# Patient Record
Sex: FEMALE | Race: WHITE | NOT HISPANIC OR LATINO | Employment: OTHER | URBAN - METROPOLITAN AREA
[De-identification: names, ages, dates, MRNs, and addresses within clinical notes are randomized per-mention and may not be internally consistent; named-entity substitution may affect disease eponyms.]

---

## 2017-09-26 ENCOUNTER — AMBULATORY - HEALTHEAST (OUTPATIENT)
Dept: ADMINISTRATIVE | Facility: CLINIC | Age: 62
End: 2017-09-26

## 2017-09-26 RX ORDER — AMITRIPTYLINE HYDROCHLORIDE 150 MG/1
150 TABLET ORAL AT BEDTIME
Status: SHIPPED | COMMUNITY
Start: 2017-09-26

## 2017-09-26 RX ORDER — FERROUS SULFATE 325(65) MG
1 TABLET ORAL
Status: SHIPPED | COMMUNITY
Start: 2017-09-26

## 2017-09-26 RX ORDER — METOPROLOL SUCCINATE 25 MG/1
25 TABLET, EXTENDED RELEASE ORAL DAILY
Status: SHIPPED | COMMUNITY
Start: 2020-04-02

## 2017-09-26 RX ORDER — ROSUVASTATIN CALCIUM 20 MG/1
20 TABLET, COATED ORAL AT BEDTIME
Status: SHIPPED | COMMUNITY
Start: 2017-09-26

## 2017-09-26 RX ORDER — LISINOPRIL 10 MG/1
20 TABLET ORAL DAILY
Status: SHIPPED | COMMUNITY
Start: 2017-09-26

## 2017-09-26 RX ORDER — NITROGLYCERIN 0.4 MG/1
0.4 TABLET SUBLINGUAL EVERY 5 MIN PRN
Status: SHIPPED | COMMUNITY
Start: 2017-09-26

## 2017-09-28 ENCOUNTER — OFFICE VISIT - HEALTHEAST (OUTPATIENT)
Dept: GERIATRICS | Facility: CLINIC | Age: 62
End: 2017-09-28

## 2017-09-28 DIAGNOSIS — Z79.4 TYPE 2 DIABETES MELLITUS WITH HYPERGLYCEMIA, WITH LONG-TERM CURRENT USE OF INSULIN (H): ICD-10-CM

## 2017-09-28 DIAGNOSIS — J44.9 COPD (CHRONIC OBSTRUCTIVE PULMONARY DISEASE) (H): ICD-10-CM

## 2017-09-28 DIAGNOSIS — N10 ACUTE PYELONEPHRITIS: ICD-10-CM

## 2017-09-28 DIAGNOSIS — E87.1 HYPONATREMIA: ICD-10-CM

## 2017-09-28 DIAGNOSIS — E87.6 HYPOKALEMIA: ICD-10-CM

## 2017-09-28 DIAGNOSIS — E11.65 TYPE 2 DIABETES MELLITUS WITH HYPERGLYCEMIA, WITH LONG-TERM CURRENT USE OF INSULIN (H): ICD-10-CM

## 2017-09-28 DIAGNOSIS — R10.9 ACUTE ABDOMINAL PAIN IN RIGHT FLANK: ICD-10-CM

## 2017-09-28 DIAGNOSIS — I25.10 CORONARY ARTERY DISEASE: ICD-10-CM

## 2017-09-29 ENCOUNTER — OFFICE VISIT - HEALTHEAST (OUTPATIENT)
Dept: GERIATRICS | Facility: CLINIC | Age: 62
End: 2017-09-29

## 2017-09-29 DIAGNOSIS — N17.9 AKI (ACUTE KIDNEY INJURY) (H): ICD-10-CM

## 2017-09-29 DIAGNOSIS — J43.9 PULMONARY EMPHYSEMA (H): ICD-10-CM

## 2017-09-29 DIAGNOSIS — Z79.4 TYPE 2 DIABETES MELLITUS WITH HYPERGLYCEMIA, WITH LONG-TERM CURRENT USE OF INSULIN (H): ICD-10-CM

## 2017-09-29 DIAGNOSIS — K59.01 SLOW TRANSIT CONSTIPATION: ICD-10-CM

## 2017-09-29 DIAGNOSIS — E34.9 HYPERTENSION SECONDARY TO ENDOCRINE DISORDER WITH GOAL BLOOD PRESSURE LESS THAN 130/80: ICD-10-CM

## 2017-09-29 DIAGNOSIS — N10 ACUTE PYELONEPHRITIS: ICD-10-CM

## 2017-09-29 DIAGNOSIS — E11.65 TYPE 2 DIABETES MELLITUS WITH HYPERGLYCEMIA, WITH LONG-TERM CURRENT USE OF INSULIN (H): ICD-10-CM

## 2017-09-29 DIAGNOSIS — I15.2 HYPERTENSION SECONDARY TO ENDOCRINE DISORDER WITH GOAL BLOOD PRESSURE LESS THAN 130/80: ICD-10-CM

## 2017-10-02 ENCOUNTER — RECORDS - HEALTHEAST (OUTPATIENT)
Dept: LAB | Facility: CLINIC | Age: 62
End: 2017-10-02

## 2017-10-03 ENCOUNTER — OFFICE VISIT - HEALTHEAST (OUTPATIENT)
Dept: GERIATRICS | Facility: CLINIC | Age: 62
End: 2017-10-03

## 2017-10-03 DIAGNOSIS — E11.65 TYPE 2 DIABETES MELLITUS WITH HYPERGLYCEMIA, WITH LONG-TERM CURRENT USE OF INSULIN (H): ICD-10-CM

## 2017-10-03 DIAGNOSIS — Z79.4 TYPE 2 DIABETES MELLITUS WITH HYPERGLYCEMIA, WITH LONG-TERM CURRENT USE OF INSULIN (H): ICD-10-CM

## 2017-10-03 DIAGNOSIS — N12 PYELONEPHRITIS: ICD-10-CM

## 2017-10-03 DIAGNOSIS — I15.2 HYPERTENSION DUE TO ENDOCRINE DISORDER: ICD-10-CM

## 2017-10-03 DIAGNOSIS — D64.9 ANEMIA: ICD-10-CM

## 2017-10-03 LAB — HBA1C MFR BLD: 16.4 % (ref 4.2–6.1)

## 2017-10-06 ENCOUNTER — OFFICE VISIT - HEALTHEAST (OUTPATIENT)
Dept: GERIATRICS | Facility: CLINIC | Age: 62
End: 2017-10-06

## 2017-10-06 DIAGNOSIS — N12 PYELONEPHRITIS: ICD-10-CM

## 2017-10-06 DIAGNOSIS — Z79.4 TYPE 2 DIABETES MELLITUS WITH HYPERGLYCEMIA, WITH LONG-TERM CURRENT USE OF INSULIN (H): ICD-10-CM

## 2017-10-06 DIAGNOSIS — E03.9 HYPOTHYROIDISM: ICD-10-CM

## 2017-10-06 DIAGNOSIS — E11.65 TYPE 2 DIABETES MELLITUS WITH HYPERGLYCEMIA, WITH LONG-TERM CURRENT USE OF INSULIN (H): ICD-10-CM

## 2017-10-06 DIAGNOSIS — N10 ACUTE PYELONEPHRITIS: ICD-10-CM

## 2017-10-09 ENCOUNTER — AMBULATORY - HEALTHEAST (OUTPATIENT)
Dept: GERIATRICS | Facility: CLINIC | Age: 62
End: 2017-10-09

## 2018-04-23 ENCOUNTER — RECORDS - HEALTHEAST (OUTPATIENT)
Dept: LAB | Facility: CLINIC | Age: 63
End: 2018-04-23

## 2018-04-23 LAB
ALBUMIN SERPL-MCNC: 3 G/DL (ref 3.5–5)
ALP SERPL-CCNC: 262 U/L (ref 45–120)
ALT SERPL W P-5'-P-CCNC: 22 U/L (ref 0–45)
ANION GAP SERPL CALCULATED.3IONS-SCNC: 9 MMOL/L (ref 5–18)
AST SERPL W P-5'-P-CCNC: 19 U/L (ref 0–40)
BILIRUB SERPL-MCNC: 0.4 MG/DL (ref 0–1)
BUN SERPL-MCNC: 7 MG/DL (ref 8–22)
CALCIUM SERPL-MCNC: 9 MG/DL (ref 8.5–10.5)
CHLORIDE BLD-SCNC: 104 MMOL/L (ref 98–107)
CHOLEST SERPL-MCNC: 276 MG/DL
CO2 SERPL-SCNC: 26 MMOL/L (ref 22–31)
CREAT SERPL-MCNC: 0.76 MG/DL (ref 0.6–1.1)
FASTING STATUS PATIENT QL REPORTED: ABNORMAL
GFR SERPL CREATININE-BSD FRML MDRD: >60 ML/MIN/1.73M2
GLUCOSE BLD-MCNC: 86 MG/DL (ref 70–125)
HDLC SERPL-MCNC: 51 MG/DL
LDLC SERPL CALC-MCNC: 176 MG/DL
POTASSIUM BLD-SCNC: 4.8 MMOL/L (ref 3.5–5)
PROT SERPL-MCNC: 6.1 G/DL (ref 6–8)
SODIUM SERPL-SCNC: 139 MMOL/L (ref 136–145)
TRIGL SERPL-MCNC: 246 MG/DL

## 2018-05-31 ENCOUNTER — RECORDS - HEALTHEAST (OUTPATIENT)
Dept: LAB | Facility: CLINIC | Age: 63
End: 2018-05-31

## 2018-05-31 LAB
ALBUMIN SERPL-MCNC: 3.1 G/DL (ref 3.5–5)
ALP SERPL-CCNC: 302 U/L (ref 45–120)
ALT SERPL W P-5'-P-CCNC: 50 U/L (ref 0–45)
ANION GAP SERPL CALCULATED.3IONS-SCNC: 12 MMOL/L (ref 5–18)
AST SERPL W P-5'-P-CCNC: 35 U/L (ref 0–40)
BILIRUB SERPL-MCNC: 0.2 MG/DL (ref 0–1)
BUN SERPL-MCNC: 16 MG/DL (ref 8–22)
CALCIUM SERPL-MCNC: 8.7 MG/DL (ref 8.5–10.5)
CHLORIDE BLD-SCNC: 104 MMOL/L (ref 98–107)
CHOLEST SERPL-MCNC: 210 MG/DL
CO2 SERPL-SCNC: 20 MMOL/L (ref 22–31)
CREAT SERPL-MCNC: 0.95 MG/DL (ref 0.6–1.1)
CREAT UR-MCNC: 13.7 MG/DL
FASTING STATUS PATIENT QL REPORTED: NO
GFR SERPL CREATININE-BSD FRML MDRD: 59 ML/MIN/1.73M2
GLUCOSE BLD-MCNC: 329 MG/DL (ref 70–125)
HDLC SERPL-MCNC: 69 MG/DL
LDLC SERPL CALC-MCNC: 84 MG/DL
MICROALBUMIN UR-MCNC: 5.89 MG/DL (ref 0–1.99)
MICROALBUMIN/CREAT UR: 429.9 MG/G
POTASSIUM BLD-SCNC: 4.5 MMOL/L (ref 3.5–5)
PROT SERPL-MCNC: 6.3 G/DL (ref 6–8)
SODIUM SERPL-SCNC: 136 MMOL/L (ref 136–145)
TRIGL SERPL-MCNC: 285 MG/DL
TSH SERPL DL<=0.005 MIU/L-ACNC: 2.95 UIU/ML (ref 0.3–5)

## 2018-06-02 LAB — BACTERIA SPEC CULT: ABNORMAL

## 2018-10-03 ENCOUNTER — RECORDS - HEALTHEAST (OUTPATIENT)
Dept: LAB | Facility: CLINIC | Age: 63
End: 2018-10-03

## 2018-10-03 LAB
ANION GAP SERPL CALCULATED.3IONS-SCNC: 11 MMOL/L (ref 5–18)
BUN SERPL-MCNC: 19 MG/DL (ref 8–22)
CALCIUM SERPL-MCNC: 9.3 MG/DL (ref 8.5–10.5)
CHLORIDE BLD-SCNC: 103 MMOL/L (ref 98–107)
CHOLEST SERPL-MCNC: 208 MG/DL
CO2 SERPL-SCNC: 28 MMOL/L (ref 22–31)
CREAT SERPL-MCNC: 0.81 MG/DL (ref 0.6–1.1)
FASTING STATUS PATIENT QL REPORTED: NO
GFR SERPL CREATININE-BSD FRML MDRD: >60 ML/MIN/1.73M2
GLUCOSE BLD-MCNC: 68 MG/DL (ref 70–125)
HDLC SERPL-MCNC: 58 MG/DL
LDLC SERPL CALC-MCNC: 119 MG/DL
POTASSIUM BLD-SCNC: 4.6 MMOL/L (ref 3.5–5)
SODIUM SERPL-SCNC: 142 MMOL/L (ref 136–145)
TRIGL SERPL-MCNC: 156 MG/DL

## 2019-09-04 ENCOUNTER — OFFICE VISIT - HEALTHEAST (OUTPATIENT)
Dept: GERIATRICS | Facility: CLINIC | Age: 64
End: 2019-09-04

## 2019-09-04 DIAGNOSIS — J43.9 PULMONARY EMPHYSEMA, UNSPECIFIED EMPHYSEMA TYPE (H): ICD-10-CM

## 2019-09-04 DIAGNOSIS — H91.91 HEARING LOSS OF RIGHT EAR, UNSPECIFIED HEARING LOSS TYPE: ICD-10-CM

## 2019-09-04 DIAGNOSIS — I15.2 HYPERTENSION DUE TO ENDOCRINE DISORDER: ICD-10-CM

## 2019-09-04 DIAGNOSIS — R13.10 DYSPHAGIA, UNSPECIFIED TYPE: ICD-10-CM

## 2019-09-04 DIAGNOSIS — E11.42 DIABETIC PERIPHERAL NEUROPATHY (H): ICD-10-CM

## 2019-09-04 DIAGNOSIS — H93.11 TINNITUS OF RIGHT EAR: ICD-10-CM

## 2019-09-04 DIAGNOSIS — R42 DIZZINESS: ICD-10-CM

## 2019-09-04 DIAGNOSIS — R20.0 RIGHT FACIAL NUMBNESS: ICD-10-CM

## 2019-09-04 DIAGNOSIS — R47.89 WORD FINDING DIFFICULTY: ICD-10-CM

## 2019-09-04 DIAGNOSIS — R26.9 GAIT DISTURBANCE: ICD-10-CM

## 2019-09-04 ASSESSMENT — MIFFLIN-ST. JEOR: SCORE: 1251.21

## 2019-09-06 ENCOUNTER — OFFICE VISIT - HEALTHEAST (OUTPATIENT)
Dept: GERIATRICS | Facility: CLINIC | Age: 64
End: 2019-09-06

## 2019-09-06 DIAGNOSIS — R42 DIZZINESS: ICD-10-CM

## 2019-09-06 DIAGNOSIS — G47.00 INSOMNIA, UNSPECIFIED TYPE: ICD-10-CM

## 2019-09-06 DIAGNOSIS — Z79.4 TYPE 2 DIABETES MELLITUS WITH HYPERGLYCEMIA, WITH LONG-TERM CURRENT USE OF INSULIN (H): ICD-10-CM

## 2019-09-06 DIAGNOSIS — E86.0 ACUTE DEHYDRATION: ICD-10-CM

## 2019-09-06 DIAGNOSIS — E11.319 DIABETIC RETINOPATHY OF BOTH EYES ASSOCIATED WITH TYPE 2 DIABETES MELLITUS, MACULAR EDEMA PRESENCE UNSPECIFIED, UNSPECIFIED RETINOPATHY SEVERITY (H): ICD-10-CM

## 2019-09-06 DIAGNOSIS — E11.65 TYPE 2 DIABETES MELLITUS WITH HYPERGLYCEMIA, WITH LONG-TERM CURRENT USE OF INSULIN (H): ICD-10-CM

## 2019-09-06 DIAGNOSIS — I10 ESSENTIAL HYPERTENSION: ICD-10-CM

## 2019-09-06 DIAGNOSIS — E11.42 DIABETIC PERIPHERAL NEUROPATHY (H): ICD-10-CM

## 2019-09-06 DIAGNOSIS — F32.A DEPRESSION, UNSPECIFIED DEPRESSION TYPE: ICD-10-CM

## 2019-09-06 DIAGNOSIS — N30.00 ACUTE CYSTITIS WITHOUT HEMATURIA: ICD-10-CM

## 2019-09-06 DIAGNOSIS — I25.10 CORONARY ARTERY DISEASE INVOLVING NATIVE CORONARY ARTERY OF NATIVE HEART WITHOUT ANGINA PECTORIS: ICD-10-CM

## 2019-09-09 ENCOUNTER — OFFICE VISIT - HEALTHEAST (OUTPATIENT)
Dept: GERIATRICS | Facility: CLINIC | Age: 64
End: 2019-09-09

## 2019-09-09 DIAGNOSIS — H93.11 TINNITUS OF RIGHT EAR: ICD-10-CM

## 2019-09-09 DIAGNOSIS — E11.42 DIABETIC PERIPHERAL NEUROPATHY (H): ICD-10-CM

## 2019-09-09 DIAGNOSIS — I15.2 HYPERTENSION DUE TO ENDOCRINE DISORDER: ICD-10-CM

## 2019-09-09 DIAGNOSIS — R20.0 RIGHT FACIAL NUMBNESS: ICD-10-CM

## 2019-09-09 DIAGNOSIS — R26.9 GAIT DISTURBANCE: ICD-10-CM

## 2019-09-09 DIAGNOSIS — Z79.4 TYPE 2 DIABETES MELLITUS WITH HYPERGLYCEMIA, WITH LONG-TERM CURRENT USE OF INSULIN (H): ICD-10-CM

## 2019-09-09 DIAGNOSIS — E11.65 TYPE 2 DIABETES MELLITUS WITH HYPERGLYCEMIA, WITH LONG-TERM CURRENT USE OF INSULIN (H): ICD-10-CM

## 2019-09-09 DIAGNOSIS — R42 DIZZINESS: ICD-10-CM

## 2019-09-09 DIAGNOSIS — H91.91 HEARING LOSS OF RIGHT EAR, UNSPECIFIED HEARING LOSS TYPE: ICD-10-CM

## 2019-09-09 DIAGNOSIS — J43.9 PULMONARY EMPHYSEMA, UNSPECIFIED EMPHYSEMA TYPE (H): ICD-10-CM

## 2019-09-09 RX ORDER — ALBUTEROL SULFATE 90 UG/1
2 AEROSOL, METERED RESPIRATORY (INHALATION) 4 TIMES DAILY PRN
Status: SHIPPED | COMMUNITY
Start: 2019-09-09

## 2019-09-09 ASSESSMENT — MIFFLIN-ST. JEOR: SCORE: 1243.05

## 2019-09-11 ENCOUNTER — OFFICE VISIT - HEALTHEAST (OUTPATIENT)
Dept: GERIATRICS | Facility: CLINIC | Age: 64
End: 2019-09-11

## 2019-09-11 DIAGNOSIS — E11.65 TYPE 2 DIABETES MELLITUS WITH HYPERGLYCEMIA, WITH LONG-TERM CURRENT USE OF INSULIN (H): ICD-10-CM

## 2019-09-11 DIAGNOSIS — E11.42 DIABETIC PERIPHERAL NEUROPATHY (H): ICD-10-CM

## 2019-09-11 DIAGNOSIS — H93.11 TINNITUS OF RIGHT EAR: ICD-10-CM

## 2019-09-11 DIAGNOSIS — Z79.4 TYPE 2 DIABETES MELLITUS WITH HYPERGLYCEMIA, WITH LONG-TERM CURRENT USE OF INSULIN (H): ICD-10-CM

## 2019-09-11 DIAGNOSIS — R26.9 GAIT DISTURBANCE: ICD-10-CM

## 2019-09-11 DIAGNOSIS — J43.9 PULMONARY EMPHYSEMA, UNSPECIFIED EMPHYSEMA TYPE (H): ICD-10-CM

## 2019-09-11 DIAGNOSIS — R42 DIZZINESS: ICD-10-CM

## 2019-09-11 DIAGNOSIS — R20.0 RIGHT FACIAL NUMBNESS: ICD-10-CM

## 2019-09-11 DIAGNOSIS — I15.2 HYPERTENSION DUE TO ENDOCRINE DISORDER: ICD-10-CM

## 2019-09-11 DIAGNOSIS — H91.91 HEARING LOSS OF RIGHT EAR, UNSPECIFIED HEARING LOSS TYPE: ICD-10-CM

## 2019-09-11 ASSESSMENT — MIFFLIN-ST. JEOR: SCORE: 1243.05

## 2019-09-13 ENCOUNTER — OFFICE VISIT - HEALTHEAST (OUTPATIENT)
Dept: GERIATRICS | Facility: CLINIC | Age: 64
End: 2019-09-13

## 2019-09-13 DIAGNOSIS — E11.42 DIABETIC PERIPHERAL NEUROPATHY (H): ICD-10-CM

## 2019-09-13 DIAGNOSIS — F32.A DEPRESSION, UNSPECIFIED DEPRESSION TYPE: ICD-10-CM

## 2019-09-13 DIAGNOSIS — E11.311 DIABETIC RETINOPATHY OF BOTH EYES WITH MACULAR EDEMA ASSOCIATED WITH TYPE 2 DIABETES MELLITUS, UNSPECIFIED RETINOPATHY SEVERITY (H): ICD-10-CM

## 2019-09-13 DIAGNOSIS — E11.65 TYPE 2 DIABETES MELLITUS WITH HYPERGLYCEMIA, WITH LONG-TERM CURRENT USE OF INSULIN (H): ICD-10-CM

## 2019-09-13 DIAGNOSIS — R42 DIZZINESS: ICD-10-CM

## 2019-09-13 DIAGNOSIS — G47.00 INSOMNIA, UNSPECIFIED TYPE: ICD-10-CM

## 2019-09-13 DIAGNOSIS — Z79.4 TYPE 2 DIABETES MELLITUS WITH HYPERGLYCEMIA, WITH LONG-TERM CURRENT USE OF INSULIN (H): ICD-10-CM

## 2019-09-13 DIAGNOSIS — I10 HYPERTENSION, UNSPECIFIED TYPE: ICD-10-CM

## 2019-09-16 ENCOUNTER — OFFICE VISIT - HEALTHEAST (OUTPATIENT)
Dept: GERIATRICS | Facility: CLINIC | Age: 64
End: 2019-09-16

## 2019-09-16 DIAGNOSIS — E11.65 TYPE 2 DIABETES MELLITUS WITH HYPERGLYCEMIA, WITH LONG-TERM CURRENT USE OF INSULIN (H): ICD-10-CM

## 2019-09-16 DIAGNOSIS — H91.91 HEARING LOSS OF RIGHT EAR, UNSPECIFIED HEARING LOSS TYPE: ICD-10-CM

## 2019-09-16 DIAGNOSIS — R42 DIZZINESS: ICD-10-CM

## 2019-09-16 DIAGNOSIS — R26.9 GAIT DISTURBANCE: ICD-10-CM

## 2019-09-16 DIAGNOSIS — J43.9 PULMONARY EMPHYSEMA, UNSPECIFIED EMPHYSEMA TYPE (H): ICD-10-CM

## 2019-09-16 DIAGNOSIS — F32.A DEPRESSION, UNSPECIFIED DEPRESSION TYPE: ICD-10-CM

## 2019-09-16 DIAGNOSIS — H93.11 TINNITUS OF RIGHT EAR: ICD-10-CM

## 2019-09-16 DIAGNOSIS — E11.42 DIABETIC PERIPHERAL NEUROPATHY (H): ICD-10-CM

## 2019-09-16 DIAGNOSIS — I15.2 HYPERTENSION DUE TO ENDOCRINE DISORDER: ICD-10-CM

## 2019-09-16 DIAGNOSIS — Z79.4 TYPE 2 DIABETES MELLITUS WITH HYPERGLYCEMIA, WITH LONG-TERM CURRENT USE OF INSULIN (H): ICD-10-CM

## 2019-09-16 ASSESSMENT — MIFFLIN-ST. JEOR: SCORE: 1276.62

## 2019-09-18 ENCOUNTER — OFFICE VISIT - HEALTHEAST (OUTPATIENT)
Dept: GERIATRICS | Facility: CLINIC | Age: 64
End: 2019-09-18

## 2019-09-18 DIAGNOSIS — H91.91 HEARING LOSS OF RIGHT EAR, UNSPECIFIED HEARING LOSS TYPE: ICD-10-CM

## 2019-09-18 DIAGNOSIS — F32.A DEPRESSION, UNSPECIFIED DEPRESSION TYPE: ICD-10-CM

## 2019-09-18 DIAGNOSIS — I25.10 CORONARY ARTERY DISEASE INVOLVING NATIVE CORONARY ARTERY OF NATIVE HEART WITHOUT ANGINA PECTORIS: ICD-10-CM

## 2019-09-18 DIAGNOSIS — H93.11 TINNITUS OF RIGHT EAR: ICD-10-CM

## 2019-09-18 DIAGNOSIS — R42 DIZZINESS: ICD-10-CM

## 2019-09-18 DIAGNOSIS — J43.9 PULMONARY EMPHYSEMA, UNSPECIFIED EMPHYSEMA TYPE (H): ICD-10-CM

## 2019-09-18 DIAGNOSIS — R26.9 GAIT DISTURBANCE: ICD-10-CM

## 2019-09-18 DIAGNOSIS — E11.65 TYPE 2 DIABETES MELLITUS WITH HYPERGLYCEMIA, WITH LONG-TERM CURRENT USE OF INSULIN (H): ICD-10-CM

## 2019-09-18 DIAGNOSIS — I15.2 HYPERTENSION DUE TO ENDOCRINE DISORDER: ICD-10-CM

## 2019-09-18 DIAGNOSIS — Z79.4 TYPE 2 DIABETES MELLITUS WITH HYPERGLYCEMIA, WITH LONG-TERM CURRENT USE OF INSULIN (H): ICD-10-CM

## 2019-09-18 DIAGNOSIS — E11.42 DIABETIC PERIPHERAL NEUROPATHY (H): ICD-10-CM

## 2019-09-18 ASSESSMENT — MIFFLIN-ST. JEOR: SCORE: 1276.62

## 2019-09-23 ENCOUNTER — AMBULATORY - HEALTHEAST (OUTPATIENT)
Dept: GERIATRICS | Facility: CLINIC | Age: 64
End: 2019-09-23

## 2020-03-14 ENCOUNTER — RECORDS - HEALTHEAST (OUTPATIENT)
Dept: ADMINISTRATIVE | Facility: OTHER | Age: 65
End: 2020-03-14

## 2020-03-21 ENCOUNTER — RECORDS - HEALTHEAST (OUTPATIENT)
Dept: ADMINISTRATIVE | Facility: OTHER | Age: 65
End: 2020-03-21

## 2020-03-23 ENCOUNTER — OFFICE VISIT - HEALTHEAST (OUTPATIENT)
Dept: GERIATRICS | Facility: CLINIC | Age: 65
End: 2020-03-23

## 2020-03-23 DIAGNOSIS — H90.5 SENSORINEURAL HEARING LOSS (SNHL) OF RIGHT EAR, UNSPECIFIED HEARING STATUS ON CONTRALATERAL SIDE: ICD-10-CM

## 2020-03-23 DIAGNOSIS — E11.65 TYPE 2 DIABETES MELLITUS WITH HYPERGLYCEMIA, WITH LONG-TERM CURRENT USE OF INSULIN (H): ICD-10-CM

## 2020-03-23 DIAGNOSIS — S42.351D CLOSED DISPLACED COMMINUTED FRACTURE OF SHAFT OF RIGHT HUMERUS WITH ROUTINE HEALING: ICD-10-CM

## 2020-03-23 DIAGNOSIS — E03.9 ACQUIRED HYPOTHYROIDISM: ICD-10-CM

## 2020-03-23 DIAGNOSIS — E11.42 DIABETIC PERIPHERAL NEUROPATHY (H): ICD-10-CM

## 2020-03-23 DIAGNOSIS — E78.5 HYPERLIPIDEMIA, UNSPECIFIED HYPERLIPIDEMIA TYPE: ICD-10-CM

## 2020-03-23 DIAGNOSIS — Z79.4 TYPE 2 DIABETES MELLITUS WITH HYPERGLYCEMIA, WITH LONG-TERM CURRENT USE OF INSULIN (H): ICD-10-CM

## 2020-03-23 DIAGNOSIS — R47.89 WORD FINDING DIFFICULTY: ICD-10-CM

## 2020-03-23 DIAGNOSIS — F33.9 EPISODE OF RECURRENT MAJOR DEPRESSIVE DISORDER, UNSPECIFIED DEPRESSION EPISODE SEVERITY (H): ICD-10-CM

## 2020-03-23 RX ORDER — POLYETHYLENE GLYCOL 3350 17 G/17G
17 POWDER, FOR SOLUTION ORAL DAILY PRN
Status: SHIPPED | COMMUNITY
Start: 2020-03-23

## 2020-03-23 RX ORDER — BUSPIRONE HYDROCHLORIDE 10 MG/1
20 TABLET ORAL AT BEDTIME
Status: SHIPPED | COMMUNITY
Start: 2020-03-23

## 2020-03-23 RX ORDER — BUDESONIDE AND FORMOTEROL FUMARATE DIHYDRATE 160; 4.5 UG/1; UG/1
2 AEROSOL RESPIRATORY (INHALATION) 2 TIMES DAILY
Status: SHIPPED | COMMUNITY
Start: 2020-03-23

## 2020-03-23 RX ORDER — ACETAMINOPHEN 500 MG
1000 TABLET ORAL 3 TIMES DAILY
Status: SHIPPED | COMMUNITY
Start: 2020-03-23

## 2020-03-23 RX ORDER — PHENOL 1.4 %
10 AEROSOL, SPRAY (ML) MUCOUS MEMBRANE AT BEDTIME
Status: SHIPPED | COMMUNITY
Start: 2020-03-23

## 2020-03-23 ASSESSMENT — MIFFLIN-ST. JEOR: SCORE: 1379.13

## 2020-03-24 ENCOUNTER — AMBULATORY - HEALTHEAST (OUTPATIENT)
Dept: ADMINISTRATIVE | Facility: CLINIC | Age: 65
End: 2020-03-24

## 2020-03-24 RX ORDER — METFORMIN HYDROCHLORIDE 750 MG/1
750 TABLET, EXTENDED RELEASE ORAL 2 TIMES DAILY WITH MEALS
Status: SHIPPED | COMMUNITY
Start: 2020-03-24

## 2020-03-24 RX ORDER — LEVOTHYROXINE SODIUM 50 UG/1
50 TABLET ORAL DAILY
Status: SHIPPED | COMMUNITY
Start: 2020-03-24

## 2020-03-25 ENCOUNTER — OFFICE VISIT - HEALTHEAST (OUTPATIENT)
Dept: GERIATRICS | Facility: CLINIC | Age: 65
End: 2020-03-25

## 2020-03-25 DIAGNOSIS — E03.9 ACQUIRED HYPOTHYROIDISM: ICD-10-CM

## 2020-03-25 DIAGNOSIS — R47.89 WORD FINDING DIFFICULTY: ICD-10-CM

## 2020-03-25 DIAGNOSIS — S42.351D CLOSED DISPLACED COMMINUTED FRACTURE OF SHAFT OF RIGHT HUMERUS WITH ROUTINE HEALING: ICD-10-CM

## 2020-03-25 DIAGNOSIS — Z79.4 TYPE 2 DIABETES MELLITUS WITH HYPERGLYCEMIA, WITH LONG-TERM CURRENT USE OF INSULIN (H): ICD-10-CM

## 2020-03-25 DIAGNOSIS — E11.65 TYPE 2 DIABETES MELLITUS WITH HYPERGLYCEMIA, WITH LONG-TERM CURRENT USE OF INSULIN (H): ICD-10-CM

## 2020-03-25 DIAGNOSIS — I15.2 HYPERTENSION DUE TO ENDOCRINE DISORDER: ICD-10-CM

## 2020-03-25 DIAGNOSIS — E78.5 HYPERLIPIDEMIA, UNSPECIFIED HYPERLIPIDEMIA TYPE: ICD-10-CM

## 2020-03-25 DIAGNOSIS — E11.42 DIABETIC PERIPHERAL NEUROPATHY (H): ICD-10-CM

## 2020-03-25 DIAGNOSIS — F33.9 EPISODE OF RECURRENT MAJOR DEPRESSIVE DISORDER, UNSPECIFIED DEPRESSION EPISODE SEVERITY (H): ICD-10-CM

## 2020-03-25 DIAGNOSIS — H90.5 SENSORINEURAL HEARING LOSS (SNHL) OF RIGHT EAR, UNSPECIFIED HEARING STATUS ON CONTRALATERAL SIDE: ICD-10-CM

## 2020-03-25 ASSESSMENT — MIFFLIN-ST. JEOR: SCORE: 1379.13

## 2020-03-30 ENCOUNTER — OFFICE VISIT - HEALTHEAST (OUTPATIENT)
Dept: GERIATRICS | Facility: CLINIC | Age: 65
End: 2020-03-30

## 2020-03-30 DIAGNOSIS — S42.351D CLOSED DISPLACED COMMINUTED FRACTURE OF SHAFT OF RIGHT HUMERUS WITH ROUTINE HEALING: ICD-10-CM

## 2020-03-30 DIAGNOSIS — F33.9 EPISODE OF RECURRENT MAJOR DEPRESSIVE DISORDER, UNSPECIFIED DEPRESSION EPISODE SEVERITY (H): ICD-10-CM

## 2020-03-30 DIAGNOSIS — E78.5 HYPERLIPIDEMIA, UNSPECIFIED HYPERLIPIDEMIA TYPE: ICD-10-CM

## 2020-03-30 DIAGNOSIS — H90.5 SENSORINEURAL HEARING LOSS (SNHL) OF RIGHT EAR, UNSPECIFIED HEARING STATUS ON CONTRALATERAL SIDE: ICD-10-CM

## 2020-03-30 DIAGNOSIS — E11.65 TYPE 2 DIABETES MELLITUS WITH HYPERGLYCEMIA, WITH LONG-TERM CURRENT USE OF INSULIN (H): ICD-10-CM

## 2020-03-30 DIAGNOSIS — R47.89 WORD FINDING DIFFICULTY: ICD-10-CM

## 2020-03-30 DIAGNOSIS — I15.2 HYPERTENSION DUE TO ENDOCRINE DISORDER: ICD-10-CM

## 2020-03-30 DIAGNOSIS — Z79.4 TYPE 2 DIABETES MELLITUS WITH HYPERGLYCEMIA, WITH LONG-TERM CURRENT USE OF INSULIN (H): ICD-10-CM

## 2020-03-30 DIAGNOSIS — E03.9 ACQUIRED HYPOTHYROIDISM: ICD-10-CM

## 2020-03-30 DIAGNOSIS — E11.42 DIABETIC PERIPHERAL NEUROPATHY (H): ICD-10-CM

## 2020-03-30 ASSESSMENT — MIFFLIN-ST. JEOR: SCORE: 1379.13

## 2020-04-02 ENCOUNTER — OFFICE VISIT - HEALTHEAST (OUTPATIENT)
Dept: GERIATRICS | Facility: CLINIC | Age: 65
End: 2020-04-02

## 2020-04-02 DIAGNOSIS — E11.42 DIABETIC PERIPHERAL NEUROPATHY (H): ICD-10-CM

## 2020-04-02 DIAGNOSIS — E11.65 TYPE 2 DIABETES MELLITUS WITH HYPERGLYCEMIA, WITH LONG-TERM CURRENT USE OF INSULIN (H): ICD-10-CM

## 2020-04-02 DIAGNOSIS — E03.9 ACQUIRED HYPOTHYROIDISM: ICD-10-CM

## 2020-04-02 DIAGNOSIS — F33.9 EPISODE OF RECURRENT MAJOR DEPRESSIVE DISORDER, UNSPECIFIED DEPRESSION EPISODE SEVERITY (H): ICD-10-CM

## 2020-04-02 DIAGNOSIS — Z79.4 TYPE 2 DIABETES MELLITUS WITH HYPERGLYCEMIA, WITH LONG-TERM CURRENT USE OF INSULIN (H): ICD-10-CM

## 2020-04-02 DIAGNOSIS — R47.89 WORD FINDING DIFFICULTY: ICD-10-CM

## 2020-04-02 DIAGNOSIS — I15.2 HYPERTENSION DUE TO ENDOCRINE DISORDER: ICD-10-CM

## 2020-04-02 DIAGNOSIS — H90.5 SENSORINEURAL HEARING LOSS (SNHL) OF RIGHT EAR, UNSPECIFIED HEARING STATUS ON CONTRALATERAL SIDE: ICD-10-CM

## 2020-04-02 DIAGNOSIS — E78.5 HYPERLIPIDEMIA, UNSPECIFIED HYPERLIPIDEMIA TYPE: ICD-10-CM

## 2020-04-02 DIAGNOSIS — S42.351D CLOSED DISPLACED COMMINUTED FRACTURE OF SHAFT OF RIGHT HUMERUS WITH ROUTINE HEALING: ICD-10-CM

## 2020-04-05 ASSESSMENT — MIFFLIN-ST. JEOR: SCORE: 1351.91

## 2020-04-06 ENCOUNTER — OFFICE VISIT - HEALTHEAST (OUTPATIENT)
Dept: GERIATRICS | Facility: CLINIC | Age: 65
End: 2020-04-06

## 2020-04-06 DIAGNOSIS — R47.89 WORD FINDING DIFFICULTY: ICD-10-CM

## 2020-04-06 DIAGNOSIS — I15.2 HYPERTENSION DUE TO ENDOCRINE DISORDER: ICD-10-CM

## 2020-04-06 DIAGNOSIS — H90.5 SENSORINEURAL HEARING LOSS (SNHL) OF RIGHT EAR, UNSPECIFIED HEARING STATUS ON CONTRALATERAL SIDE: ICD-10-CM

## 2020-04-06 DIAGNOSIS — Z79.4 TYPE 2 DIABETES MELLITUS WITH HYPERGLYCEMIA, WITH LONG-TERM CURRENT USE OF INSULIN (H): ICD-10-CM

## 2020-04-06 DIAGNOSIS — S42.351D CLOSED DISPLACED COMMINUTED FRACTURE OF SHAFT OF RIGHT HUMERUS WITH ROUTINE HEALING: ICD-10-CM

## 2020-04-06 DIAGNOSIS — E78.5 HYPERLIPIDEMIA, UNSPECIFIED HYPERLIPIDEMIA TYPE: ICD-10-CM

## 2020-04-06 DIAGNOSIS — E11.65 TYPE 2 DIABETES MELLITUS WITH HYPERGLYCEMIA, WITH LONG-TERM CURRENT USE OF INSULIN (H): ICD-10-CM

## 2020-04-06 DIAGNOSIS — E11.42 DIABETIC PERIPHERAL NEUROPATHY (H): ICD-10-CM

## 2020-04-06 DIAGNOSIS — E03.9 ACQUIRED HYPOTHYROIDISM: ICD-10-CM

## 2020-04-06 DIAGNOSIS — F33.9 EPISODE OF RECURRENT MAJOR DEPRESSIVE DISORDER, UNSPECIFIED DEPRESSION EPISODE SEVERITY (H): ICD-10-CM

## 2020-04-07 ASSESSMENT — MIFFLIN-ST. JEOR: SCORE: 1351.91

## 2020-04-08 ENCOUNTER — OFFICE VISIT - HEALTHEAST (OUTPATIENT)
Dept: GERIATRICS | Facility: CLINIC | Age: 65
End: 2020-04-08

## 2020-04-08 DIAGNOSIS — E03.9 ACQUIRED HYPOTHYROIDISM: ICD-10-CM

## 2020-04-08 DIAGNOSIS — E11.42 DIABETIC PERIPHERAL NEUROPATHY (H): ICD-10-CM

## 2020-04-08 DIAGNOSIS — F33.9 EPISODE OF RECURRENT MAJOR DEPRESSIVE DISORDER, UNSPECIFIED DEPRESSION EPISODE SEVERITY (H): ICD-10-CM

## 2020-04-08 DIAGNOSIS — S42.351D CLOSED DISPLACED COMMINUTED FRACTURE OF SHAFT OF RIGHT HUMERUS WITH ROUTINE HEALING: ICD-10-CM

## 2020-04-08 DIAGNOSIS — R47.89 WORD FINDING DIFFICULTY: ICD-10-CM

## 2020-04-08 DIAGNOSIS — I15.2 HYPERTENSION DUE TO ENDOCRINE DISORDER: ICD-10-CM

## 2020-04-08 DIAGNOSIS — Z79.4 TYPE 2 DIABETES MELLITUS WITH HYPERGLYCEMIA, WITH LONG-TERM CURRENT USE OF INSULIN (H): ICD-10-CM

## 2020-04-08 DIAGNOSIS — E11.65 TYPE 2 DIABETES MELLITUS WITH HYPERGLYCEMIA, WITH LONG-TERM CURRENT USE OF INSULIN (H): ICD-10-CM

## 2020-04-08 DIAGNOSIS — H90.5 SENSORINEURAL HEARING LOSS (SNHL) OF RIGHT EAR, UNSPECIFIED HEARING STATUS ON CONTRALATERAL SIDE: ICD-10-CM

## 2020-04-08 DIAGNOSIS — E78.5 HYPERLIPIDEMIA, UNSPECIFIED HYPERLIPIDEMIA TYPE: ICD-10-CM

## 2020-04-08 ASSESSMENT — MIFFLIN-ST. JEOR: SCORE: 1351.91

## 2020-04-13 ENCOUNTER — AMBULATORY - HEALTHEAST (OUTPATIENT)
Dept: GERIATRICS | Facility: CLINIC | Age: 65
End: 2020-04-13

## 2020-06-08 ENCOUNTER — RECORDS - HEALTHEAST (OUTPATIENT)
Dept: ADMINISTRATIVE | Facility: OTHER | Age: 65
End: 2020-06-08

## 2020-06-09 ENCOUNTER — RECORDS - HEALTHEAST (OUTPATIENT)
Dept: ADMINISTRATIVE | Facility: OTHER | Age: 65
End: 2020-06-09

## 2020-07-14 ENCOUNTER — TELEPHONE (OUTPATIENT)
Dept: BEHAVIORAL HEALTH | Facility: CLINIC | Age: 65
End: 2020-07-14

## 2020-07-14 NOTE — TELEPHONE ENCOUNTER
7/14/20 Received call from client, stated she had a Rule [address] Assessment done at [facility] and she is seeking IP CD TX. She stated a having medicare coverage. Client was informed that the CD program is not a provider of Medicare coverage. EBER

## 2020-07-20 ENCOUNTER — RECORDS - HEALTHEAST (OUTPATIENT)
Dept: ADMINISTRATIVE | Facility: OTHER | Age: 65
End: 2020-07-20

## 2020-07-24 ENCOUNTER — RECORDS - HEALTHEAST (OUTPATIENT)
Dept: ADMINISTRATIVE | Facility: OTHER | Age: 65
End: 2020-07-24

## 2020-07-27 ENCOUNTER — OFFICE VISIT - HEALTHEAST (OUTPATIENT)
Dept: GERIATRICS | Facility: CLINIC | Age: 65
End: 2020-07-27

## 2020-07-27 DIAGNOSIS — J43.9 PULMONARY EMPHYSEMA, UNSPECIFIED EMPHYSEMA TYPE (H): ICD-10-CM

## 2020-07-27 DIAGNOSIS — D63.8 ANEMIA OF CHRONIC DISEASE: ICD-10-CM

## 2020-07-27 DIAGNOSIS — Z79.4 TYPE 2 DIABETES MELLITUS WITH BOTH EYES AFFECTED BY RETINOPATHY AND MACULAR EDEMA, WITH LONG-TERM CURRENT USE OF INSULIN, UNSPECIFIED RETINOPATHY SEVERITY (H): ICD-10-CM

## 2020-07-27 DIAGNOSIS — E11.42 DIABETIC PERIPHERAL NEUROPATHY (H): ICD-10-CM

## 2020-07-27 DIAGNOSIS — I25.10 CORONARY ARTERY DISEASE INVOLVING NATIVE CORONARY ARTERY OF NATIVE HEART WITHOUT ANGINA PECTORIS: ICD-10-CM

## 2020-07-27 DIAGNOSIS — I26.99 OTHER ACUTE PULMONARY EMBOLISM WITHOUT ACUTE COR PULMONALE (H): ICD-10-CM

## 2020-07-27 DIAGNOSIS — E55.9 VITAMIN D DEFICIENCY: ICD-10-CM

## 2020-07-27 DIAGNOSIS — S02.40DD CLOSED FRACTURE OF LEFT SIDE OF MAXILLA WITH ROUTINE HEALING, SUBSEQUENT ENCOUNTER: ICD-10-CM

## 2020-07-27 DIAGNOSIS — E11.21 DIABETIC NEPHROPATHY ASSOCIATED WITH TYPE 2 DIABETES MELLITUS (H): ICD-10-CM

## 2020-07-27 DIAGNOSIS — H90.5 SENSORINEURAL HEARING LOSS (SNHL) OF RIGHT EAR, UNSPECIFIED HEARING STATUS ON CONTRALATERAL SIDE: ICD-10-CM

## 2020-07-27 DIAGNOSIS — E11.311 TYPE 2 DIABETES MELLITUS WITH BOTH EYES AFFECTED BY RETINOPATHY AND MACULAR EDEMA, WITH LONG-TERM CURRENT USE OF INSULIN, UNSPECIFIED RETINOPATHY SEVERITY (H): ICD-10-CM

## 2020-07-27 DIAGNOSIS — F33.9 EPISODE OF RECURRENT MAJOR DEPRESSIVE DISORDER, UNSPECIFIED DEPRESSION EPISODE SEVERITY (H): ICD-10-CM

## 2020-07-27 DIAGNOSIS — I15.2 HYPERTENSION DUE TO ENDOCRINE DISORDER: ICD-10-CM

## 2020-07-28 ENCOUNTER — AMBULATORY - HEALTHEAST (OUTPATIENT)
Dept: ADMINISTRATIVE | Facility: CLINIC | Age: 65
End: 2020-07-28

## 2020-07-28 RX ORDER — GLIMEPIRIDE 1 MG/1
1 TABLET ORAL
Status: SHIPPED | COMMUNITY
Start: 2020-07-28

## 2020-07-28 ASSESSMENT — MIFFLIN-ST. JEOR: SCORE: 1294.76

## 2020-07-29 ENCOUNTER — OFFICE VISIT - HEALTHEAST (OUTPATIENT)
Dept: GERIATRICS | Facility: CLINIC | Age: 65
End: 2020-07-29

## 2020-07-29 DIAGNOSIS — E11.42 DIABETIC PERIPHERAL NEUROPATHY (H): ICD-10-CM

## 2020-07-29 DIAGNOSIS — I15.2 HYPERTENSION DUE TO ENDOCRINE DISORDER: ICD-10-CM

## 2020-07-29 DIAGNOSIS — M25.511 CHRONIC RIGHT SHOULDER PAIN: ICD-10-CM

## 2020-07-29 DIAGNOSIS — Z79.4 TYPE 2 DIABETES MELLITUS WITH BOTH EYES AFFECTED BY RETINOPATHY AND MACULAR EDEMA, WITH LONG-TERM CURRENT USE OF INSULIN, UNSPECIFIED RETINOPATHY SEVERITY (H): ICD-10-CM

## 2020-07-29 DIAGNOSIS — G50.0 TRIGEMINAL NEURALGIA: ICD-10-CM

## 2020-07-29 DIAGNOSIS — R47.89 WORD FINDING DIFFICULTY: ICD-10-CM

## 2020-07-29 DIAGNOSIS — H90.5 SENSORINEURAL HEARING LOSS (SNHL) OF RIGHT EAR, UNSPECIFIED HEARING STATUS ON CONTRALATERAL SIDE: ICD-10-CM

## 2020-07-29 DIAGNOSIS — F33.9 EPISODE OF RECURRENT MAJOR DEPRESSIVE DISORDER, UNSPECIFIED DEPRESSION EPISODE SEVERITY (H): ICD-10-CM

## 2020-07-29 DIAGNOSIS — S02.40DD CLOSED FRACTURE OF LEFT SIDE OF MAXILLA WITH ROUTINE HEALING, SUBSEQUENT ENCOUNTER: ICD-10-CM

## 2020-07-29 DIAGNOSIS — D63.8 ANEMIA OF CHRONIC DISEASE: ICD-10-CM

## 2020-07-29 DIAGNOSIS — E11.21 DIABETIC NEPHROPATHY ASSOCIATED WITH TYPE 2 DIABETES MELLITUS (H): ICD-10-CM

## 2020-07-29 DIAGNOSIS — J43.9 PULMONARY EMPHYSEMA, UNSPECIFIED EMPHYSEMA TYPE (H): ICD-10-CM

## 2020-07-29 DIAGNOSIS — I26.99 OTHER ACUTE PULMONARY EMBOLISM WITHOUT ACUTE COR PULMONALE (H): ICD-10-CM

## 2020-07-29 DIAGNOSIS — I25.10 CORONARY ARTERY DISEASE INVOLVING NATIVE CORONARY ARTERY OF NATIVE HEART WITHOUT ANGINA PECTORIS: ICD-10-CM

## 2020-07-29 DIAGNOSIS — E11.311 TYPE 2 DIABETES MELLITUS WITH BOTH EYES AFFECTED BY RETINOPATHY AND MACULAR EDEMA, WITH LONG-TERM CURRENT USE OF INSULIN, UNSPECIFIED RETINOPATHY SEVERITY (H): ICD-10-CM

## 2020-07-29 DIAGNOSIS — G89.29 CHRONIC RIGHT SHOULDER PAIN: ICD-10-CM

## 2020-07-29 ASSESSMENT — MIFFLIN-ST. JEOR: SCORE: 1297.48

## 2020-08-03 ENCOUNTER — OFFICE VISIT - HEALTHEAST (OUTPATIENT)
Dept: GERIATRICS | Facility: CLINIC | Age: 65
End: 2020-08-03

## 2020-08-03 DIAGNOSIS — S02.40DD CLOSED FRACTURE OF LEFT SIDE OF MAXILLA WITH ROUTINE HEALING, SUBSEQUENT ENCOUNTER: ICD-10-CM

## 2020-08-03 DIAGNOSIS — I25.10 CORONARY ARTERY DISEASE INVOLVING NATIVE CORONARY ARTERY OF NATIVE HEART WITHOUT ANGINA PECTORIS: ICD-10-CM

## 2020-08-03 DIAGNOSIS — I26.99 OTHER ACUTE PULMONARY EMBOLISM WITHOUT ACUTE COR PULMONALE (H): ICD-10-CM

## 2020-08-03 DIAGNOSIS — E11.42 DIABETIC PERIPHERAL NEUROPATHY (H): ICD-10-CM

## 2020-08-03 DIAGNOSIS — Z79.4 TYPE 2 DIABETES MELLITUS WITH BOTH EYES AFFECTED BY RETINOPATHY AND MACULAR EDEMA, WITH LONG-TERM CURRENT USE OF INSULIN, UNSPECIFIED RETINOPATHY SEVERITY (H): ICD-10-CM

## 2020-08-03 DIAGNOSIS — E11.311 TYPE 2 DIABETES MELLITUS WITH BOTH EYES AFFECTED BY RETINOPATHY AND MACULAR EDEMA, WITH LONG-TERM CURRENT USE OF INSULIN, UNSPECIFIED RETINOPATHY SEVERITY (H): ICD-10-CM

## 2020-08-03 DIAGNOSIS — E11.21 DIABETIC NEPHROPATHY ASSOCIATED WITH TYPE 2 DIABETES MELLITUS (H): ICD-10-CM

## 2020-08-03 DIAGNOSIS — F33.9 EPISODE OF RECURRENT MAJOR DEPRESSIVE DISORDER, UNSPECIFIED DEPRESSION EPISODE SEVERITY (H): ICD-10-CM

## 2020-08-03 DIAGNOSIS — R47.89 WORD FINDING DIFFICULTY: ICD-10-CM

## 2020-08-03 DIAGNOSIS — G89.29 CHRONIC RIGHT SHOULDER PAIN: ICD-10-CM

## 2020-08-03 DIAGNOSIS — G50.0 TRIGEMINAL NEURALGIA: ICD-10-CM

## 2020-08-03 DIAGNOSIS — M25.511 CHRONIC RIGHT SHOULDER PAIN: ICD-10-CM

## 2020-08-03 DIAGNOSIS — J43.9 PULMONARY EMPHYSEMA, UNSPECIFIED EMPHYSEMA TYPE (H): ICD-10-CM

## 2020-08-03 DIAGNOSIS — H90.5 SENSORINEURAL HEARING LOSS (SNHL) OF RIGHT EAR, UNSPECIFIED HEARING STATUS ON CONTRALATERAL SIDE: ICD-10-CM

## 2020-08-03 DIAGNOSIS — D63.8 ANEMIA OF CHRONIC DISEASE: ICD-10-CM

## 2020-08-03 DIAGNOSIS — I15.2 HYPERTENSION DUE TO ENDOCRINE DISORDER: ICD-10-CM

## 2020-08-03 ASSESSMENT — MIFFLIN-ST. JEOR: SCORE: 1297.48

## 2020-08-05 ENCOUNTER — OFFICE VISIT - HEALTHEAST (OUTPATIENT)
Dept: GERIATRICS | Facility: CLINIC | Age: 65
End: 2020-08-05

## 2020-08-05 DIAGNOSIS — G89.29 CHRONIC RIGHT SHOULDER PAIN: ICD-10-CM

## 2020-08-05 DIAGNOSIS — I15.2 HYPERTENSION DUE TO ENDOCRINE DISORDER: ICD-10-CM

## 2020-08-05 DIAGNOSIS — E11.42 DIABETIC PERIPHERAL NEUROPATHY (H): ICD-10-CM

## 2020-08-05 DIAGNOSIS — D63.8 ANEMIA OF CHRONIC DISEASE: ICD-10-CM

## 2020-08-05 DIAGNOSIS — G50.0 TRIGEMINAL NEURALGIA: ICD-10-CM

## 2020-08-05 DIAGNOSIS — M25.511 CHRONIC RIGHT SHOULDER PAIN: ICD-10-CM

## 2020-08-05 DIAGNOSIS — H90.5 SENSORINEURAL HEARING LOSS (SNHL) OF RIGHT EAR, UNSPECIFIED HEARING STATUS ON CONTRALATERAL SIDE: ICD-10-CM

## 2020-08-05 DIAGNOSIS — E11.21 DIABETIC NEPHROPATHY ASSOCIATED WITH TYPE 2 DIABETES MELLITUS (H): ICD-10-CM

## 2020-08-05 DIAGNOSIS — E11.311 TYPE 2 DIABETES MELLITUS WITH BOTH EYES AFFECTED BY RETINOPATHY AND MACULAR EDEMA, WITH LONG-TERM CURRENT USE OF INSULIN, UNSPECIFIED RETINOPATHY SEVERITY (H): ICD-10-CM

## 2020-08-05 DIAGNOSIS — F33.9 EPISODE OF RECURRENT MAJOR DEPRESSIVE DISORDER, UNSPECIFIED DEPRESSION EPISODE SEVERITY (H): ICD-10-CM

## 2020-08-05 DIAGNOSIS — S02.40DD CLOSED FRACTURE OF LEFT SIDE OF MAXILLA WITH ROUTINE HEALING, SUBSEQUENT ENCOUNTER: ICD-10-CM

## 2020-08-05 DIAGNOSIS — J43.9 PULMONARY EMPHYSEMA, UNSPECIFIED EMPHYSEMA TYPE (H): ICD-10-CM

## 2020-08-05 DIAGNOSIS — I25.10 CORONARY ARTERY DISEASE INVOLVING NATIVE CORONARY ARTERY OF NATIVE HEART WITHOUT ANGINA PECTORIS: ICD-10-CM

## 2020-08-05 DIAGNOSIS — Z79.4 TYPE 2 DIABETES MELLITUS WITH BOTH EYES AFFECTED BY RETINOPATHY AND MACULAR EDEMA, WITH LONG-TERM CURRENT USE OF INSULIN, UNSPECIFIED RETINOPATHY SEVERITY (H): ICD-10-CM

## 2020-08-05 DIAGNOSIS — I26.99 OTHER ACUTE PULMONARY EMBOLISM WITHOUT ACUTE COR PULMONALE (H): ICD-10-CM

## 2020-08-05 ASSESSMENT — MIFFLIN-ST. JEOR: SCORE: 1297.48

## 2020-08-06 ENCOUNTER — COMMUNICATION - HEALTHEAST (OUTPATIENT)
Dept: GERIATRICS | Facility: CLINIC | Age: 65
End: 2020-08-06

## 2020-08-06 DIAGNOSIS — G89.29 CHRONIC RIGHT SHOULDER PAIN: ICD-10-CM

## 2020-08-06 DIAGNOSIS — M25.511 CHRONIC RIGHT SHOULDER PAIN: ICD-10-CM

## 2020-08-10 ENCOUNTER — OFFICE VISIT - HEALTHEAST (OUTPATIENT)
Dept: GERIATRICS | Facility: CLINIC | Age: 65
End: 2020-08-10

## 2020-08-10 DIAGNOSIS — G89.29 CHRONIC RIGHT SHOULDER PAIN: ICD-10-CM

## 2020-08-10 DIAGNOSIS — M25.511 CHRONIC RIGHT SHOULDER PAIN: ICD-10-CM

## 2020-08-10 DIAGNOSIS — F33.9 EPISODE OF RECURRENT MAJOR DEPRESSIVE DISORDER, UNSPECIFIED DEPRESSION EPISODE SEVERITY (H): ICD-10-CM

## 2020-08-10 DIAGNOSIS — J43.9 PULMONARY EMPHYSEMA, UNSPECIFIED EMPHYSEMA TYPE (H): ICD-10-CM

## 2020-08-10 DIAGNOSIS — D63.8 ANEMIA OF CHRONIC DISEASE: ICD-10-CM

## 2020-08-10 DIAGNOSIS — I15.2 HYPERTENSION DUE TO ENDOCRINE DISORDER: ICD-10-CM

## 2020-08-10 DIAGNOSIS — I26.99 OTHER ACUTE PULMONARY EMBOLISM WITHOUT ACUTE COR PULMONALE (H): ICD-10-CM

## 2020-08-10 DIAGNOSIS — I25.10 CORONARY ARTERY DISEASE INVOLVING NATIVE CORONARY ARTERY OF NATIVE HEART WITHOUT ANGINA PECTORIS: ICD-10-CM

## 2020-08-10 DIAGNOSIS — Z79.4 TYPE 2 DIABETES MELLITUS WITH BOTH EYES AFFECTED BY RETINOPATHY AND MACULAR EDEMA, WITH LONG-TERM CURRENT USE OF INSULIN, UNSPECIFIED RETINOPATHY SEVERITY (H): ICD-10-CM

## 2020-08-10 DIAGNOSIS — S02.40DD CLOSED FRACTURE OF LEFT SIDE OF MAXILLA WITH ROUTINE HEALING, SUBSEQUENT ENCOUNTER: ICD-10-CM

## 2020-08-10 DIAGNOSIS — G50.0 TRIGEMINAL NEURALGIA: ICD-10-CM

## 2020-08-10 DIAGNOSIS — E11.311 TYPE 2 DIABETES MELLITUS WITH BOTH EYES AFFECTED BY RETINOPATHY AND MACULAR EDEMA, WITH LONG-TERM CURRENT USE OF INSULIN, UNSPECIFIED RETINOPATHY SEVERITY (H): ICD-10-CM

## 2020-08-10 DIAGNOSIS — H90.5 SENSORINEURAL HEARING LOSS (SNHL) OF RIGHT EAR, UNSPECIFIED HEARING STATUS ON CONTRALATERAL SIDE: ICD-10-CM

## 2020-08-10 DIAGNOSIS — E11.21 DIABETIC NEPHROPATHY ASSOCIATED WITH TYPE 2 DIABETES MELLITUS (H): ICD-10-CM

## 2020-08-10 DIAGNOSIS — E11.42 DIABETIC PERIPHERAL NEUROPATHY (H): ICD-10-CM

## 2020-08-10 ASSESSMENT — MIFFLIN-ST. JEOR: SCORE: 1300.2

## 2020-08-12 ENCOUNTER — AMBULATORY - HEALTHEAST (OUTPATIENT)
Dept: GERIATRICS | Facility: CLINIC | Age: 65
End: 2020-08-12

## 2020-08-21 ENCOUNTER — COMMUNICATION - HEALTHEAST (OUTPATIENT)
Dept: NEUROSURGERY | Facility: CLINIC | Age: 65
End: 2020-08-21

## 2020-08-21 DIAGNOSIS — S12.200A C3 CERVICAL FRACTURE (H): ICD-10-CM

## 2020-09-08 ENCOUNTER — HOSPITAL ENCOUNTER (OUTPATIENT)
Dept: RADIOLOGY | Facility: HOSPITAL | Age: 65
Discharge: HOME | End: 2020-09-08
Attending: NEUROLOGICAL SURGERY

## 2020-09-08 DIAGNOSIS — S12.200A C3 CERVICAL FRACTURE (H): ICD-10-CM

## 2020-09-16 ENCOUNTER — OFFICE VISIT - HEALTHEAST (OUTPATIENT)
Dept: NEUROSURGERY | Facility: CLINIC | Age: 65
End: 2020-09-16

## 2020-09-16 DIAGNOSIS — S12.200D CLOSED DISPLACED FRACTURE OF THIRD CERVICAL VERTEBRA WITH ROUTINE HEALING, UNSPECIFIED FRACTURE MORPHOLOGY, SUBSEQUENT ENCOUNTER: ICD-10-CM

## 2020-09-16 RX ORDER — ASPIRIN 81 MG/1
81 TABLET, CHEWABLE ORAL DAILY
Status: SHIPPED | COMMUNITY
Start: 2020-07-24

## 2020-09-16 ASSESSMENT — MIFFLIN-ST. JEOR: SCORE: 1171.38

## 2020-10-12 ENCOUNTER — HOSPITAL ENCOUNTER (OUTPATIENT)
Dept: RADIOLOGY | Facility: HOSPITAL | Age: 65
Discharge: HOME | End: 2020-10-12

## 2020-10-12 DIAGNOSIS — S12.200D CLOSED DISPLACED FRACTURE OF THIRD CERVICAL VERTEBRA WITH ROUTINE HEALING, UNSPECIFIED FRACTURE MORPHOLOGY, SUBSEQUENT ENCOUNTER: ICD-10-CM

## 2020-10-15 ENCOUNTER — OFFICE VISIT - HEALTHEAST (OUTPATIENT)
Dept: NEUROSURGERY | Facility: CLINIC | Age: 65
End: 2020-10-15

## 2020-10-15 DIAGNOSIS — S12.200D CLOSED DISPLACED FRACTURE OF THIRD CERVICAL VERTEBRA WITH ROUTINE HEALING, UNSPECIFIED FRACTURE MORPHOLOGY, SUBSEQUENT ENCOUNTER: ICD-10-CM

## 2020-10-15 ASSESSMENT — MIFFLIN-ST. JEOR: SCORE: 1171.38

## 2020-10-27 ENCOUNTER — OFFICE VISIT - HEALTHEAST (OUTPATIENT)
Dept: GERIATRICS | Facility: CLINIC | Age: 65
End: 2020-10-27

## 2020-10-27 DIAGNOSIS — I25.10 CORONARY ARTERY DISEASE INVOLVING NATIVE CORONARY ARTERY OF NATIVE HEART WITHOUT ANGINA PECTORIS: ICD-10-CM

## 2020-10-27 DIAGNOSIS — E11.42 DIABETIC PERIPHERAL NEUROPATHY (H): ICD-10-CM

## 2020-10-27 DIAGNOSIS — R29.6 FREQUENT FALLS: ICD-10-CM

## 2020-10-27 DIAGNOSIS — R26.9 GAIT DISTURBANCE: ICD-10-CM

## 2020-10-27 DIAGNOSIS — J43.9 PULMONARY EMPHYSEMA, UNSPECIFIED EMPHYSEMA TYPE (H): ICD-10-CM

## 2020-10-27 DIAGNOSIS — F10.21 ALCOHOL USE DISORDER, SEVERE, IN EARLY REMISSION (H): ICD-10-CM

## 2020-10-27 DIAGNOSIS — E11.21 DIABETIC NEPHROPATHY ASSOCIATED WITH TYPE 2 DIABETES MELLITUS (H): ICD-10-CM

## 2020-10-27 DIAGNOSIS — N18.31 STAGE 3A CHRONIC KIDNEY DISEASE (H): ICD-10-CM

## 2020-10-27 DIAGNOSIS — F33.9 EPISODE OF RECURRENT MAJOR DEPRESSIVE DISORDER, UNSPECIFIED DEPRESSION EPISODE SEVERITY (H): ICD-10-CM

## 2020-10-27 DIAGNOSIS — H90.5 SENSORINEURAL HEARING LOSS (SNHL) OF RIGHT EAR, UNSPECIFIED HEARING STATUS ON CONTRALATERAL SIDE: ICD-10-CM

## 2020-10-27 DIAGNOSIS — H54.8 LEGAL BLINDNESS: ICD-10-CM

## 2020-10-27 DIAGNOSIS — I15.2 HYPERTENSION DUE TO ENDOCRINE DISORDER: ICD-10-CM

## 2020-10-27 DIAGNOSIS — S12.290S: ICD-10-CM

## 2020-10-27 DIAGNOSIS — M25.511 CHRONIC RIGHT SHOULDER PAIN: ICD-10-CM

## 2020-10-27 DIAGNOSIS — D63.8 ANEMIA OF CHRONIC DISEASE: ICD-10-CM

## 2020-10-27 DIAGNOSIS — S02.31XS: ICD-10-CM

## 2020-10-27 DIAGNOSIS — G89.29 CHRONIC RIGHT SHOULDER PAIN: ICD-10-CM

## 2020-10-27 DIAGNOSIS — G50.0 TRIGEMINAL NEURALGIA: ICD-10-CM

## 2020-10-27 ASSESSMENT — MIFFLIN-ST. JEOR: SCORE: 1240.33

## 2020-10-30 ENCOUNTER — OFFICE VISIT - HEALTHEAST (OUTPATIENT)
Dept: GERIATRICS | Facility: CLINIC | Age: 65
End: 2020-10-30

## 2020-10-30 DIAGNOSIS — H54.8 LEGAL BLINDNESS: ICD-10-CM

## 2020-10-30 DIAGNOSIS — S12.290S: ICD-10-CM

## 2020-10-30 DIAGNOSIS — I15.2 HYPERTENSION DUE TO ENDOCRINE DISORDER: ICD-10-CM

## 2020-10-30 DIAGNOSIS — N18.31 STAGE 3A CHRONIC KIDNEY DISEASE (H): ICD-10-CM

## 2020-10-30 DIAGNOSIS — G89.29 CHRONIC RIGHT SHOULDER PAIN: ICD-10-CM

## 2020-10-30 DIAGNOSIS — F33.9 EPISODE OF RECURRENT MAJOR DEPRESSIVE DISORDER, UNSPECIFIED DEPRESSION EPISODE SEVERITY (H): ICD-10-CM

## 2020-10-30 DIAGNOSIS — J43.9 PULMONARY EMPHYSEMA, UNSPECIFIED EMPHYSEMA TYPE (H): ICD-10-CM

## 2020-10-30 DIAGNOSIS — G50.0 TRIGEMINAL NEURALGIA: ICD-10-CM

## 2020-10-30 DIAGNOSIS — S02.31XS: ICD-10-CM

## 2020-10-30 DIAGNOSIS — E11.42 DIABETIC PERIPHERAL NEUROPATHY (H): ICD-10-CM

## 2020-10-30 DIAGNOSIS — R29.6 FREQUENT FALLS: ICD-10-CM

## 2020-10-30 DIAGNOSIS — I25.10 CORONARY ARTERY DISEASE INVOLVING NATIVE CORONARY ARTERY OF NATIVE HEART WITHOUT ANGINA PECTORIS: ICD-10-CM

## 2020-10-30 DIAGNOSIS — L21.9 SEBORRHEIC DERMATITIS: ICD-10-CM

## 2020-10-30 DIAGNOSIS — H90.5 SENSORINEURAL HEARING LOSS (SNHL) OF RIGHT EAR, UNSPECIFIED HEARING STATUS ON CONTRALATERAL SIDE: ICD-10-CM

## 2020-10-30 DIAGNOSIS — D63.8 ANEMIA OF CHRONIC DISEASE: ICD-10-CM

## 2020-10-30 DIAGNOSIS — F10.21 ALCOHOL USE DISORDER, SEVERE, IN EARLY REMISSION (H): ICD-10-CM

## 2020-10-30 DIAGNOSIS — R26.9 GAIT DISTURBANCE: ICD-10-CM

## 2020-10-30 DIAGNOSIS — M25.511 CHRONIC RIGHT SHOULDER PAIN: ICD-10-CM

## 2020-10-30 DIAGNOSIS — E11.21 DIABETIC NEPHROPATHY ASSOCIATED WITH TYPE 2 DIABETES MELLITUS (H): ICD-10-CM

## 2020-10-30 ASSESSMENT — MIFFLIN-ST. JEOR: SCORE: 1240.33

## 2020-11-06 ENCOUNTER — OFFICE VISIT - HEALTHEAST (OUTPATIENT)
Dept: GERIATRICS | Facility: CLINIC | Age: 65
End: 2020-11-06

## 2020-11-06 DIAGNOSIS — F10.21 ALCOHOL USE DISORDER, SEVERE, IN EARLY REMISSION (H): ICD-10-CM

## 2020-11-06 DIAGNOSIS — J43.9 PULMONARY EMPHYSEMA, UNSPECIFIED EMPHYSEMA TYPE (H): ICD-10-CM

## 2020-11-06 DIAGNOSIS — H90.5 SENSORINEURAL HEARING LOSS (SNHL) OF RIGHT EAR, UNSPECIFIED HEARING STATUS ON CONTRALATERAL SIDE: ICD-10-CM

## 2020-11-06 DIAGNOSIS — S02.31XS: ICD-10-CM

## 2020-11-06 DIAGNOSIS — Z79.4 TYPE 2 DIABETES MELLITUS WITH HYPERGLYCEMIA, WITH LONG-TERM CURRENT USE OF INSULIN (H): ICD-10-CM

## 2020-11-06 DIAGNOSIS — I15.2 HYPERTENSION DUE TO ENDOCRINE DISORDER: ICD-10-CM

## 2020-11-06 DIAGNOSIS — R26.9 GAIT DISTURBANCE: ICD-10-CM

## 2020-11-06 DIAGNOSIS — M25.511 CHRONIC RIGHT SHOULDER PAIN: ICD-10-CM

## 2020-11-06 DIAGNOSIS — I25.10 CORONARY ARTERY DISEASE INVOLVING NATIVE CORONARY ARTERY OF NATIVE HEART WITHOUT ANGINA PECTORIS: ICD-10-CM

## 2020-11-06 DIAGNOSIS — H54.8 LEGAL BLINDNESS: ICD-10-CM

## 2020-11-06 DIAGNOSIS — E11.42 DIABETIC PERIPHERAL NEUROPATHY (H): ICD-10-CM

## 2020-11-06 DIAGNOSIS — S12.290S: ICD-10-CM

## 2020-11-06 DIAGNOSIS — N18.31 STAGE 3A CHRONIC KIDNEY DISEASE (H): ICD-10-CM

## 2020-11-06 DIAGNOSIS — R29.6 FREQUENT FALLS: ICD-10-CM

## 2020-11-06 DIAGNOSIS — E11.65 TYPE 2 DIABETES MELLITUS WITH HYPERGLYCEMIA, WITH LONG-TERM CURRENT USE OF INSULIN (H): ICD-10-CM

## 2020-11-06 DIAGNOSIS — L21.9 SEBORRHEIC DERMATITIS: ICD-10-CM

## 2020-11-06 DIAGNOSIS — D63.8 ANEMIA OF CHRONIC DISEASE: ICD-10-CM

## 2020-11-06 DIAGNOSIS — E11.21 DIABETIC NEPHROPATHY ASSOCIATED WITH TYPE 2 DIABETES MELLITUS (H): ICD-10-CM

## 2020-11-06 DIAGNOSIS — F33.9 EPISODE OF RECURRENT MAJOR DEPRESSIVE DISORDER, UNSPECIFIED DEPRESSION EPISODE SEVERITY (H): ICD-10-CM

## 2020-11-06 DIAGNOSIS — G50.0 TRIGEMINAL NEURALGIA: ICD-10-CM

## 2020-11-06 DIAGNOSIS — G89.29 CHRONIC RIGHT SHOULDER PAIN: ICD-10-CM

## 2020-11-06 ASSESSMENT — MIFFLIN-ST. JEOR: SCORE: 1240.33

## 2020-11-09 ENCOUNTER — AMBULATORY - HEALTHEAST (OUTPATIENT)
Dept: GERIATRICS | Facility: CLINIC | Age: 65
End: 2020-11-09

## 2021-03-17 ENCOUNTER — RECORDS - HEALTHEAST (OUTPATIENT)
Dept: ADMINISTRATIVE | Facility: OTHER | Age: 66
End: 2021-03-17

## 2021-03-19 ENCOUNTER — RECORDS - HEALTHEAST (OUTPATIENT)
Dept: ADMINISTRATIVE | Facility: OTHER | Age: 66
End: 2021-03-19

## 2021-03-22 ENCOUNTER — OFFICE VISIT - HEALTHEAST (OUTPATIENT)
Dept: GERIATRICS | Facility: CLINIC | Age: 66
End: 2021-03-22

## 2021-03-22 DIAGNOSIS — N18.31 STAGE 3A CHRONIC KIDNEY DISEASE (H): ICD-10-CM

## 2021-03-22 DIAGNOSIS — Z79.4 TYPE 2 DIABETES MELLITUS WITH HYPERGLYCEMIA, WITH LONG-TERM CURRENT USE OF INSULIN (H): ICD-10-CM

## 2021-03-22 DIAGNOSIS — I25.10 CORONARY ARTERY DISEASE INVOLVING NATIVE CORONARY ARTERY OF NATIVE HEART WITHOUT ANGINA PECTORIS: ICD-10-CM

## 2021-03-22 DIAGNOSIS — E11.42 DIABETIC PERIPHERAL NEUROPATHY (H): ICD-10-CM

## 2021-03-22 DIAGNOSIS — E66.01 MORBID OBESITY (H): ICD-10-CM

## 2021-03-22 DIAGNOSIS — Z86.718 HISTORY OF DVT (DEEP VEIN THROMBOSIS): ICD-10-CM

## 2021-03-22 DIAGNOSIS — E11.65 TYPE 2 DIABETES MELLITUS WITH HYPERGLYCEMIA, WITH LONG-TERM CURRENT USE OF INSULIN (H): ICD-10-CM

## 2021-03-22 DIAGNOSIS — K59.03 DRUG-INDUCED CONSTIPATION: ICD-10-CM

## 2021-03-22 DIAGNOSIS — E21.1 HYPERPARATHYROIDISM DUE TO VITAMIN D DEFICIENCY (H): ICD-10-CM

## 2021-03-22 DIAGNOSIS — M35.00 SJOGREN'S SYNDROME, WITH UNSPECIFIED ORGAN INVOLVEMENT (H): ICD-10-CM

## 2021-03-22 DIAGNOSIS — Z96.611 STATUS POST REVERSE TOTAL REPLACEMENT OF RIGHT SHOULDER: ICD-10-CM

## 2021-03-22 DIAGNOSIS — I15.2 HYPERTENSION DUE TO ENDOCRINE DISORDER: ICD-10-CM

## 2021-03-22 DIAGNOSIS — D64.9 ANEMIA, UNSPECIFIED TYPE: ICD-10-CM

## 2021-03-22 DIAGNOSIS — F33.9 EPISODE OF RECURRENT MAJOR DEPRESSIVE DISORDER, UNSPECIFIED DEPRESSION EPISODE SEVERITY (H): ICD-10-CM

## 2021-03-22 DIAGNOSIS — J43.9 PULMONARY EMPHYSEMA, UNSPECIFIED EMPHYSEMA TYPE (H): ICD-10-CM

## 2021-03-22 DIAGNOSIS — F10.21 ALCOHOL USE DISORDER, SEVERE, IN EARLY REMISSION (H): ICD-10-CM

## 2021-03-22 RX ORDER — NICOTINE POLACRILEX 2 MG
1000 GUM BUCCAL DAILY
Status: SHIPPED | COMMUNITY
Start: 2021-03-22

## 2021-03-22 RX ORDER — AMOXICILLIN 250 MG
1 CAPSULE ORAL 2 TIMES DAILY
Status: SHIPPED | COMMUNITY
Start: 2021-03-22

## 2021-03-22 RX ORDER — MAGNESIUM OXIDE 400 MG/1
400 TABLET ORAL DAILY
Status: SHIPPED | COMMUNITY
Start: 2021-03-22

## 2021-03-22 RX ORDER — INSULIN GLARGINE 100 [IU]/ML
5 INJECTION, SOLUTION SUBCUTANEOUS 2 TIMES DAILY
Status: SHIPPED | COMMUNITY
Start: 2021-03-12

## 2021-03-22 RX ORDER — FOLIC ACID/VIT B COMPLEX AND C 0.8 MG
1 TABLET ORAL DAILY
Status: SHIPPED | COMMUNITY
Start: 2021-03-22

## 2021-03-22 RX ORDER — OXYCODONE HYDROCHLORIDE 5 MG/1
5-10 TABLET ORAL EVERY 4 HOURS PRN
Status: SHIPPED | COMMUNITY
Start: 2021-03-22

## 2021-03-22 RX ORDER — HYDROXYZINE PAMOATE 25 MG/1
25 CAPSULE ORAL EVERY 6 HOURS PRN
Status: SHIPPED | COMMUNITY
Start: 2021-03-19

## 2021-03-22 RX ORDER — MIRTAZAPINE 45 MG/1
45 TABLET, FILM COATED ORAL AT BEDTIME
Status: SHIPPED | COMMUNITY
Start: 2021-03-12

## 2021-03-22 ASSESSMENT — MIFFLIN-ST. JEOR: SCORE: 1407.25

## 2021-03-23 ENCOUNTER — RECORDS - HEALTHEAST (OUTPATIENT)
Dept: LAB | Facility: CLINIC | Age: 66
End: 2021-03-23

## 2021-03-25 ENCOUNTER — RECORDS - HEALTHEAST (OUTPATIENT)
Dept: LAB | Facility: CLINIC | Age: 66
End: 2021-03-25

## 2021-03-25 LAB
ANION GAP SERPL CALCULATED.3IONS-SCNC: 7 MMOL/L (ref 5–18)
BUN SERPL-MCNC: 19 MG/DL (ref 8–22)
CALCIUM SERPL-MCNC: 8.2 MG/DL (ref 8.5–10.5)
CHLORIDE BLD-SCNC: 101 MMOL/L (ref 98–107)
CO2 SERPL-SCNC: 21 MMOL/L (ref 22–31)
CREAT SERPL-MCNC: 0.86 MG/DL (ref 0.6–1.1)
ERYTHROCYTE [DISTWIDTH] IN BLOOD BY AUTOMATED COUNT: 13.1 % (ref 11–14.5)
GFR SERPL CREATININE-BSD FRML MDRD: >60 ML/MIN/1.73M2
GLUCOSE BLD-MCNC: 67 MG/DL (ref 70–125)
HCT VFR BLD AUTO: 21.3 % (ref 35–47)
HGB BLD-MCNC: 6.7 G/DL (ref 12–16)
HGB BLD-MCNC: 7.4 G/DL (ref 12–16)
MCH RBC QN AUTO: 28 PG (ref 27–34)
MCHC RBC AUTO-ENTMCNC: 31.5 G/DL (ref 32–36)
MCV RBC AUTO: 89 FL (ref 80–100)
PLATELET # BLD AUTO: 384 THOU/UL (ref 140–440)
PMV BLD AUTO: 9.4 FL (ref 8.5–12.5)
POTASSIUM BLD-SCNC: 5.2 MMOL/L (ref 3.5–5)
RBC # BLD AUTO: 2.39 MILL/UL (ref 3.8–5.4)
SODIUM SERPL-SCNC: 129 MMOL/L (ref 136–145)
WBC: 7.1 THOU/UL (ref 4–11)

## 2021-03-30 ENCOUNTER — RECORDS - HEALTHEAST (OUTPATIENT)
Dept: LAB | Facility: CLINIC | Age: 66
End: 2021-03-30

## 2021-03-31 LAB
ANION GAP SERPL CALCULATED.3IONS-SCNC: 9 MMOL/L (ref 5–18)
BUN SERPL-MCNC: 21 MG/DL (ref 8–22)
CALCIUM SERPL-MCNC: 8.3 MG/DL (ref 8.5–10.5)
CHLORIDE BLD-SCNC: 103 MMOL/L (ref 98–107)
CO2 SERPL-SCNC: 19 MMOL/L (ref 22–31)
CREAT SERPL-MCNC: 1.07 MG/DL (ref 0.6–1.1)
GFR SERPL CREATININE-BSD FRML MDRD: 51 ML/MIN/1.73M2
GLUCOSE BLD-MCNC: 155 MG/DL (ref 70–125)
HGB BLD-MCNC: 7.4 G/DL (ref 12–16)
POTASSIUM BLD-SCNC: 5.3 MMOL/L (ref 3.5–5)
SODIUM SERPL-SCNC: 131 MMOL/L (ref 136–145)

## 2021-05-26 VITALS
HEART RATE: 80 BPM | OXYGEN SATURATION: 96 % | SYSTOLIC BLOOD PRESSURE: 121 MMHG | DIASTOLIC BLOOD PRESSURE: 71 MMHG | TEMPERATURE: 97.2 F | RESPIRATION RATE: 16 BRPM

## 2021-05-26 VITALS
OXYGEN SATURATION: 98 % | SYSTOLIC BLOOD PRESSURE: 125 MMHG | TEMPERATURE: 97.5 F | HEART RATE: 78 BPM | RESPIRATION RATE: 16 BRPM | DIASTOLIC BLOOD PRESSURE: 70 MMHG

## 2021-05-31 VITALS — WEIGHT: 160.4 LBS

## 2021-06-01 NOTE — PROGRESS NOTES
[facility]    Name:   Ruddy Mendoza  : 1955  Facility:   [facility] [[id number]]   Room:   Code Status: FULL CODE -   Fac type:   SNF (Skilled Nursing Facility, TCU) -     PCP:  Provider, No Primary Care    CHIEF COMPLAINT / REASON FOR VISIT:  Chief Complaint   Patient presents with     Follow-up     TCU follow-up after hospitalization for a variety of symptoms of unknown etiology, including right facial numbness, severe dizziness, right upper extremity ataxia, and swallowing difficulties.      [facility] from 2019 until 2019 (right facial numbness, severe dizziness, right upper extremity ataxia, swallowing and word finding difficulties)      HPI: Ruddy is a 64 y.o. female with a past medical history of recurrent UTIs and pyelonephritis, diabetes mellitus type 2 (uncontrolled), endocrine induced hypertension, coronary artery disease (status post stenting), and COPD (former smoker) who presented with complaints of severe dizziness, new right lower facial numbness, and ataxia of the right upper extremity for the previous 4 to 5 days prior to admission, with concern for possible stroke, though imaging was negative.  She was also found to be hyperglycemic to the 500s, suffering SOPHIA, and a urinalysis was concerning for UTI on admission.    Etiology of her symptoms was unclear.  Given her history of nausea and vomiting, falls, and unstable gait, the main concern was for a cerebellar stroke; however, an MRI was without any findings of acute stroke, although it did show some chronic infarcts of the cerebellum.  Consider with her hyperglycemia or if hypotensive, a watershed type phenomenon.  Hypoglycemia may have been the primary cause of this, although it sounded as though it was secondary to her ability to take medications due to her vertigo.  Her report of worsening dizziness with change in position could support orthostatic hypotension, although  "this was later also deemed to be negative.  She does have diabetic peripheral neuropathy, but one would not expect this to cause a sudden vertiginous syndrome.  Unlikely BPPV, given no symptoms with head movements.  She denied any recent substance alcohol use, though consideration was given to Warnicke's type encephalopathy with her need for a wide gait.    Neurology consult: Neurology was consulted and followed her while inpatient, recommending starting thiamine 100 mg daily and considering a Tegretol level outpatient if vertigo was not resolving or worsening.    Urinary tract infection: As noted, she has history of multiple UTIs and pyelonephritis.  She had back pain and stated it was consistent with her history of UTIs.  However, she denies any dysuria, hematuria, or suprapubic pain, but did state that she did not usually have these when she had a UTI.  She did report CVA tenderness on admission.  The UA did show some nitrites, bacteria, white cells, and she received ceftriaxone in the ER.  UC was positive for pansensitive E. coli, and she was treated for 5 days with nitrofurantoin.    Diabetes mellitus type 2/hyperglycemia: She had significant hyperglycemia on admission into the 500s.  However, she stated she was unable to take her usual home medications due to her vertigo/dizziness.  At the time, her last known A1c was 16.4 in October 2017.  In the hospital, it was 9.4.  She is on 35 units of Lantus insulin every morning and also receives metformin and NovoLog per sliding scale.      TCU ISSUES    Primary diagnosis: Asked why she is here, she stated, \"I need help with my balance, and I need help to learn how to use a walker, and any help with memory.  I fell 6 times in 4 days and ended up in the hospital.  I was super, super dizzy and had no balance.  She is now also claiming to have word finding difficulty and difficulty with some swallowing certain meats.  They apparently did a bedside swallow study in the " hospital and found nothing wrong.  Nonetheless, we are giving an okay for speech therapy.    Diabetes mellitus type 2: Currently, there only 3 blood glucose levels to go on.  Supper yesterday was 150, at bedtime 321, fasting this morning 89.  She tells me that at home she will check her blood glucose levels in the morning, and if it was good, she may only check it once more during the day.    Diabetic polyneuropathy: She has tingling in the hands and feet.    ROS: She tells me she is a little constipated.  No complaints of headaches or chest pains, coughing or congestion, nausea or vomiting, dyspnea, dysuria.  She did not sleep well the first night.    Past Medical History:   Diagnosis Date     Acute encephalopathy 09/23/2017     Acute hypokalemia 09/23/2017     Acute hyponatremia 09/23/2017     Acute pyelonephritis 09/23/2017     SOPHIA (acute kidney injury) (H)      Anxiety      CAD (coronary artery disease)      COPD (chronic obstructive pulmonary disease) (H)      Depression      Diabetes mellitus (H) 09/23/2017     Diabetic peripheral neuropathy (H) 9/4/2019     History of cervical cancer      Hypertension      Sepsis (H) 09/23/2017     Trigeminal neuralgia               Family History   Problem Relation Age of Onset     COPD Mother      COPD Father      Lung cancer Father      Social History     Socioeconomic History     Marital status: Single     Spouse name: Not on file     Number of children: Not on file     Years of education: Not on file     Highest education level: Not on file   Occupational History     Not on file   Social Needs     Financial resource strain: Not on file     Food insecurity:     Worry: Not on file     Inability: Not on file     Transportation needs:     Medical: Not on file     Non-medical: Not on file   Tobacco Use     Smoking status: Former Smoker     Packs/day: 2.00     Years: 30.00     Pack years: 60.00     Types: Cigarettes     Smokeless tobacco: Never Used     Tobacco comment: quit  at age 42   Substance and Sexual Activity     Alcohol use: Yes     Comment: Rare     Drug use: No     Sexual activity: Not Currently     Partners: Male   Lifestyle     Physical activity:     Days per week: Not on file     Minutes per session: Not on file     Stress: Not on file   Relationships     Social connections:     Talks on phone: Not on file     Gets together: Not on file     Attends Latter day service: Not on file     Active member of club or organization: Not on file     Attends meetings of clubs or organizations: Not on file     Relationship status: Not on file     Intimate partner violence:     Fear of current or ex partner: Not on file     Emotionally abused: Not on file     Physically abused: Not on file     Forced sexual activity: Not on file   Other Topics Concern     Not on file   Social History Narrative    Lives at home alone with her catKailyn       MEDICATIONS: Reviewed from the MAR, physician orders, and/or earlier progress notes.  Post Discharge Medication Reconciliation Status: discharge medications reconciled, continue medications without change.  Current Outpatient Medications   Medication Sig     acetaminophen (TYLENOL) 325 MG tablet Take 650 mg by mouth every 6 (six) hours as needed for pain or fever.            amitriptyline (ELAVIL) 150 MG tablet Take 150 mg by mouth at bedtime.     aspirin 325 MG tablet Take 325 mg by mouth daily.     B complex-vitamin C-folic acid 0.8 mg Tab Take 1 tablet by mouth daily.            biotin 10,000 mcg TbDL Take 10,000 mcg by mouth 2 (two) times a day.     busPIRone (BUSPAR) 10 MG tablet Take 20 mg by mouth daily.     carBAMazepine (TEGRETOL) 200 mg tablet Take 400 mg by mouth 2 (two) times a day.     diphenhydrAMINE (UNISOM SLEEPGELS) 50 MG capsule Take 50 mg by mouth at bedtime as needed for sleep.            docusate sodium (COLACE) 100 MG capsule Take 100 mg by mouth daily.     ferrous sulfate 65 mg elemental iron Take 1 tablet by mouth 2 (two) times  "a day with meals.     insulin aspart (NOVOLOG FLEXPEN) 100 unit/mL injection pen Inject under the skin 3 (three) times a day before meals. Sliding scale based on blood sugars.           insulin glargine (LANTUS) 100 unit/mL vial Inject 35 Units under the skin every morning.     lisinopril (PRINIVIL,ZESTRIL) 10 MG tablet Take 10 mg by mouth daily.     metFORMIN (GLUCOPHAGE) 500 MG tablet Take 500 mg by mouth 2 (two) times a day with meals.            metoprolol succinate (TOPROL-XL) 25 MG Take 25 mg by mouth daily.            nitrofurantoin, macrocrystal-monohydrate, (MACROBID) 100 MG capsule Take 1 capsule (100 mg total) by mouth 2 (two) times a day for 5 days.     nitroglycerin (NITROSTAT) 0.4 MG SL tablet Place 0.4 mg under the tongue every 5 (five) minutes as needed for chest pain.     NOVOLOG FLEXPEN U-100 INSULIN 100 unit/mL (3 mL) injection pen Check blood sugar four (4) times daily.  11.65 Type 2 with hyperglycemia  BD Ultra-fine Noris Pen Needles - NDC [address]-77 - dispense 1 case, refill PRN for 1 year  Accu-chek Guide blood glucose meter - dispense 1  Accu-chek Guide test strips (50 ct. boxes) - dispense 1, refill PRN for 1 year  Accu-chek FastClix lancets (box of 102 ct.) - dispense 1, refill PRN for 1 year     rosuvastatin (CRESTOR) 20 MG tablet Take 20 mg by mouth at bedtime.     thiamine 100 MG tablet Take 1 tablet (100 mg total) by mouth daily.     traZODone (DESYREL) 50 MG tablet Take 150 mg by mouth at bedtime.     ALLERGIES:   Allergies   Allergen Reactions     Propranolol Anaphylaxis     Slowed HR     Atorvastatin      Hand neuropathy     Prochlorperazine Edisylate Rash     nightmares     DIET: Cardiac/diabetic, regular texture, thin liquids.    Vitals:    09/04/19 1503   BP: 139/83   Pulse: 79   Resp: 20   Temp: 97  F (36.1  C)   SpO2: 98%   Weight: 163 lb 9.6 oz (74.2 kg)   Height: 5' 3\" (1.6 m)     Body mass index is 28.98 kg/m .    EXAMINATION:   General: Fairly pleasant middle-aged female, " sitting at the breakfast table, in no apparent distress.  Head: Normocephalic and atraumatic.   Eyes: PERRLA, sclerae clear.   ENT: Moist oral mucosa.  She is edentulous with full upper and lower dentures.  No rhinorrhea or nasal discharge.  She has complete hearing loss in the right ear and chronic tinnitus.  Cardiovascular: Regular rate and rhythm.   Respiratory: Lungs clear to auscultation bilaterally.   Abdomen: Soft and nontender.   Musculoskeletal/Extremities: Age-related degenerative joint disease.   Integument: No rashes, clinically significant lesions, or skin breakdown.   Cognitive/Psychiatric: Alert and oriented x3.  Affect is euthymic.    DIAGNOSTICS:   Results for orders placed or performed during the hospital encounter of 08/30/19   Basic Metabolic Panel   Result Value Ref Range    Sodium 136 136 - 145 mmol/L    Potassium 4.1 3.5 - 5.0 mmol/L    Chloride 104 98 - 107 mmol/L    CO2 26 22 - 31 mmol/L    Anion Gap, Calculation 6 5 - 18 mmol/L    Glucose 103 70 - 125 mg/dL    Calcium 8.7 8.5 - 10.5 mg/dL    BUN 13 8 - 22 mg/dL    Creatinine 0.76 0.60 - 1.10 mg/dL    GFR MDRD Af Amer >60 >60 mL/min/1.73m2    GFR MDRD Non Af Amer >60 >60 mL/min/1.73m2     Lab Results   Component Value Date    WBC 5.5 08/31/2019    HGB 9.5 (L) 08/31/2019    HCT 28.6 (L) 08/31/2019    MCV 95 08/31/2019     09/03/2019     Estimated Creatinine Clearance: 67.6 mL/min (by C-G formula based on SCr of 0.76 mg/dL).  Lab Results   Component Value Date    HGBA1C 9.4 (H) 08/31/2019       ASSESSMENT/Plan:      ICD-10-CM    1. Dizziness R42    2. Right facial numbness R20.0    3. Dysphagia, unspecified type R13.10    4. Word finding difficulty R47.89    5. Gait disturbance R26.9    6. Pulmonary emphysema, unspecified emphysema type (H) J43.9    7. Hearing loss of right ear, unspecified hearing loss type H91.91    8. Hypertension due to endocrine disorder I15.2    9. Tinnitus of right ear H93.11    10. Diabetic peripheral neuropathy  (H) E11.42      CHANGES:    Okay for speech therapy to evaluate and treat for complaints of swallowing difficulty and word finding difficulty.    CARE PLAN:    The care plan has been reviewed and all orders signed. Changes to care plan, if any, as noted. Otherwise, continue current plan of care.  Total time spent with this patient was approximately 40 minutes, with greater than 50% spent in counseling and coordination of care that included a discussion with speech therapy regarding patient complaints/issues and talking about what might be causing the sense of what we might be able to do for the patient.  Time was spent speaking with physical therapy regarding her dizziness/vertigo.    The above has been created using voice recognition software. Please be aware that this may unintentionally  produce inaccuracies and/or nonsensical sentences.      Electronically signed by: Suraj Robledo CNP

## 2021-06-01 NOTE — PROGRESS NOTES
[facility]    Name:   Ruddy Mendoza  : 1955  Facility:   [facility] [[id number]]   Room:   Code Status: DNR/DNI -   Fac type:   SNF (Skilled Nursing Facility, TCU) -     PCP:  Provider, No Primary Care    CHIEF COMPLAINT / REASON FOR VISIT:  Chief Complaint   Patient presents with     Discharge Summary     TCU discharge after hospitalization for a variety of symptoms of unknown etiology, including right facial numbness, severe dizziness, right upper extremity ataxia, and swallowing difficulties.      [facility] from 2019 until 2019 (right facial numbness, severe dizziness, right upper extremity ataxia, swallowing and word finding difficulties)  [facility] from 2019 until 2019 (expected discharge date)    Patient was last seen by me on 2019 and subsequently seen by Dr. Ordonez on 2019.      HPI: Ruddy is a 64 y.o. female with a past medical history of recurrent UTIs and pyelonephritis, diabetes mellitus type 2 (uncontrolled), endocrine induced hypertension, coronary artery disease (status post stenting), and COPD (former smoker) who presented with complaints of severe dizziness, new right lower facial numbness, and ataxia of the right upper extremity for the previous 4 to 5 days prior to admission, with concern for possible stroke, though imaging was negative.  She was also found to be hyperglycemic to the 500s, suffering SOPHIA, and a urinalysis was concerning for UTI on admission.    Etiology of her symptoms was unclear.  Given her history of nausea and vomiting, falls, and unstable gait, the main concern was for a cerebellar stroke; however, an MRI was without any findings of acute stroke, although it did show some chronic infarcts of the cerebellum.  Consider with her hyperglycemia or if hypotensive, a watershed type phenomenon.  Hypoglycemia may have been the primary cause of this, although it sounded as  though it was secondary to her ability to take medications due to her vertigo.  Her report of worsening dizziness with change in position could support orthostatic hypotension, although this was later also deemed to be negative.  She does have diabetic peripheral neuropathy, but one would not expect this to cause a sudden vertiginous syndrome.  Unlikely BPPV, given no symptoms with head movements.  She denied any recent substance alcohol use, though consideration was given to Warnicke's type encephalopathy with her need for a wide gait.    Neurology consult: Neurology was consulted and followed her while inpatient, recommending starting thiamine 100 mg daily and considering a Tegretol level outpatient if vertigo was not resolving or worsening.    Urinary tract infection: As noted, she has history of multiple UTIs and pyelonephritis.  She had back pain and stated it was consistent with her history of UTIs.  However, she denies any dysuria, hematuria, or suprapubic pain, but did state that she did not usually have these when she had a UTI.  She did report CVA tenderness on admission.  The UA did show some nitrites, bacteria, white cells, and she received ceftriaxone in the ER.  UC was positive for pansensitive E. coli, and she was treated for 5 days with nitrofurantoin.    Diabetes mellitus type 2/hyperglycemia: She had significant hyperglycemia on admission into the 500s.  However, she stated she was unable to take her usual home medications due to her vertigo/dizziness.  At the time, her last known A1c was 16.4 in October 2017.  In the hospital, it was 9.4.  She is on Lantus insulin every morning and also receives metformin and NovoLog per sliding scale.      TCU ISSUES    Depression: When last seen, she told me she felt like crying all weekend.  I explained that she was started on fluoxetine on 09/06/2019, and it does take a good month or so for it to show benefit. We did spend some time talking about her  "depression, and we also reviewed her recent blood glucose levels.  With a recent change in metformin dosing from Dr. Ordonez (see below), we have opted not to make any changes at this time.    Primary diagnosis: Asked why she is here, she stated, \"I need help with my balance, and I need help to learn how to use a walker, and any help with memory.  I fell 6 times in 4 days and ended up in the hospital.  I was super, super dizzy and had no balance.\"  She does feel that her dizziness is improving, although she still has trouble with corners.  She recently got a walker.  Today, she is blaming, for dizziness on a developing head cold.  Note that her facial numbness has resolved.    She tells me that in therapy the plan is to check her balance without holding onto the walker, and she is somewhat worried about that.    COPD: She was on a Ventolin HFA inhaler at home, taking it 2 or 3 times per day, and we added that.    Dysphagia/word finding difficulty: She was also claiming to have word finding difficulty and difficulty with some swallowing certain meats.  They apparently did a bedside swallow study in the hospital and found nothing wrong.  Nonetheless, we did authorize speech therapy.  There was not felt to be any need to work with her.  Nonetheless, she plans on having a speech therapy -- due to word finding difficulty -- upon discharge.  Yesterday, she did not feel this way but has on other days.    Diabetes mellitus type 2: Blood glucose levels have been elevated (particularly at night), although when I last visited with her, there was insufficient data.  She did, as noted above, have some quite high A1c's.  She told me that at home she would check her blood glucose levels in the morning, and if it was good, she may only check it once more during the day.  In fact, she had a fasting blood glucose level of 53 on 09/10/2019.      Dr. Ordonez discontinued carb count-based NovoLog, increased her glargine from 35 units to " 45 units every morning on 09/06/2019, and set parameters for sliding scale coverage.  We cut that back to 42 units due to some low blood glucose levels.  On 09/13/2019, Dr. Ordonez increased her metformin from 500 mg twice daily to 750 mg twice daily and also discontinued sliding scale coverage.    With some low blood glucose levels lately (symptomatic at 74 this morning despite having peanut butter and crackers before bed), we are going to decrease her glargine from 42 units to 38 units.    Diabetic polyneuropathy: She has tingling in the hands and feet.    Other issues not addressed but discussed: She has very poor vision (5/200) with macular degeneration, early diabetic retinopathy, and some hemorrhaging.  She did attend [employer] [facility] [place] for a while.  She did not think it helped, and it was exceedingly difficult to read Heena given her neuropathy.    Medication changes: Dr. Ordonez also discontinued her buspirone and Unisom and increased her trazodone to 200 mg nightly, also adding fluoxetine 20 mg nightly for depression.  She still tells me she wakes up a lot during the night.    Code status: In the hospital, she was full code.  A signed a POLST here indicates that she is now DNR/DNI (also opposed to intubation, as she watched her mother go through it).     Discharge planning: She is expected to discharge on Friday, 09/20/2019.  She will have home services with [facility], including speech as well as other services.  She is somewhat nervous about discharge, and we spent some time talking about this as well.      ROS: Last seen, she was complaining of a terrible headache (and looked ill).  Any headaches this bad are rare.  She did not feel it was a migraine, because when she does have one, she experiences photophobia.  She has been taking Excedrin Migraine (APAP-ASA-caffeine), but she does not find it very beneficial.  No complaints ofchest pains, coughing or congestion, nausea or  vomiting, dyspnea, dysuria.  She did not sleep well the first night and is still waking up a lot.  Fortunately, her headache is gone.    Past Medical History:   Diagnosis Date     Acute encephalopathy 09/23/2017     Acute hypokalemia 09/23/2017     Acute hyponatremia 09/23/2017     Acute pyelonephritis 09/23/2017     SOPHIA (acute kidney injury) (H)      Anxiety      CAD (coronary artery disease)      COPD (chronic obstructive pulmonary disease) (H)      Depression      Diabetes mellitus (H) 09/23/2017     Diabetic peripheral neuropathy (H) 9/4/2019     History of cervical cancer      Hypertension      Sepsis (H) 09/23/2017     Trigeminal neuralgia               Family History   Problem Relation Age of Onset     COPD Mother      COPD Father      Lung cancer Father      Social History     Socioeconomic History     Marital status: Single     Spouse name: Not on file     Number of children: Not on file     Years of education: Not on file     Highest education level: Not on file   Occupational History     Not on file   Social Needs     Financial resource strain: Not on file     Food insecurity:     Worry: Not on file     Inability: Not on file     Transportation needs:     Medical: Not on file     Non-medical: Not on file   Tobacco Use     Smoking status: Former Smoker     Packs/day: 2.00     Years: 30.00     Pack years: 60.00     Types: Cigarettes     Smokeless tobacco: Never Used     Tobacco comment: quit at age 42   Substance and Sexual Activity     Alcohol use: Yes     Comment: Rare     Drug use: No     Sexual activity: Not Currently     Partners: Male   Lifestyle     Physical activity:     Days per week: Not on file     Minutes per session: Not on file     Stress: Not on file   Relationships     Social connections:     Talks on phone: Not on file     Gets together: Not on file     Attends Sikhism service: Not on file     Active member of club or organization: Not on file     Attends meetings of clubs or  organizations: Not on file     Relationship status: Not on file     Intimate partner violence:     Fear of current or ex partner: Not on file     Emotionally abused: Not on file     Physically abused: Not on file     Forced sexual activity: Not on file   Other Topics Concern     Not on file   Social History Narrative    Lives at home alone with her catKailyn       MEDICATIONS: Reviewed from the MAR, physician orders, and/or earlier progress notes.  Updated by me today (09/18/2019) with a decrease in glargine insulin reflected below.  Post Discharge Medication Reconciliation Status: medication reconciliation previously completed during another office visit.  Current Outpatient Medications   Medication Sig     acetaminophen (TYLENOL) 325 MG tablet Take 650 mg by mouth every 6 (six) hours as needed for pain or fever.            albuterol (PROAIR HFA;PROVENTIL HFA;VENTOLIN HFA) 90 mcg/actuation inhaler Inhale 2 puffs 3 (three) times a day as needed for wheezing.     amitriptyline (ELAVIL) 150 MG tablet Take 150 mg by mouth at bedtime.     aspirin 325 MG tablet Take 325 mg by mouth daily.     B complex-vitamin C-folic acid 0.8 mg Tab Take 1 tablet by mouth daily.            biotin 10,000 mcg TbDL Take 10,000 mcg by mouth 2 (two) times a day.     carBAMazepine (TEGRETOL) 200 mg tablet Take 400 mg by mouth 2 (two) times a day.     docusate sodium (COLACE) 100 MG capsule Take 100 mg by mouth daily.     ferrous sulfate 65 mg elemental iron Take 1 tablet by mouth 2 (two) times a day with meals.     FLUoxetine (PROZAC) 20 MG capsule Take 20 mg by mouth daily.     insulin glargine (LANTUS) 100 unit/mL vial Inject 35 Units under the skin every morning. (Patient taking differently: Inject 38 Units under the skin every morning.       )     lisinopril (PRINIVIL,ZESTRIL) 10 MG tablet Take 10 mg by mouth daily.     metFORMIN (GLUCOPHAGE) 500 MG tablet Take 750 mg by mouth 2 (two) times a day with meals.            metoprolol  "succinate (TOPROL-XL) 25 MG Take 25 mg by mouth daily.            nitroglycerin (NITROSTAT) 0.4 MG SL tablet Place 0.4 mg under the tongue every 5 (five) minutes as needed for chest pain.     NOVOLOG FLEXPEN U-100 INSULIN 100 unit/mL (3 mL) injection pen Check blood sugar four (4) times daily.  11.65 Type 2 with hyperglycemia  BD Ultra-fine Noris Pen Needles - NDC [address]-14 - dispense 1 case, refill PRN for 1 year  Accu-chek Guide blood glucose meter - dispense 1  Accu-chek Guide test strips (50 ct. boxes) - dispense 1, refill PRN for 1 year  Accu-chek FastClix lancets (box of 102 ct.) - dispense 1, refill PRN for 1 year     rosuvastatin (CRESTOR) 20 MG tablet Take 20 mg by mouth at bedtime.     thiamine 100 MG tablet Take 1 tablet (100 mg total) by mouth daily.     traZODone (DESYREL) 50 MG tablet Take 200 mg by mouth at bedtime.            ALLERGIES:   Allergies   Allergen Reactions     Propranolol Anaphylaxis     Slowed HR     Atorvastatin      Hand neuropathy     Prochlorperazine Edisylate Rash     nightmares     DIET: Cardiac/diabetic, regular texture, thin liquids.    Vitals:    09/18/19 1525   BP: 160/72   Pulse: 82   Resp: 18   Temp: 98.4  F (36.9  C)   SpO2: 97%   Weight: 169 lb 3.2 oz (76.7 kg)   Height: 5' 3\" (1.6 m)   Questionable weight gain of 7.4 pounds.  Body mass index is 29.97 kg/m .    EXAMINATION:   General: Fairly pleasant middle-aged female, sitting on her bed, in no apparent distress.  Head: Normocephalic and atraumatic.   Eyes: PERRLA, sclerae clear.   ENT: Moist oral mucosa.  She is edentulous with full upper and lower dentures.  No rhinorrhea or nasal discharge.  She has complete hearing loss in the right ear and chronic tinnitus.  Cardiovascular: Sinus tachycardia with a 2/6 systolic ejection murmur at the left sternal border.  Respiratory: Lungs clear to auscultation bilaterally.   Abdomen: Soft and nontender.   Musculoskeletal/Extremities: Age-related degenerative joint disease. "   Integument: No rashes, clinically significant lesions, or skin breakdown.   Cognitive/Psychiatric: Alert and oriented x3.  Affect is euthymic.    DIAGNOSTICS:   Results for orders placed or performed during the hospital encounter of 08/30/19   Basic Metabolic Panel   Result Value Ref Range    Sodium 136 136 - 145 mmol/L    Potassium 4.1 3.5 - 5.0 mmol/L    Chloride 104 98 - 107 mmol/L    CO2 26 22 - 31 mmol/L    Anion Gap, Calculation 6 5 - 18 mmol/L    Glucose 103 70 - 125 mg/dL    Calcium 8.7 8.5 - 10.5 mg/dL    BUN 13 8 - 22 mg/dL    Creatinine 0.76 0.60 - 1.10 mg/dL    GFR MDRD Af Amer >60 >60 mL/min/1.73m2    GFR MDRD Non Af Amer >60 >60 mL/min/1.73m2     Lab Results   Component Value Date    WBC 5.5 08/31/2019    HGB 9.5 (L) 08/31/2019    HCT 28.6 (L) 08/31/2019    MCV 95 08/31/2019     09/03/2019     CrCl cannot be calculated (Patient's most recent lab result is older than the maximum 5 days allowed.).  Lab Results   Component Value Date    HGBA1C 9.4 (H) 08/31/2019       ASSESSMENT/Plan:      ICD-10-CM    1. Dizziness R42    2. Type 2 diabetes mellitus with hyperglycemia, with long-term current use of insulin (H) E11.65     Z79.4    3. Pulmonary emphysema, unspecified emphysema type (H) J43.9    4. Diabetic peripheral neuropathy (H) E11.42    5. Coronary artery disease : previous stents without angina pectoris I25.10    6. Depression, unspecified depression type F32.9    7. Gait disturbance R26.9    8. Hypertension due to endocrine disorder I15.2    9. Hearing loss of right ear, unspecified hearing loss type H91.91    10. Tinnitus of right ear H93.11      DISCHARGE PLAN/FACE TO FACE:  I certify that this patient is under my care and that I had a face-to-face encounter that meets the physician face-to-face encounter requirements with this patient.     Date of Face-to-Face Encounter: 09/18/2019     I certify that, based on my findings, the following services are medically necessary home health  services: Home speech therapy, Home PT, and home OT    My clinical findings support the need for the above skilled services because: (Please write a brief narrative summary that describes what the RN, PT, SLP, or other services will be doing in the home. A list of diagnoses in this section does not meet the CMS requirements): Continuing with these services in the home setting.    This patient is homebound because: (Please write a brief narrative summmary describing the functional limitations as to why this patient is homebound and specifically what makes this patient homebound.):  The above services necessarily need to be performed in the home to be of benefit.    The patient is, or has been, under my care and I have initiated the establishment of the plan of care. This patient will be followed by a physician who will periodically review the plan of care.  Initial follow-up should be within 7-10 days.    Approximate time spent with this patient was greater than 30 minutes with greater than 50% spent in discussions regarding services required upon discharge.    The above has been created using voice recognition software. Please be aware that this may unintentionally  produce inaccuracies and/or nonsensical sentences.      Electronically signed by: Suraj Robledo CNP

## 2021-06-01 NOTE — PROGRESS NOTES
[facility]    Name:   Ruddy Mendoza  : 1955  Facility:   [facility] [[id number]]   Room:   Code Status: DNR -   Fac type:   SNF (Skilled Nursing Facility, TCU) -     PCP:  Provider, No Primary Care    CHIEF COMPLAINT / REASON FOR VISIT:  Chief Complaint   Patient presents with     Follow-up     TCU follow-up after hospitalization for a variety of symptoms of unknown etiology, including right facial numbness, severe dizziness, right upper extremity ataxia, and swallowing difficulties.      [facility] from 2019 until 2019 (right facial numbness, severe dizziness, right upper extremity ataxia, swallowing and word finding difficulties)  [facility] from 2019 until 2019 (expected discharge date)    Patient was last seen by me on 2019 and subsequently seen by Dr. Ordonez on 2019.      HPI: Ruddy is a 64 y.o. female with a past medical history of recurrent UTIs and pyelonephritis, diabetes mellitus type 2 (uncontrolled), endocrine induced hypertension, coronary artery disease (status post stenting), and COPD (former smoker) who presented with complaints of severe dizziness, new right lower facial numbness, and ataxia of the right upper extremity for the previous 4 to 5 days prior to admission, with concern for possible stroke, though imaging was negative.  She was also found to be hyperglycemic to the 500s, suffering SOPHIA, and a urinalysis was concerning for UTI on admission.    Etiology of her symptoms was unclear.  Given her history of nausea and vomiting, falls, and unstable gait, the main concern was for a cerebellar stroke; however, an MRI was without any findings of acute stroke, although it did show some chronic infarcts of the cerebellum.  Consider with her hyperglycemia or if hypotensive, a watershed type phenomenon.  Hypoglycemia may have been the primary cause of this, although it sounded as though it  was secondary to her ability to take medications due to her vertigo.  Her report of worsening dizziness with change in position could support orthostatic hypotension, although this was later also deemed to be negative.  She does have diabetic peripheral neuropathy, but one would not expect this to cause a sudden vertiginous syndrome.  Unlikely BPPV, given no symptoms with head movements.  She denied any recent substance alcohol use, though consideration was given to Warnicke's type encephalopathy with her need for a wide gait.    Neurology consult: Neurology was consulted and followed her while inpatient, recommending starting thiamine 100 mg daily and considering a Tegretol level outpatient if vertigo was not resolving or worsening.    Urinary tract infection: As noted, she has history of multiple UTIs and pyelonephritis.  She had back pain and stated it was consistent with her history of UTIs.  However, she denies any dysuria, hematuria, or suprapubic pain, but did state that she did not usually have these when she had a UTI.  She did report CVA tenderness on admission.  The UA did show some nitrites, bacteria, white cells, and she received ceftriaxone in the ER.  UC was positive for pansensitive E. coli, and she was treated for 5 days with nitrofurantoin.    Diabetes mellitus type 2/hyperglycemia: She had significant hyperglycemia on admission into the 500s.  However, she stated she was unable to take her usual home medications due to her vertigo/dizziness.  At the time, her last known A1c was 16.4 in October 2017.  In the hospital, it was 9.4.  She is on Lantus insulin every morning and also receives metformin and NovoLog per sliding scale.      TCU ISSUES    Today/depression: She tells me she feels cold symptoms coming on.  She also asks about an antidepressant.  She tells me she felt like crying all weekend.  I explained that she was started on fluoxetine on 09/06/2019, and it does take a good month or so for  "it to show benefit. We did spend some time talking about her depression, and we also reviewed her recent blood glucose levels.  With a recent change in metformin dosing from Dr. Ordonez (see below), we have opted not to make any changes at this time.    Primary diagnosis: Asked why she is here, she stated, \"I need help with my balance, and I need help to learn how to use a walker, and any help with memory.  I fell 6 times in 4 days and ended up in the hospital.  I was super, super dizzy and had no balance.\"  She does feel that her dizziness is improving, although she still has trouble with corners.  She recently got a walker.  Today, she is blaming, for dizziness on a developing head cold.  Note that her facial numbness has resolved.    She tells me that in therapy the plan is to check her balance without holding onto the walker, and she is somewhat worried about that.    COPD: She was on a Ventolin HFA inhaler at home, taking it 2 or 3 times per day, and we added that.    Dysphagia/word finding difficulty: She was also claiming to have word finding difficulty and difficulty with some swallowing certain meats.  They apparently did a bedside swallow study in the hospital and found nothing wrong.  Nonetheless, we did authorize speech therapy.  There was not felt to be any need to work with her.  Nonetheless, she plans on having a speech therapy -- due to word finding difficulty -- upon discharge.  Yesterday, she did not feel this way but has on other days.    Diabetes mellitus type 2: Blood glucose levels have been elevated (particularly at night), although when I last visited with her, there was insufficient data.  She did, as noted above, have some quite high A1c's.  She told me that at home she would check her blood glucose levels in the morning, and if it was good, she may only check it once more during the day.  In fact, she had a fasting blood glucose level of 53 on 09/10/2019.      Dr. Ordonez discontinued carb " count-based NovoLog, increased her glargine from 35 units to 45 units every morning on 09/06/2019, and set parameters for sliding scale coverage.  We cut that back to 42 units.  On 09/13/2019, Dr. Ordonez increased her metformin from 500 mg twice daily to 750 mg twice daily and also discontinued sliding scale coverage.    Currently, fasting blood glucose levels over the last week (since the reading of 53) range between 71 and 103.  Lunch levels are all between 101 and 168 except for an outlier of 78.  At supper, the range is between 110 and 217 (with a 294 outlier).  Bedtime blood glucose levels tend to be high, ranging between 163 and 258 (with a 320 outlier).  Considering her fasting blood glucose levels, this is not really much for concern.    Diabetic polyneuropathy: She has tingling in the hands and feet.    Medication changes: Dr. Ordonez also discontinued her buspirone and Unisom and increased her trazodone to 200 mg nightly, also adding fluoxetine 20 mg nightly for depression.  She still tells me she wakes up a lot during the night.    Code status: In the hospital, she was full code.  A signed a POLST here indicates that she is now DNR.     Discharge planning: She is expected to discharge next Friday, 09/20/2019.  She will have home services with [facility], including speech as well as other services.  She is somewhat nervous about discharge, and we spent some time talking about this as well.      ROS: Last seen, she was complaining of a terrible headache (and looked ill).  Any headaches this bad are rare.  She did not feel it was a migraine, because when she does have one, she experiences photophobia.  She has been taking Excedrin Migraine (APAP-ASA-caffeine), but she does not find it very beneficial.  No complaints ofchest pains, coughing or congestion, nausea or vomiting, dyspnea, dysuria.  She did not sleep well the first night and is still waking up a lot.  Fortunately, her headache is gone.    Past  Medical History:   Diagnosis Date     Acute encephalopathy 09/23/2017     Acute hypokalemia 09/23/2017     Acute hyponatremia 09/23/2017     Acute pyelonephritis 09/23/2017     SOPHIA (acute kidney injury) (H)      Anxiety      CAD (coronary artery disease)      COPD (chronic obstructive pulmonary disease) (H)      Depression      Diabetes mellitus (H) 09/23/2017     Diabetic peripheral neuropathy (H) 9/4/2019     History of cervical cancer      Hypertension      Sepsis (H) 09/23/2017     Trigeminal neuralgia               Family History   Problem Relation Age of Onset     COPD Mother      COPD Father      Lung cancer Father      Social History     Socioeconomic History     Marital status: Single     Spouse name: Not on file     Number of children: Not on file     Years of education: Not on file     Highest education level: Not on file   Occupational History     Not on file   Social Needs     Financial resource strain: Not on file     Food insecurity:     Worry: Not on file     Inability: Not on file     Transportation needs:     Medical: Not on file     Non-medical: Not on file   Tobacco Use     Smoking status: Former Smoker     Packs/day: 2.00     Years: 30.00     Pack years: 60.00     Types: Cigarettes     Smokeless tobacco: Never Used     Tobacco comment: quit at age 42   Substance and Sexual Activity     Alcohol use: Yes     Comment: Rare     Drug use: No     Sexual activity: Not Currently     Partners: Male   Lifestyle     Physical activity:     Days per week: Not on file     Minutes per session: Not on file     Stress: Not on file   Relationships     Social connections:     Talks on phone: Not on file     Gets together: Not on file     Attends [employer] service: Not on file     Active member of club or organization: Not on file     Attends meetings of clubs or organizations: Not on file     Relationship status: Not on file     Intimate partner violence:     Fear of current or ex partner: Not on file      Emotionally abused: Not on file     Physically abused: Not on file     Forced sexual activity: Not on file   Other Topics Concern     Not on file   Social History Narrative    Lives at home alone with her catKailyn       MEDICATIONS: Reviewed from the MAR, physician orders, and/or earlier progress notes.  Updated by me today (09/16/2019) with an increase in Metformin and discontinuation of sliding scale NovoLog reflected below.  Post Discharge Medication Reconciliation Status: medication reconciliation previously completed during another office visit.  Current Outpatient Medications   Medication Sig     acetaminophen (TYLENOL) 325 MG tablet Take 650 mg by mouth every 6 (six) hours as needed for pain or fever.            albuterol (PROAIR HFA;PROVENTIL HFA;VENTOLIN HFA) 90 mcg/actuation inhaler Inhale 2 puffs 3 (three) times a day as needed for wheezing.     amitriptyline (ELAVIL) 150 MG tablet Take 150 mg by mouth at bedtime.     aspirin 325 MG tablet Take 325 mg by mouth daily.     B complex-vitamin C-folic acid 0.8 mg Tab Take 1 tablet by mouth daily.            biotin 10,000 mcg TbDL Take 10,000 mcg by mouth 2 (two) times a day.     carBAMazepine (TEGRETOL) 200 mg tablet Take 400 mg by mouth 2 (two) times a day.     docusate sodium (COLACE) 100 MG capsule Take 100 mg by mouth daily.     ferrous sulfate 65 mg elemental iron Take 1 tablet by mouth 2 (two) times a day with meals.     FLUoxetine (PROZAC) 20 MG capsule Take 20 mg by mouth daily.     insulin glargine (LANTUS) 100 unit/mL vial Inject 35 Units under the skin every morning. (Patient taking differently: Inject 42 Units under the skin every morning.       )     lisinopril (PRINIVIL,ZESTRIL) 10 MG tablet Take 10 mg by mouth daily.     metFORMIN (GLUCOPHAGE) 500 MG tablet Take 750 mg by mouth 2 (two) times a day with meals.            metoprolol succinate (TOPROL-XL) 25 MG Take 25 mg by mouth daily.            nitroglycerin (NITROSTAT) 0.4 MG SL tablet Place  "0.4 mg under the tongue every 5 (five) minutes as needed for chest pain.     NOVOLOG FLEXPEN U-100 INSULIN 100 unit/mL (3 mL) injection pen Check blood sugar four (4) times daily.  11.65 Type 2 with hyperglycemia  BD Ultra-fine Noris Pen Needles - NDC [address]-29 - dispense 1 case, refill PRN for 1 year  Accu-chek Guide blood glucose meter - dispense 1  Accu-chek Guide test strips (50 ct. boxes) - dispense 1, refill PRN for 1 year  Accu-chek FastClix lancets (box of 102 ct.) - dispense 1, refill PRN for 1 year     rosuvastatin (CRESTOR) 20 MG tablet Take 20 mg by mouth at bedtime.     thiamine 100 MG tablet Take 1 tablet (100 mg total) by mouth daily.     traZODone (DESYREL) 50 MG tablet Take 200 mg by mouth at bedtime.            ALLERGIES:   Allergies   Allergen Reactions     Propranolol Anaphylaxis     Slowed HR     Atorvastatin      Hand neuropathy     Prochlorperazine Edisylate Rash     nightmares     DIET: Cardiac/diabetic, regular texture, thin liquids.    Vitals:    09/16/19 1624   BP: 131/79   Pulse: 79   Resp: 18   Temp: 98.4  F (36.9  C)   SpO2: 98%   Weight: 169 lb 3.2 oz (76.7 kg)   Height: 5' 3\" (1.6 m)   Questionable weight gain of 7.4 pounds.  Body mass index is 29.97 kg/m .    EXAMINATION:   General: Fairly pleasant middle-aged female, sitting on her bed, in no apparent distress.  Head: Normocephalic and atraumatic.   Eyes: PERRLA, sclerae clear.   ENT: Moist oral mucosa.  She is edentulous with full upper and lower dentures.  No rhinorrhea or nasal discharge.  She has complete hearing loss in the right ear and chronic tinnitus.  Cardiovascular: Sinus tachycardia with a 2/6 systolic ejection murmur at the left sternal border.  Respiratory: Lungs clear to auscultation bilaterally.   Abdomen: Soft and nontender.   Musculoskeletal/Extremities: Age-related degenerative joint disease.   Integument: No rashes, clinically significant lesions, or skin breakdown.   Cognitive/Psychiatric: Alert and oriented " x3.  Affect is euthymic.    DIAGNOSTICS:   Results for orders placed or performed during the hospital encounter of 08/30/19   Basic Metabolic Panel   Result Value Ref Range    Sodium 136 136 - 145 mmol/L    Potassium 4.1 3.5 - 5.0 mmol/L    Chloride 104 98 - 107 mmol/L    CO2 26 22 - 31 mmol/L    Anion Gap, Calculation 6 5 - 18 mmol/L    Glucose 103 70 - 125 mg/dL    Calcium 8.7 8.5 - 10.5 mg/dL    BUN 13 8 - 22 mg/dL    Creatinine 0.76 0.60 - 1.10 mg/dL    GFR MDRD Af Amer >60 >60 mL/min/1.73m2    GFR MDRD Non Af Amer >60 >60 mL/min/1.73m2     Lab Results   Component Value Date    WBC 5.5 08/31/2019    HGB 9.5 (L) 08/31/2019    HCT 28.6 (L) 08/31/2019    MCV 95 08/31/2019     09/03/2019     CrCl cannot be calculated (Patient's most recent lab result is older than the maximum 5 days allowed.).  Lab Results   Component Value Date    HGBA1C 9.4 (H) 08/31/2019       ASSESSMENT/Plan:      ICD-10-CM    1. Dizziness R42    2. Type 2 diabetes mellitus with hyperglycemia, with long-term current use of insulin (H) E11.65     Z79.4    3. Pulmonary emphysema, unspecified emphysema type (H) J43.9    4. Diabetic peripheral neuropathy (H) E11.42    5. Depression, unspecified depression type F32.9    6. Gait disturbance R26.9    7. Hypertension due to endocrine disorder I15.2    8. Hearing loss of right ear, unspecified hearing loss type H91.91    9. Tinnitus of right ear H93.11      CHANGES:    None.    CARE PLAN:    The care plan has been reviewed and all orders signed. Changes to care plan, if any, as noted. Otherwise, continue current plan of care.  Total time spent today was approximately 35 minutes, with greater than 50% spent in counseling and coordination of care that included discussing her feelings of depression, nervousness about discharge, and also reviewing her blood glucose levels and diabetic regimen.    The above has been created using voice recognition software. Please be aware that this may unintentionally   produce inaccuracies and/or nonsensical sentences.      Electronically signed by: Suraj Robledo, CNP

## 2021-06-01 NOTE — PROGRESS NOTES
[facility]    Name:   Ruddy Mendoza  : 1955  Facility:   [facility] [[id number]]   Room:   Code Status: DNR -   Fac type:   SNF (Skilled Nursing Facility, TCU) -     PCP:  Provider, No Primary Care    CHIEF COMPLAINT / REASON FOR VISIT:  Chief Complaint   Patient presents with     Follow-up     TCU follow-up after hospitalization for a variety of symptoms of unknown etiology, including right facial numbness, severe dizziness, right upper extremity ataxia, and swallowing difficulties.      [facility] from 2019 until 2019 (right facial numbness, severe dizziness, right upper extremity ataxia, swallowing and word finding difficulties)  [facility] from 2019 until 2019 (expected discharge date)    Patient was last seen by me on 2019.      HPI: Ruddy is a 64 y.o. female with a past medical history of recurrent UTIs and pyelonephritis, diabetes mellitus type 2 (uncontrolled), endocrine induced hypertension, coronary artery disease (status post stenting), and COPD (former smoker) who presented with complaints of severe dizziness, new right lower facial numbness, and ataxia of the right upper extremity for the previous 4 to 5 days prior to admission, with concern for possible stroke, though imaging was negative.  She was also found to be hyperglycemic to the 500s, suffering SOPHIA, and a urinalysis was concerning for UTI on admission.    Etiology of her symptoms was unclear.  Given her history of nausea and vomiting, falls, and unstable gait, the main concern was for a cerebellar stroke; however, an MRI was without any findings of acute stroke, although it did show some chronic infarcts of the cerebellum.  Consider with her hyperglycemia or if hypotensive, a watershed type phenomenon.  Hypoglycemia may have been the primary cause of this, although it sounded as though it was secondary to her ability to take medications due  "to her vertigo.  Her report of worsening dizziness with change in position could support orthostatic hypotension, although this was later also deemed to be negative.  She does have diabetic peripheral neuropathy, but one would not expect this to cause a sudden vertiginous syndrome.  Unlikely BPPV, given no symptoms with head movements.  She denied any recent substance alcohol use, though consideration was given to Warnicke's type encephalopathy with her need for a wide gait.    Neurology consult: Neurology was consulted and followed her while inpatient, recommending starting thiamine 100 mg daily and considering a Tegretol level outpatient if vertigo was not resolving or worsening.    Urinary tract infection: As noted, she has history of multiple UTIs and pyelonephritis.  She had back pain and stated it was consistent with her history of UTIs.  However, she denies any dysuria, hematuria, or suprapubic pain, but did state that she did not usually have these when she had a UTI.  She did report CVA tenderness on admission.  The UA did show some nitrites, bacteria, white cells, and she received ceftriaxone in the ER.  UC was positive for pansensitive E. coli, and she was treated for 5 days with nitrofurantoin.    Diabetes mellitus type 2/hyperglycemia: She had significant hyperglycemia on admission into the 500s.  However, she stated she was unable to take her usual home medications due to her vertigo/dizziness.  At the time, her last known A1c was 16.4 in October 2017.  In the hospital, it was 9.4.  She is on Lantus insulin every morning and also receives metformin and NovoLog per sliding scale.      TCU ISSUES    Primary diagnosis: Asked why she is here, she stated, \"I need help with my balance, and I need help to learn how to use a walker, and any help with memory.  I fell 6 times in 4 days and ended up in the hospital.  I was super, super dizzy and had no balance.  She does feel that her dizziness is improving, " although she still has trouble with corners.  She recently got a walker.    COPD: She tells me she was on a Ventolin HFA inhaler at home, taking it 2 or 3 times per day.  We are adding that.    Dysphagia/word finding difficulty: She was also claiming to have word finding difficulty and difficulty with some swallowing certain meats.  They apparently did a bedside swallow study in the hospital and found nothing wrong.  Nonetheless, we did authorize speech therapy.  There was not felt to be any need to work with her.  Nonetheless, she plans on having a speech therapy -- due to word finding difficulty -- upon discharge.    Diabetes mellitus type 2: Blood glucose levels have been elevated (particularly at night), although when I last visited with her, there was insufficient data.  She did, as noted above, have some quite high A1c's.  She told me that at home she would check her blood glucose levels in the morning, and if it was good, she may only check it once more during the day.  She tells me her fasting blood glucose level yesterday morning was 53 (after being 78 on 09/09/2019).      Dr. Ordonez discontinued carb count-based NovoLog, increased her glargine from 35 units to 45 units every morning on 09/06/2019, and set parameters for sliding scale coverage.  We will cut that back to 42 units.    Diabetic polyneuropathy: She has tingling in the hands and feet.    Medication changes: Dr. Ordonez also discontinued her buspirone and Unisom and increased her trazodone to 200 mg nightly, also adding fluoxetine 20 mg nightly for depression.  She still tells me she wakes up a lot during the night.    Code status: In the hospital, she was full code.  A signed a POLST here indicates that she is now DNR.     Discharge planning: She is expected to discharge next Friday, 09/20/2019.  She will have home services with [facility], including speech as well as other services.      ROS: Last seen, she was complaining of a terrible headache  (and looked ill).  Any headaches this bad are rare.  She did not feel it was a migraine, because when she does have one, she experiences photophobia.  She has been taking Excedrin Migraine (APAP-ASA-caffeine), but she does not find it very beneficial.  No complaints ofchest pains, coughing or congestion, nausea or vomiting, dyspnea, dysuria.  She did not sleep well the first night and is still waking up a lot.  Fortunately, her headache is gone.    Past Medical History:   Diagnosis Date     Acute encephalopathy 09/23/2017     Acute hypokalemia 09/23/2017     Acute hyponatremia 09/23/2017     Acute pyelonephritis 09/23/2017     SOPHIA (acute kidney injury) (H)      Anxiety      CAD (coronary artery disease)      COPD (chronic obstructive pulmonary disease) (H)      Depression      Diabetes mellitus (H) 09/23/2017     Diabetic peripheral neuropathy (H) 9/4/2019     History of cervical cancer      Hypertension      Sepsis (H) 09/23/2017     Trigeminal neuralgia               Family History   Problem Relation Age of Onset     COPD Mother      COPD Father      Lung cancer Father      Social History     Socioeconomic History     Marital status: Single     Spouse name: Not on file     Number of children: Not on file     Years of education: Not on file     Highest education level: Not on file   Occupational History     Not on file   Social Needs     Financial resource strain: Not on file     Food insecurity:     Worry: Not on file     Inability: Not on file     Transportation needs:     Medical: Not on file     Non-medical: Not on file   Tobacco Use     Smoking status: Former Smoker     Packs/day: 2.00     Years: 30.00     Pack years: 60.00     Types: Cigarettes     Smokeless tobacco: Never Used     Tobacco comment: quit at age 42   Substance and Sexual Activity     Alcohol use: Yes     Comment: Rare     Drug use: No     Sexual activity: Not Currently     Partners: Male   Lifestyle     Physical activity:     Days per week: Not  on file     Minutes per session: Not on file     Stress: Not on file   Relationships     Social connections:     Talks on phone: Not on file     Gets together: Not on file     Attends Christian service: Not on file     Active member of club or organization: Not on file     Attends meetings of clubs or organizations: Not on file     Relationship status: Not on file     Intimate partner violence:     Fear of current or ex partner: Not on file     Emotionally abused: Not on file     Physically abused: Not on file     Forced sexual activity: Not on file   Other Topics Concern     Not on file   Social History Narrative    Lives at home alone with her catKailyn       MEDICATIONS: Reviewed from the MAR, physician orders, and/or earlier progress notes.  Updated by me today (09/11/2019) with a decrease in Lantus insulin reflected below.  Post Discharge Medication Reconciliation Status: medication reconciliation previously completed during another office visit.  Current Outpatient Medications   Medication Sig     acetaminophen (TYLENOL) 325 MG tablet Take 650 mg by mouth every 6 (six) hours as needed for pain or fever.            albuterol (PROAIR HFA;PROVENTIL HFA;VENTOLIN HFA) 90 mcg/actuation inhaler Inhale 2 puffs 3 (three) times a day as needed for wheezing.     amitriptyline (ELAVIL) 150 MG tablet Take 150 mg by mouth at bedtime.     aspirin 325 MG tablet Take 325 mg by mouth daily.     B complex-vitamin C-folic acid 0.8 mg Tab Take 1 tablet by mouth daily.            biotin 10,000 mcg TbDL Take 10,000 mcg by mouth 2 (two) times a day.     carBAMazepine (TEGRETOL) 200 mg tablet Take 400 mg by mouth 2 (two) times a day.     docusate sodium (COLACE) 100 MG capsule Take 100 mg by mouth daily.     ferrous sulfate 65 mg elemental iron Take 1 tablet by mouth 2 (two) times a day with meals.     FLUoxetine (PROZAC) 20 MG capsule Take 20 mg by mouth daily.     insulin aspart (NOVOLOG FLEXPEN) 100 unit/mL injection pen Inject  "under the skin 3 (three) times a day before meals. 150-200 = 2 units  201-250= 4units  251-300= 6 units  301-350= 8 units  >350 = 10 units           insulin glargine (LANTUS) 100 unit/mL vial Inject 35 Units under the skin every morning. (Patient taking differently: Inject 42 Units under the skin every morning.       )     lisinopril (PRINIVIL,ZESTRIL) 10 MG tablet Take 10 mg by mouth daily.     metFORMIN (GLUCOPHAGE) 500 MG tablet Take 500 mg by mouth 2 (two) times a day with meals.            metoprolol succinate (TOPROL-XL) 25 MG Take 25 mg by mouth daily.            nitroglycerin (NITROSTAT) 0.4 MG SL tablet Place 0.4 mg under the tongue every 5 (five) minutes as needed for chest pain.     NOVOLOG FLEXPEN U-100 INSULIN 100 unit/mL (3 mL) injection pen Check blood sugar four (4) times daily.  11.65 Type 2 with hyperglycemia  BD Ultra-fine Noris Pen Needles - NDC [address]-50 - dispense 1 case, refill PRN for 1 year  Accu-chek Guide blood glucose meter - dispense 1  Accu-chek Guide test strips (50 ct. boxes) - dispense 1, refill PRN for 1 year  Accu-chek FastClix lancets (box of 102 ct.) - dispense 1, refill PRN for 1 year     rosuvastatin (CRESTOR) 20 MG tablet Take 20 mg by mouth at bedtime.     thiamine 100 MG tablet Take 1 tablet (100 mg total) by mouth daily.     traZODone (DESYREL) 50 MG tablet Take 200 mg by mouth at bedtime.            ALLERGIES:   Allergies   Allergen Reactions     Propranolol Anaphylaxis     Slowed HR     Atorvastatin      Hand neuropathy     Prochlorperazine Edisylate Rash     nightmares     DIET: Cardiac/diabetic, regular texture, thin liquids.    Vitals:    09/11/19 1617   BP: 136/80   Pulse: 86   Resp: 19   Temp: (!) 96.2  F (35.7  C)   SpO2: 97%   Weight: 161 lb 12.8 oz (73.4 kg)   Height: 5' 3\" (1.6 m)     Body mass index is 28.66 kg/m .    EXAMINATION:   General: Fairly pleasant middle-aged female, sitting on her bed, in no apparent distress.  Head: Normocephalic and atraumatic. "   Eyes: PERRLA, sclerae clear.   ENT: Moist oral mucosa.  She is edentulous with full upper and lower dentures.  No rhinorrhea or nasal discharge.  She has complete hearing loss in the right ear and chronic tinnitus.  Cardiovascular: Regular rate and rhythm.   Respiratory: Lungs clear to auscultation bilaterally.   Abdomen: Soft and nontender.   Musculoskeletal/Extremities: Age-related degenerative joint disease.   Integument: No rashes, clinically significant lesions, or skin breakdown.   Cognitive/Psychiatric: Alert and oriented x3.  Affect is euthymic.    DIAGNOSTICS:   Results for orders placed or performed during the hospital encounter of 08/30/19   Basic Metabolic Panel   Result Value Ref Range    Sodium 136 136 - 145 mmol/L    Potassium 4.1 3.5 - 5.0 mmol/L    Chloride 104 98 - 107 mmol/L    CO2 26 22 - 31 mmol/L    Anion Gap, Calculation 6 5 - 18 mmol/L    Glucose 103 70 - 125 mg/dL    Calcium 8.7 8.5 - 10.5 mg/dL    BUN 13 8 - 22 mg/dL    Creatinine 0.76 0.60 - 1.10 mg/dL    GFR MDRD Af Amer >60 >60 mL/min/1.73m2    GFR MDRD Non Af Amer >60 >60 mL/min/1.73m2     Lab Results   Component Value Date    WBC 5.5 08/31/2019    HGB 9.5 (L) 08/31/2019    HCT 28.6 (L) 08/31/2019    MCV 95 08/31/2019     09/03/2019     CrCl cannot be calculated (Patient's most recent lab result is older than the maximum 5 days allowed.).  Lab Results   Component Value Date    HGBA1C 9.4 (H) 08/31/2019       ASSESSMENT/Plan:      ICD-10-CM    1. Dizziness R42    2. Right facial numbness R20.0    3. Type 2 diabetes mellitus with hyperglycemia, with long-term current use of insulin (H) E11.65     Z79.4    4. Pulmonary emphysema, unspecified emphysema type (H) J43.9    5. Diabetic peripheral neuropathy (H) E11.42    6. Gait disturbance R26.9    7. Hypertension due to endocrine disorder I15.2    8. Hearing loss of right ear, unspecified hearing loss type H91.91    9. Tinnitus of right ear H93.11      CHANGES:    Decrease Lantus  insulin from 45 units to 42 units.    CARE PLAN:    The care plan has been reviewed and all orders signed. Changes to care plan, if any, as noted. Otherwise, continue current plan of care.  Total time spent with this patient was approximately 35 minutes, with greater than 50% spent in counseling and coordination of care that included addressing low blood glucose levels/hypoglycemia as well as discussing what she will need upon discharge (which is approaching).    The above has been created using voice recognition software. Please be aware that this may unintentionally  produce inaccuracies and/or nonsensical sentences.      Electronically signed by: Suraj Robledo, GRETCHEN

## 2021-06-01 NOTE — PROGRESS NOTES
[facility]      Facility:    [facility] [[id number]]    Code Status: DNR   [facility]     8/30 to 9/3/19    Chief Complaint/Reason for Visit:  Chief Complaint   Patient presents with     H & P     dizziness / falls       HPI:   Ruddy is a 64 y.o. female with a history of diabetes type 2 peripheral neuropathy and retinopathy, emphysema,  coronary artery disease ( stents in 2004, 2015), history of both cervical and ovarian cancer, hypothyroidism (currently not on replacement therapy) depression /anxiety , frequent UTIs, trigeminal neuralgia for which she is on carbamazepine    She had been having 4-5 days of  severe dizziness, causing nausea, vomiting, unstable gait. She had fallen 6 times trying to get up. She couldn't get to the kitchen to eat, didn't take her medications.  She also had right  lower facial numbness, and ataxia of her right arm. Denied any alcohol intake during these events.    She was brought into the hospital: Her sugars were in the 500 range.  Her white count was normal, lactate = 2.2, creatinine increased equal 1.28 (baseline 0.81).        She had not been taking her insulin because of her severe vertigo, she could not get up to get it.  She has not had a recent A1c, last check = 16.4 in October 2017.  Prior to getting sick, she was taking 40 units of Lantus, sliding scale, and metformin.  A1c  in the hospital =9.4%.. She was aggressively hydrated with normal saline, was covered with a lower dose of Lantus and sliding scale insulin.  Metformin was held due to her renal issues.  Her sodium was low but it was felt to be secondary to her elevated sugars.    Her acute kidney injury was due to significant dehydration, it improved back to normal range with fluids.    CT of the head was negative for acute changes.  MRI was done out of concern for cerebellar stroke with her falling and unstable gait.  MRI negative for acute stroke, positive for chronic  infarcts of the cerebellum.  MRI of the cervical spine: Negative for cord issues high sugars were thought to be possible.  Neurology was consulted, recommended thiamine 100 mg daily.  There was no clear etiology found for her neurologic symptoms.  TSH and free T4 were checked were normal, vitamin B12 was normal, orthostatic pressures were negative.  Vestibular PT was recommended.  Should dizziness continue, it was suggested a Tegretol level be checked.    She did have back pain, which is similar to her onset of symptoms with UTIs.  She usually does not have dysuria, suprapubic pain.  Urinalysis and urine culture were positive, E. coli pansensitive.  He had been on ceftriaxone in the hospital, was discharged on nitrofurantoin for 5 days after discharge.        Past Medical History:  Past Medical History:   Diagnosis Date     Acute encephalopathy 09/23/2017     Acute hypokalemia 09/23/2017     Acute hyponatremia 09/23/2017     Acute pyelonephritis 09/23/2017     SOPHIA (acute kidney injury) (H)      Anxiety      CAD (coronary artery disease)      COPD (chronic obstructive pulmonary disease) (H)      Depression      Diabetes mellitus (H) 09/23/2017     Diabetic peripheral neuropathy (H) 9/4/2019     History of cervical cancer      Hypertension      Sepsis (H) 09/23/2017     Trigeminal neuralgia            Surgical History:  Past Surgical History:   Procedure Laterality Date     CORONARY STENT PLACEMENT      TWO     HERNIA REPAIR       TOTAL ABDOMINAL HYSTERECTOMY W/ BILATERAL SALPINGOOPHORECTOMY      Cervical cancer     VASCULAR SURGERY      Left wrist       Family History:   Family History   Problem Relation Age of Onset     COPD Mother      COPD Father      Lung cancer Father      + Every family member has had hypertension, emphysema type COPD, many with CHF.  Her father had peptic ulcer disease, and a TIA    Denies diabetes in the family    Social History:    Social History     Socioeconomic History     Marital status:  Single     Spouse name: Not on file     Number of children: 4 sons; one  SIDS     Years of education: Not on file     Highest education level: Not on file   Occupational History     Not on file   Social Needs     Financial resource strain: Not on file     Food insecurity:     Worry: Not on file     Inability: Not on file     Transportation needs:     Medical: Not on file     Non-medical: Not on file   Tobacco Use     Smoking status: Former Smoker     Packs/day: 2.00     Years: 30.00     Pack years: 60.00     Types: Cigarettes     Smokeless tobacco: Never Used     Tobacco comment: quit at age 42   Substance and Sexual Activity     Alcohol use: Yes     Comment: Rare     Drug use: No     Sexual activity: Not Currently     Partners: Male   Lifestyle     Physical activity:     Days per week: Not on file     Minutes per session: Not on file     Stress: Not on file   Relationships     Social connections:     Talks on phone: Not on file     Gets together: Not on file     Attends [employer] service: Not on file     Active member of club or organization: Not on file     Attends meetings of clubs or organizations: Not on file     Relationship status: Not on file     Intimate partner violence:     Fear of current or ex partner: Not on file     Emotionally abused: Not on file     Physically abused: Not on file     Forced sexual activity: Not on file   Other Topics Concern     Not on file   Social History Narrative    Lives at home alone with her cat, Kailyn          Review of Systems   Still a little bit dizzy, especially with standing or turning her body, not turning her neck  She got dizzy riding the stationary bike in physical therapy  She feels quite depressed, is losing weight, not eating much at home, although her appetite is increased at the TCU.  The years ago she was on an antidepressant she has no idea of the name.  She has chronic insomnia has tried melatonin, she is been on trazodone but it does not seem to be too  effective.  She notices more difficulty with her memory and speech, she believes it secondary to bumping her head in the fall  Her knees seem to give out on her at home, she is fallen several times  She was diagnosed with diabetes at age 39: She checks her sugars generally twice a day at home, they can go in the 300 range for most of her checks, will occasionally it has been low at 120, occasionally it has gone up to 400 when she feels facial flushing.  She was discharged with carbohydrate counting, she does not know how to do this.  She has diabetic retinopathy with past bleeds, in addition to macular degeneration.  Does not have peripheral vision, has field cuts.  Requires a large print, cannot really see the TV, listens to it.  She had been babysitting her granddaughter, but now her son is not going to allow it  The remainder of the comprehensive review of systems is negative    Vitals:    09/06/19 1100   BP: 125/70   Pulse: 78   Resp: 16   Temp: 97.5  F (36.4  C)   SpO2: 98%       Physical Exam   Constitutional: She is oriented to person, place, and time. She appears distressed.   Fatigued appearing middle-aged  female   HENT:   Nose: Nose normal.   Mouth/Throat: Oropharynx is clear and moist.   Eyes: Conjunctivae and EOM are normal. No scleral icterus.   Decreased vision per her report   Cardiovascular: Regular rhythm and normal heart sounds.   No murmur heard.  Pulmonary/Chest: Breath sounds normal. She has no wheezes. She has no rales.   Abdominal: Bowel sounds are normal. There is no rebound.   Musculoskeletal: Normal range of motion. She exhibits no edema or tenderness.   Lymphadenopathy:     She has no cervical adenopathy.   Neurological: She is alert and oriented to person, place, and time.   Right arm appears to be accurate, no ataxia  Bit unstable with standing  No nystagmus with standing   Skin: Skin is warm and dry. No rash noted.   Scabs on both kneecaps right greater than left    Psychiatric: She has a normal mood and affect. Her behavior is normal. Thought content normal.     PHQ9 = 18        The 4 days of glucoses were reviewed from her TCU stay thus far: 7 AM glucoses are great (88-1 10)  Noon: Still in an okay range at 145, 166  Suppertime sugars: 150-215  9 PM sugars: Consistently in the 300 range      Medication List:  Current Outpatient Medications   Medication Sig     acetaminophen (TYLENOL) 325 MG tablet Take 650 mg by mouth every 6 (six) hours as needed for pain or fever.            amitriptyline (ELAVIL) 150 MG tablet Take 150 mg by mouth at bedtime.     aspirin 325 MG tablet Take 325 mg by mouth daily.     B complex-vitamin C-folic acid 0.8 mg Tab Take 1 tablet by mouth daily.            biotin 10,000 mcg TbDL Take 10,000 mcg by mouth 2 (two) times a day.     busPIRone (BUSPAR) 10 MG tablet Take 20 mg by mouth daily.     carBAMazepine (TEGRETOL) 200 mg tablet Take 400 mg by mouth 2 (two) times a day.     diphenhydrAMINE (UNISOM SLEEPGELS) 50 MG capsule Take 50 mg by mouth at bedtime as needed for sleep.            docusate sodium (COLACE) 100 MG capsule Take 100 mg by mouth daily.     ferrous sulfate 65 mg elemental iron Take 1 tablet by mouth 2 (two) times a day with meals.     insulin aspart (NOVOLOG FLEXPEN) 100 unit/mL injection pen Inject under the skin 3 (three) times a day before meals. Sliding scale based on blood sugars.           insulin glargine (LANTUS) 100 unit/mL vial Inject 35 Units under the skin every morning.     lisinopril (PRINIVIL,ZESTRIL) 10 MG tablet Take 10 mg by mouth daily.     metFORMIN (GLUCOPHAGE) 500 MG tablet Take 500 mg by mouth 2 (two) times a day with meals.            metoprolol succinate (TOPROL-XL) 25 MG Take 25 mg by mouth daily.            nitrofurantoin, macrocrystal-monohydrate, (MACROBID) 100 MG capsule Take 1 capsule (100 mg total) by mouth 2 (two) times a day for 5 days.     nitroglycerin (NITROSTAT) 0.4 MG SL tablet Place 0.4 mg  under the tongue every 5 (five) minutes as needed for chest pain.     NOVOLOG FLEXPEN U-100 INSULIN 100 unit/mL (3 mL) injection pen Check blood sugar four (4) times daily.  11.65 Type 2 with hyperglycemia  BD Ultra-fine Noris Pen Needles - NDC [address]-14 - dispense 1 case, refill PRN for 1 year  Accu-chek Guide blood glucose meter - dispense 1  Accu-chek Guide test strips (50 ct. boxes) - dispense 1, refill PRN for 1 year  Accu-chek FastClix lancets (box of 102 ct.) - dispense 1, refill PRN for 1 year     rosuvastatin (CRESTOR) 20 MG tablet Take 20 mg by mouth at bedtime.     thiamine 100 MG tablet Take 1 tablet (100 mg total) by mouth daily.     traZODone (DESYREL) 50 MG tablet Take 150 mg by mouth at bedtime.       Labs:    Ref Range & Units 9/1/19 0546    Sodium 136 - 145 mmol/L 136     Potassium 3.5 - 5.0 mmol/L 4.1     Chloride 98 - 107 mmol/L 104     CO2 22 - 31 mmol/L 26     Anion Gap, Calculation 5 - 18 mmol/L 6     Glucose 70 - 125 mg/dL 103     Calcium 8.5 - 10.5 mg/dL 8.7     BUN 8 - 22 mg/dL 13     Creatinine 0.60 - 1.10 mg/dL 0.76     GFR MDRD Non Af Amer >60 mL/min/1.73m2 >60        Ref Range & Units 8/30/19 1822    Bilirubin, Total 0.0 - 1.0 mg/dL 0.4     Bilirubin, Direct <=0.5 mg/dL 0.1     Protein, Total 6.0 - 8.0 g/dL 6.6     Albumin 3.5 - 5.0 g/dL 3.2Low      Alkaline Phosphatase 45 - 120 U/L 161High      AST 0 - 40 U/L 17     ALT 0 - 45 U/L 14          Ref Range & Units 8/31/19 1247    GGT (Gamma GT) 0 - 50 U/L 216High     Vitamin E    = normal    8/31/19 Ref Range & Units    Copper, Serum/Plasma 80.0 - 155.0 ug/dL 75.8Low         Ref Range & Units 8/31/19 8/30/19     WBC 4.0 - 11.0 thou/uL 5.5  8.8     RBC 3.80 - 5.40 mill/uL 3.02Low   3.28Low      Hemoglobin 12.0 - 16.0 g/dL 9.5Low   10.4Low      Hematocrit 35.0 - 47.0 % 28.6Low   30.8Low      MCV 80 - 100 fL 95  94     MCH 27.0 - 34.0 pg 31.5  31.7     MCHC 32.0 - 36.0 g/dL 33.2  33.8     RDW 11.0 - 14.5 % 14.4  14.4     Platelets 140 -  440 thou/uL 184           Assessment / Plan:    ICD-10-CM    1. Dizziness R42 Mild level soon but still present. ? Check Tegretol   2. Type 2 diabetes mellitus with hyperglycemia, with long-term current use of insulin (H) E11.65 Will increase the Lantus since evening glucoses are consistently high, and add sliding scale Novolog. No carb counts    Z79.4    3. Acute dehydration E86.0 Resolved with IVF, normalization of Cr   4. Diabetic peripheral neuropathy (H) E11.42 Chronic gait issues; also discovered to have chronic cerebellar infarcts. PT/OT   5. Diabetic retinopathy of both eyes associated with type 2 diabetes mellitus, macular edema presence unspecified, unspecified retinopathy severity (H) E11.319 Is quite visually impaired.   6. Acute cystitis without hematuria N30.00 Will complete abx on 9/8   7. Depression, unspecified depression type F32.9 Will start Prozac 20 mg daily with patient input and consent.   8. Insomnia, unspecified type G47.00  DC Benadryl, increase trazodone up to 200 mg   9. Essential hypertension I10 In great range at present. Monitor   10. Coronary artery disease : previous stents without angina pectoris I25.10 Continue medications     She changed her code status = DNR        Electronically signed by: Leatha Ordonez MD

## 2021-06-01 NOTE — PROGRESS NOTES
[facility]      Facility:    [facility] [[id number]]    Code Status: DNR   [facility]     8/30 to 9/3/19    Chief Complaint/Reason for Visit:  Chief Complaint   Patient presents with     Review Of Multiple Medical Conditions       HPI:   Ruddy is a 64 y.o. female with a history of diabetes type 2 peripheral neuropathy and retinopathy, emphysema,  coronary artery disease ( stents in 2004, 2015), history of both cervical and ovarian cancer, hypothyroidism (currently not on replacement therapy) depression /anxiety , frequent UTIs, trigeminal neuralgia for which she is on carbamazepine    She had been having 4-5 days of  severe dizziness, causing nausea, vomiting, unstable gait. She had fallen 6 times trying to get up. She couldn't get to the kitchen to eat, didn't take her medications.  She also had right  lower facial numbness, and ataxia of her right arm. Denied any alcohol intake during these events.    She was brought into the hospital: Her sugars were in the 500 range.  Her white count was normal, lactate = 2.2, creatinine increased equal 1.28 (baseline 0.81).        She had not been taking her insulin because of her severe vertigo, she could not get up to get it.  She has not had a recent A1c, last check = 16.4 in October 2017.  Prior to getting sick, she was taking 40 units of Lantus, sliding scale, and metformin.  A1c  in the hospital =9.4%.. She was aggressively hydrated with normal saline, was covered with a lower dose of Lantus and sliding scale insulin.  Metformin was held due to her renal issues.  Her sodium was low but it was felt to be secondary to her elevated sugars.    Her acute kidney injury was due to significant dehydration, it improved back to normal range with fluids.    CT of the head was negative for acute changes.  MRI was done out of concern for cerebellar stroke with her falling and unstable gait.  MRI negative for acute stroke, positive for  chronic infarcts of the cerebellum.  MRI of the cervical spine: Negative for cord issues high sugars were thought to be possible.  Neurology was consulted, recommended thiamine 100 mg daily.  There was no clear etiology found for her neurologic symptoms.  TSH and free T4 were checked were normal, vitamin B12 was normal, orthostatic pressures were negative.  Vestibular PT was recommended.  Should dizziness continue, it was suggested a Tegretol level be checked.    She did have back pain, which is similar to her onset of symptoms with UTIs.  She usually does not have dysuria, suprapubic pain.  Urinalysis and urine culture were positive, E. coli pansensitive.  He had been on ceftriaxone in the hospital, was discharged on nitrofurantoin for 5 days after discharge.    UPDATE:  She has had some problems with low glucose in the morning  Her dizziness has decreased but still present when she turns her body or her head.  Speech-language pathology is working with her for cognition      Past Medical History:  Past Medical History:   Diagnosis Date     Acute encephalopathy 09/23/2017     Acute hypokalemia 09/23/2017     Acute hyponatremia 09/23/2017     Acute pyelonephritis 09/23/2017     SOPHIA (acute kidney injury) (H)      Anxiety      CAD (coronary artery disease)      COPD (chronic obstructive pulmonary disease) (H)      Depression      Diabetes mellitus (H) 09/23/2017     Diabetic peripheral neuropathy (H) 9/4/2019     History of cervical cancer      Hypertension      Sepsis (H) 09/23/2017     Trigeminal neuralgia            Surgical History:  Past Surgical History:   Procedure Laterality Date     CORONARY STENT PLACEMENT      TWO     HERNIA REPAIR       TOTAL ABDOMINAL HYSTERECTOMY W/ BILATERAL SALPINGOOPHORECTOMY      Cervical cancer     VASCULAR SURGERY      Left wrist         Review of Systems   She likes the memory exercises given her by speech-language pathology, has occasional low morning glucoses at home  She has   been having peanut butter cracker at bedtime.  Definitely feels symptoms when her sugars are much below 100, probably is eating less here than at home  Sleeping pill is helpful  Headache still present at times  She is sad that her some won't let her babysit her grand daughter anymore  She states therapy is planning to discharge a week from today      Blood pressure 121/71, pulse 80, temperature 97.2  F (36.2  C), resp. rate 16, SpO2 96 %.        Physical Exam     PHQ9 = 18     Constitutional: Middle-aged  female , pleasant, talkative  Cardiovascular: Regular rhythm ,normal heart sounds,no murmur   Pulmonary/Chest: Breath sounds clear   Abdominal: Bowel sounds are normal, non tender   Musculoskeletal: Normal range of motion. She exhibits no edema or tenderness.   Neurological: She is alert and oriented to person, place, and time. Is somewhat unstable with standing   Skin: Skin is warm and dry. No rash noted.    Psychiatric: She has a normal mood. Thoughts normal.     Glucose review: many of her glucoses are quite low, especially in the AM, and symptomatic.   One glucose 235    Allergies   Allergen Reactions     Propranolol Anaphylaxis     Slowed HR     Atorvastatin      Hand neuropathy     Prochlorperazine Edisylate Rash     nightmares         Medication List:  Current Outpatient Medications   Medication Sig     acetaminophen (TYLENOL) 325 MG tablet Take 650 mg by mouth every 6 (six) hours as needed for pain or fever.            albuterol (PROAIR HFA;PROVENTIL HFA;VENTOLIN HFA) 90 mcg/actuation inhaler Inhale 2 puffs 3 (three) times a day as needed for wheezing.     amitriptyline (ELAVIL) 150 MG tablet Take 150 mg by mouth at bedtime.     aspirin 325 MG tablet Take 325 mg by mouth daily.     B complex-vitamin C-folic acid 0.8 mg Tab Take 1 tablet by mouth daily.            biotin 10,000 mcg TbDL Take 10,000 mcg by mouth 2 (two) times a day.     carBAMazepine (TEGRETOL) 200 mg tablet Take 400 mg by mouth 2  (two) times a day.     docusate sodium (COLACE) 100 MG capsule Take 100 mg by mouth daily.     ferrous sulfate 65 mg elemental iron Take 1 tablet by mouth 2 (two) times a day with meals.     FLUoxetine (PROZAC) 20 MG capsule Take 20 mg by mouth daily.     insulin aspart (NOVOLOG FLEXPEN) 100 unit/mL injection pen Inject under the skin 3 (three) times a day before meals. 150-200 = 2 units  201-250= 4units  251-300= 6 units  301-350= 8 units  >350 = 10 units           insulin glargine (LANTUS) 100 unit/mL vial Inject 35 Units under the skin every morning. (Patient taking differently: Inject 42 Units under the skin every morning.       )     lisinopril (PRINIVIL,ZESTRIL) 10 MG tablet Take 10 mg by mouth daily.     metFORMIN (GLUCOPHAGE) 500 MG tablet Take 500 mg by mouth 2 (two) times a day with meals.            metoprolol succinate (TOPROL-XL) 25 MG Take 25 mg by mouth daily.            nitroglycerin (NITROSTAT) 0.4 MG SL tablet Place 0.4 mg under the tongue every 5 (five) minutes as needed for chest pain.     NOVOLOG FLEXPEN U-100 INSULIN 100 unit/mL (3 mL) injection pen Check blood sugar four (4) times daily.  11.65 Type 2 with hyperglycemia  BD Ultra-fine Noris Pen Needles - NDC [address]-43 - dispense 1 case, refill PRN for 1 year  Accu-chek Guide blood glucose meter - dispense 1  Accu-chek Guide test strips (50 ct. boxes) - dispense 1, refill PRN for 1 year  Accu-chek FastClix lancets (box of 102 ct.) - dispense 1, refill PRN for 1 year     rosuvastatin (CRESTOR) 20 MG tablet Take 20 mg by mouth at bedtime.     thiamine 100 MG tablet Take 1 tablet (100 mg total) by mouth daily.     traZODone (DESYREL) 50 MG tablet Take 200 mg by mouth at bedtime.              Labs:    Ref Range & Units 9/1/19 0546    Sodium 136 - 145 mmol/L 136     Potassium 3.5 - 5.0 mmol/L 4.1     Chloride 98 - 107 mmol/L 104     CO2 22 - 31 mmol/L 26     Anion Gap, Calculation 5 - 18 mmol/L 6     Glucose 70 - 125 mg/dL 103     Calcium 8.5 -  10.5 mg/dL 8.7     BUN 8 - 22 mg/dL 13     Creatinine 0.60 - 1.10 mg/dL 0.76     GFR MDRD Non Af Amer >60 mL/min/1.73m2 >60        Ref Range & Units 8/30/19 1822    Bilirubin, Total 0.0 - 1.0 mg/dL 0.4     Bilirubin, Direct <=0.5 mg/dL 0.1     Protein, Total 6.0 - 8.0 g/dL 6.6     Albumin 3.5 - 5.0 g/dL 3.2Low      Alkaline Phosphatase 45 - 120 U/L 161High      AST 0 - 40 U/L 17     ALT 0 - 45 U/L 14          Ref Range & Units 8/31/19 1247    GGT (Gamma GT) 0 - 50 U/L 216High     Vitamin E    = normal    8/31/19 Ref Range & Units    Copper, Serum/Plasma 80.0 - 155.0 ug/dL 75.8Low         Ref Range & Units 8/31/19 8/30/19     WBC 4.0 - 11.0 thou/uL 5.5  8.8     RBC 3.80 - 5.40 mill/uL 3.02Low   3.28Low      Hemoglobin 12.0 - 16.0 g/dL 9.5Low   10.4Low      Hematocrit 35.0 - 47.0 % 28.6Low   30.8Low      MCV 80 - 100 fL 95  94     MCH 27.0 - 34.0 pg 31.5  31.7     MCHC 32.0 - 36.0 g/dL 33.2  33.8     RDW 11.0 - 14.5 % 14.4  14.4     Platelets 140 - 440 thou/uL 184           Assessment / Plan:      ICD-10-CM    1. Dizziness R42 Still present, has features of BPV   2. Type 2 diabetes mellitus with hyperglycemia, with long-term current use of insulin (H) E11.65 Now some low glucoses. Stop sliding scale. Trial increased Metformin to see if more even range glucoses    Z79.4    3. Diabetic peripheral neuropathy (H) E11.42 unsteady   4. Diabetic retinopathy of both eyes with macular edema associated with type 2 diabetes mellitus, unspecified retinopathy severity (H) E11.311 Impaired vision   5. Depression, unspecified depression type F32.9 Started on Prozac during TCU for self professed depression and increased PHQ9 score   6. Hypertension, unspecified type I10 Excellent readings   7. Insomnia, unspecified type G47.00 Increased Trazodone. Benadryl stopped b/c of her neuropathy and it is on the BEERS list to avoid         ICD-10-CM    2. Type 2 diabetes mellitus with hyperglycemia, with long-term current use of insulin (H)  E11.65     Z79.4    4. Diabetic peripheral neuropathy (H) E11.42 Chronic gait issues; also discovered to have chronic cerebellar infarcts. PT/OT   5. Diabetic retinopathy of both eyes associated with type 2 diabetes mellitus, macular edema presence unspecified, unspecified retinopathy severity (H) E11.319 Is quite visually impaired.   7. Depression, unspecified depression type F32.9 Will start Prozac 20 mg daily with patient input and consent.   8. Insomnia, unspecified type G47.00  DC Benadryl, increase trazodone up to 200 mg   9. Essential hypertension I10 In great range at present. Monitor   10. Coronary artery disease : previous stents without angina pectoris I25.10 Continue medications             Electronically signed by: Leatha Ordonez MD

## 2021-06-01 NOTE — PROGRESS NOTES
[facility]    Name:   Ruddy Mendoza  : 1955  Facility:   [facility] [[id number]]   Room:   Code Status: DNR -   Fac type:   SNF (Skilled Nursing Facility, TCU) -     PCP:  Provider, No Primary Care    CHIEF COMPLAINT / REASON FOR VISIT:  Chief Complaint   Patient presents with     Follow-up     TCU follow-up after hospitalization for a variety of symptoms of unknown etiology, including right facial numbness, severe dizziness, right upper extremity ataxia, and swallowing difficulties.      [facility] from 2019 until 2019 (right facial numbness, severe dizziness, right upper extremity ataxia, swallowing and word finding difficulties)      HPI: Ruddy is a 64 y.o. female with a past medical history of recurrent UTIs and pyelonephritis, diabetes mellitus type 2 (uncontrolled), endocrine induced hypertension, coronary artery disease (status post stenting), and COPD (former smoker) who presented with complaints of severe dizziness, new right lower facial numbness, and ataxia of the right upper extremity for the previous 4 to 5 days prior to admission, with concern for possible stroke, though imaging was negative.  She was also found to be hyperglycemic to the 500s, suffering SOPHIA, and a urinalysis was concerning for UTI on admission.    Etiology of her symptoms was unclear.  Given her history of nausea and vomiting, falls, and unstable gait, the main concern was for a cerebellar stroke; however, an MRI was without any findings of acute stroke, although it did show some chronic infarcts of the cerebellum.  Consider with her hyperglycemia or if hypotensive, a watershed type phenomenon.  Hypoglycemia may have been the primary cause of this, although it sounded as though it was secondary to her ability to take medications due to her vertigo.  Her report of worsening dizziness with change in position could support orthostatic hypotension, although this was  "later also deemed to be negative.  She does have diabetic peripheral neuropathy, but one would not expect this to cause a sudden vertiginous syndrome.  Unlikely BPPV, given no symptoms with head movements.  She denied any recent substance alcohol use, though consideration was given to Warnicke's type encephalopathy with her need for a wide gait.    Neurology consult: Neurology was consulted and followed her while inpatient, recommending starting thiamine 100 mg daily and considering a Tegretol level outpatient if vertigo was not resolving or worsening.    Urinary tract infection: As noted, she has history of multiple UTIs and pyelonephritis.  She had back pain and stated it was consistent with her history of UTIs.  However, she denies any dysuria, hematuria, or suprapubic pain, but did state that she did not usually have these when she had a UTI.  She did report CVA tenderness on admission.  The UA did show some nitrites, bacteria, white cells, and she received ceftriaxone in the ER.  UC was positive for pansensitive E. coli, and she was treated for 5 days with nitrofurantoin.    Diabetes mellitus type 2/hyperglycemia: She had significant hyperglycemia on admission into the 500s.  However, she stated she was unable to take her usual home medications due to her vertigo/dizziness.  At the time, her last known A1c was 16.4 in October 2017.  In the hospital, it was 9.4.  She is on 35 units of Lantus insulin every morning and also receives metformin and NovoLog per sliding scale.      TCU ISSUES    Primary diagnosis: Asked why she is here, she stated, \"I need help with my balance, and I need help to learn how to use a walker, and any help with memory.  I fell 6 times in 4 days and ended up in the hospital.  I was super, super dizzy and had no balance.  She does feel that her dizziness is improving, although she still has trouble with corners.  She recently got a walker.    COPD: She tells me she was on a Ventolin HFA " inhaler at home, taking it 2 or 3 times per day.  We are adding that.    Dysphagia/word finding difficulty: She was also claiming to have word finding difficulty and difficulty with some swallowing certain meats.  They apparently did a bedside swallow study in the hospital and found nothing wrong.  Nonetheless, we did authorize speech therapy.  There was not felt to be any need to work with her.    Diabetes mellitus type 2: Blood glucose levels have been elevated (particularly at night), although when I last visited with her, there was insufficient data.  She did, as noted above, have some quite high A1c's.  She told me that at home she would check her blood glucose levels in the morning, and if it was good, she may only check it once more during the day.  She tells me her fasting blood glucose level this morning was 78.  She ate most of her breakfast, and then began to feel sick.    Dr. Ordonez discontinued carb count-based NovoLog, increased her glargine from 35 units to 45 units every morning, and set parameters for sliding scale coverage.    Diabetic polyneuropathy: She has tingling in the hands and feet.    Medication changes: Dr. Ordonez also discontinued her buspirone and Unisom and increased her trazodone to 200 mg nightly, also adding fluoxetine 20 mg nightly for depression.    Code status: In the hospital, she was full code.  A signed a POLST here indicates that she is now DNR.       ROS: Today, she is complaining of a terrible headache (and looks ill).  Any headaches this bad or rare.  She does not feel it is a migraine, because when she does have one, she experiences photophobia.  She has been taking Excedrin Migraine (APAP-ASA-caffeine), but she does not find it very beneficial.  No complaints ofchest pains, coughing or congestion, nausea or vomiting, dyspnea, dysuria.  She did not sleep well the first night.    Past Medical History:   Diagnosis Date     Acute encephalopathy 09/23/2017     Acute  hypokalemia 09/23/2017     Acute hyponatremia 09/23/2017     Acute pyelonephritis 09/23/2017     SOPHIA (acute kidney injury) (H)      Anxiety      CAD (coronary artery disease)      COPD (chronic obstructive pulmonary disease) (H)      Depression      Diabetes mellitus (H) 09/23/2017     Diabetic peripheral neuropathy (H) 9/4/2019     History of cervical cancer      Hypertension      Sepsis (H) 09/23/2017     Trigeminal neuralgia               Family History   Problem Relation Age of Onset     COPD Mother      COPD Father      Lung cancer Father      Social History     Socioeconomic History     Marital status: Single     Spouse name: Not on file     Number of children: Not on file     Years of education: Not on file     Highest education level: Not on file   Occupational History     Not on file   Social Needs     Financial resource strain: Not on file     Food insecurity:     Worry: Not on file     Inability: Not on file     Transportation needs:     Medical: Not on file     Non-medical: Not on file   Tobacco Use     Smoking status: Former Smoker     Packs/day: 2.00     Years: 30.00     Pack years: 60.00     Types: Cigarettes     Smokeless tobacco: Never Used     Tobacco comment: quit at age 42   Substance and Sexual Activity     Alcohol use: Yes     Comment: Rare     Drug use: No     Sexual activity: Not Currently     Partners: Male   Lifestyle     Physical activity:     Days per week: Not on file     Minutes per session: Not on file     Stress: Not on file   Relationships     Social connections:     Talks on phone: Not on file     Gets together: Not on file     Attends Buddhism service: Not on file     Active member of club or organization: Not on file     Attends meetings of clubs or organizations: Not on file     Relationship status: Not on file     Intimate partner violence:     Fear of current or ex partner: Not on file     Emotionally abused: Not on file     Physically abused: Not on file     Forced sexual  activity: Not on file   Other Topics Concern     Not on file   Social History Narrative    Lives at home alone with her catKailyn       MEDICATIONS: Reviewed from the MAR, physician orders, and/or earlier progress notes.  Updated by me today (09/09/2019) with the addition of Ventolin HFA inhaler reflected below.  Dr. Ordonez's increase in glargine was also addressed.  Post Discharge Medication Reconciliation Status: medication reconciliation previously completed during another office visit.  Current Outpatient Medications   Medication Sig     albuterol (PROAIR HFA;PROVENTIL HFA;VENTOLIN HFA) 90 mcg/actuation inhaler Inhale 2 puffs 3 (three) times a day as needed for wheezing.     acetaminophen (TYLENOL) 325 MG tablet Take 650 mg by mouth every 6 (six) hours as needed for pain or fever.            amitriptyline (ELAVIL) 150 MG tablet Take 150 mg by mouth at bedtime.     aspirin 325 MG tablet Take 325 mg by mouth daily.     B complex-vitamin C-folic acid 0.8 mg Tab Take 1 tablet by mouth daily.            biotin 10,000 mcg TbDL Take 10,000 mcg by mouth 2 (two) times a day.     carBAMazepine (TEGRETOL) 200 mg tablet Take 400 mg by mouth 2 (two) times a day.     docusate sodium (COLACE) 100 MG capsule Take 100 mg by mouth daily.     ferrous sulfate 65 mg elemental iron Take 1 tablet by mouth 2 (two) times a day with meals.     FLUoxetine (PROZAC) 20 MG capsule Take 20 mg by mouth daily.     insulin aspart (NOVOLOG FLEXPEN) 100 unit/mL injection pen Inject under the skin 3 (three) times a day before meals. 150-200 = 2 units  201-250= 4units  251-300= 6 units  301-350= 8 units  >350 = 10 units           insulin glargine (LANTUS) 100 unit/mL vial Inject 35 Units under the skin every morning. (Patient taking differently: Inject 45 Units under the skin every morning.       )     lisinopril (PRINIVIL,ZESTRIL) 10 MG tablet Take 10 mg by mouth daily.     metFORMIN (GLUCOPHAGE) 500 MG tablet Take 500 mg by mouth 2 (two) times a  "day with meals.            metoprolol succinate (TOPROL-XL) 25 MG Take 25 mg by mouth daily.            nitroglycerin (NITROSTAT) 0.4 MG SL tablet Place 0.4 mg under the tongue every 5 (five) minutes as needed for chest pain.     NOVOLOG FLEXPEN U-100 INSULIN 100 unit/mL (3 mL) injection pen Check blood sugar four (4) times daily.  11.65 Type 2 with hyperglycemia  BD Ultra-fine Noris Pen Needles - NDC [address]-98 - dispense 1 case, refill PRN for 1 year  Accu-chek Guide blood glucose meter - dispense 1  Accu-chek Guide test strips (50 ct. boxes) - dispense 1, refill PRN for 1 year  Accu-chek FastClix lancets (box of 102 ct.) - dispense 1, refill PRN for 1 year     rosuvastatin (CRESTOR) 20 MG tablet Take 20 mg by mouth at bedtime.     thiamine 100 MG tablet Take 1 tablet (100 mg total) by mouth daily.     traZODone (DESYREL) 50 MG tablet Take 200 mg by mouth at bedtime.            ALLERGIES:   Allergies   Allergen Reactions     Propranolol Anaphylaxis     Slowed HR     Atorvastatin      Hand neuropathy     Prochlorperazine Edisylate Rash     nightmares     DIET: Cardiac/diabetic, regular texture, thin liquids.    Vitals:    09/09/19 1737   BP: 132/79   Pulse: 84   Resp: 18   Temp: 97.6  F (36.4  C)   SpO2: 97%   Weight: 161 lb 12.8 oz (73.4 kg)   Height: 5' 3\" (1.6 m)     Body mass index is 28.66 kg/m .    EXAMINATION:   General: Fairly pleasant middle-aged female, sitting at the breakfast table, in no apparent distress.  Head: Normocephalic and atraumatic.   Eyes: PERRLA, sclerae clear.   ENT: Moist oral mucosa.  She is edentulous with full upper and lower dentures.  No rhinorrhea or nasal discharge.  She has complete hearing loss in the right ear and chronic tinnitus.  Cardiovascular: Regular rate and rhythm.   Respiratory: Lungs clear to auscultation bilaterally.   Abdomen: Soft and nontender.   Musculoskeletal/Extremities: Age-related degenerative joint disease.   Integument: No rashes, clinically significant " lesions, or skin breakdown.   Cognitive/Psychiatric: Alert and oriented x3.  Affect is euthymic.    DIAGNOSTICS:   Results for orders placed or performed during the hospital encounter of 08/30/19   Basic Metabolic Panel   Result Value Ref Range    Sodium 136 136 - 145 mmol/L    Potassium 4.1 3.5 - 5.0 mmol/L    Chloride 104 98 - 107 mmol/L    CO2 26 22 - 31 mmol/L    Anion Gap, Calculation 6 5 - 18 mmol/L    Glucose 103 70 - 125 mg/dL    Calcium 8.7 8.5 - 10.5 mg/dL    BUN 13 8 - 22 mg/dL    Creatinine 0.76 0.60 - 1.10 mg/dL    GFR MDRD Af Amer >60 >60 mL/min/1.73m2    GFR MDRD Non Af Amer >60 >60 mL/min/1.73m2     Lab Results   Component Value Date    WBC 5.5 08/31/2019    HGB 9.5 (L) 08/31/2019    HCT 28.6 (L) 08/31/2019    MCV 95 08/31/2019     09/03/2019     CrCl cannot be calculated (Patient's most recent lab result is older than the maximum 5 days allowed.).  Lab Results   Component Value Date    HGBA1C 9.4 (H) 08/31/2019       ASSESSMENT/Plan:      ICD-10-CM    1. Dizziness R42    2. Right facial numbness R20.0    3. Type 2 diabetes mellitus with hyperglycemia, with long-term current use of insulin (H) E11.65     Z79.4    4. Pulmonary emphysema, unspecified emphysema type (H) J43.9    5. Diabetic peripheral neuropathy (H) E11.42    6. Gait disturbance R26.9    7. Hypertension due to endocrine disorder I15.2    8. Hearing loss of right ear, unspecified hearing loss type H91.91    9. Tinnitus of right ear H93.11      CHANGES:    Ventolin HFA inhaler 3 times daily as needed.    CARE PLAN:    The care plan has been reviewed and all orders signed. Changes to care plan, if any, as noted. Otherwise, continue current plan of care.      The above has been created using voice recognition software. Please be aware that this may unintentionally  produce inaccuracies and/or nonsensical sentences.      Electronically signed by: Suraj Robledo, GRETCHEN

## 2021-06-03 VITALS
HEIGHT: 63 IN | OXYGEN SATURATION: 97 % | TEMPERATURE: 98.4 F | HEART RATE: 82 BPM | RESPIRATION RATE: 18 BRPM | DIASTOLIC BLOOD PRESSURE: 72 MMHG | SYSTOLIC BLOOD PRESSURE: 160 MMHG | BODY MASS INDEX: 29.98 KG/M2 | WEIGHT: 169.2 LBS

## 2021-06-03 VITALS
HEIGHT: 63 IN | HEART RATE: 79 BPM | DIASTOLIC BLOOD PRESSURE: 79 MMHG | RESPIRATION RATE: 18 BRPM | TEMPERATURE: 98.4 F | OXYGEN SATURATION: 98 % | WEIGHT: 169.2 LBS | BODY MASS INDEX: 29.98 KG/M2 | SYSTOLIC BLOOD PRESSURE: 131 MMHG

## 2021-06-03 VITALS
TEMPERATURE: 97.6 F | OXYGEN SATURATION: 97 % | HEIGHT: 63 IN | WEIGHT: 161.8 LBS | BODY MASS INDEX: 28.67 KG/M2 | HEART RATE: 84 BPM | RESPIRATION RATE: 18 BRPM | SYSTOLIC BLOOD PRESSURE: 132 MMHG | DIASTOLIC BLOOD PRESSURE: 79 MMHG

## 2021-06-03 VITALS
OXYGEN SATURATION: 98 % | WEIGHT: 163.6 LBS | TEMPERATURE: 97 F | DIASTOLIC BLOOD PRESSURE: 83 MMHG | SYSTOLIC BLOOD PRESSURE: 139 MMHG | HEIGHT: 63 IN | RESPIRATION RATE: 20 BRPM | BODY MASS INDEX: 28.99 KG/M2 | HEART RATE: 79 BPM

## 2021-06-03 VITALS
BODY MASS INDEX: 28.67 KG/M2 | HEIGHT: 63 IN | HEART RATE: 86 BPM | SYSTOLIC BLOOD PRESSURE: 136 MMHG | OXYGEN SATURATION: 97 % | TEMPERATURE: 96.2 F | DIASTOLIC BLOOD PRESSURE: 80 MMHG | RESPIRATION RATE: 19 BRPM | WEIGHT: 161.8 LBS

## 2021-06-04 VITALS
BODY MASS INDEX: 30.9 KG/M2 | HEIGHT: 63 IN | SYSTOLIC BLOOD PRESSURE: 147 MMHG | WEIGHT: 174.4 LBS | HEART RATE: 85 BPM | RESPIRATION RATE: 16 BRPM | TEMPERATURE: 98 F | DIASTOLIC BLOOD PRESSURE: 82 MMHG | OXYGEN SATURATION: 98 %

## 2021-06-04 VITALS
OXYGEN SATURATION: 97 % | DIASTOLIC BLOOD PRESSURE: 70 MMHG | RESPIRATION RATE: 18 BRPM | WEIGHT: 161.2 LBS | HEIGHT: 63 IN | HEART RATE: 83 BPM | SYSTOLIC BLOOD PRESSURE: 102 MMHG | BODY MASS INDEX: 28.56 KG/M2 | TEMPERATURE: 97.2 F

## 2021-06-04 VITALS
HEIGHT: 63 IN | RESPIRATION RATE: 18 BRPM | SYSTOLIC BLOOD PRESSURE: 118 MMHG | TEMPERATURE: 98 F | DIASTOLIC BLOOD PRESSURE: 74 MMHG | HEART RATE: 76 BPM | OXYGEN SATURATION: 99 % | WEIGHT: 173.8 LBS | BODY MASS INDEX: 30.79 KG/M2

## 2021-06-04 VITALS
RESPIRATION RATE: 16 BRPM | SYSTOLIC BLOOD PRESSURE: 178 MMHG | BODY MASS INDEX: 25.87 KG/M2 | DIASTOLIC BLOOD PRESSURE: 98 MMHG | HEART RATE: 88 BPM | WEIGHT: 146 LBS | HEIGHT: 63 IN | OXYGEN SATURATION: 97 %

## 2021-06-04 VITALS
WEIGHT: 173.8 LBS | TEMPERATURE: 98.2 F | RESPIRATION RATE: 18 BRPM | BODY MASS INDEX: 30.79 KG/M2 | HEIGHT: 63 IN | OXYGEN SATURATION: 97 % | DIASTOLIC BLOOD PRESSURE: 90 MMHG | HEART RATE: 86 BPM | SYSTOLIC BLOOD PRESSURE: 132 MMHG

## 2021-06-04 VITALS
TEMPERATURE: 96.8 F | HEART RATE: 78 BPM | HEIGHT: 63 IN | BODY MASS INDEX: 33.98 KG/M2 | OXYGEN SATURATION: 92 % | WEIGHT: 191.8 LBS | DIASTOLIC BLOOD PRESSURE: 70 MMHG | RESPIRATION RATE: 16 BRPM | SYSTOLIC BLOOD PRESSURE: 149 MMHG

## 2021-06-04 VITALS
DIASTOLIC BLOOD PRESSURE: 96 MMHG | TEMPERATURE: 98 F | OXYGEN SATURATION: 98 % | BODY MASS INDEX: 30.69 KG/M2 | HEIGHT: 63 IN | SYSTOLIC BLOOD PRESSURE: 149 MMHG | HEART RATE: 82 BPM | RESPIRATION RATE: 18 BRPM | WEIGHT: 173.2 LBS

## 2021-06-04 VITALS
OXYGEN SATURATION: 98 % | WEIGHT: 173.8 LBS | BODY MASS INDEX: 30.79 KG/M2 | HEART RATE: 94 BPM | SYSTOLIC BLOOD PRESSURE: 138 MMHG | DIASTOLIC BLOOD PRESSURE: 83 MMHG | TEMPERATURE: 98.3 F | RESPIRATION RATE: 18 BRPM | HEIGHT: 63 IN

## 2021-06-04 VITALS
OXYGEN SATURATION: 98 % | TEMPERATURE: 94.6 F | HEIGHT: 63 IN | HEART RATE: 81 BPM | DIASTOLIC BLOOD PRESSURE: 81 MMHG | WEIGHT: 185.8 LBS | RESPIRATION RATE: 16 BRPM | SYSTOLIC BLOOD PRESSURE: 163 MMHG | BODY MASS INDEX: 32.92 KG/M2

## 2021-06-04 VITALS
RESPIRATION RATE: 18 BRPM | BODY MASS INDEX: 32.92 KG/M2 | HEART RATE: 90 BPM | HEIGHT: 63 IN | DIASTOLIC BLOOD PRESSURE: 88 MMHG | OXYGEN SATURATION: 97 % | TEMPERATURE: 96.9 F | SYSTOLIC BLOOD PRESSURE: 157 MMHG | WEIGHT: 185.8 LBS

## 2021-06-04 VITALS
WEIGHT: 146 LBS | BODY MASS INDEX: 25.87 KG/M2 | RESPIRATION RATE: 16 BRPM | DIASTOLIC BLOOD PRESSURE: 94 MMHG | OXYGEN SATURATION: 98 % | HEART RATE: 88 BPM | SYSTOLIC BLOOD PRESSURE: 180 MMHG | HEIGHT: 63 IN

## 2021-06-04 VITALS
BODY MASS INDEX: 33.98 KG/M2 | OXYGEN SATURATION: 97 % | HEIGHT: 63 IN | WEIGHT: 191.8 LBS | DIASTOLIC BLOOD PRESSURE: 74 MMHG | RESPIRATION RATE: 18 BRPM | SYSTOLIC BLOOD PRESSURE: 140 MMHG | TEMPERATURE: 96.2 F | HEART RATE: 87 BPM

## 2021-06-04 VITALS
BODY MASS INDEX: 32.92 KG/M2 | HEIGHT: 63 IN | OXYGEN SATURATION: 97 % | RESPIRATION RATE: 19 BRPM | HEART RATE: 80 BPM | SYSTOLIC BLOOD PRESSURE: 146 MMHG | TEMPERATURE: 96 F | WEIGHT: 185.8 LBS | DIASTOLIC BLOOD PRESSURE: 79 MMHG

## 2021-06-04 VITALS
DIASTOLIC BLOOD PRESSURE: 82 MMHG | OXYGEN SATURATION: 97 % | TEMPERATURE: 97.5 F | HEART RATE: 77 BPM | RESPIRATION RATE: 20 BRPM | WEIGHT: 161.2 LBS | HEIGHT: 63 IN | BODY MASS INDEX: 28.56 KG/M2 | SYSTOLIC BLOOD PRESSURE: 153 MMHG

## 2021-06-04 VITALS
DIASTOLIC BLOOD PRESSURE: 82 MMHG | BODY MASS INDEX: 33.98 KG/M2 | HEIGHT: 63 IN | WEIGHT: 191.8 LBS | HEART RATE: 98 BPM | RESPIRATION RATE: 19 BRPM | OXYGEN SATURATION: 94 % | TEMPERATURE: 96.4 F | SYSTOLIC BLOOD PRESSURE: 128 MMHG

## 2021-06-04 VITALS
RESPIRATION RATE: 17 BRPM | TEMPERATURE: 97.3 F | DIASTOLIC BLOOD PRESSURE: 80 MMHG | HEIGHT: 63 IN | HEART RATE: 81 BPM | WEIGHT: 161.2 LBS | BODY MASS INDEX: 28.56 KG/M2 | SYSTOLIC BLOOD PRESSURE: 138 MMHG | OXYGEN SATURATION: 98 %

## 2021-06-05 VITALS
RESPIRATION RATE: 18 BRPM | TEMPERATURE: 97.8 F | BODY MASS INDEX: 35.08 KG/M2 | HEIGHT: 63 IN | HEART RATE: 78 BPM | OXYGEN SATURATION: 92 % | WEIGHT: 198 LBS | DIASTOLIC BLOOD PRESSURE: 77 MMHG | SYSTOLIC BLOOD PRESSURE: 151 MMHG

## 2021-06-07 NOTE — PROGRESS NOTES
[facility]    Name:   Ruddy Mendoza  : 1955  Facility:   [facility] [[id number]]   Room:   Code Status: FULL CODE -   Fac type:   SNF (Skilled Nursing Facility, TCU) -     PCP:  Dawit Aviles MD    CHIEF COMPLAINT / REASON FOR VISIT:  Chief Complaint   Patient presents with     Discharge Summary     TCU discharge after hospitalization for right proximal humerous fracture, s/p ORIF.      [facility] from 2019 until 2019 (right facial numbness, severe dizziness, right upper extremity ataxia, swallowing and word finding difficulties)  [facility] from 2019 until 2019  [facility] from 2020 until 2020 (right proximal humerus fracture)    Patient was last seen by me on 2020.      HPI: Ruddy is a 64 y.o. female with a past medical history of recurrent UTIs and pyelonephritis, diabetes mellitus type 2 (uncontrolled), endocrine induced hypertension, coronary artery disease (status post stenting), and COPD (former smoker) who, prior to her last stay in the TCU, had presented with complaints of severe dizziness, new right lower facial numbness, and ataxia of the right upper extremity for the previous 4 to 5 days prior to admission, with concern for possible stroke, though imaging was negative.  She was also found to be hyperglycemic to the 500s, suffering SOPHIA, and a urinalysis was concerning for UTI on admission.    Etiology of her symptoms was unclear.  Given her history of nausea and vomiting, falls, and unstable gait, the main concern was for a cerebellar stroke; however, an MRI was without any findings of acute stroke, although it did show some chronic infarcts of the cerebellum.  Consider with her hyperglycemia or if hypotensive, a watershed type phenomenon.  Hypoglycemia may have been the primary cause of this, although it sounded as though it was secondary to her ability to take medications due to  "her vertigo.  Her report of worsening dizziness with change in position could support orthostatic hypotension, although this was later also deemed to be negative.  She does have diabetic peripheral neuropathy, but one would not expect this to cause a sudden vertiginous syndrome.  Unlikely BPPV, given no symptoms with head movements.  She denied any recent substance alcohol use, though consideration was given to Warnicke's type encephalopathy with her need for a wide gait, and neurology did recommend initiating thiamine therapy.  At the time, she was also treated for a urinary tract infection (pansensitive E. Coli).  She also had significant hyperglycemia on admission into the 500s.  However, she stated she was unable to take her usual home medications due to her vertigo/dizziness.  At the time, her last known A1c was 16.4 in October 2017.  In the hospital, it was 9.4.      More recently, she fell at home.  Based on EMS reports, it appeared to be a hoarder home.  She was found on the the floor of her bedroom with a hole in the wall, and the patient later reported old broken shelving falling, although in the ED she could not remember what occurred.  She did note drinking multiple drinks of straight vodka that night, often does this intermittently, and did not want her children to know this.  As noted previously, there has been some concern for alcoholism and Warnicke's encephalopathy.      TCU ISSUES    Humerus fracture/pain management: She describes her pain as \"a little less this morning but feeling a little better.\"  She was initially receiving 5 mg of oxycodone every 4 hours as needed.  We tried scheduling 10 mg 3 times daily and 5 mg every 4 hours as needed, and this proved helpful but was still insufficient.  She did tell me that hydromorphone and morphine made her feel \"weird,\" and also stated that her pain is worse in the mornings.  One problem was that she was getting her pain medication late in the morning, " and therapy grabbd her before it had a chance to work.  We wrote orders to schedule her first dose rather early (about 6 AM).  Significant pain is still a problem, impacting her appetite, causing weight loss and hypoglycemia.  She has needed this at least a day or 2 of therapy due to pain.  We increased the as needed dosing to 10 mg, and while she is still in pain, it is manageable.    She did have her staples removed on 03/30/2020, and the area is now Steri-Stripped.  There is no indication of any infection.  Follow-up x-rays were performed on 03/27/2020, and there were no issues.    Diabetes mellitus type 2/poor appetite/hypoglycemia/weight loss: Currently on a lower dose of glargine than she had been at home, now 30 units every morning.  PTA dose was 35 units.  In addition to the glargine, she is getting 5 units of scheduled aspart with each meal and at bedtime.  She has had hypoglycemic episodes, particularly fasting, ostensibly due to poor appetite (down 6 pounds), thought related to her level of pain.  She stated that she ate all of her breakfast but only 75% of other meals.  Low blood glucose levels have been asymptomatic.      At home, she had not been receiving scheduled prandial NovoLog but instead has been managing with a sliding scale.  At my last visit, we talked about eliminating her scheduled dosing but instead decided to make sure that she got a bedtime snack each day.  Measures did not improve, and she has had blood glucose levels in the 60s on several occasions, fasting, lunch, and supper time.  There was only one blood glucose level reaching 200, and so we discontinued prandial dosing.  Her lowest blood glucose level since then has been 75.    Her endocrinologist told her about Dexcom G6, where she can monitor her blood glucose levels with her smart phone.  She will be receiving this, as her PCP did take care of prior authorization.    Note that blood glucose levels have been under rather poor  control.  Her A1c on 08/31/2019 was 9.4.    A minor concern she has is about giving herself insulin with her left hand.  She does use an insulin pen.    Hypertension: Blood pressures were elevated, with systolics in the 150s to 190s, possibly related to her pain levels.  We increased metoprolol succinate from 25 mg to 50 mg daily.  We are also increased as needed oxycodone from 5 mg every 4 hours as needed to 10 mg every 4 hours as needed (as noted above).  No change to scheduled dosing.  Blood pressures have shown improvement.    Cognition: As noted, there has been some concern of cognitive impairment/encephalopathy.  During her stay here in September 2019, she did have some word finding difficulty but otherwise scored well in cognitive testing.  Currently, according to Occupational Therapy, she scored only 16/30 on the SLUMS, indicating moderate cognitive deficits.  SLP is working with her as well on cognition and word finding difficulty.    Depression: Major depressive disorder (with anxiety) has been ongoing for some time.  She was started on fluoxetine on 09/06/2019, and the dosing has not been changed.  She may be self-medicating with alcohol, as she did acknowledge having multiple drinks of straight vodka the day of her fall.    From what I have heard, she had been taking care of her grandchildren prior to her last hospitalization, and since then, the children are now going to .  I believe she was told that if she could not take care of herself, she could not take care of the grandchildren.  I believe this has upset her greatly.    She also admits to being depressed that the  in her building committed suicide.  There were no indications that he was depressed, and he left no note.    When last here, Dr. Ordonez discontinued her buspirone, also adding fluoxetine 20 mg nightly for depression.  She seems to be back on the former, continuing the latter.    COPD: Receiving Symbicort and  Ventolin HFA inhalers at home.    Diabetic polyneuropathy: Tingling in the hands and feet.    Trigeminal neuralgia: Taking carbamazepine for this, and it seems to be under control.    Other issues not addressed but discussed: She has very poor vision (5/200) with macular degeneration, early diabetic retinopathy, and some hemorrhaging.  She did attend [employer] at the [facility] for a while.  She did not think it helped, and it was exceedingly difficult to read Braille, given her neuropathy.    Code status: Prior to her last stay, she was full code in the hospital.  A signed a POLST here indicated that she was now DNR/DNI (also opposed to intubation, as she watched her mother go through it).  Once again, she is full code.    Discharge plan: She has been living in an independent living senior apartment.  There was A care conference on 04/03/2020.  The plan is to discharge home on 04/10/2020.      ROS: Biggest complaint has been that of the right shoulder pain. She may be getting a little constipated, although she does have scheduled MiraLAX ordered.  No headaches or chest pains, coughing or congestion, nausea or vomiting, dyspnea, dysuria, integumentary issues.  Sleep is always been a problem.  She is currently on both melatonin and trazodone.    Past Medical History:   Diagnosis Date     Acute encephalopathy 09/23/2017     Acute hypokalemia 09/23/2017     Acute hyponatremia 09/23/2017     Acute pyelonephritis 09/23/2017     SOPHIA (acute kidney injury) (H)      Anxiety      CAD (coronary artery disease)      Closed fracture of right humerus      COPD (chronic obstructive pulmonary disease) (H)      Depression      Diabetes mellitus (H) 09/23/2017     Diabetic peripheral neuropathy (H) 9/4/2019     History of cervical cancer      HLD (hyperlipidemia)      Hypertension      Hypothyroidism      Insomnia      Normocytic anemia      Sensorineural hearing loss (SNHL) of right ear with tinnitus 9/4/2019      Sepsis (H) 09/23/2017     Trigeminal neuralgia               Family History   Problem Relation Age of Onset     COPD Mother      COPD Father      Lung cancer Father      Social History     Socioeconomic History     Marital status: Single     Spouse name: Not on file     Number of children: Not on file     Years of education: Not on file     Highest education level: Not on file   Occupational History     Not on file   Social Needs     Financial resource strain: Not on file     Food insecurity     Worry: Not on file     Inability: Not on file     Transportation needs     Medical: Not on file     Non-medical: Not on file   Tobacco Use     Smoking status: Former Smoker     Packs/day: 2.00     Years: 30.00     Pack years: 60.00     Types: Cigarettes     Smokeless tobacco: Never Used     Tobacco comment: quit at age 42   Substance and Sexual Activity     Alcohol use: Yes     Comment: Rare     Drug use: No     Sexual activity: Not Currently     Partners: Male   Lifestyle     Physical activity     Days per week: Not on file     Minutes per session: Not on file     Stress: Not on file   Relationships     Social connections     Talks on phone: Not on file     Gets together: Not on file     Attends Hindu service: Not on file     Active member of club or organization: Not on file     Attends meetings of clubs or organizations: Not on file     Relationship status: Not on file     Intimate partner violence     Fear of current or ex partner: Not on file     Emotionally abused: Not on file     Physically abused: Not on file     Forced sexual activity: Not on file   Other Topics Concern     Not on file   Social History Narrative    Lives at home alone with her catKailyn       MEDICATIONS: Reviewed from the MAR, physician orders, and/or earlier progress notes.    Post Discharge Medication Reconciliation Status: medication reconciliation previously completed during another office visit.    Current Outpatient Medications    Medication Sig     acetaminophen (TYLENOL) 500 MG tablet Take 1,000 mg by mouth 4 (four) times a day.      albuterol (PROAIR HFA;PROVENTIL HFA;VENTOLIN HFA) 90 mcg/actuation inhaler Inhale 2 puffs 4 (four) times a day as needed for wheezing.      amitriptyline (ELAVIL) 150 MG tablet Take 150 mg by mouth at bedtime.     aspirin 325 MG tablet Take 325 mg by mouth daily.     budesonide-formoteroL (SYMBICORT) 160-4.5 mcg/actuation inhaler Inhale 2 puffs 2 (two) times a day.     busPIRone (BUSPAR) 10 MG tablet Take 20 mg by mouth at bedtime.     carBAMazepine (TEGRETOL) 200 mg tablet Take 400 mg by mouth 2 (two) times a day.     ferrous sulfate 65 mg elemental iron Take 1 tablet by mouth daily with breakfast.      FLUoxetine (PROZAC) 20 MG capsule Take 20 mg by mouth daily.     ibuprofen (ADVIL,MOTRIN) 200 MG tablet Take 200 mg by mouth every 4 (four) hours as needed for pain or mild pain (1-3).     insulin glargine (LANTUS) 100 unit/mL vial Inject 35 Units under the skin every morning. (Patient taking differently: Inject 30 Units under the skin every morning. )     levothyroxine (SYNTHROID, LEVOTHROID) 50 MCG tablet Take 50 mcg by mouth daily.     lisinopril (PRINIVIL,ZESTRIL) 10 MG tablet Take 10 mg by mouth daily.     melatonin 10 mg Tab Take 10 mg by mouth at bedtime.     metFORMIN (GLUCOPHAGE-XR) 750 MG 24 hr tablet Take 750 mg by mouth 2 (two) times a day as needed.     metoprolol succinate (TOPROL-XL) 25 MG Take 50 mg by mouth daily.      multivit with min-folic acid 0.4 mg Tab Take 1 tablet by mouth daily.     nitroglycerin (NITROSTAT) 0.4 MG SL tablet Place 0.4 mg under the tongue every 5 (five) minutes as needed for chest pain.     NOVOLOG FLEXPEN U-100 INSULIN 100 unit/mL (3 mL) injection pen Check blood sugar four (4) times daily.  11.65 Type 2 with hyperglycemia  BD Ultra-fine Noris Pen Needles - NDC [address]-89 - dispense 1 case, refill PRN for 1 year  Accu-chek Guide blood glucose meter - dispense  "1  Accu-chek Guide test strips (50 ct. boxes) - dispense 1, refill PRN for 1 year  Accu-chek FastClix lancets (box of 102 ct.) - dispense 1, refill PRN for 1 year (Patient taking differently: Inject 5 Units under the skin. 5 units 4 times daily (AC + bedtime)  11.65 Type 2 with hyperglycemia  BD Ultra-fine Noris Pen Needles - NDC [address]-42 - dispense 1 case, refill PRN for 1 year  Accu-chek Guide blood glucose meter - dispense 1  Accu-chek Guide test strips (50 ct. boxes) - dispense 1, refill PRN for 1 year  Accu-chek FastClix lancets (box of 102 ct.) - dispense 1, refill PRN for 1 year)     oxyCODONE (ROXICODONE) 5 MG immediate release tablet Take 10 mg by mouth every 4 (four) hours as needed for pain (Also receiving scheduled dosing.).      oxyCODONE (ROXICODONE) 5 MG immediate release tablet Take 10 mg by mouth every 8 (eight) hours. Also has PRN dosing available.     polyethylene glycol (MIRALAX) 17 gram packet Take 17 g by mouth daily.     rosuvastatin (CRESTOR) 20 MG tablet Take 20 mg by mouth at bedtime.     senna-docusate (SENNOSIDES-DOCUSATE SODIUM) 8.6-50 mg tablet Take 1 tablet by mouth 2 (two) times a day as needed for constipation.     traZODone (DESYREL) 50 MG tablet Take 150 mg by mouth at bedtime.      ALLERGIES:   Allergies   Allergen Reactions     Propranolol Anaphylaxis     Slowed HR     Atorvastatin      Hand neuropathy     Prochlorperazine Edisylate Rash     nightmares     DIET: Diabetic, regular texture, thin liquids.    Vitals:    04/08/20 1551   BP: 146/79   Pulse: 80   Resp: 19   Temp: (!) 96  F (35.6  C)   SpO2: 97%   Weight: 185 lb 12.8 oz (84.3 kg)   Height: 5' 3\" (1.6 m)   Previous weight when last here in the TCU was 169.2 pounds.  This is a 22.6 pound weight gain.  She has endorsed weight gain.  Body mass index is 32.91 kg/m .    EXAMINATION:   General: Fairly pleasant middle-aged female, sitting on her bed, in no apparent distress.  Head: Normocephalic and atraumatic.   Eyes: " PERRLA, sclerae clear.   ENT: Moist oral mucosa.  She is edentulous with full upper and lower dentures.  No rhinorrhea or nasal discharge.  She has complete hearing loss in the right ear and chronic tinnitus.  Cardiovascular: Regular rate and rhythm with a short 2/6 systolic ejection murmur at the left sternal border.  Respiratory: Lungs clear to auscultation bilaterally.   Abdomen: Soft and nontender.   Musculoskeletal/Extremities: Most of the right arm swelling has resolved.  She is wearing an edema glove, but it is loose.  Integument: Steri-Stripped right shoulder looks clean without any locations of infection.  No rashes, clinically significant lesions, or skin breakdown.   Cognitive/Psychiatric: Alert and oriented x3, although she scored 16/30 on the SLUMS.  Affect is euthymic.    DIAGNOSTICS:   Results for orders placed or performed during the hospital encounter of 08/30/19   Basic Metabolic Panel   Result Value Ref Range    Sodium 136 136 - 145 mmol/L    Potassium 4.1 3.5 - 5.0 mmol/L    Chloride 104 98 - 107 mmol/L    CO2 26 22 - 31 mmol/L    Anion Gap, Calculation 6 5 - 18 mmol/L    Glucose 103 70 - 125 mg/dL    Calcium 8.7 8.5 - 10.5 mg/dL    BUN 13 8 - 22 mg/dL    Creatinine 0.76 0.60 - 1.10 mg/dL    GFR MDRD Af Amer >60 >60 mL/min/1.73m2    GFR MDRD Non Af Amer >60 >60 mL/min/1.73m2     Lab Results   Component Value Date    WBC 5.5 08/31/2019    HGB 9.5 (L) 08/31/2019    HCT 28.6 (L) 08/31/2019    MCV 95 08/31/2019     09/03/2019     CrCl cannot be calculated (Patient's most recent lab result is older than the maximum 10 days allowed.).  Lab Results   Component Value Date    HGBA1C 9.4 (H) 08/31/2019       ASSESSMENT/Plan:      ICD-10-CM    1. Closed displaced comminuted fracture of shaft of right humerus with routine healing  S42.351D    2. Type 2 diabetes mellitus with hyperglycemia, with long-term current use of insulin (H)  E11.65     Z79.4    3. Hypertension due to endocrine disorder  I15.2     4. Episode of recurrent major depressive disorder, unspecified depression episode severity (H)  F33.9    5. Diabetic peripheral neuropathy (H)  E11.42    6. Word finding difficulty  R47.89    7. Sensorineural hearing loss (SNHL) of right ear, unspecified hearing status on contralateral side  H90.5    8. Acquired hypothyroidism  E03.9    9. Hyperlipidemia, unspecified hyperlipidemia type  E78.5      DISCHARGE PLAN/FACE TO FACE:  I certify that this patient is under my care and that I had a face-to-face encounter that meets the physician face-to-face encounter requirements with this patient.     Date of Face-to-Face Encounter: 04/08/2020     I certify that, based on my findings, the following services are medically necessary home health services: Home health aide, home health nurse, home PT, and home OT    My clinical findings support the need for the above skilled services because: (Please write a brief narrative summary that describes what the RN, PT, SLP, or other services will be doing in the home. A list of diagnoses in this section does not meet the CMS requirements): Home health aide to assist with ADLs such as showering, home health nurse to assist with diabetes and medication management, as well as assessment of recent right shoulder surgery, and home PT and OT to continue therapy in the home setting.    This patient is homebound because: (Please write a brief narrative summmary describing the functional limitations as to why this patient is homebound and specifically what makes this patient homebound.):  The above services necessarily need to be performed in the home to be of benefit.  Also, her injury has made it difficult to commute, and stay at home orders are current in the [facility].    The patient is, or has been, under my care and I have initiated the establishment of the plan of care. This patient will be followed by a physician who will periodically review the plan of care.  Initial  follow-up should be within 7-10 days.    Approximate time spent with this patient was greater than 30 minutes with greater than 50% spent in discussions regarding services required upon discharge.      The above has been created using voice recognition software. Please be aware that this may unintentionally  produce inaccuracies and/or nonsensical sentences.      Electronically signed by: Suraj Robledo CNP

## 2021-06-07 NOTE — PROGRESS NOTES
[facility]    Name:   Ruddy Mendoza  : 1955  Facility:   [facility] [[id number]]   Room:   Code Status: FULL CODE -   Fac type:   SNF (Skilled Nursing Facility, TCU) -     PCP:  Dawit Aviles MD    CHIEF COMPLAINT / REASON FOR VISIT:  Chief Complaint   Patient presents with     Follow-up     TCU follow-up after hospitalization for right proximal humerus fracture, s/p ORIF.      [facility] [place] from 2019 until 2019 (right facial numbness, severe dizziness, right upper extremity ataxia, swallowing and word finding difficulties)  [facility] from 2019 until 2019  [facility] from 2020 until 2020 (right proximal humerus fracture)        HPI: Ruddy is a 64 y.o. female with a past medical history of recurrent UTIs and pyelonephritis, diabetes mellitus type 2 (uncontrolled), endocrine induced hypertension, coronary artery disease (status post stenting), and COPD (former smoker) who, prior to her last stay in the TCU, had presented with complaints of severe dizziness, new right lower facial numbness, and ataxia of the right upper extremity for the previous 4 to 5 days prior to admission, with concern for possible stroke, though imaging was negative.  She was also found to be hyperglycemic to the 500s, suffering SOPHIA, and a urinalysis was concerning for UTI on admission.    Etiology of her symptoms was unclear.  Given her history of nausea and vomiting, falls, and unstable gait, the main concern was for a cerebellar stroke; however, an MRI was without any findings of acute stroke, although it did show some chronic infarcts of the cerebellum.  Consider with her hyperglycemia or if hypotensive, a watershed type phenomenon.  Hypoglycemia may have been the primary cause of this, although it sounded as though it was secondary to her ability to take medications due to her vertigo.  Her report of worsening dizziness with  "change in position could support orthostatic hypotension, although this was later also deemed to be negative.  She does have diabetic peripheral neuropathy, but one would not expect this to cause a sudden vertiginous syndrome.  Unlikely BPPV, given no symptoms with head movements.  She denied any recent substance alcohol use, though consideration was given to Warnicke's type encephalopathy with her need for a wide gait, and neurology did recommend initiating thiamine therapy.  At the time, she was also treated for a urinary tract infection (pansensitive E. Coli).  She also had significant hyperglycemia on admission into the 500s.  However, she stated she was unable to take her usual home medications due to her vertigo/dizziness.  At the time, her last known A1c was 16.4 in October 2017.  In the hospital, it was 9.4.      More recently, she fell at home.  Based on EMS reports, it appeared to be a hoarder home.  She was found on the the floor of her bedroom with a hole in the wall, and the patient later reported old broken shelving falling, although in the ED she could not remember what occurred.  She did note drinking multiple drinks of straight vodka that night, often does this intermittently, and did not want her children to know this.  As noted previously, there has been some concern for alcoholism and Warnicke's encephalopathy.      TCU ISSUES    Humerus fracture: Considerable pain, unrelieved on the current regimen of oxycodone 5 mg every 4 hours as needed.  We are going to try scheduling 10 mg 3 times daily and 5 mg every 4 hours as needed.  She did tell me that hydromorphone and morphine made her feel \"weird.\"  She tells me that her pain is worse in the mornings.    Cognition: As noted, there is some concern of cognitive impairment/encephalopathy.  When last here in September 2019, she did have some word finding difficulty then but otherwise scored well in cognitive testing.  According to Occupational Therapy, " "she scored only 16/30 on the SLUMS, indicating moderate cognitive deficits.    When last here, she stated, \"I need help with my balance, and I need help to learn how to use a walker, and any help with memory.  I fell 6 times in 4 days and ended up in the hospital.  I was super, super dizzy and had no balance.\"      Depression: Major depressive disorder (with anxiety) has been ongoing for some time.  She was started on fluoxetine on 09/06/2019, and the dosing has not been changed.  She may be self-medicating with alcohol, as she did acknowledge having multiple drinks of straight vodka the day of her fall.    From what I have heard, she had been taking care of her grandchildren prior to her last hospitalization, and since then, the children are now going to .  I believe she was told that if she could not take care of herself, she could not take care of the grandchildren.  I believe this has upset her greatly.    She also admits to being depressed that the  in her building committed suicide.  There were no indications that he was depressed, and he left no note.    When last here, Dr. Ordonez discontinued her buspirone, also adding fluoxetine 20 mg nightly for depression.  She seems to be back on the former, continuing the latter.    COPD: She is on Symbicort and Ventolin HFA inhalers at home.    Diabetes mellitus type 2: She is currently on a lower dose of glargine than she had been at home.  Current dosing is 30 units every morning.  At home, she had been on 35 units.  In addition to the glargine, she is getting 5 units of scheduled aspart with each meal and at bedtime.    It is too soon to evaluate current blood glucose levels, and she has not been here long enough.  They range between 116 and 223.  She has had, as noted above, some quite high A1cs.  When last here, she told me that at home she would check her blood glucose levels in the morning, and if it was good, she may only check it once " more during the day.     Diabetic polyneuropathy: She has tingling in the hands and feet.    Trigeminal neuralgia: She is taking carbamazepine for this.  It seems to be under control.    Other issues not addressed but discussed: She has very poor vision (5/200) with macular degeneration, early diabetic retinopathy, and some hemorrhaging.  She did attend [employer] at the [facility] for a while.  She did not think it helped, and it was exceedingly difficult to read Braille, given her neuropathy.    Code status: Prior to her last stay, she was full code in the hospital.  A signed a POLST here indicated that she was now DNR/DNI (also opposed to intubation, as she watched her mother go through it).  Once again, she is full code.    Medication changes: Currently with orders for 1000 mg of acetaminophen 4 times daily, we will need to decrease the dose to 3 times daily.      ROS: Biggest complaint is that of right arm pain.  She claims pain of 9/10.  I doubt that it is that high, although she is certainly uncomfortable.  She may be getting a little constipated, although she does have scheduled MiraLAX ordered.  No headaches or chest pains, coughing or congestion, nausea or vomiting, dyspnea, dysuria, integumentary issues.  Sleep is always been a problem.  She is currently on both melatonin and trazodone.    Past Medical History:   Diagnosis Date     Acute encephalopathy 09/23/2017     Acute hypokalemia 09/23/2017     Acute hyponatremia 09/23/2017     Acute pyelonephritis 09/23/2017     SOPHIA (acute kidney injury) (H)      Anxiety      CAD (coronary artery disease)      COPD (chronic obstructive pulmonary disease) (H)      Depression      Diabetes mellitus (H) 09/23/2017     Diabetic peripheral neuropathy (H) 9/4/2019     History of cervical cancer      Hypertension      Sensorineural hearing loss (SNHL) of right ear with tinnitus 9/4/2019     Sepsis (H) 09/23/2017     Trigeminal neuralgia               Family  History   Problem Relation Age of Onset     COPD Mother      COPD Father      Lung cancer Father      Social History     Socioeconomic History     Marital status: Single     Spouse name: Not on file     Number of children: Not on file     Years of education: Not on file     Highest education level: Not on file   Occupational History     Not on file   Social Needs     Financial resource strain: Not on file     Food insecurity     Worry: Not on file     Inability: Not on file     Transportation needs     Medical: Not on file     Non-medical: Not on file   Tobacco Use     Smoking status: Former Smoker     Packs/day: 2.00     Years: 30.00     Pack years: 60.00     Types: Cigarettes     Smokeless tobacco: Never Used     Tobacco comment: quit at age 42   Substance and Sexual Activity     Alcohol use: Yes     Comment: Rare     Drug use: No     Sexual activity: Not Currently     Partners: Male   Lifestyle     Physical activity     Days per week: Not on file     Minutes per session: Not on file     Stress: Not on file   Relationships     Social connections     Talks on phone: Not on file     Gets together: Not on file     Attends [employer] service: Not on file     Active member of club or organization: Not on file     Attends meetings of clubs or organizations: Not on file     Relationship status: Not on file     Intimate partner violence     Fear of current or ex partner: Not on file     Emotionally abused: Not on file     Physically abused: Not on file     Forced sexual activity: Not on file   Other Topics Concern     Not on file   Social History Narrative    Lives at home alone with her catKailyn       MEDICATIONS: Reviewed from the MAR, physician orders, and/or earlier progress notes.    Post Discharge Medication Reconciliation Status: discharge medications reconciled and changed, per note/orders (see AVS).  Updated by me today (03/23/2020) with an increase in oxycodone and decrease in acetaminophen reflected below.   Other medication changes since her last stay here were also updated.  Current Outpatient Medications   Medication Sig     acetaminophen (TYLENOL) 500 MG tablet Take 1,000 mg by mouth 3 (three) times a day.     budesonide-formoteroL (SYMBICORT) 160-4.5 mcg/actuation inhaler Inhale 2 puffs 2 (two) times a day.     busPIRone (BUSPAR) 10 MG tablet Take 20 mg by mouth at bedtime.     ibuprofen (ADVIL,MOTRIN) 200 MG tablet Take 200 mg by mouth every 4 (four) hours as needed for pain or mild pain (1-3).     melatonin 10 mg Tab Take 10 mg by mouth at bedtime.     oxyCODONE (ROXICODONE) 5 MG immediate release tablet Take 5 mg by mouth every 4 (four) hours as needed for pain (Also receiving scheduled dosing.).     oxyCODONE (ROXICODONE) 5 MG immediate release tablet Take 10 mg by mouth every 8 (eight) hours. Also has PRN dosing available.     polyethylene glycol (MIRALAX) 17 gram packet Take 17 g by mouth daily.     senna-docusate (SENNOSIDES-DOCUSATE SODIUM) 8.6-50 mg tablet Take 1 tablet by mouth 2 (two) times a day as needed for constipation.     albuterol (PROAIR HFA;PROVENTIL HFA;VENTOLIN HFA) 90 mcg/actuation inhaler Inhale 2 puffs 3 (three) times a day as needed for wheezing.     amitriptyline (ELAVIL) 150 MG tablet Take 150 mg by mouth at bedtime.     aspirin 325 MG tablet Take 325 mg by mouth daily.     B complex-vitamin C-folic acid 0.8 mg Tab Take 1 tablet by mouth daily.            biotin 10,000 mcg TbDL Take 10,000 mcg by mouth 2 (two) times a day.     carBAMazepine (TEGRETOL) 200 mg tablet Take 400 mg by mouth 2 (two) times a day.     ferrous sulfate 65 mg elemental iron Take 1 tablet by mouth daily with breakfast.      FLUoxetine (PROZAC) 20 MG capsule Take 20 mg by mouth daily.     insulin glargine (LANTUS) 100 unit/mL vial Inject 35 Units under the skin every morning. (Patient taking differently: Inject 30 Units under the skin every morning. )     lisinopril (PRINIVIL,ZESTRIL) 10 MG tablet Take 10 mg by mouth  "daily.     metFORMIN (GLUCOPHAGE) 500 MG tablet Take 750 mg by mouth 2 (two) times a day with meals.            metoprolol succinate (TOPROL-XL) 25 MG Take 25 mg by mouth daily.            nitroglycerin (NITROSTAT) 0.4 MG SL tablet Place 0.4 mg under the tongue every 5 (five) minutes as needed for chest pain.     NOVOLOG FLEXPEN U-100 INSULIN 100 unit/mL (3 mL) injection pen Check blood sugar four (4) times daily.  11.65 Type 2 with hyperglycemia  BD Ultra-fine Noris Pen Needles - NDC [address]-21 - dispense 1 case, refill PRN for 1 year  Accu-chek Guide blood glucose meter - dispense 1  Accu-chek Guide test strips (50 ct. boxes) - dispense 1, refill PRN for 1 year  Accu-chek FastClix lancets (box of 102 ct.) - dispense 1, refill PRN for 1 year (Patient taking differently: Inject 5 Units under the skin. 5 units 4 times daily (AC + bedtime)  11.65 Type 2 with hyperglycemia  BD Ultra-fine Noris Pen Needles - NDC [address]-73 - dispense 1 case, refill PRN for 1 year  Accu-chek Guide blood glucose meter - dispense 1  Accu-chek Guide test strips (50 ct. boxes) - dispense 1, refill PRN for 1 year  Accu-chek FastClix lancets (box of 102 ct.) - dispense 1, refill PRN for 1 year)     rosuvastatin (CRESTOR) 20 MG tablet Take 20 mg by mouth at bedtime.     thiamine 100 MG tablet Take 1 tablet (100 mg total) by mouth daily.     traZODone (DESYREL) 50 MG tablet Take 150 mg by mouth at bedtime.      ALLERGIES:   Allergies   Allergen Reactions     Propranolol Anaphylaxis     Slowed HR     Atorvastatin      Hand neuropathy     Prochlorperazine Edisylate Rash     nightmares     DIET: Diabetic, regular texture, thin liquids.    Vitals:    03/23/20 1342   BP: 140/74   Pulse: 87   Resp: 18   Temp: (!) 96.2  F (35.7  C)   SpO2: 97%   Weight: 191 lb 12.8 oz (87 kg)   Height: 5' 3\" (1.6 m)   Previous weight when last here was 169.2 pounds.  This is a 22.6 pound weight gain.  Body mass index is 33.98 kg/m .    EXAMINATION:   General: " Fairly pleasant middle-aged female, sitting on her bed, clearly in some discomfort.  Head: Normocephalic and atraumatic.   Eyes: PERRLA, sclerae clear.   ENT: Moist oral mucosa.  She is edentulous with full upper and lower dentures.  No rhinorrhea or nasal discharge.  She has complete hearing loss in the right ear and chronic tinnitus.  Cardiovascular: Regular rate and rhythm with a short 2/6 systolic ejection murmur at the left sternal border.  Respiratory: Lungs clear to auscultation bilaterally.   Abdomen: Soft and nontender.   Musculoskeletal/Extremities: Right shoulder is swollen down to the right hand with 2-3+ pitting edema.  Integument: No rashes, clinically significant lesions, or skin breakdown.   Cognitive/Psychiatric: Alert and oriented x3, although she scored 16/30 on the SLUMS.  Affect is euthymic.    DIAGNOSTICS:   Results for orders placed or performed during the hospital encounter of 08/30/19   Basic Metabolic Panel   Result Value Ref Range    Sodium 136 136 - 145 mmol/L    Potassium 4.1 3.5 - 5.0 mmol/L    Chloride 104 98 - 107 mmol/L    CO2 26 22 - 31 mmol/L    Anion Gap, Calculation 6 5 - 18 mmol/L    Glucose 103 70 - 125 mg/dL    Calcium 8.7 8.5 - 10.5 mg/dL    BUN 13 8 - 22 mg/dL    Creatinine 0.76 0.60 - 1.10 mg/dL    GFR MDRD Af Amer >60 >60 mL/min/1.73m2    GFR MDRD Non Af Amer >60 >60 mL/min/1.73m2     Lab Results   Component Value Date    WBC 5.5 08/31/2019    HGB 9.5 (L) 08/31/2019    HCT 28.6 (L) 08/31/2019    MCV 95 08/31/2019     09/03/2019     CrCl cannot be calculated (Patient's most recent lab result is older than the maximum 10 days allowed.).  Lab Results   Component Value Date    HGBA1C 9.4 (H) 08/31/2019       ASSESSMENT/Plan:      ICD-10-CM    1. Closed displaced comminuted fracture of shaft of right humerus with routine healing  S42.351D    2. Type 2 diabetes mellitus with hyperglycemia, with long-term current use of insulin (H)  E11.65     Z79.4    3. Word finding  difficulty  R47.89    4. Episode of recurrent major depressive disorder, unspecified depression episode severity (H)  F33.9    5. Diabetic peripheral neuropathy (H)  E11.42    6. Acquired hypothyroidism  E03.9    7. Sensorineural hearing loss (SNHL) of right ear, unspecified hearing status on contralateral side  H90.5    8. Hyperlipidemia, unspecified hyperlipidemia type  E78.5      CHANGES:  1.  Decrease acetaminophen from 1000 mg 4 times daily to 1000 mg 3 times daily.  2.  Discontinue current oxycodone orders.  New orders: Oxycodone 10 mg scheduled 3 times daily +5 mg every 4 hours as needed.  3.  Okay for SLP to work on cognition and word finding difficulty.    CARE PLAN:  The care plan has been reviewed and all orders signed.  Changes to care plan, if any, as noted.  Otherwise, continue current plan of care.  Total time spent with this patient was approximately 40 minutes, with greater than 50% spent in counseling and coordination of care that included significant information provided by the patient not included in her hospital discharge summary, as well as a conversation with speech therapy (who will be working with her) regarding cognitive and word finding issues.    The above has been created using voice recognition software. Please be aware that this may unintentionally  produce inaccuracies and/or nonsensical sentences.      Electronically signed by: Suraj Robledo CNP

## 2021-06-07 NOTE — PROGRESS NOTES
[facility]    Name:   Ruddy Mendoza  : 1955  Facility:   [facility] [[id number]]   Room:   Code Status: FULL CODE -   Fac type:   SNF (Skilled Nursing Facility, TCU) -     PCP:  Dawit Aviles MD    CHIEF COMPLAINT / REASON FOR VISIT:  Chief Complaint   Patient presents with     Follow-up     TCU follow-up after hospitalization for right proximal humerus fracture, s/p ORIF.      [facility] from 2019 until 2019 (right facial numbness, severe dizziness, right upper extremity ataxia, swallowing and word finding difficulties)  [facility]CU from 2019 until 2019  [facility] from 2020 until 2020 (right proximal humerus fracture)        HPI: Ruddy is a 64 y.o. female with a past medical history of recurrent UTIs and pyelonephritis, diabetes mellitus type 2 (uncontrolled), endocrine induced hypertension, coronary artery disease (status post stenting), and COPD (former smoker) who, prior to her last stay in the TCU, had presented with complaints of severe dizziness, new right lower facial numbness, and ataxia of the right upper extremity for the previous 4 to 5 days prior to admission, with concern for possible stroke, though imaging was negative.  She was also found to be hyperglycemic to the 500s, suffering SOPHIA, and a urinalysis was concerning for UTI on admission.    Etiology of her symptoms was unclear.  Given her history of nausea and vomiting, falls, and unstable gait, the main concern was for a cerebellar stroke; however, an MRI was without any findings of acute stroke, although it did show some chronic infarcts of the cerebellum.  Consider with her hyperglycemia or if hypotensive, a watershed type phenomenon.  Hypoglycemia may have been the primary cause of this, although it sounded as though it was secondary to her ability to take medications due to her vertigo.  Her report of worsening dizziness with  "change in position could support orthostatic hypotension, although this was later also deemed to be negative.  She does have diabetic peripheral neuropathy, but one would not expect this to cause a sudden vertiginous syndrome.  Unlikely BPPV, given no symptoms with head movements.  She denied any recent substance alcohol use, though consideration was given to Warnicke's type encephalopathy with her need for a wide gait, and neurology did recommend initiating thiamine therapy.  At the time, she was also treated for a urinary tract infection (pansensitive E. Coli).  She also had significant hyperglycemia on admission into the 500s.  However, she stated she was unable to take her usual home medications due to her vertigo/dizziness.  At the time, her last known A1c was 16.4 in October 2017.  In the hospital, it was 9.4.      More recently, she fell at home.  Based on EMS reports, it appeared to be a hoarder home.  She was found on the the floor of her bedroom with a hole in the wall, and the patient later reported old broken shelving falling, although in the ED she could not remember what occurred.  She did note drinking multiple drinks of straight vodka that night, often does this intermittently, and did not want her children to know this.  As noted previously, there has been some concern for alcoholism and Warnicke's encephalopathy.      TCU ISSUES    Humerus fracture: When last seen, he endorsed considerable pain, unrelieved on the current regimen of oxycodone 5 mg every 4 hours as needed.  We tried scheduling 10 mg 3 times daily and 5 mg every 4 hours as needed, and this has been helpful.  She did tell me that hydromorphone and morphine made her feel \"weird.\"  She also stated that her pain is worse in the mornings.      She tells me she is having a \"rough day\" today.  The right shoulder hurts, and it has been keeping her up at night.  One problem is that she is getting her pain medication late in the morning, and " "therapy grabs her before it has had a chance to work.  We need to schedule her first dose rather early (about 6 AM).    Cognition: As noted, there is some concern of cognitive impairment/encephalopathy.  When last here in September 2019, she did have some word finding difficulty then but otherwise scored well in cognitive testing.  According to Occupational Therapy, she scored only 16/30 on the SLUMS, indicating moderate cognitive deficits.  SLP is working with her as well on cognition and word finding difficulty.    During her last stay in the TCU, she stated, \"I need help with my balance, and I need help to learn how to use a walker, and any help with memory.  I fell 6 times in 4 days and ended up in the hospital.  I was super, super dizzy and had no balance.\"      Depression: Major depressive disorder (with anxiety) has been ongoing for some time.  She was started on fluoxetine on 09/06/2019, and the dosing has not been changed.  She may be self-medicating with alcohol, as she did acknowledge having multiple drinks of straight vodka the day of her fall.    From what I have heard, she had been taking care of her grandchildren prior to her last hospitalization, and since then, the children are now going to .  I believe she was told that if she could not take care of herself, she could not take care of the grandchildren.  I believe this has upset her greatly.    She also admits to being depressed that the  in her building committed suicide.  There were no indications that he was depressed, and he left no note.    When last here, Dr. Ordonez discontinued her buspirone, also adding fluoxetine 20 mg nightly for depression.  She seems to be back on the former, continuing the latter.    COPD: Receiving Symbicort and Ventolin HFA inhalers at home.    Diabetes mellitus type 2: Currently on a lower dose of glargine than she had been at home, now 30 units every morning.  At home, she had been on 35 " units.  In addition to the glargine, she is getting 5 units of scheduled aspart with each meal and at bedtime.    Recent blood glucose levels look good and are generally between 100 and 200.    Diabetic polyneuropathy: Tingling in the hands and feet.    Trigeminal neuralgia: Taking carbamazepine for this, and it seems to be under control.    Other issues not addressed but discussed: She has very poor vision (5/200) with macular degeneration, early diabetic retinopathy, and some hemorrhaging.  She did attend [employer] at the [facility] for a while.  She did not think it helped, and it was exceedingly difficult to read Braille, given her neuropathy.    Code status: Prior to her last stay, she was full code in the hospital.  A signed a POLST here indicated that she was now DNR/DNI (also opposed to intubation, as she watched her mother go through it).  Once again, she is full code.    Medication changes: Currently with orders for 1000 mg of acetaminophen 4 times daily, we will need to decrease the dose to 3 times daily.      ROS: Biggest complaint is that of right arm pain.  She claims pain of 9/10.  I doubt that it is that high, although she is certainly uncomfortable.  She may be getting a little constipated, although she does have scheduled MiraLAX ordered.  No headaches or chest pains, coughing or congestion, nausea or vomiting, dyspnea, dysuria, integumentary issues.  Sleep is always been a problem.  She is currently on both melatonin and trazodone.    Past Medical History:   Diagnosis Date     Acute encephalopathy 09/23/2017     Acute hypokalemia 09/23/2017     Acute hyponatremia 09/23/2017     Acute pyelonephritis 09/23/2017     SOPHIA (acute kidney injury) (H)      Anxiety      CAD (coronary artery disease)      Closed fracture of right humerus      COPD (chronic obstructive pulmonary disease) (H)      Depression      Diabetes mellitus (H) 09/23/2017     Diabetic peripheral neuropathy (H) 9/4/2019      History of cervical cancer      HLD (hyperlipidemia)      Hypertension      Hypothyroidism      Insomnia      Normocytic anemia      Sensorineural hearing loss (SNHL) of right ear with tinnitus 9/4/2019     Sepsis (H) 09/23/2017     Trigeminal neuralgia               Family History   Problem Relation Age of Onset     COPD Mother      COPD Father      Lung cancer Father      Social History     Socioeconomic History     Marital status: Single     Spouse name: Not on file     Number of children: Not on file     Years of education: Not on file     Highest education level: Not on file   Occupational History     Not on file   Social Needs     Financial resource strain: Not on file     Food insecurity     Worry: Not on file     Inability: Not on file     Transportation needs     Medical: Not on file     Non-medical: Not on file   Tobacco Use     Smoking status: Former Smoker     Packs/day: 2.00     Years: 30.00     Pack years: 60.00     Types: Cigarettes     Smokeless tobacco: Never Used     Tobacco comment: quit at age 42   Substance and Sexual Activity     Alcohol use: Yes     Comment: Rare     Drug use: No     Sexual activity: Not Currently     Partners: Male   Lifestyle     Physical activity     Days per week: Not on file     Minutes per session: Not on file     Stress: Not on file   Relationships     Social connections     Talks on phone: Not on file     Gets together: Not on file     Attends [employer] service: Not on file     Active member of club or organization: Not on file     Attends meetings of clubs or organizations: Not on file     Relationship status: Not on file     Intimate partner violence     Fear of current or ex partner: Not on file     Emotionally abused: Not on file     Physically abused: Not on file     Forced sexual activity: Not on file   Other Topics Concern     Not on file   Social History Narrative    Lives at home alone with her catKailyn       MEDICATIONS: Reviewed from the MAR, physician  orders, and/or earlier progress notes.    Post Discharge Medication Reconciliation Status: medication reconciliation previously completed during another office visit.    Current Outpatient Medications   Medication Sig     acetaminophen (TYLENOL) 500 MG tablet Take 1,000 mg by mouth 4 (four) times a day.      albuterol (PROAIR HFA;PROVENTIL HFA;VENTOLIN HFA) 90 mcg/actuation inhaler Inhale 2 puffs 4 (four) times a day as needed for wheezing.      amitriptyline (ELAVIL) 150 MG tablet Take 150 mg by mouth at bedtime.     aspirin 325 MG tablet Take 325 mg by mouth daily.     budesonide-formoteroL (SYMBICORT) 160-4.5 mcg/actuation inhaler Inhale 2 puffs 2 (two) times a day.     busPIRone (BUSPAR) 10 MG tablet Take 20 mg by mouth at bedtime.     carBAMazepine (TEGRETOL) 200 mg tablet Take 400 mg by mouth 2 (two) times a day.     ferrous sulfate 65 mg elemental iron Take 1 tablet by mouth daily with breakfast.      FLUoxetine (PROZAC) 20 MG capsule Take 20 mg by mouth daily.     ibuprofen (ADVIL,MOTRIN) 200 MG tablet Take 200 mg by mouth every 4 (four) hours as needed for pain or mild pain (1-3).     insulin glargine (LANTUS) 100 unit/mL vial Inject 35 Units under the skin every morning. (Patient taking differently: Inject 30 Units under the skin every morning. )     levothyroxine (SYNTHROID, LEVOTHROID) 50 MCG tablet Take 50 mcg by mouth daily.     lisinopril (PRINIVIL,ZESTRIL) 10 MG tablet Take 10 mg by mouth daily.     melatonin 10 mg Tab Take 10 mg by mouth at bedtime.     metFORMIN (GLUCOPHAGE-XR) 750 MG 24 hr tablet Take 750 mg by mouth 2 (two) times a day as needed.     metoprolol succinate (TOPROL-XL) 25 MG Take 25 mg by mouth daily.            multivit with min-folic acid 0.4 mg Tab Take 1 tablet by mouth daily.     nitroglycerin (NITROSTAT) 0.4 MG SL tablet Place 0.4 mg under the tongue every 5 (five) minutes as needed for chest pain.     NOVOLOG FLEXPEN U-100 INSULIN 100 unit/mL (3 mL) injection pen Check blood  "sugar four (4) times daily.  11.65 Type 2 with hyperglycemia  BD Ultra-fine Noris Pen Needles - NDC [address]-40 - dispense 1 case, refill PRN for 1 year  Accu-chek Guide blood glucose meter - dispense 1  Accu-chek Guide test strips (50 ct. boxes) - dispense 1, refill PRN for 1 year  Accu-chek FastClix lancets (box of 102 ct.) - dispense 1, refill PRN for 1 year (Patient taking differently: Inject 5 Units under the skin. 5 units 4 times daily (AC + bedtime)  11.65 Type 2 with hyperglycemia  BD Ultra-fine Noris Pen Needles - NDC [address]-25 - dispense 1 case, refill PRN for 1 year  Accu-chek Guide blood glucose meter - dispense 1  Accu-chek Guide test strips (50 ct. boxes) - dispense 1, refill PRN for 1 year  Accu-chek FastClix lancets (box of 102 ct.) - dispense 1, refill PRN for 1 year)     oxyCODONE (ROXICODONE) 5 MG immediate release tablet Take 5 mg by mouth every 4 (four) hours as needed for pain (Also receiving scheduled dosing.).     oxyCODONE (ROXICODONE) 5 MG immediate release tablet Take 10 mg by mouth every 8 (eight) hours. Also has PRN dosing available.     polyethylene glycol (MIRALAX) 17 gram packet Take 17 g by mouth daily.     rosuvastatin (CRESTOR) 20 MG tablet Take 20 mg by mouth at bedtime.     senna-docusate (SENNOSIDES-DOCUSATE SODIUM) 8.6-50 mg tablet Take 1 tablet by mouth 2 (two) times a day as needed for constipation.     traZODone (DESYREL) 50 MG tablet Take 150 mg by mouth at bedtime.      ALLERGIES:   Allergies   Allergen Reactions     Propranolol Anaphylaxis     Slowed HR     Atorvastatin      Hand neuropathy     Prochlorperazine Edisylate Rash     nightmares     DIET: Diabetic, regular texture, thin liquids.    Vitals:    03/25/20 1139   BP: 128/82   Pulse: 98   Resp: 19   Temp: (!) 96.4  F (35.8  C)   SpO2: 94%   Weight: 191 lb 12.8 oz (87 kg)   Height: 5' 3\" (1.6 m)   Previous weight when last here in the TCU was 169.2 pounds.  This is a 22.6 pound weight gain.  She does endorse " weight gain.  Body mass index is 33.98 kg/m .    EXAMINATION:   General: Fairly pleasant middle-aged female, sitting on her bed, in no apparent distress.  Head: Normocephalic and atraumatic.   Eyes: PERRLA, sclerae clear.   ENT: Moist oral mucosa.  She is edentulous with full upper and lower dentures.  No rhinorrhea or nasal discharge.  She has complete hearing loss in the right ear and chronic tinnitus.  Cardiovascular: Regular rate and rhythm with a short 2/6 systolic ejection murmur at the left sternal border.  Respiratory: Lungs clear to auscultation bilaterally.   Abdomen: Soft and nontender.   Musculoskeletal/Extremities: Right shoulder is swollen down to the right hand with 1-2+ pitting edema, improved since my last visit.  Integument: No rashes, clinically significant lesions, or skin breakdown.   Cognitive/Psychiatric: Alert and oriented x3, although she scored 16/30 on the SLUMS.  Affect is euthymic.    DIAGNOSTICS:   Results for orders placed or performed during the hospital encounter of 08/30/19   Basic Metabolic Panel   Result Value Ref Range    Sodium 136 136 - 145 mmol/L    Potassium 4.1 3.5 - 5.0 mmol/L    Chloride 104 98 - 107 mmol/L    CO2 26 22 - 31 mmol/L    Anion Gap, Calculation 6 5 - 18 mmol/L    Glucose 103 70 - 125 mg/dL    Calcium 8.7 8.5 - 10.5 mg/dL    BUN 13 8 - 22 mg/dL    Creatinine 0.76 0.60 - 1.10 mg/dL    GFR MDRD Af Amer >60 >60 mL/min/1.73m2    GFR MDRD Non Af Amer >60 >60 mL/min/1.73m2     Lab Results   Component Value Date    WBC 5.5 08/31/2019    HGB 9.5 (L) 08/31/2019    HCT 28.6 (L) 08/31/2019    MCV 95 08/31/2019     09/03/2019     CrCl cannot be calculated (Patient's most recent lab result is older than the maximum 10 days allowed.).  Lab Results   Component Value Date    HGBA1C 9.4 (H) 08/31/2019       ASSESSMENT/Plan:      ICD-10-CM    1. Closed displaced comminuted fracture of shaft of right humerus with routine healing  S42.351D    2. Type 2 diabetes mellitus  with hyperglycemia, with long-term current use of insulin (H)  E11.65     Z79.4    3. Word finding difficulty  R47.89    4. Episode of recurrent major depressive disorder, unspecified depression episode severity (H)  F33.9    5. Diabetic peripheral neuropathy (H)  E11.42    6. Hypertension due to endocrine disorder  I15.2    7. Sensorineural hearing loss (SNHL) of right ear, unspecified hearing status on contralateral side  H90.5    8. Acquired hypothyroidism  E03.9    9. Hyperlipidemia, unspecified hyperlipidemia type  E78.5      CHANGES:  Now receiving 10 mg of oxycodone 3 times daily (in addition to as needed).  Schedule first dose of oxycodone at 6 AM.    CARE PLAN:  The care plan has been reviewed and all orders signed.  Changes to care plan, if any, as noted.  Otherwise, continue current plan of care.      The above has been created using voice recognition software. Please be aware that this may unintentionally  produce inaccuracies and/or nonsensical sentences.      Electronically signed by: Suraj Robledo CNP

## 2021-06-07 NOTE — PROGRESS NOTES
[facility]    Name:   Ruddy Mendoza  : 1955  Facility:   [facility] [[id number]]   Room:   Code Status: FULL CODE -   Fac type:   SNF (Skilled Nursing Facility, TCU) -     PCP:  Dawit Aviles MD    CHIEF COMPLAINT / REASON FOR VISIT:  Chief Complaint   Patient presents with     Follow-up     TCU follow-up after hospitalization for right proximal humerous fracture, s/p ORIF.      [facility] from 2019 until 2019 (right facial numbness, severe dizziness, right upper extremity ataxia, swallowing and word finding difficulties)  [facility]CU from 2019 until 2019  [facility] from 2020 until 2020 (right proximal humerus fracture)    Patient was last seen by me on 2020.      HPI: Ruddy is a 64 y.o. female with a past medical history of recurrent UTIs and pyelonephritis, diabetes mellitus type 2 (uncontrolled), endocrine induced hypertension, coronary artery disease (status post stenting), and COPD (former smoker) who, prior to her last stay in the TCU, had presented with complaints of severe dizziness, new right lower facial numbness, and ataxia of the right upper extremity for the previous 4 to 5 days prior to admission, with concern for possible stroke, though imaging was negative.  She was also found to be hyperglycemic to the 500s, suffering SOPHIA, and a urinalysis was concerning for UTI on admission.    Etiology of her symptoms was unclear.  Given her history of nausea and vomiting, falls, and unstable gait, the main concern was for a cerebellar stroke; however, an MRI was without any findings of acute stroke, although it did show some chronic infarcts of the cerebellum.  Consider with her hyperglycemia or if hypotensive, a watershed type phenomenon.  Hypoglycemia may have been the primary cause of this, although it sounded as though it was secondary to her ability to take medications due to her  "vertigo.  Her report of worsening dizziness with change in position could support orthostatic hypotension, although this was later also deemed to be negative.  She does have diabetic peripheral neuropathy, but one would not expect this to cause a sudden vertiginous syndrome.  Unlikely BPPV, given no symptoms with head movements.  She denied any recent substance alcohol use, though consideration was given to Warnicke's type encephalopathy with her need for a wide gait, and neurology did recommend initiating thiamine therapy.  At the time, she was also treated for a urinary tract infection (pansensitive E. Coli).  She also had significant hyperglycemia on admission into the 500s.  However, she stated she was unable to take her usual home medications due to her vertigo/dizziness.  At the time, her last known A1c was 16.4 in October 2017.  In the hospital, it was 9.4.      More recently, she fell at home.  Based on EMS reports, it appeared to be a hoarder home.  She was found on the the floor of her bedroom with a hole in the wall, and the patient later reported old broken shelving falling, although in the ED she could not remember what occurred.  She did note drinking multiple drinks of straight vodka that night, often does this intermittently, and did not want her children to know this.  As noted previously, there has been some concern for alcoholism and Warnicke's encephalopathy.      TCU ISSUES    Humerus fracture: When last seen, she endorsed considerable pain, unrelieved on the a regimen of oxycodone 5 mg every 4 hours as needed.  We tried scheduling 10 mg 3 times daily and 5 mg every 4 hours as needed, and this proved helpful.  She did tell me that hydromorphone and morphine made her feel \"weird.\"  She also stated that her pain is worse in the mornings.  One problem was that she is getting her pain medication late in the morning, and therapy grabs her before it has had a chance to work.  We wrote orders to " "schedule her first dose rather early (about 6 AM).    Pain is \"pretty bad at night now,\" she tells me.  She rates it \"about a 9.\"  She even missed therapy 1 day because the pain is so bad.  She is to have the staples removed today.  Follow-up x-rays were performed on 03/27/2020, and there were no issues.    Cognition: As noted, there has been some concern of cognitive impairment/encephalopathy.  During her stay here in September 2019, she did have some word finding difficulty but otherwise scored well in cognitive testing.  Currently, according to Occupational Therapy, she scored only 16/30 on the SLUMS, indicating moderate cognitive deficits.  SLP is working with her as well on cognition and word finding difficulty.    Depression: Major depressive disorder (with anxiety) has been ongoing for some time.  She was started on fluoxetine on 09/06/2019, and the dosing has not been changed.  She may be self-medicating with alcohol, as she did acknowledge having multiple drinks of straight vodka the day of her fall.    From what I have heard, she had been taking care of her grandchildren prior to her last hospitalization, and since then, the children are now going to .  I believe she was told that if she could not take care of herself, she could not take care of the grandchildren.  I believe this has upset her greatly.    She also admits to being depressed that the  in her building committed suicide.  There were no indications that he was depressed, and he left no note.    When last here, Dr. Ordonez discontinued her buspirone, also adding fluoxetine 20 mg nightly for depression.  She seems to be back on the former, continuing the latter.    COPD: Receiving Symbicort and Ventolin HFA inhalers at home.    Diabetes mellitus type 2: Currently on a lower dose of glargine than she had been at home, now 30 units every morning.  At home, she had been on 35 units.  In addition to the glargine, she is getting " 5 units of scheduled aspart with each meal and at bedtime.    Recent blood glucose levels look good and are generally between 100 and 200, although she has had at least 1 fasting asymptomatic hypoglycemic episode.    Diabetic polyneuropathy: Tingling in the hands and feet.    Trigeminal neuralgia: Taking carbamazepine for this, and it seems to be under control.    Other issues not addressed but discussed: She has very poor vision (5/200) with macular degeneration, early diabetic retinopathy, and some hemorrhaging.  She did attend [employer] at the [facility] for a while.  She did not think it helped, and it was exceedingly difficult to read Braille, given her neuropathy.    Code status: Prior to her last stay, she was full code in the hospital.  A signed a POLST here indicated that she was now DNR/DNI (also opposed to intubation, as she watched her mother go through it).  Once again, she is full code.      ROS: Biggest complaint continues to be that of the right shoulder pain.  She claims pain of 9/10.  I doubt that it is that high, although she certainly continues to the uncomfortable.  She may be getting a little constipated, although she does have scheduled MiraLAX ordered.  No headaches or chest pains, coughing or congestion, nausea or vomiting, dyspnea, dysuria, integumentary issues.  Sleep is always been a problem.  She is currently on both melatonin and trazodone.    Past Medical History:   Diagnosis Date     Acute encephalopathy 09/23/2017     Acute hypokalemia 09/23/2017     Acute hyponatremia 09/23/2017     Acute pyelonephritis 09/23/2017     SOPHIA (acute kidney injury) (H)      Anxiety      CAD (coronary artery disease)      Closed fracture of right humerus      COPD (chronic obstructive pulmonary disease) (H)      Depression      Diabetes mellitus (H) 09/23/2017     Diabetic peripheral neuropathy (H) 9/4/2019     History of cervical cancer      HLD (hyperlipidemia)      Hypertension       Hypothyroidism      Insomnia      Normocytic anemia      Sensorineural hearing loss (SNHL) of right ear with tinnitus 9/4/2019     Sepsis (H) 09/23/2017     Trigeminal neuralgia               Family History   Problem Relation Age of Onset     COPD Mother      COPD Father      Lung cancer Father      Social History     Socioeconomic History     Marital status: Single     Spouse name: Not on file     Number of children: Not on file     Years of education: Not on file     Highest education level: Not on file   Occupational History     Not on file   Social Needs     Financial resource strain: Not on file     Food insecurity     Worry: Not on file     Inability: Not on file     Transportation needs     Medical: Not on file     Non-medical: Not on file   Tobacco Use     Smoking status: Former Smoker     Packs/day: 2.00     Years: 30.00     Pack years: 60.00     Types: Cigarettes     Smokeless tobacco: Never Used     Tobacco comment: quit at age 42   Substance and Sexual Activity     Alcohol use: Yes     Comment: Rare     Drug use: No     Sexual activity: Not Currently     Partners: Male   Lifestyle     Physical activity     Days per week: Not on file     Minutes per session: Not on file     Stress: Not on file   Relationships     Social connections     Talks on phone: Not on file     Gets together: Not on file     Attends Judaism service: Not on file     Active member of club or organization: Not on file     Attends meetings of clubs or organizations: Not on file     Relationship status: Not on file     Intimate partner violence     Fear of current or ex partner: Not on file     Emotionally abused: Not on file     Physically abused: Not on file     Forced sexual activity: Not on file   Other Topics Concern     Not on file   Social History Narrative    Lives at home alone with her catKailyn       MEDICATIONS: Reviewed from the MAR, physician orders, and/or earlier progress notes.    Post Discharge Medication  Reconciliation Status: medication reconciliation previously completed during another office visit.    Current Outpatient Medications   Medication Sig     acetaminophen (TYLENOL) 500 MG tablet Take 1,000 mg by mouth 4 (four) times a day.      albuterol (PROAIR HFA;PROVENTIL HFA;VENTOLIN HFA) 90 mcg/actuation inhaler Inhale 2 puffs 4 (four) times a day as needed for wheezing.      amitriptyline (ELAVIL) 150 MG tablet Take 150 mg by mouth at bedtime.     aspirin 325 MG tablet Take 325 mg by mouth daily.     budesonide-formoteroL (SYMBICORT) 160-4.5 mcg/actuation inhaler Inhale 2 puffs 2 (two) times a day.     busPIRone (BUSPAR) 10 MG tablet Take 20 mg by mouth at bedtime.     carBAMazepine (TEGRETOL) 200 mg tablet Take 400 mg by mouth 2 (two) times a day.     ferrous sulfate 65 mg elemental iron Take 1 tablet by mouth daily with breakfast.      FLUoxetine (PROZAC) 20 MG capsule Take 20 mg by mouth daily.     ibuprofen (ADVIL,MOTRIN) 200 MG tablet Take 200 mg by mouth every 4 (four) hours as needed for pain or mild pain (1-3).     insulin glargine (LANTUS) 100 unit/mL vial Inject 35 Units under the skin every morning. (Patient taking differently: Inject 30 Units under the skin every morning. )     levothyroxine (SYNTHROID, LEVOTHROID) 50 MCG tablet Take 50 mcg by mouth daily.     lisinopril (PRINIVIL,ZESTRIL) 10 MG tablet Take 10 mg by mouth daily.     melatonin 10 mg Tab Take 10 mg by mouth at bedtime.     metFORMIN (GLUCOPHAGE-XR) 750 MG 24 hr tablet Take 750 mg by mouth 2 (two) times a day as needed.     metoprolol succinate (TOPROL-XL) 25 MG Take 25 mg by mouth daily.            multivit with min-folic acid 0.4 mg Tab Take 1 tablet by mouth daily.     nitroglycerin (NITROSTAT) 0.4 MG SL tablet Place 0.4 mg under the tongue every 5 (five) minutes as needed for chest pain.     NOVOLOG FLEXPEN U-100 INSULIN 100 unit/mL (3 mL) injection pen Check blood sugar four (4) times daily.  11.65 Type 2 with hyperglycemia  BD  "Ultra-fine Noris Pen Needles - NDC [address]-29 - dispense 1 case, refill PRN for 1 year  Accu-chek Guide blood glucose meter - dispense 1  Accu-chek Guide test strips (50 ct. boxes) - dispense 1, refill PRN for 1 year  Accu-chek FastClix lancets (box of 102 ct.) - dispense 1, refill PRN for 1 year (Patient taking differently: Inject 5 Units under the skin. 5 units 4 times daily (AC + bedtime)  11.65 Type 2 with hyperglycemia  BD Ultra-fine Noris Pen Needles - NDC [address]-06 - dispense 1 case, refill PRN for 1 year  Accu-chek Guide blood glucose meter - dispense 1  Accu-chek Guide test strips (50 ct. boxes) - dispense 1, refill PRN for 1 year  Accu-chek FastClix lancets (box of 102 ct.) - dispense 1, refill PRN for 1 year)     oxyCODONE (ROXICODONE) 5 MG immediate release tablet Take 5 mg by mouth every 4 (four) hours as needed for pain (Also receiving scheduled dosing.).     oxyCODONE (ROXICODONE) 5 MG immediate release tablet Take 10 mg by mouth every 8 (eight) hours. Also has PRN dosing available.     polyethylene glycol (MIRALAX) 17 gram packet Take 17 g by mouth daily.     rosuvastatin (CRESTOR) 20 MG tablet Take 20 mg by mouth at bedtime.     senna-docusate (SENNOSIDES-DOCUSATE SODIUM) 8.6-50 mg tablet Take 1 tablet by mouth 2 (two) times a day as needed for constipation.     traZODone (DESYREL) 50 MG tablet Take 150 mg by mouth at bedtime.      ALLERGIES:   Allergies   Allergen Reactions     Propranolol Anaphylaxis     Slowed HR     Atorvastatin      Hand neuropathy     Prochlorperazine Edisylate Rash     nightmares     DIET: Diabetic, regular texture, thin liquids.    Vitals:    03/30/20 1018   BP: 149/70   Pulse: 78   Resp: 16   Temp: 96.8  F (36  C)   SpO2: 92%   Weight: 191 lb 12.8 oz (87 kg)   Height: 5' 3\" (1.6 m)   Previous weight when last here in the [facility] was 169.2 pounds.  This is a 22.6 pound weight gain.  She does endorse weight gain.  Body mass index is 33.98 kg/m .    EXAMINATION:   General: " Fairly pleasant middle-aged female, sitting on her bed, in no apparent distress.  Head: Normocephalic and atraumatic.   Eyes: PERRLA, sclerae clear.   ENT: Moist oral mucosa.  She is edentulous with full upper and lower dentures.  No rhinorrhea or nasal discharge.  She has complete hearing loss in the right ear and chronic tinnitus.  Cardiovascular: Regular rate and rhythm with a short 2/6 systolic ejection murmur at the left sternal border.  Respiratory: Lungs clear to auscultation bilaterally.   Abdomen: Soft and nontender.   Musculoskeletal/Extremities: Right shoulder is swollen down to the right hand with trace pitting edema, improved since my last visit.  Integument: No rashes, clinically significant lesions, or skin breakdown.   Cognitive/Psychiatric: Alert and oriented x3, although she scored 16/30 on the SLUMS.  Affect is euthymic.    DIAGNOSTICS:   Results for orders placed or performed during the hospital encounter of 08/30/19   Basic Metabolic Panel   Result Value Ref Range    Sodium 136 136 - 145 mmol/L    Potassium 4.1 3.5 - 5.0 mmol/L    Chloride 104 98 - 107 mmol/L    CO2 26 22 - 31 mmol/L    Anion Gap, Calculation 6 5 - 18 mmol/L    Glucose 103 70 - 125 mg/dL    Calcium 8.7 8.5 - 10.5 mg/dL    BUN 13 8 - 22 mg/dL    Creatinine 0.76 0.60 - 1.10 mg/dL    GFR MDRD Af Amer >60 >60 mL/min/1.73m2    GFR MDRD Non Af Amer >60 >60 mL/min/1.73m2     Lab Results   Component Value Date    WBC 5.5 08/31/2019    HGB 9.5 (L) 08/31/2019    HCT 28.6 (L) 08/31/2019    MCV 95 08/31/2019     09/03/2019     CrCl cannot be calculated (Patient's most recent lab result is older than the maximum 10 days allowed.).  Lab Results   Component Value Date    HGBA1C 9.4 (H) 08/31/2019       ASSESSMENT/Plan:      ICD-10-CM    1. Closed displaced comminuted fracture of shaft of right humerus with routine healing  S42.351D    2. Type 2 diabetes mellitus with hyperglycemia, with long-term current use of insulin (H)  E11.65      Z79.4    3. Word finding difficulty  R47.89    4. Episode of recurrent major depressive disorder, unspecified depression episode severity (H)  F33.9    5. Diabetic peripheral neuropathy (H)  E11.42    6. Hypertension due to endocrine disorder  I15.2    7. Sensorineural hearing loss (SNHL) of right ear, unspecified hearing status on contralateral side  H90.5    8. Acquired hypothyroidism  E03.9    9. Hyperlipidemia, unspecified hyperlipidemia type  E78.5      CHANGES:  Now receiving 10 mg of oxycodone 3 times daily (in addition to as needed).  Schedule first dose of oxycodone at 6 AM.    CARE PLAN:  The care plan has been reviewed and all orders signed.  Changes to care plan, if any, as noted.  Otherwise, continue current plan of care.      The above has been created using voice recognition software. Please be aware that this may unintentionally  produce inaccuracies and/or nonsensical sentences.      Electronically signed by: Suraj Robledo CNP

## 2021-06-07 NOTE — PROGRESS NOTES
[facility]    Name:   Ruddy Mendoza  : 1955  Facility:   [facility] [[id number]]   Room:   Code Status: FULL CODE -   Fac type:   SNF (Skilled Nursing Facility, TCU) -     PCP:  Dawit Aviles MD    CHIEF COMPLAINT / REASON FOR VISIT:  Chief Complaint   Patient presents with     Follow-up     TCU follow-up after hospitalization for right proximal humerous fracture, s/p ORIF.     Problem Visit     Unrelenting pain, elevated blood pressures and low blood glucose levels.      [facility] from 2019 until 2019 (right facial numbness, severe dizziness, right upper extremity ataxia, swallowing and word finding difficulties)  [facility]CU from 2019 until 2019  [facility] from 2020 until 2020 (right proximal humerus fracture)    Patient was last seen by me on 2020.      HPI: Ruddy is a 64 y.o. female with a past medical history of recurrent UTIs and pyelonephritis, diabetes mellitus type 2 (uncontrolled), endocrine induced hypertension, coronary artery disease (status post stenting), and COPD (former smoker) who, prior to her last stay in the TCU, had presented with complaints of severe dizziness, new right lower facial numbness, and ataxia of the right upper extremity for the previous 4 to 5 days prior to admission, with concern for possible stroke, though imaging was negative.  She was also found to be hyperglycemic to the 500s, suffering SOPHIA, and a urinalysis was concerning for UTI on admission.    Etiology of her symptoms was unclear.  Given her history of nausea and vomiting, falls, and unstable gait, the main concern was for a cerebellar stroke; however, an MRI was without any findings of acute stroke, although it did show some chronic infarcts of the cerebellum.  Consider with her hyperglycemia or if hypotensive, a watershed type phenomenon.  Hypoglycemia may have been the primary cause of this,  "although it sounded as though it was secondary to her ability to take medications due to her vertigo.  Her report of worsening dizziness with change in position could support orthostatic hypotension, although this was later also deemed to be negative.  She does have diabetic peripheral neuropathy, but one would not expect this to cause a sudden vertiginous syndrome.  Unlikely BPPV, given no symptoms with head movements.  She denied any recent substance alcohol use, though consideration was given to Warnicke's type encephalopathy with her need for a wide gait, and neurology did recommend initiating thiamine therapy.  At the time, she was also treated for a urinary tract infection (pansensitive E. Coli).  She also had significant hyperglycemia on admission into the 500s.  However, she stated she was unable to take her usual home medications due to her vertigo/dizziness.  At the time, her last known A1c was 16.4 in October 2017.  In the hospital, it was 9.4.      More recently, she fell at home.  Based on EMS reports, it appeared to be a hoarder home.  She was found on the the floor of her bedroom with a hole in the wall, and the patient later reported old broken shelving falling, although in the ED she could not remember what occurred.  She did note drinking multiple drinks of straight vodka that night, often does this intermittently, and did not want her children to know this.  As noted previously, there has been some concern for alcoholism and Warnicke's encephalopathy.      TCU ISSUES    Humerus fracture: During my visit, she has endorsed considerable pain, unrelieved on the current oxycodone regimen.  She was initially receiving 5 mg every 4 hours as needed.  We tried scheduling 10 mg 3 times daily and 5 mg every 4 hours as needed, and this proved helpful.  She did tell me that hydromorphone and morphine made her feel \"weird.\"  She also stated that her pain is worse in the mornings.  One problem was that she is " getting her pain medication late in the morning, and therapy grabs her before it has had a chance to work.  We wrote orders to schedule her first dose rather early (about 6 AM).    She now rates her pain slightly less (7 or 8) than she did before (9).  Pain has impacted her appetite, causing weight loss and hypoglycemia.  She even missed therapy one day because of the pain.  Now, she feels the pain meds are working well, although she is sore today because she slept on the affected arm last night.    She did have her staples removed on 03/30/2020, and the area is now Steri-Stripped.  There is no indication of any infection.  Follow-up x-rays were performed on 03/27/2020, and there were no issues.    Diabetes mellitus type 2/poor appetite/hypoglycemia/weight loss: Currently on a lower dose of glargine than she had been at home, now 30 units every morning.  PTA dose was 35 units.  In addition to the glargine, she is getting 5 units of scheduled aspart with each meal and at bedtime.  She has had hypoglycemic episodes, particularly fasting, ostensibly due to poor appetite (down 6 pounds), thought related to her level of pain.  She stated that she ate all of her breakfast but only 75% of other meals.  Low blood glucose levels have been asymptomatic.      At home, she had not been receiving scheduled prandial NovoLog but instead has been managing with a sliding scale.  At my last visit, we talked about eliminating her scheduled dosing but instead decided to make sure that she got a bedtime snack each day.  Measures did not improve, and she has had blood glucose levels in the 60s on several occasions, fasting, lunch, and supper time.  There was only one blood glucose level reaching 200.  We are going to discontinue her prandial dosing.    Her endocrinologist told her about Dexcom G6, where she can monitor her blood glucose levels with her smart phone.  She will be receiving this, as her PCP did take care of prior  authorization.    Note that blood glucose levels have been under rather poor control.  Her A1c on 08/31/2019 was 9.4.    Pain management/hypertension: Blood pressures have been elevated, with systolics in the 150s to 190s, possibly related to her pain levels.  We increased her metoprolol succinate from 25 mg to 50 mg daily.  We are also increased her as needed oxycodone from 5 mg every 4 hours as needed to 10 mg every 4 hours as needed.  No change to scheduled dosing.    Cognition: As noted, there has been some concern of cognitive impairment/encephalopathy.  During her stay here in September 2019, she did have some word finding difficulty but otherwise scored well in cognitive testing.  Currently, according to Occupational Therapy, she scored only 16/30 on the SLUMS, indicating moderate cognitive deficits.  SLP is working with her as well on cognition and word finding difficulty.    Depression: Major depressive disorder (with anxiety) has been ongoing for some time.  She was started on fluoxetine on 09/06/2019, and the dosing has not been changed.  She may be self-medicating with alcohol, as she did acknowledge having multiple drinks of straight vodka the day of her fall.    From what I have heard, she had been taking care of her grandchildren prior to her last hospitalization, and since then, the children are now going to .  I believe she was told that if she could not take care of herself, she could not take care of the grandchildren.  I believe this has upset her greatly.    She also admits to being depressed that the  in her building committed suicide.  There were no indications that he was depressed, and he left no note.    When last here, Dr. Ordonez discontinued her buspirone, also adding fluoxetine 20 mg nightly for depression.  She seems to be back on the former, continuing the latter.    COPD: Receiving Symbicort and Ventolin HFA inhalers at home.    Diabetic polyneuropathy: Tingling  in the hands and feet.    Trigeminal neuralgia: Taking carbamazepine for this, and it seems to be under control.    Other issues not addressed but discussed: She has very poor vision (5/200) with macular degeneration, early diabetic retinopathy, and some hemorrhaging.  She did attend [employer] at the [facility] for a while.  She did not think it helped, and it was exceedingly difficult to read Braille, given her neuropathy.    Code status: Prior to her last stay, she was full code in the hospital.  A signed a POLST here indicated that she was now DNR/DNI (also opposed to intubation, as she watched her mother go through it).  Once again, she is full code.    Discharge plan: She lives in a senior apartment.  A care conference occurred on 04/03/2020.  The plan is to discharge home on 04/10/2020.      ROS: Biggest complaint has been that of the right shoulder pain. She may be getting a little constipated, although she does have scheduled MiraLAX ordered.  No headaches or chest pains, coughing or congestion, nausea or vomiting, dyspnea, dysuria, integumentary issues.  Sleep is always been a problem.  She is currently on both melatonin and trazodone.    Past Medical History:   Diagnosis Date     Acute encephalopathy 09/23/2017     Acute hypokalemia 09/23/2017     Acute hyponatremia 09/23/2017     Acute pyelonephritis 09/23/2017     SOPHIA (acute kidney injury) (H)      Anxiety      CAD (coronary artery disease)      Closed fracture of right humerus      COPD (chronic obstructive pulmonary disease) (H)      Depression      Diabetes mellitus (H) 09/23/2017     Diabetic peripheral neuropathy (H) 9/4/2019     History of cervical cancer      HLD (hyperlipidemia)      Hypertension      Hypothyroidism      Insomnia      Normocytic anemia      Sensorineural hearing loss (SNHL) of right ear with tinnitus 9/4/2019     Sepsis (H) 09/23/2017     Trigeminal neuralgia               Family History   Problem Relation Age of  Onset     COPD Mother      COPD Father      Lung cancer Father      Social History     Socioeconomic History     Marital status: Single     Spouse name: Not on file     Number of children: Not on file     Years of education: Not on file     Highest education level: Not on file   Occupational History     Not on file   Social Needs     Financial resource strain: Not on file     Food insecurity     Worry: Not on file     Inability: Not on file     Transportation needs     Medical: Not on file     Non-medical: Not on file   Tobacco Use     Smoking status: Former Smoker     Packs/day: 2.00     Years: 30.00     Pack years: 60.00     Types: Cigarettes     Smokeless tobacco: Never Used     Tobacco comment: quit at age 42   Substance and Sexual Activity     Alcohol use: Yes     Comment: Rare     Drug use: No     Sexual activity: Not Currently     Partners: Male   Lifestyle     Physical activity     Days per week: Not on file     Minutes per session: Not on file     Stress: Not on file   Relationships     Social connections     Talks on phone: Not on file     Gets together: Not on file     Attends Religion service: Not on file     Active member of club or organization: Not on file     Attends meetings of clubs or organizations: Not on file     Relationship status: Not on file     Intimate partner violence     Fear of current or ex partner: Not on file     Emotionally abused: Not on file     Physically abused: Not on file     Forced sexual activity: Not on file   Other Topics Concern     Not on file   Social History Narrative    Lives at home alone with her catKailyn       MEDICATIONS: Reviewed from the MAR, physician orders, and/or earlier progress notes.    Post Discharge Medication Reconciliation Status: medication reconciliation previously completed during another office visit.  Updated by me today (04/06/2020) with elimination of scheduled prandial NovoLog reflected below.  Sliding scales remained intact.  Current  Outpatient Medications   Medication Sig     acetaminophen (TYLENOL) 500 MG tablet Take 1,000 mg by mouth 4 (four) times a day.      albuterol (PROAIR HFA;PROVENTIL HFA;VENTOLIN HFA) 90 mcg/actuation inhaler Inhale 2 puffs 4 (four) times a day as needed for wheezing.      amitriptyline (ELAVIL) 150 MG tablet Take 150 mg by mouth at bedtime.     aspirin 325 MG tablet Take 325 mg by mouth daily.     budesonide-formoteroL (SYMBICORT) 160-4.5 mcg/actuation inhaler Inhale 2 puffs 2 (two) times a day.     busPIRone (BUSPAR) 10 MG tablet Take 20 mg by mouth at bedtime.     carBAMazepine (TEGRETOL) 200 mg tablet Take 400 mg by mouth 2 (two) times a day.     ferrous sulfate 65 mg elemental iron Take 1 tablet by mouth daily with breakfast.      FLUoxetine (PROZAC) 20 MG capsule Take 20 mg by mouth daily.     ibuprofen (ADVIL,MOTRIN) 200 MG tablet Take 200 mg by mouth every 4 (four) hours as needed for pain or mild pain (1-3).     insulin glargine (LANTUS) 100 unit/mL vial Inject 35 Units under the skin every morning. (Patient taking differently: Inject 30 Units under the skin every morning. )     levothyroxine (SYNTHROID, LEVOTHROID) 50 MCG tablet Take 50 mcg by mouth daily.     lisinopril (PRINIVIL,ZESTRIL) 10 MG tablet Take 10 mg by mouth daily.     melatonin 10 mg Tab Take 10 mg by mouth at bedtime.     metFORMIN (GLUCOPHAGE-XR) 750 MG 24 hr tablet Take 750 mg by mouth 2 (two) times a day as needed.     metoprolol succinate (TOPROL-XL) 25 MG Take 50 mg by mouth daily.      multivit with min-folic acid 0.4 mg Tab Take 1 tablet by mouth daily.     nitroglycerin (NITROSTAT) 0.4 MG SL tablet Place 0.4 mg under the tongue every 5 (five) minutes as needed for chest pain.     NOVOLOG FLEXPEN U-100 INSULIN 100 unit/mL (3 mL) injection pen Check blood sugar four (4) times daily.  11.65 Type 2 with hyperglycemia  BD Ultra-fine Noris Pen Needles - NDC [address]-26 - dispense 1 case, refill PRN for 1 year  Accu-chek Guide blood glucose  "meter - dispense 1  Accu-chek Guide test strips (50 ct. boxes) - dispense 1, refill PRN for 1 year  Accu-chek FastClix lancets (box of 102 ct.) - dispense 1, refill PRN for 1 year (Patient taking differently: Inject 5 Units under the skin. 5 units 4 times daily (AC + bedtime)  11.65 Type 2 with hyperglycemia  BD Ultra-fine Noris Pen Needles - NDC [address]-84 - dispense 1 case, refill PRN for 1 year  Accu-chek Guide blood glucose meter - dispense 1  Accu-chek Guide test strips (50 ct. boxes) - dispense 1, refill PRN for 1 year  Accu-chek FastClix lancets (box of 102 ct.) - dispense 1, refill PRN for 1 year)     oxyCODONE (ROXICODONE) 5 MG immediate release tablet Take 10 mg by mouth every 4 (four) hours as needed for pain (Also receiving scheduled dosing.).      oxyCODONE (ROXICODONE) 5 MG immediate release tablet Take 10 mg by mouth every 8 (eight) hours. Also has PRN dosing available.     polyethylene glycol (MIRALAX) 17 gram packet Take 17 g by mouth daily.     rosuvastatin (CRESTOR) 20 MG tablet Take 20 mg by mouth at bedtime.     senna-docusate (SENNOSIDES-DOCUSATE SODIUM) 8.6-50 mg tablet Take 1 tablet by mouth 2 (two) times a day as needed for constipation.     traZODone (DESYREL) 50 MG tablet Take 150 mg by mouth at bedtime.      ALLERGIES:   Allergies   Allergen Reactions     Propranolol Anaphylaxis     Slowed HR     Atorvastatin      Hand neuropathy     Prochlorperazine Edisylate Rash     nightmares     DIET: Diabetic, regular texture, thin liquids.    Vitals:    04/07/20 1051   BP: 163/81   Pulse: 81   Resp: 16   Temp: (!) 94.6  F (34.8  C)   SpO2: 98%   Weight: 185 lb 12.8 oz (84.3 kg)   Height: 5' 3\" (1.6 m)   Previous weight when last here in the U was 169.2 pounds.  This is a 22.6 pound weight gain.  She has endorsed weight gain.  Body mass index is 32.91 kg/m .    EXAMINATION:   General: Fairly pleasant middle-aged female, sitting on her bed, in no apparent distress.  Head: Normocephalic and " atraumatic.   Eyes: PERRLA, sclerae clear.   ENT: Moist oral mucosa.  She is edentulous with full upper and lower dentures.  No rhinorrhea or nasal discharge.  She has complete hearing loss in the right ear and chronic tinnitus.  Cardiovascular: Regular rate and rhythm with a short 2/6 systolic ejection murmur at the left sternal border.  Respiratory: Lungs clear to auscultation bilaterally.   Abdomen: Soft and nontender.   Musculoskeletal/Extremities: Most of the right arm swelling has resolved.  Integument: Steri-Stripped right shoulder looks clean without any locations of infection.  No rashes, clinically significant lesions, or skin breakdown.   Cognitive/Psychiatric: Alert and oriented x3, although she scored 16/30 on the SLUMS.  Affect is euthymic.    DIAGNOSTICS:   Results for orders placed or performed during the hospital encounter of 08/30/19   Basic Metabolic Panel   Result Value Ref Range    Sodium 136 136 - 145 mmol/L    Potassium 4.1 3.5 - 5.0 mmol/L    Chloride 104 98 - 107 mmol/L    CO2 26 22 - 31 mmol/L    Anion Gap, Calculation 6 5 - 18 mmol/L    Glucose 103 70 - 125 mg/dL    Calcium 8.7 8.5 - 10.5 mg/dL    BUN 13 8 - 22 mg/dL    Creatinine 0.76 0.60 - 1.10 mg/dL    GFR MDRD Af Amer >60 >60 mL/min/1.73m2    GFR MDRD Non Af Amer >60 >60 mL/min/1.73m2     Lab Results   Component Value Date    WBC 5.5 08/31/2019    HGB 9.5 (L) 08/31/2019    HCT 28.6 (L) 08/31/2019    MCV 95 08/31/2019     09/03/2019     CrCl cannot be calculated (Patient's most recent lab result is older than the maximum 10 days allowed.).  Lab Results   Component Value Date    HGBA1C 9.4 (H) 08/31/2019       ASSESSMENT/Plan:      ICD-10-CM    1. Closed displaced comminuted fracture of shaft of right humerus with routine healing  S42.351D    2. Type 2 diabetes mellitus with hyperglycemia, with long-term current use of insulin (H)  E11.65     Z79.4    3. Hypertension due to endocrine disorder  I15.2    4. Episode of recurrent  major depressive disorder, unspecified depression episode severity (H)  F33.9    5. Diabetic peripheral neuropathy (H)  E11.42    6. Word finding difficulty  R47.89    7. Sensorineural hearing loss (SNHL) of right ear, unspecified hearing status on contralateral side  H90.5    8. Acquired hypothyroidism  E03.9    9. Hyperlipidemia, unspecified hyperlipidemia type  E78.5      CHANGES:  Discontinue scheduled prandial NovoLog dosing.  Preserve sliding scales.    CARE PLAN:  The care plan has been reviewed and all orders signed.  Changes to care plan, if any, as noted.  Otherwise, continue current plan of care.  Total time spent with this patient was approximately 35 minutes, with greater than 50% spent in counseling and coordination of care that included a review of her blood glucose levels (with orders written), assessing her ongoing pain complaints (improved), and also reviewing her elevated blood pressures.  With pain relief, these will hopefully come down.  Otherwise, further adjustments may be necessary.    The above has been created using voice recognition software. Please be aware that this may unintentionally  produce inaccuracies and/or nonsensical sentences.      Electronically signed by: Suraj Robledo, CNP

## 2021-06-07 NOTE — PROGRESS NOTES
[facility]    Name:   Ruddy Mendoza  : 1955  Facility:   [facility] [[id number]]   Room:   Code Status: FULL CODE -   Fac type:   SNF (Skilled Nursing Facility, TCU) -     PCP:  Dawit Aviles MD    CHIEF COMPLAINT / REASON FOR VISIT:  Chief Complaint   Patient presents with     Follow-up     TCU follow-up after hospitalization for right proximal humerous fracture, s/p ORIF.     Problem Visit     Unrelenting pain, elevated blood pressures and low blood glucose levels.      [facility] from 2019 until 2019 (right facial numbness, severe dizziness, right upper extremity ataxia, swallowing and word finding difficulties)  [facility] from 2019 until 2019  [facility] from 2020 until 2020 (right proximal humerus fracture)    Patient was last seen by me on 2020.      HPI: Ruddy is a 64 y.o. female with a past medical history of recurrent UTIs and pyelonephritis, diabetes mellitus type 2 (uncontrolled), endocrine induced hypertension, coronary artery disease (status post stenting), and COPD (former smoker) who, prior to her last stay in the TCU, had presented with complaints of severe dizziness, new right lower facial numbness, and ataxia of the right upper extremity for the previous 4 to 5 days prior to admission, with concern for possible stroke, though imaging was negative.  She was also found to be hyperglycemic to the 500s, suffering SOPHIA, and a urinalysis was concerning for UTI on admission.    Etiology of her symptoms was unclear.  Given her history of nausea and vomiting, falls, and unstable gait, the main concern was for a cerebellar stroke; however, an MRI was without any findings of acute stroke, although it did show some chronic infarcts of the cerebellum.  Consider with her hyperglycemia or if hypotensive, a watershed type phenomenon.  Hypoglycemia may have been the primary cause of this,  "although it sounded as though it was secondary to her ability to take medications due to her vertigo.  Her report of worsening dizziness with change in position could support orthostatic hypotension, although this was later also deemed to be negative.  She does have diabetic peripheral neuropathy, but one would not expect this to cause a sudden vertiginous syndrome.  Unlikely BPPV, given no symptoms with head movements.  She denied any recent substance alcohol use, though consideration was given to Warnicke's type encephalopathy with her need for a wide gait, and neurology did recommend initiating thiamine therapy.  At the time, she was also treated for a urinary tract infection (pansensitive E. Coli).  She also had significant hyperglycemia on admission into the 500s.  However, she stated she was unable to take her usual home medications due to her vertigo/dizziness.  At the time, her last known A1c was 16.4 in October 2017.  In the hospital, it was 9.4.      More recently, she fell at home.  Based on EMS reports, it appeared to be a hoarder home.  She was found on the the floor of her bedroom with a hole in the wall, and the patient later reported old broken shelving falling, although in the ED she could not remember what occurred.  She did note drinking multiple drinks of straight vodka that night, often does this intermittently, and did not want her children to know this.  As noted previously, there has been some concern for alcoholism and Warnicke's encephalopathy.      TCU ISSUES    Humerus fracture: During my visit, she has endorsed considerable pain, unrelieved on the current oxycodone regimen.  She was initially receiving 5 mg every 4 hours as needed.  We tried scheduling 10 mg 3 times daily and 5 mg every 4 hours as needed, and this proved helpful.  She did tell me that hydromorphone and morphine made her feel \"weird.\"  She also stated that her pain is worse in the mornings.  One problem was that she is " "getting her pain medication late in the morning, and therapy grabs her before it has had a chance to work.  We wrote orders to schedule her first dose rather early (about 6 AM).    She continues to rate her pain \"about a 9,\" and it is interfering with her recovery.  Additionally, it has impacted her appetite, causing weight loss and hypoglycemia.  She even missed therapy one day because of the pain.  She did have her staples removed on 03/30/2020, and the area is now Steri-Stripped.  There is no indication of any infection.  Follow-up x-rays were performed on 03/27/2020, and there were no issues.    Diabetes mellitus type 2/poor appetite/hypoglycemia/weight loss: Currently on a lower dose of glargine than she had been at home, now 30 units every morning.  PTA dose was 35 units.  In addition to the glargine, she is getting 5 units of scheduled aspart with each meal and at bedtime.  She has had hypoglycemic episodes, particularly fasting (64, 77, 63), ostensibly due to poor appetite (down 6 pounds) which may be related to her level of pain.  Low blood glucose levels have been asymptomatic.  At home, she had not been receiving scheduled prandial NovoLog but instead has been managing with a sliding scale.  We talked about eliminating her scheduled dosing but instead decided to make sure that she got a bedtime snack each day.    Her endocrinologist told her about Dexcom G6, where she can monitor her blood glucose levels with her smart phone.  She will be receiving this, as her PCP did take care of prior authorization.    Pain management/hypertension: Blood pressures have been elevated, with systolics in the 150s to 190s, possibly related to her pain levels.  We are going to increase her metoprolol succinate from 25 mg to 50 mg daily.  We are also going to increase her as needed oxycodone from 5 mg every 4 hours as needed to 10 mg every 4 hours as needed.  We will keep scheduled dosing unchanged.  Note that she has " diligently been applying ice.    Cognition: As noted, there has been some concern of cognitive impairment/encephalopathy.  During her stay here in September 2019, she did have some word finding difficulty but otherwise scored well in cognitive testing.  Currently, according to Occupational Therapy, she scored only 16/30 on the SLUMS, indicating moderate cognitive deficits.  SLP is working with her as well on cognition and word finding difficulty.    Depression: Major depressive disorder (with anxiety) has been ongoing for some time.  She was started on fluoxetine on 09/06/2019, and the dosing has not been changed.  She may be self-medicating with alcohol, as she did acknowledge having multiple drinks of straight vodka the day of her fall.    From what I have heard, she had been taking care of her grandchildren prior to her last hospitalization, and since then, the children are now going to .  I believe she was told that if she could not take care of herself, she could not take care of the grandchildren.  I believe this has upset her greatly.    She also admits to being depressed that the  in her building committed suicide.  There were no indications that he was depressed, and he left no note.    When last here, Dr. Ordonez discontinued her buspirone, also adding fluoxetine 20 mg nightly for depression.  She seems to be back on the former, continuing the latter.    COPD: Receiving Symbicort and Ventolin HFA inhalers at home.    Diabetic polyneuropathy: Tingling in the hands and feet.    Trigeminal neuralgia: Taking carbamazepine for this, and it seems to be under control.    Other issues not addressed but discussed: She has very poor vision (5/200) with macular degeneration, early diabetic retinopathy, and some hemorrhaging.  She did attend [employer] at the [facility] for a while.  She did not think it helped, and it was exceedingly difficult to read Braille, given her  neuropathy.    Code status: Prior to her last stay, she was full code in the hospital.  A signed a POLST here indicated that she was now DNR/DNI (also opposed to intubation, as she watched her mother go through it).  Once again, she is full code.    Discharge plan: She lives in a senior apartment.  A care conference is scheduled for 04/03/2020.  The plan also is to send her home on 04/10/2020.      ROS: Biggest complaint continues to be that of the right shoulder pain.  She claims pain of 9/10.  I doubt that it is that high, although she certainly continues to the uncomfortable.  She may be getting a little constipated, although she does have scheduled MiraLAX ordered.  No headaches or chest pains, coughing or congestion, nausea or vomiting, dyspnea, dysuria, integumentary issues.  Sleep is always been a problem.  She is currently on both melatonin and trazodone.    Past Medical History:   Diagnosis Date     Acute encephalopathy 09/23/2017     Acute hypokalemia 09/23/2017     Acute hyponatremia 09/23/2017     Acute pyelonephritis 09/23/2017     SOPHIA (acute kidney injury) (H)      Anxiety      CAD (coronary artery disease)      Closed fracture of right humerus      COPD (chronic obstructive pulmonary disease) (H)      Depression      Diabetes mellitus (H) 09/23/2017     Diabetic peripheral neuropathy (H) 9/4/2019     History of cervical cancer      HLD (hyperlipidemia)      Hypertension      Hypothyroidism      Insomnia      Normocytic anemia      Sensorineural hearing loss (SNHL) of right ear with tinnitus 9/4/2019     Sepsis (H) 09/23/2017     Trigeminal neuralgia               Family History   Problem Relation Age of Onset     COPD Mother      COPD Father      Lung cancer Father      Social History     Socioeconomic History     Marital status: Single     Spouse name: Not on file     Number of children: Not on file     Years of education: Not on file     Highest education level: Not on file   Occupational History      Not on file   Social Needs     Financial resource strain: Not on file     Food insecurity     Worry: Not on file     Inability: Not on file     Transportation needs     Medical: Not on file     Non-medical: Not on file   Tobacco Use     Smoking status: Former Smoker     Packs/day: 2.00     Years: 30.00     Pack years: 60.00     Types: Cigarettes     Smokeless tobacco: Never Used     Tobacco comment: quit at age 42   Substance and Sexual Activity     Alcohol use: Yes     Comment: Rare     Drug use: No     Sexual activity: Not Currently     Partners: Male   Lifestyle     Physical activity     Days per week: Not on file     Minutes per session: Not on file     Stress: Not on file   Relationships     Social connections     Talks on phone: Not on file     Gets together: Not on file     Attends Anabaptism service: Not on file     Active member of club or organization: Not on file     Attends meetings of clubs or organizations: Not on file     Relationship status: Not on file     Intimate partner violence     Fear of current or ex partner: Not on file     Emotionally abused: Not on file     Physically abused: Not on file     Forced sexual activity: Not on file   Other Topics Concern     Not on file   Social History Narrative    Lives at home alone with her catKailyn       MEDICATIONS: Reviewed from the MAR, physician orders, and/or earlier progress notes.    Post Discharge Medication Reconciliation Status: medication reconciliation previously completed during another office visit.  Updated by me today (04/02/2020) with increases in both metoprolol succinate and oxycodone reflected below.  Current Outpatient Medications   Medication Sig     acetaminophen (TYLENOL) 500 MG tablet Take 1,000 mg by mouth 4 (four) times a day.      albuterol (PROAIR HFA;PROVENTIL HFA;VENTOLIN HFA) 90 mcg/actuation inhaler Inhale 2 puffs 4 (four) times a day as needed for wheezing.      amitriptyline (ELAVIL) 150 MG tablet Take 150 mg by  mouth at bedtime.     aspirin 325 MG tablet Take 325 mg by mouth daily.     budesonide-formoteroL (SYMBICORT) 160-4.5 mcg/actuation inhaler Inhale 2 puffs 2 (two) times a day.     busPIRone (BUSPAR) 10 MG tablet Take 20 mg by mouth at bedtime.     carBAMazepine (TEGRETOL) 200 mg tablet Take 400 mg by mouth 2 (two) times a day.     ferrous sulfate 65 mg elemental iron Take 1 tablet by mouth daily with breakfast.      FLUoxetine (PROZAC) 20 MG capsule Take 20 mg by mouth daily.     ibuprofen (ADVIL,MOTRIN) 200 MG tablet Take 200 mg by mouth every 4 (four) hours as needed for pain or mild pain (1-3).     insulin glargine (LANTUS) 100 unit/mL vial Inject 35 Units under the skin every morning. (Patient taking differently: Inject 30 Units under the skin every morning. )     levothyroxine (SYNTHROID, LEVOTHROID) 50 MCG tablet Take 50 mcg by mouth daily.     lisinopril (PRINIVIL,ZESTRIL) 10 MG tablet Take 10 mg by mouth daily.     melatonin 10 mg Tab Take 10 mg by mouth at bedtime.     metFORMIN (GLUCOPHAGE-XR) 750 MG 24 hr tablet Take 750 mg by mouth 2 (two) times a day as needed.     metoprolol succinate (TOPROL-XL) 25 MG Take 50 mg by mouth daily.      multivit with min-folic acid 0.4 mg Tab Take 1 tablet by mouth daily.     nitroglycerin (NITROSTAT) 0.4 MG SL tablet Place 0.4 mg under the tongue every 5 (five) minutes as needed for chest pain.     NOVOLOG FLEXPEN U-100 INSULIN 100 unit/mL (3 mL) injection pen Check blood sugar four (4) times daily.  11.65 Type 2 with hyperglycemia  BD Ultra-fine Noris Pen Needles - NDC [address]-00 - dispense 1 case, refill PRN for 1 year  Accu-chek Guide blood glucose meter - dispense 1  Accu-chek Guide test strips (50 ct. boxes) - dispense 1, refill PRN for 1 year  Accu-chek FastClix lancets (box of 102 ct.) - dispense 1, refill PRN for 1 year (Patient taking differently: Inject 5 Units under the skin. 5 units 4 times daily (AC + bedtime)  11.65 Type 2 with hyperglycemia  BD  "Ultra-fine Noris Pen Needles - NDC [address]-73 - dispense 1 case, refill PRN for 1 year  Accu-chek Guide blood glucose meter - dispense 1  Accu-chek Guide test strips (50 ct. boxes) - dispense 1, refill PRN for 1 year  Accu-chek FastClix lancets (box of 102 ct.) - dispense 1, refill PRN for 1 year)     oxyCODONE (ROXICODONE) 5 MG immediate release tablet Take 10 mg by mouth every 4 (four) hours as needed for pain (Also receiving scheduled dosing.).      oxyCODONE (ROXICODONE) 5 MG immediate release tablet Take 10 mg by mouth every 8 (eight) hours. Also has PRN dosing available.     polyethylene glycol (MIRALAX) 17 gram packet Take 17 g by mouth daily.     rosuvastatin (CRESTOR) 20 MG tablet Take 20 mg by mouth at bedtime.     senna-docusate (SENNOSIDES-DOCUSATE SODIUM) 8.6-50 mg tablet Take 1 tablet by mouth 2 (two) times a day as needed for constipation.     traZODone (DESYREL) 50 MG tablet Take 150 mg by mouth at bedtime.      ALLERGIES:   Allergies   Allergen Reactions     Propranolol Anaphylaxis     Slowed HR     Atorvastatin      Hand neuropathy     Prochlorperazine Edisylate Rash     nightmares     DIET: Diabetic, regular texture, thin liquids.    Vitals:    04/05/20 1417   BP: 157/88   Pulse: 90   Resp: 18   Temp: 96.9  F (36.1  C)   SpO2: 97%   Weight: 185 lb 12.8 oz (84.3 kg)   Height: 5' 3\" (1.6 m)   Previous weight when last here in the U was 169.2 pounds.  This is a 22.6 pound weight gain.  She does endorse weight gain.  Body mass index is 32.91 kg/m .    EXAMINATION:   General: Fairly pleasant middle-aged female, sitting on her bed, in no apparent distress.  Head: Normocephalic and atraumatic.   Eyes: PERRLA, sclerae clear.   ENT: Moist oral mucosa.  She is edentulous with full upper and lower dentures.  No rhinorrhea or nasal discharge.  She has complete hearing loss in the right ear and chronic tinnitus.  Cardiovascular: Regular rate and rhythm with a short 2/6 systolic ejection murmur at the left " sternal border.  Respiratory: Lungs clear to auscultation bilaterally.   Abdomen: Soft and nontender.   Musculoskeletal/Extremities: Most of the right arm swelling has resolved.  Integument: Steri-Stripped right shoulder looks clean without any locations of infection.  No rashes, clinically significant lesions, or skin breakdown.   Cognitive/Psychiatric: Alert and oriented x3, although she scored 16/30 on the SLUMS.  Affect is euthymic.    DIAGNOSTICS:   Results for orders placed or performed during the hospital encounter of 08/30/19   Basic Metabolic Panel   Result Value Ref Range    Sodium 136 136 - 145 mmol/L    Potassium 4.1 3.5 - 5.0 mmol/L    Chloride 104 98 - 107 mmol/L    CO2 26 22 - 31 mmol/L    Anion Gap, Calculation 6 5 - 18 mmol/L    Glucose 103 70 - 125 mg/dL    Calcium 8.7 8.5 - 10.5 mg/dL    BUN 13 8 - 22 mg/dL    Creatinine 0.76 0.60 - 1.10 mg/dL    GFR MDRD Af Amer >60 >60 mL/min/1.73m2    GFR MDRD Non Af Amer >60 >60 mL/min/1.73m2     Lab Results   Component Value Date    WBC 5.5 08/31/2019    HGB 9.5 (L) 08/31/2019    HCT 28.6 (L) 08/31/2019    MCV 95 08/31/2019     09/03/2019     CrCl cannot be calculated (Patient's most recent lab result is older than the maximum 10 days allowed.).  Lab Results   Component Value Date    HGBA1C 9.4 (H) 08/31/2019       ASSESSMENT/Plan:      ICD-10-CM    1. Closed displaced comminuted fracture of shaft of right humerus with routine healing  S42.351D    2. Type 2 diabetes mellitus with hyperglycemia, with long-term current use of insulin (H)  E11.65     Z79.4    3. Hypertension due to endocrine disorder  I15.2    4. Episode of recurrent major depressive disorder, unspecified depression episode severity (H)  F33.9    5. Word finding difficulty  R47.89    6. Diabetic peripheral neuropathy (H)  E11.42    7. Sensorineural hearing loss (SNHL) of right ear, unspecified hearing status on contralateral side  H90.5    8. Acquired hypothyroidism  E03.9    9.  Hyperlipidemia, unspecified hyperlipidemia type  E78.5      CHANGES:  1.  Increase as needed oxycodone from 5 mg every 4 hours to 10 mg every 4 hours.  No change to scheduled dosing.  2.  Increase metoprolol succinate from 25 mg to 50 mg daily.    CARE PLAN:  The care plan has been reviewed and all orders signed.  Changes to care plan, if any, as noted.  Otherwise, continue current plan of care.  Total time spent with this patient was approximately 35 minutes, with greater than 50% spent in counseling and coordination of care that included a review of her blood glucose levels (with orders for bedtime snack), assessing her ongoing pain complaints, and also addressing her elevated blood pressures, with orders and scripts being written.    The above has been created using voice recognition software. Please be aware that this may unintentionally  produce inaccuracies and/or nonsensical sentences.      Electronically signed by: Suraj Robledo CNP

## 2021-06-10 NOTE — PROGRESS NOTES
[facility]    Name:   Ruddy Albertp  : 1955  Facility:   [facility] [[id number]]   Room:   Code Status: DNI and POLST AVAILABLE - Limited treatment (CPR and meds okay)  Fac type:   SNF (Skilled Nursing Facility, TCU) -     PCP:  Dawit Aviles MD    CHIEF COMPLAINT / REASON FOR VISIT:  Chief Complaint   Patient presents with     Follow-up     TCU follow-up after hospitalization following a fall resulting in facial fractures, as well as discovery of a pulmonary embolism.      [facility] from 2019 until 2019 (right facial numbness, severe dizziness, right upper extremity ataxia, swallowing and word finding difficulties)  [facility]CU from 2019 until 2019  [facility] from 2020 until 2020 (right proximal humerus fracture and hyperglycemia with SOPHIA)   [facility] from 2020 until 2020 (facial fractures and pulmonary embolism)    Patient was last seen by me on 2020.      HPI: Ruddy is a 64 y.o. female with a past medical history of recurrent UTIs and pyelonephritis, diabetes mellitus type 2 (poorly controlled - A1c 16.4 in 10/2017 and 9.4 in 2020 - with multiple related complications, including peripheral neuropathy, nephropathy, and retinopathy), endocrine induced hypertension, coronary artery disease (status post stenting x2), COPD (former smoker), and alcoholism who has been here multiple times in the past, involving stroke-like symptoms and right proximal humerus fracture along with hyperglycemia and SOPHIA.    Based on EMS reports prior to her last hospitalization, she appeared to live in a hoarder home.  She was found on the the floor of her bedroom with a hole in the wall, and the patient later reported old broken shelving falling, although in the ED she could not remember what occurred.  She did note drinking multiple drinks of straight vodka that night, often doing this  intermittently, and did not want her children to know.  There has been concern for alcoholism and possibly Warnicke's encephalopathy.     She was admitted on zero 7/2428 after presenting to the ED for evaluation of recurrent falls (x4).  She had been seen at [facility] ED on 06/27/2024 fall and was discharged home with nitrofurantoin for urinary tract infection, along with home health care.  There was no loss of consciousness, dizziness, vision changes, or room spinning sensation reported.  She expressed uncertainty as to why she kept falling, but she did report difficulty ambulating at home and getting up after her fall.  Her first fall happened while getting off the toilet, the second in the shower where she hit her head, the third while getting out of her chair, and the last while walking home from the store.  Paramedics were called to her house multiple times.  The patient denied any pain or headaches and reported feeling fine.    In the ED, she was tachycardic with a pulse of 103.  Labs were notable for a hemoglobin of 10.3, hematocrit 31.9, and calcium 8.2.  CT of the head showed partial visualized air-fluid level in the left maxillary sinus with hyperdense material new from 06/08/2020 (possibly representing layering hemorrhage from an occult facial fracture).  A CT of the chest showed acute PE in the left lower lobe and mild emphysema.  ECG showed NSR.  She received IVF and admitted for further management.     A maxillofacial CT confirmed mildly displaced fractures of the left inferior orbital rim and anterior and posterior lateral left maxillary sinus walls.  This injury occurred when go to the bathroom in a dark house walking into the the door.  She is legally blind.  A CT of the chest was positive for small burden PE.  Lower extremity venous Dopplers were positive for DVT involving the right calf.  She was started on enoxaparin and transitioned to Eliquis 10 mg twice daily for 1 week, followed by 5  "mg twice daily thereafter.  An echocardiogram was performed that revealed no evidence for right heart strain.    ENT was consulted with respect to the facial fractures, and no surgical intervention was indicated.  She was subsequently transferred to [facility] with therapy.       TCU ISSUES    Facial fractures: She is experiencing facial pain on the left, especially at the sinuses.  There is also some numbness.  She tells me that \"Tylenol and ibuprofen are just not doing it.\"    Pulmonary embolism and DVT: On Eliquis.  She still has some pain with deep inspiration.    Diabetes mellitus type 2: Receiving metformin, glimepiride, and sliding scale aspart.  Note that blood glucose levels had been under rather poor control.  Her A1c on 08/31/2019 was 9.4.  Currently, fasting blood glucose levels range between 121 and 164.  At lunch, the range is between 132 and 180.  Supper range is between 127 and 169.  The only significantly elevated blood glucose levels are at bedtime, ranging from 172 up to 301.    Right shoulder pain: She did have a fractured right proximal humerus in March 2020.  Currently, pain is worse, although there was no radiographic evidence of any reinjury.  Still, she is quite uncomfortable.  Pain seems to be at its worst first thing in the morning and in the middle of the night.  For the facial pain and for this, we are going to order oxycodone 5 mg every 6 hours as needed.    Hypertension: Still mildly elevated (pain related?), she is receiving lisinopril and metoprolol succinate.    Cognition:  During her last stay, there was some concern of cognitive impairment/encephalopathy.  During her stay here in September 2019, she did have some word finding difficulty but otherwise scored well in cognitive testing.  When last here, according to Occupational Therapy, she scored only 16/30 on the SLUMS, indicating moderate cognitive deficits.  SLP did work with her as well on cognition and word finding difficulty " "during her last stay.    Depression and anxiety: Major depressive disorder (with anxiety) has been ongoing for some time.  She was started on 20 mg of fluoxetine on 09/06/2019, and the dosing has not been changed.  She also receives BuSpar (20 mg daily).  She may also be self-medicating with alcohol.    Insomnia: She tells me she can sleep with \"a lot of medication.\"  She does awaken at 4 AM and stays up after that.    COPD: Has inhalers.    Diabetic polyneuropathy: Tingling in the hands and feet, though worse in the feet.    Trigeminal neuralgia: Taking carbamazepine for this, and it seems to be under control.      ROS: She complains about a poor appetite.  \"Every time I eat, I feel nauseous,\" she noted at my last visit with her.  She denies any vertigo.  No  chest pains, coughing or congestion, dyspnea, dysuria, integumentary issues.  Sleep has always been a problem, but she is doing okay on her current sleep medication regimen.    Past Medical History:   Diagnosis Date     Acute encephalopathy 09/23/2017     Acute hypokalemia 09/23/2017     Acute hyponatremia 09/23/2017     Acute pulmonary embolism (H)      Acute pyelonephritis 09/23/2017     SOPHIA (acute kidney injury) (H)      Anxiety      CAD (coronary artery disease)      Closed fracture of facial bone (H)      Closed fracture of right humerus      COPD (chronic obstructive pulmonary disease) (H)      Depression      Diabetes mellitus (H) 09/23/2017     Diabetic peripheral neuropathy (H) 9/4/2019     History of cervical cancer      HLD (hyperlipidemia)      Hypertension      Hypothyroidism      Insomnia      Normocytic anemia      Peripheral neuropathy      Sensorineural hearing loss (SNHL) of right ear with tinnitus 9/4/2019     Sepsis (H) 09/23/2017     Trigeminal neuralgia               Family History   Problem Relation Age of Onset     COPD Mother      COPD Father      Lung cancer Father      Social History     Socioeconomic History     Marital status: " Single     Spouse name: Not on file     Number of children: Not on file     Years of education: Not on file     Highest education level: Not on file   Occupational History     Not on file   Social Needs     Financial resource strain: Not on file     Food insecurity     Worry: Not on file     Inability: Not on file     Transportation needs     Medical: Not on file     Non-medical: Not on file   Tobacco Use     Smoking status: Former Smoker     Packs/day: 2.00     Years: 30.00     Pack years: 60.00     Types: Cigarettes     Smokeless tobacco: Never Used     Tobacco comment: quit at age 42   Substance and Sexual Activity     Alcohol use: Yes     Comment: Rare     Drug use: No     Sexual activity: Not Currently     Partners: Male   Lifestyle     Physical activity     Days per week: Not on file     Minutes per session: Not on file     Stress: Not on file   Relationships     Social connections     Talks on phone: Not on file     Gets together: Not on file     Attends Jainism service: Not on file     Active member of club or organization: Not on file     Attends meetings of clubs or organizations: Not on file     Relationship status: Not on file     Intimate partner violence     Fear of current or ex partner: Not on file     Emotionally abused: Not on file     Physically abused: Not on file     Forced sexual activity: Not on file   Other Topics Concern     Not on file   Social History Narrative    Lives at home alone with her catKailyn       MEDICATIONS: Reviewed from the MAR, physician orders, and/or earlier progress notes.    Post Discharge Medication Reconciliation Status: discharge medications reconciled and changed, per note/orders.  Updated by me today (07/29/2020) with the addition of oxycodone reflected below.  Current Outpatient Medications   Medication Sig     oxyCODONE (ROXICODONE) 5 MG immediate release tablet Take 5 mg by mouth every 6 (six) hours as needed for pain.     acetaminophen (TYLENOL) 500 MG  tablet Take 1,000 mg by mouth 3 (three) times a day.      albuterol (PROAIR HFA;PROVENTIL HFA;VENTOLIN HFA) 90 mcg/actuation inhaler Inhale 2 puffs 4 (four) times a day as needed for wheezing.      amitriptyline (ELAVIL) 150 MG tablet Take 150 mg by mouth at bedtime.     apixaban ANTICOAGULANT (ELIQUIS) 5 mg Tab tablet Take 5 mg by mouth 2 (two) times a day.     aspirin 81 mg chewable tablet Chew 81 mg daily.     budesonide-formoteroL (SYMBICORT) 160-4.5 mcg/actuation inhaler Inhale 2 puffs 2 (two) times a day.     busPIRone (BUSPAR) 10 MG tablet Take 20 mg by mouth at bedtime.     canagliflozin (INVOKANA) 100 mg Tab Take 100 mg by mouth daily before breakfast.     carBAMazepine (TEGRETOL) 200 mg tablet Take 400 mg by mouth 2 (two) times a day.     doxylamine (UNISOM) 25 mg tablet Take 25 mg by mouth at bedtime.     ferrous sulfate 65 mg elemental iron Take 1 tablet by mouth daily with breakfast.      FLUoxetine (PROZAC) 20 MG capsule Take 20 mg by mouth daily.     glimepiride (AMARYL) 1 MG tablet Take 1 mg by mouth daily before breakfast.     ibuprofen (ADVIL,MOTRIN) 200 MG tablet Take 200 mg by mouth every 4 (four) hours as needed for pain or mild pain (1-3).     insulin aspart U-100 (NOVOLOG) 100 unit/mL injection Inject under the skin 3 (three) times a day with meals. Per sliding scale: if 0 - 69 = 0 UNITS SEEHYPOGLYCEMIA PROTOCOL; 70 - 149 = 0 UNITSNO INSULIN, GIVE PRANDIAL INSULIN IFORDERED; 150 - 199 = 1 UNIT; 200 - 249 = 2UNITS; 250 - 299 = 3 UNITS; 300 - 349 = 4 UNITS;350 - 399 = 5 UNITS; 400+ = 6 UNITS IF  ORGREATER, GIVE 6 UNITS AND CALL MD/NP     levothyroxine (SYNTHROID, LEVOTHROID) 50 MCG tablet Take 50 mcg by mouth daily.     lisinopril (PRINIVIL,ZESTRIL) 10 MG tablet Take 10 mg by mouth daily.     melatonin 10 mg Tab Take 10 mg by mouth at bedtime.     metFORMIN (GLUCOPHAGE-XR) 750 MG 24 hr tablet Take 750 mg by mouth 2 (two) times a day with meals.      metoprolol succinate (TOPROL-XL) 25 MG  "Take 25 mg by mouth daily.      mirtazapine (REMERON) 15 MG tablet Take 15 mg by mouth daily.     multivit with min-folic acid 0.4 mg Tab Take 1 tablet by mouth daily.     naltrexone (DEPADE) 50 mg tablet Take 50 mg by mouth daily.     nitroglycerin (NITROSTAT) 0.4 MG SL tablet Place 0.4 mg under the tongue every 5 (five) minutes as needed for chest pain.     polyethylene glycol (MIRALAX) 17 gram packet Take 17 g by mouth daily.     rosuvastatin (CRESTOR) 20 MG tablet Take 20 mg by mouth at bedtime.     traZODone (DESYREL) 50 MG tablet Take 150 mg by mouth at bedtime.      ALLERGIES:   Allergies   Allergen Reactions     Propranolol Anaphylaxis     Slowed HR     Atorvastatin      Hand neuropathy     Prochlorperazine Edisylate Rash     nightmares     DIET: Diabetic, regular texture, thin liquids.    Vitals:    07/29/20 1436   BP: 138/83   Pulse: 94   Resp: 18   Temp: 98.3  F (36.8  C)   SpO2: 98%   Weight: 173 lb 12.8 oz (78.8 kg)   Height: 5' 3\" (1.6 m)   She is down 11.6 pounds since her last stay.  Body mass index is 30.79 kg/m .    EXAMINATION:   General: Fairly pleasant middle-aged female, lying in bed, in no apparent distress.  Head: Normocephalic and atraumatic.  Visual signs of her facial fractures are not in evidence.  Eyes: PERRLA, sclerae clear.   ENT: Moist oral mucosa.  She is edentulous with full upper and lower dentures.  No rhinorrhea or nasal discharge.  She has complete hearing loss in the right ear and chronic tinnitus there.  Cardiovascular: Regular rate and rhythm with a short 2/6 systolic ejection murmur at the left sternal border.  Respiratory: Lungs clear to auscultation bilaterally.   Abdomen: Soft and nontender.   Musculoskeletal/Extremities: Trace swelling in the feet.  Integument:  No rashes, clinically significant lesions, or skin breakdown.   Cognitive/Psychiatric: Alert and oriented x3, although she scored 16/30 on the SLUMS during an earlier stay.  Affect is rather flat.    DIAGNOSTICS: "   Results for orders placed or performed during the hospital encounter of 08/30/19   Basic Metabolic Panel   Result Value Ref Range    Sodium 136 136 - 145 mmol/L    Potassium 4.1 3.5 - 5.0 mmol/L    Chloride 104 98 - 107 mmol/L    CO2 26 22 - 31 mmol/L    Anion Gap, Calculation 6 5 - 18 mmol/L    Glucose 103 70 - 125 mg/dL    Calcium 8.7 8.5 - 10.5 mg/dL    BUN 13 8 - 22 mg/dL    Creatinine 0.76 0.60 - 1.10 mg/dL    GFR MDRD Af Amer >60 >60 mL/min/1.73m2    GFR MDRD Non Af Amer >60 >60 mL/min/1.73m2     Lab Results   Component Value Date    WBC 5.5 08/31/2019    HGB 9.5 (L) 08/31/2019    HCT 28.6 (L) 08/31/2019    MCV 95 08/31/2019     09/03/2019     CrCl cannot be calculated (Patient's most recent lab result is older than the maximum 10 days allowed.).  Lab Results   Component Value Date    HGBA1C 9.4 (H) 08/31/2019       ASSESSMENT/Plan:      ICD-10-CM    1. Closed fracture of left side of maxilla with routine healing, subsequent encounter  S02.40DD    2. Other acute pulmonary embolism without acute cor pulmonale (H)  I26.99    3. Chronic right shoulder pain  M25.511     G89.29    4. 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 Episode of recurrent major depressive disorder, unspecified depression episode severity (H)  F33.9    6. Diabetic nephropathy associated with type 2 diabetes mellitus (H)  E11.21    7. Diabetic peripheral neuropathy (H)  E11.42    8. Pulmonary emphysema, unspecified emphysema type (H)  J43.9    9. Sensorineural hearing loss (SNHL) of right ear, unspecified hearing status on contralateral side  H90.5    10. Hypertension due to endocrine disorder  I15.2    11. Coronary artery disease : previous stents without angina pectoris  I25.10    12. Anemia of chronic disease  D63.8    13. Word finding difficulty  R47.89    14. Trigeminal neuralgia  G50.0       CHANGES:  Oxycodone 5 mg every 6 hours as needed for facial pain and right shoulder pain.    CARE PLAN:  The care plan has been reviewed and all orders signed.  Changes to care plan, if any, as noted.  Otherwise, continue current plan of care.  Total time spent with this patient was approximately 35 minutes, with greater than 50% spent in counseling and coordination of care that included addressing her pain issues and the writing of a prescription for narcotic analgesics.      The above has been created using voice recognition software. Please be aware that this may unintentionally  produce inaccuracies and/or nonsensical sentences.      Electronically signed by: Suraj Robledo CNP

## 2021-06-10 NOTE — TELEPHONE ENCOUNTER
PATIENT NAME:  Ruddy Mendoza  YOB: 1955  MRN: [id number]  SURGEON: Dr. Nieves  DATE of CONSULT: 08/20/2020  Consult for C3 fracture    FOLLOW-UP PLAN:    Hospital Follow Up Visit: 2 weeks  Provider: NP    DIAGNOSTICS:  XR  DISPOSITION:  Home 08/21/2020    ADDITIONAL INSTRUCTIONS FOR MEDICAL STAFF:      Nilam Lowry RN, CNRN

## 2021-06-10 NOTE — PROGRESS NOTES
[facility]    Name:   Ruddy Albertp  : 1955  Facility:   [facility] [[id number]]   Room:   Code Status: DNI and POLST AVAILABLE - Limited treatment (CPR and meds okay)  Fac type:   SNF (Skilled Nursing Facility, TCU) -     PCP:  Dawit Aviles MD    CHIEF COMPLAINT / REASON FOR VISIT:  Chief Complaint   Patient presents with     Discharge Summary     TCU discharge after hospitalization following a fall resulting in facial fractures, as well as discovery of a pulmonary embolism.      [facility] from 2019 until 2019 (right facial numbness, severe dizziness, right upper extremity ataxia, swallowing and word finding difficulties)  [facility] from 2019 until 2019  [facility] from 2020 until 2020 (right proximal humerus fracture and hyperglycemia with SOPHIA)   [facility] from 2020 until 2020 (facial fractures and pulmonary embolism)  [facility] from 2020 until 2020 (expected discharge date)      HPI: Ruddy is a 64 y.o. female with a past medical history of recurrent UTIs and pyelonephritis, diabetes mellitus type 2 (poorly controlled - A1c 16.4 in 10/2017 and 9.4 in 2020 - with multiple related complications, including peripheral neuropathy, nephropathy, and retinopathy), endocrine induced hypertension, coronary artery disease (status post stenting x2), COPD (former smoker), and alcoholism who has been here multiple times in the past, involving stroke-like symptoms and right proximal humerus fracture along with hyperglycemia and SOPHIA.    Based on EMS reports prior to her last hospitalization, she appeared to live in a hoarder home.  She was found on the the floor of her bedroom with a hole in the wall, and the patient later reported old broken shelving falling, although in the ED she could not remember what occurred.  She did note drinking multiple drinks of  straight vodka that night, often doing this intermittently, and did not want her children to know.  There has been concern for alcoholism and possibly Warnicke's encephalopathy.     She was admitted on 07/2428 after presenting to the ED for evaluation of recurrent falls (x4).  She had been seen at [facility] ED on 06/27/2024 fall and was discharged home with nitrofurantoin for urinary tract infection, along with home health care.  There was no loss of consciousness, dizziness, vision changes, or room spinning sensation reported.  She expressed uncertainty as to why she kept falling, but she did report difficulty ambulating at home and getting up after her fall.  Her first fall happened while getting off the toilet, the second in the shower where she hit her head, the third while getting out of her chair, and the last while walking home from the store.  Paramedics were called to her house multiple times.  The patient denied any pain or headaches and reported feeling fine.    In the ED, she was tachycardic with a pulse of 103.  Labs were notable for a hemoglobin of 10.3, hematocrit 31.9, and calcium 8.2.  CT of the head showed partial visualized air-fluid level in the left maxillary sinus with hyperdense material new from 06/08/2020 (possibly representing layering hemorrhage from an occult facial fracture).  A CT of the chest showed acute PE in the left lower lobe and mild emphysema.  ECG showed NSR.  She received IVF and admitted for further management.     A maxillofacial CT confirmed mildly displaced fractures of the left inferior orbital rim and anterior and posterior lateral left maxillary sinus walls.  This injury occurred when go to the bathroom in a dark house walking into the the door.  She is legally blind.  A CT of the chest was positive for small burden PE.  Lower extremity venous Dopplers were positive for DVT involving the right calf, although she never had pain there.  She was started on  "enoxaparin and transitioned to Eliquis 10 mg twice daily for 1 week, followed by 5 mg twice daily thereafter.  An echocardiogram was performed that revealed no evidence for right heart strain.    ENT was consulted with respect to the facial fractures, and no surgical intervention was indicated.  She was subsequently transferred to [facility] with therapy.       TCU ISSUES    Disposition: She had a care conference on 08/05/2020, and assisted living (due to frequent falls) was discussed.  During my visit with her, she was crying.  She stated that all of falls occurred in 1 day and that the doctors blamed blood clots and neuropathy.  She is quite distressed.  She currently lives in a 1 bedroom apartment in a senior citizen disability building.  She told me the people to go into assisted living \"are in really bad shape.\"  I tried to emphasize that assisted living was pretty much à la carte, and she would have a good deal of independence, only receiving assistance where needed.  She also told me that she does not have money for AL.      Facial fractures: She is experiencing facial pain on the left, especially at the sinuses.  There is also some numbness.  Ibuprofen and acetaminophen were completely ineffective.  Her oxycodone does not entirely remove the pain, but it \"takes the edge off.\"  When last seen, it hurt a bit more because she was crying.    Pulmonary embolism and DVT: On Eliquis.  She still has some pain with deep inspiration, so she tries to breathe shallowly.  Chest pains are less than they had been.    Diabetes mellitus type 2: Receiving metformin, glimepiride, Invokana, and sliding scale aspart.  Note that blood glucose levels had been under rather poor control.  Her A1c on 08/31/2019 was 9.4.  Recent blood glucose levels are mostly in the low to mid 100s, rarely greater than 200 or less than 100.  All in all, they are only occasionally giving a single unit of insulin with some meals.  We will discontinue " "sliding scale coverage.    Right shoulder pain: She did have a fractured right proximal humerus in March 2020.  Currently, pain is worse, although there was no radiographic evidence of any reinjury.  Still, she is quite uncomfortable.  Pain seems to be at its worst first thing in the morning and in the middle of the night.  For the facial pain and for this, we ordered oxycodone 5 mg every 6 hours as needed (see Facial fractures above).  She told me that she can, for the first time in a while, put her socks on herself.  She has an appointment with the orthopedic surgeon for her right shoulder on 08/13/2020.  The pain is now going down the arm into the wrist, and I told her that this was probably due to overuse/compensation.    Hypertension: Still mildly elevated (pain related?), she is receiving lisinopril and metoprolol succinate.    Cognition:  During her last stay, there was some concern of cognitive impairment/encephalopathy.  During her stay here in September 2019, she did have some word finding difficulty but otherwise scored well in cognitive testing.  When last here, according to Occupational Therapy, she scored only 16/30 on the SLUMS, indicating moderate cognitive deficits.  SLP did work with her as well on cognition and word finding difficulty during her last stay.  No obvious word finding difficulties this stay.    Depression and anxiety: Major depressive disorder (with anxiety) has been ongoing for some time.  She was started on 20 mg of fluoxetine on 09/06/2019, and the dosing has not been changed.  She also receives BuSpar (20 mg daily).  She may also have been self-medicating with alcohol.    Insomnia: She tells me she can sleep with \"a lot of medication.\"  She does awaken at 4 AM and stays up after that.    COPD: Has inhalers.    Diabetic polyneuropathy: Tingling in the hands and feet, though worse in the feet.    Trigeminal neuralgia: Taking carbamazepine for this, and it seems to be under control.  " "She describes the pain as \"phantom earaches.\"     Discharge planning: She will be discharging home tomorrow with [facility], including home health aide, PT and OT, and .  Her family is getting the house ready for her.  She is able to get around with a 4 wheeled walker.  She does have an appointment to see the orthopedic surgeon for her right shoulder later this week.      ROS: No  chest pains, coughing or congestion, dizziness or dyspnea, dysuria, integumentary issues.  Sleep has always been a problem, but she is doing okay on her current sleep medication regimen.    Past Medical History:   Diagnosis Date     Acute encephalopathy 09/23/2017     Acute hypokalemia 09/23/2017     Acute hyponatremia 09/23/2017     Acute pulmonary embolism (H)      Acute pyelonephritis 09/23/2017     SOPHIA (acute kidney injury) (H)      Anxiety      CAD (coronary artery disease)      Closed fracture of facial bone (H)      Closed fracture of right humerus      COPD (chronic obstructive pulmonary disease) (H)      Depression      Diabetes mellitus (H) 09/23/2017     Diabetic peripheral neuropathy (H) 9/4/2019     History of cervical cancer      HLD (hyperlipidemia)      Hypertension      Hypothyroidism      Insomnia      Normocytic anemia      Peripheral neuropathy      Sensorineural hearing loss (SNHL) of right ear with tinnitus 9/4/2019     Sepsis (H) 09/23/2017     Trigeminal neuralgia               Family History   Problem Relation Age of Onset     COPD Mother      COPD Father      Lung cancer Father      Social History     Socioeconomic History     Marital status: Single     Spouse name: Not on file     Number of children: Not on file     Years of education: Not on file     Highest education level: Not on file   Occupational History     Not on file   Social Needs     Financial resource strain: Not on file     Food insecurity     Worry: Not on file     Inability: Not on file     Transportation needs     Medical: " Not on file     Non-medical: Not on file   Tobacco Use     Smoking status: Former Smoker     Packs/day: 2.00     Years: 30.00     Pack years: 60.00     Types: Cigarettes     Smokeless tobacco: Never Used     Tobacco comment: quit at age 42   Substance and Sexual Activity     Alcohol use: Yes     Comment: Rare     Drug use: No     Sexual activity: Not Currently     Partners: Male   Lifestyle     Physical activity     Days per week: Not on file     Minutes per session: Not on file     Stress: Not on file   Relationships     Social connections     Talks on phone: Not on file     Gets together: Not on file     Attends Jain service: Not on file     Active member of club or organization: Not on file     Attends meetings of clubs or organizations: Not on file     Relationship status: Not on file     Intimate partner violence     Fear of current or ex partner: Not on file     Emotionally abused: Not on file     Physically abused: Not on file     Forced sexual activity: Not on file   Other Topics Concern     Not on file   Social History Narrative    Lives at home alone with her catKailyn       MEDICATIONS: Reviewed from the MAR, physician orders, and/or earlier progress notes.    Post Discharge Medication Reconciliation Status: medication reconciliation previously completed during another office visit.    Current Outpatient Medications   Medication Sig     acetaminophen (TYLENOL) 500 MG tablet Take 1,000 mg by mouth 3 (three) times a day.      albuterol (PROAIR HFA;PROVENTIL HFA;VENTOLIN HFA) 90 mcg/actuation inhaler Inhale 2 puffs 4 (four) times a day as needed for wheezing.      amitriptyline (ELAVIL) 150 MG tablet Take 150 mg by mouth at bedtime.     apixaban ANTICOAGULANT (ELIQUIS) 5 mg Tab tablet Take 5 mg by mouth 2 (two) times a day.     aspirin 81 mg chewable tablet Chew 81 mg daily.     budesonide-formoteroL (SYMBICORT) 160-4.5 mcg/actuation inhaler Inhale 2 puffs 2 (two) times a day.     busPIRone (BUSPAR) 10  MG tablet Take 20 mg by mouth at bedtime.     canagliflozin (INVOKANA) 100 mg Tab Take 100 mg by mouth daily before breakfast.     carBAMazepine (TEGRETOL) 200 mg tablet Take 400 mg by mouth 2 (two) times a day.     doxylamine (UNISOM) 25 mg tablet Take 25 mg by mouth at bedtime.     ferrous sulfate 65 mg elemental iron Take 1 tablet by mouth daily with breakfast.      FLUoxetine (PROZAC) 20 MG capsule Take 20 mg by mouth daily.     glimepiride (AMARYL) 1 MG tablet Take 1 mg by mouth daily before breakfast.     ibuprofen (ADVIL,MOTRIN) 200 MG tablet Take 200 mg by mouth every 4 (four) hours as needed for pain or mild pain (1-3).     insulin aspart U-100 (NOVOLOG) 100 unit/mL injection Inject under the skin 3 (three) times a day with meals. Per sliding scale: if 0 - 69 = 0 UNITS SEEHYPOGLYCEMIA PROTOCOL; 70 - 149 = 0 UNITSNO INSULIN, GIVE PRANDIAL INSULIN IFORDERED; 150 - 199 = 1 UNIT; 200 - 249 = 2UNITS; 250 - 299 = 3 UNITS; 300 - 349 = 4 UNITS;350 - 399 = 5 UNITS; 400+ = 6 UNITS IF  ORGREATER, GIVE 6 UNITS AND CALL MD/NP     levothyroxine (SYNTHROID, LEVOTHROID) 50 MCG tablet Take 50 mcg by mouth daily.     lisinopril (PRINIVIL,ZESTRIL) 10 MG tablet Take 10 mg by mouth daily.     melatonin 10 mg Tab Take 10 mg by mouth at bedtime.     metFORMIN (GLUCOPHAGE-XR) 750 MG 24 hr tablet Take 750 mg by mouth 2 (two) times a day with meals.      metoprolol succinate (TOPROL-XL) 25 MG Take 25 mg by mouth daily.      mirtazapine (REMERON) 15 MG tablet Take 15 mg by mouth daily.     multivit with min-folic acid 0.4 mg Tab Take 1 tablet by mouth daily.     naltrexone (DEPADE) 50 mg tablet Take 50 mg by mouth daily.     nitroglycerin (NITROSTAT) 0.4 MG SL tablet Place 0.4 mg under the tongue every 5 (five) minutes as needed for chest pain.     oxyCODONE (ROXICODONE) 5 MG immediate release tablet TAKE 1 TABLET BY MOUTH EVERY 6 HOURS AS NEEDED FOR FACIAL, RIGHT SHOULDER PAIN     polyethylene glycol (MIRALAX) 17 gram packet  "Take 17 g by mouth daily.     rosuvastatin (CRESTOR) 20 MG tablet Take 20 mg by mouth at bedtime.     traZODone (DESYREL) 50 MG tablet Take 150 mg by mouth at bedtime.      ALLERGIES:   Allergies   Allergen Reactions     Propranolol Anaphylaxis     Slowed HR     Atorvastatin      Hand neuropathy     Prochlorperazine Edisylate Rash     nightmares     DIET: Consistent carbohydrates, regular texture, thin liquids.    Vitals:    08/10/20 1517   BP: 147/82   Pulse: 85   Resp: 16   Temp: 98  F (36.7  C)   SpO2: 98%   Weight: 174 lb 6.4 oz (79.1 kg)   Height: 5' 3\" (1.6 m)   She is down 11.6 pounds since her last stay.  Body mass index is 30.89 kg/m .    EXAMINATION:   General: Fairly pleasant middle-aged female, sitting comfortably, in no apparent distress.  Head: Normocephalic and atraumatic.  External visual signs of her facial fractures are not in evidence.  Eyes: PERRLA, sclerae clear.   ENT: Moist oral mucosa.  She is edentulous with full upper and lower dentures.  No rhinorrhea or nasal discharge.  She has complete hearing loss in the right ear and chronic tinnitus there.  Cardiovascular: Previously: Regular rate and rhythm with a short 2/6 systolic ejection murmur at the left sternal border.  Chest not auscultated today due to ongoing COVID-19 precautions.  Respiratory: Previously: Lungs clear to auscultation bilaterally.  Chest not auscultated today due to ongoing COVID-19 precautions.  Abdomen: Soft and nontender.   Musculoskeletal/Extremities: Trace swelling in the feet.  Integument:  No rashes, clinically significant lesions, or skin breakdown.   Cognitive/Psychiatric: Alert and oriented x3, although she scored 16/30 on the SLUMS during an earlier stay.  Affect is rather flat.    DIAGNOSTICS:   Results for orders placed or performed during the hospital encounter of 08/30/19   Basic Metabolic Panel   Result Value Ref Range    Sodium 136 136 - 145 mmol/L    Potassium 4.1 3.5 - 5.0 mmol/L    Chloride 104 98 - 107 " mmol/L    CO2 26 22 - 31 mmol/L    Anion Gap, Calculation 6 5 - 18 mmol/L    Glucose 103 70 - 125 mg/dL    Calcium 8.7 8.5 - 10.5 mg/dL    BUN 13 8 - 22 mg/dL    Creatinine 0.76 0.60 - 1.10 mg/dL    GFR MDRD Af Amer >60 >60 mL/min/1.73m2    GFR MDRD Non Af Amer >60 >60 mL/min/1.73m2     Lab Results   Component Value Date    WBC 5.5 08/31/2019    HGB 9.5 (L) 08/31/2019    HCT 28.6 (L) 08/31/2019    MCV 95 08/31/2019     09/03/2019     CrCl cannot be calculated (Patient's most recent lab result is older than the maximum 10 days allowed.).  Lab Results   Component Value Date    HGBA1C 9.4 (H) 08/31/2019       ASSESSMENT/Plan:      ICD-10-CM    1. Closed fracture of left side of maxilla with routine healing, subsequent encounter  S02.40DD    2. Other acute pulmonary embolism without acute cor pulmonale (H)  I26.99    3. Chronic right shoulder pain  M25.511     G89.29    4. 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 Episode of recurrent major depressive disorder, unspecified depression episode severity (H)  F33.9    6. Diabetic nephropathy associated with type 2 diabetes mellitus (H)  E11.21    7. Diabetic peripheral neuropathy (H)  E11.42    8. Pulmonary emphysema, unspecified emphysema type (H)  J43.9    9. Sensorineural hearing loss (SNHL) of right ear, unspecified hearing status on contralateral side  H90.5    10. Hypertension due to endocrine disorder  I15.2    11. Coronary artery disease : previous stents without angina pectoris  I25.10    12. Anemia of chronic disease  D63.8    13. Trigeminal neuralgia  G50.0      DISCHARGE PLAN/FACE TO FACE:  I certify that this patient is under my care and that I had a face-to-face encounter that meets the physician face-to-face encounter requirements with this patient.     Date of Face-to-Face Encounter: 08/10/2020     I certify  that, based on my findings, the following services are medically necessary home health services: Home health aide, home PT, home OT,     My clinical findings support the need for the above skilled services because: (Please write a brief narrative summary that describes what the RN, PT, SLP, or other services will be doing in the home. A list of diagnoses in this section does not meet the CMS requirements): Home health aide to assist with ADLs such as bathing, home PT and OT to continue therapies in the home setting, and  to assist with living arrangements, insurance issues, and such.    This patient is homebound because: (Please write a brief narrative summmary describing the functional limitations as to why this patient is homebound and specifically what makes this patient homebound.):  Services necessarily need to be performed in the home to be of benefit.    The patient is, or has been, under my care and I have initiated the establishment of the plan of care. This patient will be followed by a physician who will periodically review the plan of care.  Initial follow-up should be within 7-10 days.    Approximate time spent with this patient was greater than 30 minutes with greater than 50% spent in discussions regarding services required upon discharge.        The above has been created using voice recognition software. Please be aware that this may unintentionally  produce inaccuracies and/or nonsensical sentences.      Electronically signed by: Suraj Robledo CNP

## 2021-06-10 NOTE — PROGRESS NOTES
[facility]    Name:   Ruddy Albertp  : 1955  Facility:   [facility] [[id number]]   Room:   Code Status: DNI and POLST AVAILABLE - Limited treatment (CPR and meds okay)  Fac type:   SNF (Skilled Nursing Facility, TCU) -     PCP:  Dawit Aviles MD    CHIEF COMPLAINT / REASON FOR VISIT:  Chief Complaint   Patient presents with     Follow-up     TCU follow-up after hospitalization following a fall resulting in facial fractures, as well as discovery of a pulmonary embolism.      [facility] from 2019 until 2019 (right facial numbness, severe dizziness, right upper extremity ataxia, swallowing and word finding difficulties)  [facility] from 2019 until 2019  [facility] from 2020 until 2020 (right proximal humerus fracture and hyperglycemia with SOPHIA)   [facility] from 2020 until 2020 (facial fractures and pulmonary embolism)    Patient was last seen by me on 2020.      HPI: Ruddy is a 64 y.o. female with a past medical history of recurrent UTIs and pyelonephritis, diabetes mellitus type 2 (poorly controlled - A1c 16.4 in 10/2017 and 9.4 in 2020 - with multiple related complications, including peripheral neuropathy, nephropathy, and retinopathy), endocrine induced hypertension, coronary artery disease (status post stenting x2), COPD (former smoker), and alcoholism who has been here multiple times in the past, involving stroke-like symptoms and right proximal humerus fracture along with hyperglycemia and SOPHIA.    Based on EMS reports prior to her last hospitalization, she appeared to live in a hoarder home.  She was found on the the floor of her bedroom with a hole in the wall, and the patient later reported old broken shelving falling, although in the ED she could not remember what occurred.  She did note drinking multiple drinks of straight vodka that night, often doing this  intermittently, and did not want her children to know.  There has been concern for alcoholism and possibly Warnicke's encephalopathy.     She was admitted on 07/2428 after presenting to the ED for evaluation of recurrent falls (x4).  She had been seen at [facility] ED on 06/27/2024 fall and was discharged home with nitrofurantoin for urinary tract infection, along with home health care.  There was no loss of consciousness, dizziness, vision changes, or room spinning sensation reported.  She expressed uncertainty as to why she kept falling, but she did report difficulty ambulating at home and getting up after her fall.  Her first fall happened while getting off the toilet, the second in the shower where she hit her head, the third while getting out of her chair, and the last while walking home from the store.  Paramedics were called to her house multiple times.  The patient denied any pain or headaches and reported feeling fine.    In the ED, she was tachycardic with a pulse of 103.  Labs were notable for a hemoglobin of 10.3, hematocrit 31.9, and calcium 8.2.  CT of the head showed partial visualized air-fluid level in the left maxillary sinus with hyperdense material new from 06/08/2020 (possibly representing layering hemorrhage from an occult facial fracture).  A CT of the chest showed acute PE in the left lower lobe and mild emphysema.  ECG showed NSR.  She received IVF and admitted for further management.     A maxillofacial CT confirmed mildly displaced fractures of the left inferior orbital rim and anterior and posterior lateral left maxillary sinus walls.  This injury occurred when go to the bathroom in a dark house walking into the the door.  She is legally blind.  A CT of the chest was positive for small burden PE.  Lower extremity venous Dopplers were positive for DVT involving the right calf, although she never had pain there.  She was started on enoxaparin and transitioned to Eliquis 10 mg twice  "daily for 1 week, followed by 5 mg twice daily thereafter.  An echocardiogram was performed that revealed no evidence for right heart strain.    ENT was consulted with respect to the facial fractures, and no surgical intervention was indicated.  She was subsequently transferred to [facility] with therapy.       TCU ISSUES    Disposition: She had a care conference today, and assisted living (due to frequent falls) was discussed.  During my visit with her, she was crying.  She stated that all of falls occurred in 1 day and that the doctors blamed blood clots and neuropathy.  She is quite distressed.  She currently lives in a 1 bedroom apartment in a senior citizen disability building.  She tells me the people to go into assisted living \"are in really bad shape.\"  I tried to emphasize that assisted living was pretty much à la carte, and she would have a good deal of independence, only receiving assistance where needed.  She also told me that she does not have money for AL.      Facial fractures: She is experiencing facial pain on the left, especially at the sinuses.  There is also some numbness.  Ibuprofen and acetaminophen were completely ineffective.  Her oxycodone does not entirely remove the pain, but it \"takes the edge off.\"      It hurts a bit more today since she has been crying.    Pulmonary embolism and DVT: On Eliquis.  She still has some pain with deep inspiration, so she tries to breathe shallowly.    Diabetes mellitus type 2: Receiving metformin, glimepiride, Invokana, and sliding scale aspart.  Note that blood glucose levels had been under rather poor control.  Her A1c on 08/31/2019 was 9.4.  Recent blood glucose levels are mostly in the low to mid 100s.  All in all, they are only occasionally giving a single unit of insulin with some meals.  We will consider discontinuing sliding scale, especially prior to discharge.    Right shoulder pain: She did have a fractured right proximal humerus in March 2020.  " "Currently, pain is worse, although there was no radiographic evidence of any reinjury.  Still, she is quite uncomfortable.  Pain seems to be at its worst first thing in the morning and in the middle of the night.  For the facial pain and for this, we ordered oxycodone 5 mg every 6 hours as needed (see Facial fractures above).  She told me that she can, for the first time in a while, put her socks on herself.  She has an appointment with the orthopedic surgeon for her right shoulder on 08/13/2020.      Hypertension: Still mildly elevated (pain related?), she is receiving lisinopril and metoprolol succinate.    Cognition:  During her last stay, there was some concern of cognitive impairment/encephalopathy.  During her stay here in September 2019, she did have some word finding difficulty but otherwise scored well in cognitive testing.  When last here, according to Occupational Therapy, she scored only 16/30 on the SLUMS, indicating moderate cognitive deficits.  SLP did work with her as well on cognition and word finding difficulty during her last stay.    Depression and anxiety: Major depressive disorder (with anxiety) has been ongoing for some time.  She was started on 20 mg of fluoxetine on 09/06/2019, and the dosing has not been changed.  She also receives BuSpar (20 mg daily).  She may also be self-medicating with alcohol.    Insomnia: She tells me she can sleep with \"a lot of medication.\"  She does awaken at 4 AM and stays up after that.    COPD: Has inhalers.    Diabetic polyneuropathy: Tingling in the hands and feet, though worse in the feet.    Trigeminal neuralgia: Taking carbamazepine for this, and it seems to be under control.  She describes the pain as \"phantom earaches.\"      ROS: No  chest pains, coughing or congestion, dizziness or dyspnea, dysuria, integumentary issues.  Sleep has always been a problem, but she is doing okay on her current sleep medication regimen.    Past Medical History:   Diagnosis " Date     Acute encephalopathy 09/23/2017     Acute hypokalemia 09/23/2017     Acute hyponatremia 09/23/2017     Acute pulmonary embolism (H)      Acute pyelonephritis 09/23/2017     SOPHIA (acute kidney injury) (H)      Anxiety      CAD (coronary artery disease)      Closed fracture of facial bone (H)      Closed fracture of right humerus      COPD (chronic obstructive pulmonary disease) (H)      Depression      Diabetes mellitus (H) 09/23/2017     Diabetic peripheral neuropathy (H) 9/4/2019     History of cervical cancer      HLD (hyperlipidemia)      Hypertension      Hypothyroidism      Insomnia      Normocytic anemia      Peripheral neuropathy      Sensorineural hearing loss (SNHL) of right ear with tinnitus 9/4/2019     Sepsis (H) 09/23/2017     Trigeminal neuralgia               Family History   Problem Relation Age of Onset     COPD Mother      COPD Father      Lung cancer Father      Social History     Socioeconomic History     Marital status: Single     Spouse name: Not on file     Number of children: Not on file     Years of education: Not on file     Highest education level: Not on file   Occupational History     Not on file   Social Needs     Financial resource strain: Not on file     Food insecurity     Worry: Not on file     Inability: Not on file     Transportation needs     Medical: Not on file     Non-medical: Not on file   Tobacco Use     Smoking status: Former Smoker     Packs/day: 2.00     Years: 30.00     Pack years: 60.00     Types: Cigarettes     Smokeless tobacco: Never Used     Tobacco comment: quit at age 42   Substance and Sexual Activity     Alcohol use: Yes     Comment: Rare     Drug use: No     Sexual activity: Not Currently     Partners: Male   Lifestyle     Physical activity     Days per week: Not on file     Minutes per session: Not on file     Stress: Not on file   Relationships     Social connections     Talks on phone: Not on file     Gets together: Not on file     Attends Congregation  service: Not on file     Active member of club or organization: Not on file     Attends meetings of clubs or organizations: Not on file     Relationship status: Not on file     Intimate partner violence     Fear of current or ex partner: Not on file     Emotionally abused: Not on file     Physically abused: Not on file     Forced sexual activity: Not on file   Other Topics Concern     Not on file   Social History Narrative    Lives at home alone with her catKailyn       MEDICATIONS: Reviewed from the MAR, physician orders, and/or earlier progress notes.    Post Discharge Medication Reconciliation Status: medication reconciliation previously completed during another office visit.    Current Outpatient Medications   Medication Sig     acetaminophen (TYLENOL) 500 MG tablet Take 1,000 mg by mouth 3 (three) times a day.      albuterol (PROAIR HFA;PROVENTIL HFA;VENTOLIN HFA) 90 mcg/actuation inhaler Inhale 2 puffs 4 (four) times a day as needed for wheezing.      amitriptyline (ELAVIL) 150 MG tablet Take 150 mg by mouth at bedtime.     apixaban ANTICOAGULANT (ELIQUIS) 5 mg Tab tablet Take 5 mg by mouth 2 (two) times a day.     aspirin 81 mg chewable tablet Chew 81 mg daily.     budesonide-formoteroL (SYMBICORT) 160-4.5 mcg/actuation inhaler Inhale 2 puffs 2 (two) times a day.     busPIRone (BUSPAR) 10 MG tablet Take 20 mg by mouth at bedtime.     canagliflozin (INVOKANA) 100 mg Tab Take 100 mg by mouth daily before breakfast.     carBAMazepine (TEGRETOL) 200 mg tablet Take 400 mg by mouth 2 (two) times a day.     doxylamine (UNISOM) 25 mg tablet Take 25 mg by mouth at bedtime.     ferrous sulfate 65 mg elemental iron Take 1 tablet by mouth daily with breakfast.      FLUoxetine (PROZAC) 20 MG capsule Take 20 mg by mouth daily.     glimepiride (AMARYL) 1 MG tablet Take 1 mg by mouth daily before breakfast.     ibuprofen (ADVIL,MOTRIN) 200 MG tablet Take 200 mg by mouth every 4 (four) hours as needed for pain or mild pain  "(1-3).     insulin aspart U-100 (NOVOLOG) 100 unit/mL injection Inject under the skin 3 (three) times a day with meals. Per sliding scale: if 0 - 69 = 0 UNITS SEEHYPOGLYCEMIA PROTOCOL; 70 - 149 = 0 UNITSNO INSULIN, GIVE PRANDIAL INSULIN IFORDERED; 150 - 199 = 1 UNIT; 200 - 249 = 2UNITS; 250 - 299 = 3 UNITS; 300 - 349 = 4 UNITS;350 - 399 = 5 UNITS; 400+ = 6 UNITS IF  ORGREATER, GIVE 6 UNITS AND CALL MD/NP     levothyroxine (SYNTHROID, LEVOTHROID) 50 MCG tablet Take 50 mcg by mouth daily.     lisinopril (PRINIVIL,ZESTRIL) 10 MG tablet Take 10 mg by mouth daily.     melatonin 10 mg Tab Take 10 mg by mouth at bedtime.     metFORMIN (GLUCOPHAGE-XR) 750 MG 24 hr tablet Take 750 mg by mouth 2 (two) times a day with meals.      metoprolol succinate (TOPROL-XL) 25 MG Take 25 mg by mouth daily.      mirtazapine (REMERON) 15 MG tablet Take 15 mg by mouth daily.     multivit with min-folic acid 0.4 mg Tab Take 1 tablet by mouth daily.     naltrexone (DEPADE) 50 mg tablet Take 50 mg by mouth daily.     nitroglycerin (NITROSTAT) 0.4 MG SL tablet Place 0.4 mg under the tongue every 5 (five) minutes as needed for chest pain.     oxyCODONE (ROXICODONE) 5 MG immediate release tablet TAKE 1 TABLET BY MOUTH EVERY 6 HOURS AS NEEDED FOR FACIAL, RIGHT SHOULDER PAIN     polyethylene glycol (MIRALAX) 17 gram packet Take 17 g by mouth daily.     rosuvastatin (CRESTOR) 20 MG tablet Take 20 mg by mouth at bedtime.     traZODone (DESYREL) 50 MG tablet Take 150 mg by mouth at bedtime.      ALLERGIES:   Allergies   Allergen Reactions     Propranolol Anaphylaxis     Slowed HR     Atorvastatin      Hand neuropathy     Prochlorperazine Edisylate Rash     nightmares     DIET: Consistent carbohydrates, regular texture, thin liquids.    Vitals:    08/05/20 1606   BP: 132/90   Pulse: 86   Resp: 18   Temp: 98.2  F (36.8  C)   SpO2: 97%   Weight: 173 lb 12.8 oz (78.8 kg)   Height: 5' 3\" (1.6 m)   She is down 11.6 pounds since her last stay.  Body " mass index is 30.79 kg/m .    EXAMINATION:   General: Fairly pleasant middle-aged female, sitting comfortably, in no apparent distress.  Head: Normocephalic and atraumatic.  External visual signs of her facial fractures are not in evidence.  Eyes: PERRLA, sclerae clear.   ENT: Moist oral mucosa.  She is edentulous with full upper and lower dentures.  No rhinorrhea or nasal discharge.  She has complete hearing loss in the right ear and chronic tinnitus there.  Cardiovascular: Previously: Regular rate and rhythm with a short 2/6 systolic ejection murmur at the left sternal border.  Chest not auscultated today due to ongoing COVID-19 precautions.  Respiratory: Previously: Lungs clear to auscultation bilaterally.  Chest not auscultated today due to ongoing COVID-19 precautions.  Abdomen: Soft and nontender.   Musculoskeletal/Extremities: Trace swelling in the feet.  Integument:  No rashes, clinically significant lesions, or skin breakdown.   Cognitive/Psychiatric: Alert and oriented x3, although she scored 16/30 on the SLUMS during an earlier stay.  Affect is rather flat.    DIAGNOSTICS:   Results for orders placed or performed during the hospital encounter of 08/30/19   Basic Metabolic Panel   Result Value Ref Range    Sodium 136 136 - 145 mmol/L    Potassium 4.1 3.5 - 5.0 mmol/L    Chloride 104 98 - 107 mmol/L    CO2 26 22 - 31 mmol/L    Anion Gap, Calculation 6 5 - 18 mmol/L    Glucose 103 70 - 125 mg/dL    Calcium 8.7 8.5 - 10.5 mg/dL    BUN 13 8 - 22 mg/dL    Creatinine 0.76 0.60 - 1.10 mg/dL    GFR MDRD Af Amer >60 >60 mL/min/1.73m2    GFR MDRD Non Af Amer >60 >60 mL/min/1.73m2     Lab Results   Component Value Date    WBC 5.5 08/31/2019    HGB 9.5 (L) 08/31/2019    HCT 28.6 (L) 08/31/2019    MCV 95 08/31/2019     09/03/2019     CrCl cannot be calculated (Patient's most recent lab result is older than the maximum 10 days allowed.).  Lab Results   Component Value Date    HGBA1C 9.4 (H) 08/31/2019        ASSESSMENT/Plan:      ICD-10-CM    1. Closed fracture of left side of maxilla with routine healing, subsequent encounter  S02.40DD    2. Other acute pulmonary embolism without acute cor pulmonale (H)  I26.99    3. Chronic right shoulder pain  M25.511     G89.29    4. 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 Episode of recurrent major depressive disorder, unspecified depression episode severity (H)  F33.9    6. Diabetic nephropathy associated with type 2 diabetes mellitus (H)  E11.21    7. Diabetic peripheral neuropathy (H)  E11.42    8. Pulmonary emphysema, unspecified emphysema type (H)  J43.9    9. Sensorineural hearing loss (SNHL) of right ear, unspecified hearing status on contralateral side  H90.5    10. Hypertension due to endocrine disorder  I15.2    11. Coronary artery disease : previous stents without angina pectoris  I25.10    12. Anemia of chronic disease  D63.8    13. Trigeminal neuralgia  G50.0      CHANGES:  None.    CARE PLAN:  The care plan has been reviewed and all orders signed.  Changes to care plan, if any, as noted.  Otherwise, continue current plan of care.        The above has been created using voice recognition software. Please be aware that this may unintentionally  produce inaccuracies and/or nonsensical sentences.      Electronically signed by: Suraj Robledo, GRETCHEN

## 2021-06-10 NOTE — PROGRESS NOTES
[facility]    Name:   Ruddy Mendoza  : 1955  Facility:   [facility] [[id number]]   Room:   Code Status: DNI and POLST AVAILABLE - Limited treatment (CPR and meds okay)  Fac type:   SNF (Skilled Nursing Facility, TCU) -     PCP:  Dawit Aviles MD    CHIEF COMPLAINT / REASON FOR VISIT:  Chief Complaint   Patient presents with     Follow-up     ADMISSION following hospitalization following a fall resulting in facial fractures, as well as discovery of a pulmonary embolism.      [facility] from 2019 until 2019 (right facial numbness, severe dizziness, right upper extremity ataxia, swallowing and word finding difficulties)  [facility] from 2019 until 2019  [facility] from 2020 until 2020 (right proximal humerus fracture and hyperglycemia with SOPHIA)   [facility] from 2020 until 2020 (facial fractures and pulmonary embolism)    Patient was last seen by me on 2020.      HPI: Ruddy is a 64 y.o. female with a past medical history of recurrent UTIs and pyelonephritis, diabetes mellitus type 2 (poorly controlled - A1c 16.4 in 10/2017 and 9.4 in 2020 - with multiple related complications, including peripheral neuropathy, nephropathy, and retinopathy), endocrine induced hypertension, coronary artery disease (status post stenting x2), COPD (former smoker), and alcoholism who has been here multiple times in the past, involving stroke-like symptoms and right proximal humerus fracture along with hyperglycemia and SOPHIA.    Based on EMS reports prior to her last hospitalization, she appeared to live in a hoarder home.  She was found on the the floor of her bedroom with a hole in the wall, and the patient later reported old broken shelving falling, although in the ED she could not remember what occurred.  She did note drinking multiple drinks of straight vodka that night, often doing this  intermittently, and did not want her children to know.  There has been concern for alcoholism and possibly Warnicke's encephalopathy.     She was admitted on zero 7/2428 after presenting to the ED for evaluation of recurrent falls (x4).  She had been seen at [facility] ED on 06/27/2024 fall and was discharged home with nitrofurantoin for urinary tract infection, along with home health care.  There was no loss of consciousness, dizziness, vision changes, or room spinning sensation reported.  She expressed uncertainty as to why she kept falling, but she did report difficulty ambulating at home and getting up after her fall.  Her first fall happened while getting off the toilet, the second in the shower where she hit her head, the third while getting out of her chair, and the last while walking home from the store.  Paramedics were called to her house multiple times.  The patient denied any pain or headaches and reported feeling fine.    In the ED, she was tachycardic with a pulse of 103.  Labs were notable for a hemoglobin of 10.3, hematocrit 31.9, and calcium 8.2.  CT of the head showed partial visualized air-fluid level in the left maxillary sinus with hyperdense material new from 06/08/2020 (possibly representing layering hemorrhage from an occult facial fracture).  A CT of the chest showed acute PE in the left lower lobe and mild emphysema.  ECG showed NSR.  She received IVF and admitted for further management.     A maxillofacial CT confirmed mildly displaced fractures of the left inferior orbital rim and anterior and posterior lateral left maxillary sinus walls.  This injury occurred when go to the bathroom in a dark house walking into the the door.  She is legally blind.  A CT of the chest was positive for small burden PE.  Lower extremity venous Dopplers were positive for DVT involving the right calf.  She was started on enoxaparin and transitioned to Eliquis 10 mg twice daily for 1 week, followed by 5  "mg twice daily thereafter.  An echocardiogram was performed that revealed no evidence for right heart strain.    ENT was consulted with respect to the facial fractures, and no surgical intervention was indicated.  She was subsequently transferred to [facility] with therapy.       TCU ISSUES    Facial fractures: Her feels feels none except for some pain at the sinuses.    Pulmonary embolism and DVT: On Eliquis.  She still has some pain with deep inspiration.    Diabetes mellitus type 2: Receiving metformin, glimepiride, and sliding scale aspart.  Note that blood glucose levels have been under rather poor control.  Her A1c on 08/31/2019 was 9.4.    Hypertension: Still mildly elevated, she is receiving lisinopril and metoprolol succinate.    Cognition:  During her last stay, there was some concern of cognitive impairment/encephalopathy.  During her stay here in September 2019, she did have some word finding difficulty but otherwise scored well in cognitive testing.  When last here, according to Occupational Therapy, she scored only 16/30 on the SLUMS, indicating moderate cognitive deficits.  SLP did work with her as well on cognition and word finding difficulty during her last stay.    Depression and anxiety: Major depressive disorder (with anxiety) has been ongoing for some time.  She was started on 20 mg of fluoxetine on 09/06/2019, and the dosing has not been changed.  She also receives BuSpar (20 mg daily).  She may also be self-medicating with alcohol.    Insomnia: She tells me she can sleep with \"a lot of medication.\"    COPD: Has inhalers.    Diabetic polyneuropathy: Tingling in the hands and feet, though worse in the feet.    Trigeminal neuralgia: Taking carbamazepine for this, and it seems to be under control.      ROS: She complains about a poor appetite.  \"Every time I eat, I feel nauseous.\"  She denies any vertigo.  No  chest pains, coughing or congestion, dyspnea, dysuria, integumentary issues.  Sleep has " always been a problem, but she is doing okay on her current sleep medication regimen.    Past Medical History:   Diagnosis Date     Acute encephalopathy 09/23/2017     Acute hypokalemia 09/23/2017     Acute hyponatremia 09/23/2017     Acute pyelonephritis 09/23/2017     SOPHIA (acute kidney injury) (H)      Anxiety      CAD (coronary artery disease)      Closed fracture of right humerus      COPD (chronic obstructive pulmonary disease) (H)      Depression      Diabetes mellitus (H) 09/23/2017     Diabetic peripheral neuropathy (H) 9/4/2019     History of cervical cancer      HLD (hyperlipidemia)      Hypertension      Hypothyroidism      Insomnia      Normocytic anemia      Sensorineural hearing loss (SNHL) of right ear with tinnitus 9/4/2019     Sepsis (H) 09/23/2017     Trigeminal neuralgia               Family History   Problem Relation Age of Onset     COPD Mother      COPD Father      Lung cancer Father      Social History     Socioeconomic History     Marital status: Single     Spouse name: Not on file     Number of children: Not on file     Years of education: Not on file     Highest education level: Not on file   Occupational History     Not on file   Social Needs     Financial resource strain: Not on file     Food insecurity     Worry: Not on file     Inability: Not on file     Transportation needs     Medical: Not on file     Non-medical: Not on file   Tobacco Use     Smoking status: Former Smoker     Packs/day: 2.00     Years: 30.00     Pack years: 60.00     Types: Cigarettes     Smokeless tobacco: Never Used     Tobacco comment: quit at age 42   Substance and Sexual Activity     Alcohol use: Yes     Comment: Rare     Drug use: No     Sexual activity: Not Currently     Partners: Male   Lifestyle     Physical activity     Days per week: Not on file     Minutes per session: Not on file     Stress: Not on file   Relationships     Social connections     Talks on phone: Not on file     Gets together: Not on file  "    Attends Tenriism service: Not on file     Active member of club or organization: Not on file     Attends meetings of clubs or organizations: Not on file     Relationship status: Not on file     Intimate partner violence     Fear of current or ex partner: Not on file     Emotionally abused: Not on file     Physically abused: Not on file     Forced sexual activity: Not on file   Other Topics Concern     Not on file   Social History Narrative    Lives at home alone with her catKailyn       MEDICATIONS: Reviewed from the MAR, physician orders, and/or earlier progress notes.    Post Discharge Medication Reconciliation Status: unable to reconcile discharge medications due to not yet backloaded.      ALLERGIES:   Allergies   Allergen Reactions     Propranolol Anaphylaxis     Slowed HR     Atorvastatin      Hand neuropathy     Prochlorperazine Edisylate Rash     nightmares     DIET: Diabetic, regular texture, thin liquids.    Vitals:    07/28/20 0939   BP: (!) 149/96   Pulse: 82   Resp: 18   Temp: 98  F (36.7  C)   SpO2: 98%   Weight: 173 lb 3.2 oz (78.6 kg)   Height: 5' 3\" (1.6 m)   She is down 11.6 pounds since her last stay.  Body mass index is 30.68 kg/m .    EXAMINATION:   General: Fairly pleasant middle-aged female, lying in bed, in no apparent distress.  Head: Normocephalic and atraumatic.  Visual signs of her facial fractures are not in evidence.  Eyes: PERRLA, sclerae clear.   ENT: Moist oral mucosa.  She is edentulous with full upper and lower dentures.  No rhinorrhea or nasal discharge.  She has complete hearing loss in the right ear and chronic tinnitus there.  Cardiovascular: Regular rate and rhythm with a short 2/6 systolic ejection murmur at the left sternal border.  Respiratory: Lungs clear to auscultation bilaterally.   Abdomen: Soft and nontender.   Musculoskeletal/Extremities: Trace swelling in the feet.  Integument:  No rashes, clinically significant lesions, or skin breakdown. "   Cognitive/Psychiatric: Alert and oriented x3, although she scored 16/30 on the SLUMS during an earlier stay.  Affect is rather flat.    DIAGNOSTICS:   Results for orders placed or performed during the hospital encounter of 08/30/19   Basic Metabolic Panel   Result Value Ref Range    Sodium 136 136 - 145 mmol/L    Potassium 4.1 3.5 - 5.0 mmol/L    Chloride 104 98 - 107 mmol/L    CO2 26 22 - 31 mmol/L    Anion Gap, Calculation 6 5 - 18 mmol/L    Glucose 103 70 - 125 mg/dL    Calcium 8.7 8.5 - 10.5 mg/dL    BUN 13 8 - 22 mg/dL    Creatinine 0.76 0.60 - 1.10 mg/dL    GFR MDRD Af Amer >60 >60 mL/min/1.73m2    GFR MDRD Non Af Amer >60 >60 mL/min/1.73m2     Lab Results   Component Value Date    WBC 5.5 08/31/2019    HGB 9.5 (L) 08/31/2019    HCT 28.6 (L) 08/31/2019    MCV 95 08/31/2019     09/03/2019     CrCl cannot be calculated (Patient's most recent lab result is older than the maximum 10 days allowed.).  Lab Results   Component Value Date    HGBA1C 9.4 (H) 08/31/2019       ASSESSMENT/Plan:      ICD-10-CM    1. Closed fracture of left side of maxilla with routine healing, subsequent encounter  S02.40DD    2. Other acute pulmonary embolism without acute cor pulmonale (H)  I26.99    3. 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 Episode of recurrent major depressive disorder, unspecified depression episode severity (H)  F33.9    5. Diabetic nephropathy associated with type 2 diabetes mellitus (H)  E11.21    6. Diabetic peripheral neuropathy (H)  E11.42    7. Pulmonary emphysema, mild (H)  J43.9    8. Sensorineural hearing loss (SNHL) of right ear, unspecified hearing status on contralateral side  H90.5    9. Hypertension due to endocrine disorder  I15.2    10. Coronary artery disease : previous stents without angina pectoris  I25.10    11. Anemia of chronic disease  D63.8     12. Vitamin D deficiency  E55.9      CHANGES:  None.    CARE PLAN:  The care plan has been reviewed and all orders signed.  Changes to care plan, if any, as noted.  Otherwise, continue current plan of care.  Total time spent with this patient was approximately 35 minutes, with greater than 50% spent in counseling and coordination of care that included a review of her various comorbidities and particulars regarding her recent new diagnoses.      The above has been created using voice recognition software. Please be aware that this may unintentionally  produce inaccuracies and/or nonsensical sentences.      Electronically signed by: Suraj Robledo CNP

## 2021-06-10 NOTE — PROGRESS NOTES
[facility]    Name:   Ruddy Albertp  : 1955  Facility:   [facility] [[id number]]   Room:   Code Status: DNI and POLST AVAILABLE - Limited treatment (CPR and meds okay)  Fac type:   SNF (Skilled Nursing Facility, TCU) -     PCP:  Dawit Aviles MD    CHIEF COMPLAINT / REASON FOR VISIT:  Chief Complaint   Patient presents with     Follow-up     TCU follow-up after hospitalization following a fall resulting in facial fractures, as well as discovery of a pulmonary embolism.      [facility] from 2019 until 2019 (right facial numbness, severe dizziness, right upper extremity ataxia, swallowing and word finding difficulties)  [facility] from 2019 until 2019  [facility] from 2020 until 2020 (right proximal humerus fracture and hyperglycemia with SOPHIA)   [facility] from 2020 until 2020 (facial fractures and pulmonary embolism)    Patient was last seen by me on 2020.      HPI: Ruddy is a 64 y.o. female with a past medical history of recurrent UTIs and pyelonephritis, diabetes mellitus type 2 (poorly controlled - A1c 16.4 in 10/2017 and 9.4 in 2020 - with multiple related complications, including peripheral neuropathy, nephropathy, and retinopathy), endocrine induced hypertension, coronary artery disease (status post stenting x2), COPD (former smoker), and alcoholism who has been here multiple times in the past, involving stroke-like symptoms and right proximal humerus fracture along with hyperglycemia and SOPHIA.    Based on EMS reports prior to her last hospitalization, she appeared to live in a hoarder home.  She was found on the the floor of her bedroom with a hole in the wall, and the patient later reported old broken shelving falling, although in the ED she could not remember what occurred.  She did note drinking multiple drinks of straight vodka that night, often doing this  intermittently, and did not want her children to know.  There has been concern for alcoholism and possibly Warnicke's encephalopathy.     She was admitted on 07/2428 after presenting to the ED for evaluation of recurrent falls (x4).  She had been seen at [facility] ED on 06/27/2024 fall and was discharged home with nitrofurantoin for urinary tract infection, along with home health care.  There was no loss of consciousness, dizziness, vision changes, or room spinning sensation reported.  She expressed uncertainty as to why she kept falling, but she did report difficulty ambulating at home and getting up after her fall.  Her first fall happened while getting off the toilet, the second in the shower where she hit her head, the third while getting out of her chair, and the last while walking home from the store.  Paramedics were called to her house multiple times.  The patient denied any pain or headaches and reported feeling fine.    In the ED, she was tachycardic with a pulse of 103.  Labs were notable for a hemoglobin of 10.3, hematocrit 31.9, and calcium 8.2.  CT of the head showed partial visualized air-fluid level in the left maxillary sinus with hyperdense material new from 06/08/2020 (possibly representing layering hemorrhage from an occult facial fracture).  A CT of the chest showed acute PE in the left lower lobe and mild emphysema.  ECG showed NSR.  She received IVF and admitted for further management.     A maxillofacial CT confirmed mildly displaced fractures of the left inferior orbital rim and anterior and posterior lateral left maxillary sinus walls.  This injury occurred when go to the bathroom in a dark house walking into the the door.  She is legally blind.  A CT of the chest was positive for small burden PE.  Lower extremity venous Dopplers were positive for DVT involving the right calf, although she never had pain there.  She was started on enoxaparin and transitioned to Eliquis 10 mg twice  "daily for 1 week, followed by 5 mg twice daily thereafter.  An echocardiogram was performed that revealed no evidence for right heart strain.    ENT was consulted with respect to the facial fractures, and no surgical intervention was indicated.  She was subsequently transferred to [facility] with therapy.       TCU ISSUES    Facial fractures: She is experiencing facial pain on the left, especially at the sinuses.  There is also some numbness.  Ibuprofen and acetaminophen were completely ineffective.  Her oxycodone does not remove the pain, but it \"takes the edge off.\"    Pulmonary embolism and DVT: On Eliquis.  She still has some pain with deep inspiration, so she tries to breathe shallowly.    Diabetes mellitus type 2: Receiving metformin, glimepiride, Invokana, and sliding scale aspart.  Note that blood glucose levels had been under rather poor control.  Her A1c on 08/31/2019 was 9.4.  When last seen, fasting blood glucose levels ranged between 121 and 164.  At lunch, the range was between 132 and 180.  Supper range was between 127 and 169.  The only significantly elevated blood glucose levels are at bedtime, ranging from 172 up to 301, but she notes that this is done after supper.  All in all, they are only occasionally giving a single unit of insulin with some meals.  We will consider discontinuing sliding scale.    Right shoulder pain: She did have a fractured right proximal humerus in March 2020.  Currently, pain is worse, although there was no radiographic evidence of any reinjury.  Still, she is quite uncomfortable.  Pain seems to be at its worst first thing in the morning and in the middle of the night.  For the facial pain and for this, we ordered oxycodone 5 mg every 6 hours as needed (see Facial fractures above).  She told me that she can, for the first time in a while, put her socks on herself.  She has an appointment with the orthopedic surgeon for her right shoulder on 08/13/2020.      Hypertension: " "Still mildly elevated (pain related?), she is receiving lisinopril and metoprolol succinate.    Cognition:  During her last stay, there was some concern of cognitive impairment/encephalopathy.  During her stay here in September 2019, she did have some word finding difficulty but otherwise scored well in cognitive testing.  When last here, according to Occupational Therapy, she scored only 16/30 on the SLUMS, indicating moderate cognitive deficits.  SLP did work with her as well on cognition and word finding difficulty during her last stay.    Depression and anxiety: Major depressive disorder (with anxiety) has been ongoing for some time.  She was started on 20 mg of fluoxetine on 09/06/2019, and the dosing has not been changed.  She also receives BuSpar (20 mg daily).  She may also be self-medicating with alcohol.    Insomnia: She tells me she can sleep with \"a lot of medication.\"  She does awaken at 4 AM and stays up after that.    COPD: Has inhalers.    Diabetic polyneuropathy: Tingling in the hands and feet, though worse in the feet.    Trigeminal neuralgia: Taking carbamazepine for this, and it seems to be under control.      ROS: No  chest pains, coughing or congestion, dizziness or dyspnea, dysuria, integumentary issues.  Sleep has always been a problem, but she is doing okay on her current sleep medication regimen.    Past Medical History:   Diagnosis Date     Acute encephalopathy 09/23/2017     Acute hypokalemia 09/23/2017     Acute hyponatremia 09/23/2017     Acute pulmonary embolism (H)      Acute pyelonephritis 09/23/2017     SOPHIA (acute kidney injury) (H)      Anxiety      CAD (coronary artery disease)      Closed fracture of facial bone (H)      Closed fracture of right humerus      COPD (chronic obstructive pulmonary disease) (H)      Depression      Diabetes mellitus (H) 09/23/2017     Diabetic peripheral neuropathy (H) 9/4/2019     History of cervical cancer      HLD (hyperlipidemia)      Hypertension "      Hypothyroidism      Insomnia      Normocytic anemia      Peripheral neuropathy      Sensorineural hearing loss (SNHL) of right ear with tinnitus 9/4/2019     Sepsis (H) 09/23/2017     Trigeminal neuralgia               Family History   Problem Relation Age of Onset     COPD Mother      COPD Father      Lung cancer Father      Social History     Socioeconomic History     Marital status: Single     Spouse name: Not on file     Number of children: Not on file     Years of education: Not on file     Highest education level: Not on file   Occupational History     Not on file   Social Needs     Financial resource strain: Not on file     Food insecurity     Worry: Not on file     Inability: Not on file     Transportation needs     Medical: Not on file     Non-medical: Not on file   Tobacco Use     Smoking status: Former Smoker     Packs/day: 2.00     Years: 30.00     Pack years: 60.00     Types: Cigarettes     Smokeless tobacco: Never Used     Tobacco comment: quit at age 42   Substance and Sexual Activity     Alcohol use: Yes     Comment: Rare     Drug use: No     Sexual activity: Not Currently     Partners: Male   Lifestyle     Physical activity     Days per week: Not on file     Minutes per session: Not on file     Stress: Not on file   Relationships     Social connections     Talks on phone: Not on file     Gets together: Not on file     Attends Faith service: Not on file     Active member of club or organization: Not on file     Attends meetings of clubs or organizations: Not on file     Relationship status: Not on file     Intimate partner violence     Fear of current or ex partner: Not on file     Emotionally abused: Not on file     Physically abused: Not on file     Forced sexual activity: Not on file   Other Topics Concern     Not on file   Social History Narrative    Lives at home alone with her catKailyn       MEDICATIONS: Reviewed from the MAR, physician orders, and/or earlier progress notes.    Post  Discharge Medication Reconciliation Status: medication reconciliation previously completed during another office visit.    Current Outpatient Medications   Medication Sig     acetaminophen (TYLENOL) 500 MG tablet Take 1,000 mg by mouth 3 (three) times a day.      albuterol (PROAIR HFA;PROVENTIL HFA;VENTOLIN HFA) 90 mcg/actuation inhaler Inhale 2 puffs 4 (four) times a day as needed for wheezing.      amitriptyline (ELAVIL) 150 MG tablet Take 150 mg by mouth at bedtime.     apixaban ANTICOAGULANT (ELIQUIS) 5 mg Tab tablet Take 5 mg by mouth 2 (two) times a day.     aspirin 81 mg chewable tablet Chew 81 mg daily.     budesonide-formoteroL (SYMBICORT) 160-4.5 mcg/actuation inhaler Inhale 2 puffs 2 (two) times a day.     busPIRone (BUSPAR) 10 MG tablet Take 20 mg by mouth at bedtime.     canagliflozin (INVOKANA) 100 mg Tab Take 100 mg by mouth daily before breakfast.     carBAMazepine (TEGRETOL) 200 mg tablet Take 400 mg by mouth 2 (two) times a day.     doxylamine (UNISOM) 25 mg tablet Take 25 mg by mouth at bedtime.     ferrous sulfate 65 mg elemental iron Take 1 tablet by mouth daily with breakfast.      FLUoxetine (PROZAC) 20 MG capsule Take 20 mg by mouth daily.     glimepiride (AMARYL) 1 MG tablet Take 1 mg by mouth daily before breakfast.     ibuprofen (ADVIL,MOTRIN) 200 MG tablet Take 200 mg by mouth every 4 (four) hours as needed for pain or mild pain (1-3).     insulin aspart U-100 (NOVOLOG) 100 unit/mL injection Inject under the skin 3 (three) times a day with meals. Per sliding scale: if 0 - 69 = 0 UNITS SEEHYPOGLYCEMIA PROTOCOL; 70 - 149 = 0 UNITSNO INSULIN, GIVE PRANDIAL INSULIN IFORDERED; 150 - 199 = 1 UNIT; 200 - 249 = 2UNITS; 250 - 299 = 3 UNITS; 300 - 349 = 4 UNITS;350 - 399 = 5 UNITS; 400+ = 6 UNITS IF  ORGREATER, GIVE 6 UNITS AND CALL MD/NP     levothyroxine (SYNTHROID, LEVOTHROID) 50 MCG tablet Take 50 mcg by mouth daily.     lisinopril (PRINIVIL,ZESTRIL) 10 MG tablet Take 10 mg by mouth  "daily.     melatonin 10 mg Tab Take 10 mg by mouth at bedtime.     metFORMIN (GLUCOPHAGE-XR) 750 MG 24 hr tablet Take 750 mg by mouth 2 (two) times a day with meals.      metoprolol succinate (TOPROL-XL) 25 MG Take 25 mg by mouth daily.      mirtazapine (REMERON) 15 MG tablet Take 15 mg by mouth daily.     multivit with min-folic acid 0.4 mg Tab Take 1 tablet by mouth daily.     naltrexone (DEPADE) 50 mg tablet Take 50 mg by mouth daily.     nitroglycerin (NITROSTAT) 0.4 MG SL tablet Place 0.4 mg under the tongue every 5 (five) minutes as needed for chest pain.     oxyCODONE (ROXICODONE) 5 MG immediate release tablet Take 5 mg by mouth every 6 (six) hours as needed for pain.     polyethylene glycol (MIRALAX) 17 gram packet Take 17 g by mouth daily.     rosuvastatin (CRESTOR) 20 MG tablet Take 20 mg by mouth at bedtime.     traZODone (DESYREL) 50 MG tablet Take 150 mg by mouth at bedtime.      ALLERGIES:   Allergies   Allergen Reactions     Propranolol Anaphylaxis     Slowed HR     Atorvastatin      Hand neuropathy     Prochlorperazine Edisylate Rash     nightmares     DIET: Consistent carbohydrates, regular texture, thin liquids.    Vitals:    08/03/20 1601   BP: 118/74   Pulse: 76   Resp: 18   Temp: 98  F (36.7  C)   SpO2: 99%   Weight: 173 lb 12.8 oz (78.8 kg)   Height: 5' 3\" (1.6 m)   She is down 11.6 pounds since her last stay.  Body mass index is 30.79 kg/m .    EXAMINATION:   General: Fairly pleasant middle-aged female, sitting comfortably, in no apparent distress.  Head: Normocephalic and atraumatic.  External visual signs of her facial fractures are not in evidence.  Eyes: PERRLA, sclerae clear.   ENT: Moist oral mucosa.  She is edentulous with full upper and lower dentures.  No rhinorrhea or nasal discharge.  She has complete hearing loss in the right ear and chronic tinnitus there.  Cardiovascular: Previously: Regular rate and rhythm with a short 2/6 systolic ejection murmur at the left sternal border.  " Chest not auscultated today due to ongoing COVID-19 precautions.  Respiratory: Previously: Lungs clear to auscultation bilaterally.  Chest not auscultated today due to ongoing COVID-19 precautions.  Abdomen: Soft and nontender.   Musculoskeletal/Extremities: Trace swelling in the feet.  Integument:  No rashes, clinically significant lesions, or skin breakdown.   Cognitive/Psychiatric: Alert and oriented x3, although she scored 16/30 on the SLUMS during an earlier stay.  Affect is rather flat.    DIAGNOSTICS:   Results for orders placed or performed during the hospital encounter of 08/30/19   Basic Metabolic Panel   Result Value Ref Range    Sodium 136 136 - 145 mmol/L    Potassium 4.1 3.5 - 5.0 mmol/L    Chloride 104 98 - 107 mmol/L    CO2 26 22 - 31 mmol/L    Anion Gap, Calculation 6 5 - 18 mmol/L    Glucose 103 70 - 125 mg/dL    Calcium 8.7 8.5 - 10.5 mg/dL    BUN 13 8 - 22 mg/dL    Creatinine 0.76 0.60 - 1.10 mg/dL    GFR MDRD Af Amer >60 >60 mL/min/1.73m2    GFR MDRD Non Af Amer >60 >60 mL/min/1.73m2     Lab Results   Component Value Date    WBC 5.5 08/31/2019    HGB 9.5 (L) 08/31/2019    HCT 28.6 (L) 08/31/2019    MCV 95 08/31/2019     09/03/2019     CrCl cannot be calculated (Patient's most recent lab result is older than the maximum 10 days allowed.).  Lab Results   Component Value Date    HGBA1C 9.4 (H) 08/31/2019       ASSESSMENT/Plan:      ICD-10-CM    1. Closed fracture of left side of maxilla with routine healing, subsequent encounter  S02.40DD    2. Other acute pulmonary embolism without acute cor pulmonale (H)  I26.99    3. Chronic right shoulder pain  M25.511     G89.29    4. 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 Episode of recurrent major depressive disorder, unspecified depression episode severity (H)  F33.9    6. Diabetic nephropathy associated  with type 2 diabetes mellitus (H)  E11.21    7. Diabetic peripheral neuropathy (H)  E11.42    8. Pulmonary emphysema, unspecified emphysema type (H)  J43.9    9. Sensorineural hearing loss (SNHL) of right ear, unspecified hearing status on contralateral side  H90.5    10. Hypertension due to endocrine disorder  I15.2    11. Coronary artery disease : previous stents without angina pectoris  I25.10    12. Anemia of chronic disease  D63.8    13. Word finding difficulty  R47.89    14. Trigeminal neuralgia  G50.0      CHANGES:  None.    CARE PLAN:  The care plan has been reviewed and all orders signed.  Changes to care plan, if any, as noted.  Otherwise, continue current plan of care.        The above has been created using voice recognition software. Please be aware that this may unintentionally  produce inaccuracies and/or nonsensical sentences.      Electronically signed by: Suraj Robledo CNP

## 2021-06-11 NOTE — PATIENT INSTRUCTIONS - HE
Follow up four more weeks with x-rays, we will get flexion extension films at that time. Continue collar at all times. No bending/lifting/twisting >5 lbs. See ortho for your shoulder pain.

## 2021-06-11 NOTE — PROGRESS NOTES
"CHART NOTE     DATE OF SERVICE:  2020     : 1955   65 y.o.     ASSESSMENT/Plan :   Neck pain minimal, having mostly right shoulder pain and decreased range of motion. I declined to refill tramadol for her shoulder pain. C3 chip fracture in stable alignment, plan four more weeks with collar then flex/ex. If no movement, can dc collar.       HPI: from hospital consultation: \"Ruddy Mendoza is a 64 year old female fell at home 2020 while intoxicated. Sustained C3 anterior inferior tip fracture. Prevertebral soft tissue swelling. MRI largely motion degraded. Concerns for ALL injury as well as C3-4 interspinous ligament edema. Repeat MRI still motion degraded but ligaments appear intact. Will plan to brace patient with [place] J collar at all times for 3 months. Casi to shower. Recently diagnosed with DVT, PE and now on Eliquis. Recent right arm surgery 3/19/2020 right proximal humerous fracture, details unknown but she states she has intermittent arm pain that gets quite severe which was one reason she drank heavily yesterday. She has limited ROM in the right arm, weak hand grasp. No high grade cervical stenosis on MRI. Head CT negative for hemorrhage or SDH. No neck pain on exam at rest. Pain with palpation. Mild arm pain, chronic. No numbness or tingling. No electric shocks with fall. She also does not remember all the details of her fall.\"       PAST MEDICAL HISTORY, SURGICAL HISTORY, REVIEW OF SYMPTOMS, MEDICATIONS AND ALLERGIES:  Past medical history, surgical history, ROS, medications and allergies reviewed with patient and remain unchanged from previous visit.    Past Medical History:   Diagnosis Date     Acute encephalopathy 2017     Acute hypokalemia 2017     Acute hyponatremia 2017     Acute pulmonary embolism (H)      Acute pyelonephritis 2017     SOPHIA (acute kidney injury) (H)      Anxiety      C3 cervical fracture (H)      CAD (coronary artery disease)      Closed " "fracture of facial bone (H)      Closed fracture of right humerus      COPD (chronic obstructive pulmonary disease) (H)      Depression      Diabetes mellitus (H) 09/23/2017     Diabetic peripheral neuropathy (H) 9/4/2019     History of cervical cancer      HLD (hyperlipidemia)      Hypertension      Hypothyroidism      Insomnia      Normocytic anemia      Peripheral neuropathy      Sensorineural hearing loss (SNHL) of right ear with tinnitus 9/4/2019     Sepsis (H) 09/23/2017     TIA (transient ischemic attack)      Trigeminal neuralgia        PHYSICAL EXAM:    BP (!) 180/94 - recheck 140s/80s   Pulse 88   Resp 16   Ht 5' 3\" (1.6 m)   Wt 146 lb (66.2 kg)   SpO2 98%   BMI 25.86 kg/m      Neurological exam reveals:  Respirations easy, non-labored.   Skin: W/D/I. No rashes, lesions or breaks in integrity.   Recent and remote memory intact, fund of knowledge wnl.    Alert and oriented x3, speech fluent and appropriate.   Comprehension intact with 2 step crossover command.   Finger nose finger smooth and accurate  Face symmetric, tongue midline, Uvula midline,  palate rises with phonation   Shoulder shrug equal  Arm strength bilateral grasp, biceps, triceps, and deltoids 5/5, did not test right arm due to pain.    Leg strength bilateral dorsiflexion, plantar flexion, and hip flexion 5/5  Muscle Bulk and tone wnl.   Gait and station: with a walker.      RADIOGRAPHIC IMAGING: XR Films personally reviewed and interpreted.  Also reviewed and discussed with Dr. Nieves.      EXAM: XR CERVICAL SPINE 2 - 3 VWS  LOCATION: [facility]  DATE/TIME: 9/8/2020 8:26 AM     INDICATION: Unspecified displaced fracture of third cervical vertebra, initial encounter for closed fracture.  COMPARISON: 8/20/2020 cervical radiographs.     IMPRESSION:   Slightly displaced anterior-inferior corner fracture arising from the C3 vertebral body is redemonstrated. Fracture fragment displaced 2 mm anteriorly. No change in position. No " evidence for bony bridging. Normal alignment and disc spacing.   Fracture extends to the lower endplate of C3 as seen on the prior exam. Prevertebral soft tissues unremarkable. Mild C5-C6 degenerative disc change. Normal facet joint alignment.

## 2021-06-12 NOTE — PROGRESS NOTES
[facility]    Name:   Ruddy Mendoza  : 1955  Facility:   [facility] [[id number]]   Room:   Code Status: DNI and POLST AVAILABLE - Limited treatment (CPR and meds okay)  Fac type:   SNF (Skilled Nursing Facility, TCU) -     PCP:  Ron Mandujano    CHIEF COMPLAINT / REASON FOR VISIT:  Chief Complaint   Patient presents with     Discharge Summary     Multiple falls with head injuries.      [facility] from 2019 until 2019 (right facial numbness, severe dizziness, right upper extremity ataxia, swallowing and word finding difficulties)  [facility] from 2019 until 2019  [facility] from 2020 until 2020 (right proximal humerus fracture and hyperglycemia with SOPHIA)   [facility] from 2020 until 2020 (facial fractures and pulmonary embolism)  [facility] from 2020 until 2020 (expected discharge date)  [facility] from 2020 until 2020 (C3 vertebral fracture)      HPI: Ruddy is a 65 y.o. female with an extensive history of problems and hospitalizations related to alcohol use disorder, tobacco use, and diabetes mellitus.     RECENT HISTORY    She has a past medical history of recurrent UTIs and pyelonephritis, diabetes mellitus type 2 (poorly controlled - A1c 16.4 in 10/2017 and 9.4 in 2020 - with multiple related complications, including peripheral neuropathy, nephropathy, and retinopathy), endocrine induced hypertension, coronary artery disease (status post stenting x2), COPD (former smoker), and alcoholism who has been here multiple times in the past, involving stroke-like symptoms and right proximal humerus fracture along with hyperglycemia and SOPHIA.    Based on EMS reports prior to an earlier hospitalization, she appeared to live in a hoarder home.  She was found on the the floor of her bedroom with a hole in the wall, and the patient later  reported old broken shelving falling, although in the ED she could not remember what occurred.  She did note drinking multiple drinks of straight vodka that night, often doing this intermittently, and did not want her children to know.  There has been concern for alcoholism and possibly Warnicke's encephalopathy.     She was admitted on 07/2428 after presenting to the ED for evaluation of recurrent falls (x4).  She had been seen at [facility] ED on 06/27/2024 fall and was discharged home with nitrofurantoin for urinary tract infection, along with home health care.  There was no loss of consciousness, dizziness, vision changes, or room spinning sensation reported.  She expressed uncertainty as to why she kept falling, but she did report difficulty ambulating at home and getting up after her fall.  Her first fall happened while getting off the toilet, the second in the shower where she hit her head, the third while getting out of her chair, and the last while walking home from the store.  Paramedics were called to her house multiple times.      In the ED, she was tachycardic with a pulse of 103.  Labs were notable for a hemoglobin of 10.3, hematocrit 31.9, and calcium 8.2.  CT of the head showed partial visualized air-fluid level in the left maxillary sinus with hyperdense material new from 06/08/2020 (possibly representing layering hemorrhage from an occult facial fracture).  A CT of the chest showed acute PE in the left lower lobe and mild emphysema.  ECG showed NSR.  She received IVF and admitted for further management.     A maxillofacial CT confirmed mildly displaced fractures of the left inferior orbital rim and anterior and posterior lateral left maxillary sinus walls.  This injury occurred when going to the bathroom in a dark house walking into the the door.  She is legally blind.  A CT of the chest was positive for small burden PE.  Lower extremity venous Dopplers were positive for DVT involving the  right calf, although she never had pain there.  She was started on enoxaparin and transitioned to Eliquis 10 mg twice daily for 1 week, followed by 5 mg twice daily thereafter.  An echocardiogram was performed that revealed no evidence for right heart strain.  ENT was consulted with respect to the facial fractures, and no surgical intervention was indicated.      Subsequent hospitalizations followed this, including in August 2020 stay secondary to a C3 fracture.     MOST RECENT HOSPITALIZATION    She presented once again to the ED on 10/23/2020 with dizziness and a fall.  She stated she was not intoxicated at the time of the most recent fall and has been sober for 9 weeks prior to admission.  Differential diagnoses included TIA/CVA, intoxication/withdrawal, UTI, seizures/postictal, arrhythmia, vertigo, electrolyte abnormality, mood medications, thyroid abnormalities.  Neurology was consulted.  She does have a history of hypothyroidism, but her TSH was normal.  Electrolytes were also normal.  B12 was elevated.  Carbamazepine levels were unremarkable.  Some of her sedating medications were held with no difference in symptoms.  Echo showed EF of 55 to 60%.  MRI was negative for CVA, and EEG negative for stroke.  She was seen by PT/OT who recommended TCU upon discharge.  Also started on meclizine and vestibular therapy.  Recommendation for outpatient follow-up with Dr. Vazquez (neurology) if gait continues to be unstable.    A right sided orbital blowout fracture was noted.  Patient to follow-up with OMFS.    For her alcoholism, she is not on naltrexone but does receive acamprosate, multivitamin/folate/thiamine.    Diabetes mellitus type 2: She is a diabetic with a recent A1c of 7.6%.  She stated that she has been taking insulin; however, her regimen was unclear.  Lantus dose was listed as 38 units but not listed upon recent hospital discharge summary.  She does take sliding scale NovoLog 3 times per day.  Blood glucose  "levels ranged in the 100s to 200s during hospitalization with sliding scale correction.  Her Lantus was held with the need for outpatient follow-up. She continues to receive sliding scale insulin, Invokana, metformin, and glimepiride.    Normocytic anemia: Hemoglobin 10 on admission.  Receiving multivitamin and iron.    Other chronic issues:   Mood disorder: PTA amitriptyline, buspirone, carbamazepine, mirtazapine, and trazodone.  Hyperlipidemia: PTA statin.  COPD: PTA albuterol and Symbicort inhalers.  Hypothyroidism: PTA levothyroxine.  Recent DVT/PE: PTA Eliquis.  Hypertension: PTA metoprolol, lisinopril.      TCU ISSUES    Disposition: She may still looking for assisted living.  While at home, she has been looking for housekeeping.  Her son, Moses, is assisting her.  As noted in the HPI, she appears to be a hoarder.  She does feel that her acamprosate (for EtOH dependence maintenance) has been effective.    Today, she tells me she is doing okay.  She will be having right shoulder surgery very soon, as the hardware is failing.  She has a preop scheduled for Monday with a surgery to be performed on 11/18/2020.  After that surgery, she will not be able to use her walker, so she will necessarily require more care at that time.    She knows that she will need to be very careful and avoid falling.    Discharge planning: Patient is discharging today.  At home, she will require physical therapy and a home health aide, both to be provided by [facility].    Gait instability/frequent falls: She has described feeling \"real jerky.\"  During 1 fall, she stated that her body simply \"started to go backward.\"  Observed with physical therapy, she is shaky getting up but ambulating rapidly with a good gait.  She has to sit a while before standing.  She has described her falling as being \"out of control.\"  She does have tremors in her hands and positional dizziness, especially when turning a corner.    Depression and anxiety:  Major " "depressive disorder (with anxiety) has been ongoing for some time.  She most likely was self-medicating with alcohol.  She has described her depression as \"really bad right now.\"  As far as I can tell, medications have not been changed since her last stay.  She will be seen by psych, something she is requesting.    Weight loss: She is down 13.4 pounds since her last stay.  Weight during this stay has been stable.    Cognition:  During an earlier stay, there was some concern of cognitive impairment/encephalopathy.  During her stay here in September 2019, she did have some word finding difficulty but otherwise scored well in cognitive testing.  When last here, according to Occupational Therapy, she scored only 16/30 on the SLUMS, indicating moderate cognitive deficits.  SLP did work with her as well on cognition and word finding difficulty during that stay.  Currently, Occupational Therapy reports a SLUMS score of 15/30, indicating cognitive impairment.  She has been seen by SLP in the past for word finding difficulty, and we will utilize again for cognitive assessments.    Alcoholism: Patient tells me that she had been sober for 10 years before resuming.    Diabetes mellitus type 2: Recent blood glucose levels range between 88 and 262, with most <200.    Right shoulder pain: She did have a fractured right proximal humerus in March 2020.  Pain is chronic.  As noted above, she will be having surgery later this month.  Hypertension: Wide fluctuation in blood pressures.    COPD: Has inhalers.  Endorses dyspnea on exertion.    Diabetic polyneuropathy: She tells me she cannot feel the bottom of her feet.  There is tingling in the hands and feet, though worse in the feet.    Legal blindness: She has mentioned \"bleeding near the optic nerve.\"    Trigeminal neuralgia: Taking carbamazepine for this, and it seems to be under control.  She has described the discomfort as \"phantom earaches.\"     Seborrheic dermatitis: We did " order hydrocortisone cream.      ROS:  As noted, she endorses chronic dizziness.  She also mentions tremors/shake.  Differential diagnosis for this is too extensive and is most likely multifactorial.  No  chest pains, coughing or congestion, dysuria, integumentary issues.  No bowel issues.  Sleep has always been a problem.  Often, she cannot sleep because of right shoulder pain.    Past Medical History:   Diagnosis Date     Acute encephalopathy 09/23/2017     Acute hypokalemia 09/23/2017     Acute hyponatremia 09/23/2017     Acute pulmonary embolism (H)      Acute pyelonephritis 09/23/2017     SOPHIA (acute kidney injury) (H)      Anxiety      C3 cervical fracture (H)      CAD (coronary artery disease)      Closed fracture of facial bone (H)      Closed fracture of right humerus      COPD (chronic obstructive pulmonary disease) (H)      Depression      Diabetes mellitus (H) 09/23/2017     Diabetic peripheral neuropathy (H) 9/4/2019     History of cervical cancer      HLD (hyperlipidemia)      Hypertension      Hypothyroidism      Insomnia      Legal blindness 5/28/2014    Diabetic retinopathy, macular edema and degeneration. S/p eye injection. Not surgical candidate d/t proximity to optic nerve.     Normocytic anemia      Peripheral neuropathy      Seborrheic dermatitis 11/4/2020     Sensorineural hearing loss (SNHL) of right ear with tinnitus 9/4/2019     Sepsis (H) 09/23/2017     TIA (transient ischemic attack)      Trigeminal neuralgia               Family History   Problem Relation Age of Onset     COPD Mother      COPD Father      Lung cancer Father      Social History     Socioeconomic History     Marital status: Single     Spouse name: Not on file     Number of children: Not on file     Years of education: Not on file     Highest education level: Not on file   Occupational History     Not on file   Social Needs     Financial resource strain: Not on file     Food insecurity     Worry: Not on file     Inability:  "Not on file     Transportation needs     Medical: Not on file     Non-medical: Not on file   Tobacco Use     Smoking status: Former Smoker     Packs/day: 2.00     Years: 30.00     Pack years: 60.00     Types: Cigarettes     Smokeless tobacco: Never Used     Tobacco comment: quit at age 42   Substance and Sexual Activity     Alcohol use: Not Currently     Comment: Reports is an \"alcoholic\"      Drug use: No     Sexual activity: Not Currently     Partners: Male   Lifestyle     Physical activity     Days per week: Not on file     Minutes per session: Not on file     Stress: Not on file   Relationships     Social connections     Talks on phone: Not on file     Gets together: Not on file     Attends [employer] service: Not on file     Active member of club or organization: Not on file     Attends meetings of clubs or organizations: Not on file     Relationship status: Not on file     Intimate partner violence     Fear of current or ex partner: Not on file     Emotionally abused: Not on file     Physically abused: Not on file     Forced sexual activity: Not on file   Other Topics Concern     Not on file   Social History Narrative    Lives at home alone with her catKailyn       MEDICATIONS: Reviewed from the MAR, physician orders, and/or earlier progress notes.    Post Discharge Medication Reconciliation Status: medication reconciliation previously completed during another office visit.      Current Outpatient Medications   Medication Sig     acamprosate (CAMPRAL) 333 mg tablet Take 2 tablets (666 mg total) by mouth 3 (three) times a day.     acetaminophen (TYLENOL) 500 MG tablet Take 1,000 mg by mouth 3 (three) times a day.      albuterol (PROAIR HFA;PROVENTIL HFA;VENTOLIN HFA) 90 mcg/actuation inhaler Inhale 2 puffs 4 (four) times a day as needed for wheezing.      amitriptyline (ELAVIL) 150 MG tablet Take 150 mg by mouth at bedtime.     apixaban ANTICOAGULANT (ELIQUIS) 2.5 mg Tab tablet Take 2.5 mg by mouth 2 (two) " times a day.      aspirin 81 mg chewable tablet Chew 81 mg daily.      budesonide-formoteroL (SYMBICORT) 160-4.5 mcg/actuation inhaler Inhale 2 puffs 2 (two) times a day.     busPIRone (BUSPAR) 10 MG tablet Take 20 mg by mouth at bedtime.     canagliflozin (INVOKANA) 100 mg Tab Take 100 mg by mouth daily before breakfast.     carBAMazepine (TEGRETOL) 200 mg tablet Take 400 mg by mouth 2 (two) times a day.     cholecalciferol, vitamin D3, 1,000 unit (25 mcg) tablet Take 1,000 Units by mouth daily.     ferrous sulfate 65 mg elemental iron Take 1 tablet by mouth daily with breakfast.      FLUoxetine (PROZAC) 20 MG capsule Take 20 mg by mouth daily.     glimepiride (AMARYL) 1 MG tablet Take 1 mg by mouth daily before breakfast.     hydrocortisone 1 % cream Apply topically 2 (two) times a day. Keep away from eyes.     insulin aspart U-100 (NOVOLOG) 100 unit/mL (3 mL) injection pen Inject 1-10 Units under the skin see administration instructions. Inject 1-10 units subcutaneously three times daily with meals per blood glucose. Don't give corrective dose for as needed, post-prandial, or nocturnal glucose checks unless ordered. BG <70 mg/dL see hypoglycemia protocol. Do not inject for a BG of  mg/dL unless ordered. Starting at blood glucose of 150 mg/dL inject 1-5 units for every increase of 50 mg/dL. Inject 6 units for BG > 400 mg/dL and call MD.     levothyroxine (SYNTHROID, LEVOTHROID) 50 MCG tablet Take 50 mcg by mouth daily.     lisinopril (PRINIVIL,ZESTRIL) 10 MG tablet Take 10 mg by mouth daily.     melatonin 10 mg Tab Take 10 mg by mouth at bedtime.     metFORMIN (GLUCOPHAGE-XR) 750 MG 24 hr tablet Take 750 mg by mouth 2 (two) times a day with meals.      metoprolol succinate (TOPROL-XL) 25 MG Take 25 mg by mouth daily.      mirtazapine (REMERON) 15 MG tablet Take 15 mg by mouth daily.     multivit with min-folic acid 0.4 mg Tab Take 1 tablet by mouth daily.     nitroglycerin (NITROSTAT) 0.4 MG SL tablet Place  "0.4 mg under the tongue every 5 (five) minutes as needed for chest pain.     polyethylene glycol (MIRALAX) 17 gram packet Take 17 g by mouth daily as needed.      rosuvastatin (CRESTOR) 20 MG tablet Take 20 mg by mouth at bedtime.     traZODone (DESYREL) 150 MG tablet Take 150 mg by mouth at bedtime.      ALLERGIES:   Allergies   Allergen Reactions     Propranolol Anaphylaxis     Slowed HR     Atorvastatin      Hand neuropathy     Prochlorperazine Edisylate Rash     nightmares     DIET: 2 g sodium restriction, consistent carbohydrates, regular texture, thin liquids.    Vitals:    11/06/20 0959   BP: 138/80   Pulse: 81   Resp: 17   Temp: 97.3  F (36.3  C)   SpO2: 98%   Weight: 161 lb 3.2 oz (73.1 kg)   Height: 5' 3\" (1.6 m)   She is down 11.6 pounds since her last stay.  Body mass index is 28.56 kg/m .    EXAMINATION:   General: Fairly pleasant middle-aged female, sitting comfortably, in no apparent distress.   Head: Normocephalic and atraumatic.  External visual signs of her facial fractures are not in evidence.  Eyes: PERRLA, sclerae clear.   ENT: Moist oral mucosa.  She is edentulous with full upper and lower dentures.  No rhinorrhea or nasal discharge.  She has complete hearing loss in the right ear and chronic tinnitus there.  Cardiovascular: Regular rate and rhythm with a short 2/6 systolic ejection murmur at the left sternal border.   Respiratory: Lung sounds are coarse in the bases, otherwise clear.  Abdomen: Soft and nontender.   Musculoskeletal/Extremities: Trace swelling in the ankles.  Integument: Seborrheic dermatitis of the forehead.  We ordered hydrocortisone cream.  Cognitive/Psychiatric: Appearing alert and oriented x3, although she scored 16/30 on the SLUMS during an earlier stay and 15/30 more recently.  Affect is rather flat, and she does endorse depression.    DIAGNOSTICS:   Results for orders placed or performed during the hospital encounter of 10/23/20   Basic Metabolic Panel   Result Value Ref " Range    Sodium 136 136 - 145 mmol/L    Potassium 4.7 3.5 - 5.0 mmol/L    Chloride 100 98 - 107 mmol/L    CO2 28 22 - 31 mmol/L    Anion Gap, Calculation 8 5 - 18 mmol/L    Glucose 196 (H) 70 - 125 mg/dL    Calcium 8.9 8.5 - 10.5 mg/dL    BUN 18 8 - 22 mg/dL    Creatinine 1.13 (H) 0.60 - 1.10 mg/dL    GFR MDRD Af Amer 58 (L) >60 mL/min/1.73m2    GFR MDRD Non Af Amer 48 (L) >60 mL/min/1.73m2     Lab Results   Component Value Date    WBC 6.8 10/23/2020    HGB 10.0 (L) 10/23/2020    HCT 31.2 (L) 10/23/2020    MCV 88 10/23/2020     10/23/2020     CrCl cannot be calculated (Patient's most recent lab result is older than the maximum 10 days allowed.).  Lab Results   Component Value Date    HGBA1C 7.6 (H) 08/21/2020       ASSESSMENT/Plan:      ICD-10-CM    1. Frequent falls  R29.6    2. Alcohol use disorder, severe, in early remission (H)  F10.21    3. Diabetic peripheral neuropathy (H)  E11.42    4. Diabetic nephropathy associated with type 2 diabetes mellitus (H)  E11.21    5. Type 2 diabetes mellitus with hyperglycemia, with long-term current use of insulin (H)  E11.65     Z79.4    6. Closed blow-out fracture of right orbit, sequela (H)  S02.31XS    7. Episode of recurrent major depressive disorder, unspecified depression episode severity (H)  F33.9    8. Pulmonary emphysema, unspecified emphysema type (H)  J43.9    9. Sensorineural hearing loss (SNHL) of right ear, unspecified hearing status on contralateral side  H90.5    10. Gait disturbance  R26.9    11. Chronic right shoulder pain  M25.511     G89.29    12. Stage 3a chronic kidney disease  N18.31    13. Compression fracture of C3 vertebra, sequela  S12.290S    14. Hypertension due to endocrine disorder  I15.2    15. Legal blindness  H54.8    16. Coronary artery disease : previous stents without angina pectoris  I25.10    17. Anemia of chronic disease  D63.8    18. Trigeminal neuralgia  G50.0    19. Seborrheic dermatitis  L21.9      DISCHARGE PLAN/FACE TO  FACE:  I certify that this patient is under my care and that I had a face-to-face encounter that meets the physician face-to-face encounter requirements with this patient.     Date of Face-to-Face Encounter: 11/06/2020     I certify that, based on my findings, the following services are medically necessary home health services: Home health aide and home PT    My clinical findings support the need for the above skilled services because: (Please write a brief narrative summary that describes what the RN, PT, SLP, or other services will be doing in the home. A list of diagnoses in this section does not meet the CMS requirements): Home health aide to assist with ADLs around her house with some light housekeeping that she is unable to perform on her own.  Home PT to continue therapy in the home setting.    This patient is homebound because: (Please write a brief narrative summmary describing the functional limitations as to why this patient is homebound and specifically what makes this patient homebound.):  Above services necessarily need to be performed in the home to be of benefit.    The patient is, or has been, under my care and I have initiated the establishment of the plan of care. This patient will be followed by a physician who will periodically review the plan of care.  Initial follow-up should be within 7-10 days.    Approximate time spent with this patient was greater than 30 minutes with greater than 50% spent in discussions regarding services required upon discharge.      The above has been created using voice recognition software. Please be aware that this may unintentionally  produce inaccuracies and/or nonsensical sentences.      Electronically signed by: Suraj Robledo CNP

## 2021-06-12 NOTE — PROGRESS NOTES
[facility]    Name:   Ruddy Mendoza  : 1955  Facility:   [facility] [[id number]]   Room:   Code Status: DNI and POLST AVAILABLE - Limited treatment (CPR and meds okay)  Fac type:   SNF (Skilled Nursing Facility, TCU) -     PCP:  Ron Mandujano    CHIEF COMPLAINT / REASON FOR VISIT:  Chief Complaint   Patient presents with     Follow-up     Multiple falls with head injuries.      [facility] from 2019 until 2019 (right facial numbness, severe dizziness, right upper extremity ataxia, swallowing and word finding difficulties)  [facility] from 2019 until 2019  [facility] from 2020 until 2020 (right proximal humerus fracture and hyperglycemia with SOPHIA)   [facility] from 2020 until 2020 (facial fractures and pulmonary embolism)  [facility] from 2020 until 2020 (expected discharge date)  [facility] from 2020 until 2020 (C3 vertebral fracture)      HPI: Ruddy is a 65 y.o. female with an extensive history of problems and hospitalizations related to alcohol use disorder and diabetes mellitus.     RECENT HISTORY    She has a past medical history of recurrent UTIs and pyelonephritis, diabetes mellitus type 2 (poorly controlled - A1c 16.4 in 10/2017 and 9.4 in 2020 - with multiple related complications, including peripheral neuropathy, nephropathy, and retinopathy), endocrine induced hypertension, coronary artery disease (status post stenting x2), COPD (former smoker), and alcoholism who has been here multiple times in the past, involving stroke-like symptoms and right proximal humerus fracture along with hyperglycemia and SOPHIA.    Based on EMS reports prior to an earlier hospitalization, she appeared to live in a hoarder home.  She was found on the the floor of her bedroom with a hole in the wall, and the patient later reported old broken  shelving falling, although in the ED she could not remember what occurred.  She did note drinking multiple drinks of straight vodka that night, often doing this intermittently, and did not want her children to know.  There has been concern for alcoholism and possibly Warnicke's encephalopathy.     She was admitted on 07/2428 after presenting to the ED for evaluation of recurrent falls (x4).  She had been seen at [facility] ED on 06/27/2024 fall and was discharged home with nitrofurantoin for urinary tract infection, along with home health care.  There was no loss of consciousness, dizziness, vision changes, or room spinning sensation reported.  She expressed uncertainty as to why she kept falling, but she did report difficulty ambulating at home and getting up after her fall.  Her first fall happened while getting off the toilet, the second in the shower where she hit her head, the third while getting out of her chair, and the last while walking home from the store.  Paramedics were called to her house multiple times.      In the ED, she was tachycardic with a pulse of 103.  Labs were notable for a hemoglobin of 10.3, hematocrit 31.9, and calcium 8.2.  CT of the head showed partial visualized air-fluid level in the left maxillary sinus with hyperdense material new from 06/08/2020 (possibly representing layering hemorrhage from an occult facial fracture).  A CT of the chest showed acute PE in the left lower lobe and mild emphysema.  ECG showed NSR.  She received IVF and admitted for further management.     A maxillofacial CT confirmed mildly displaced fractures of the left inferior orbital rim and anterior and posterior lateral left maxillary sinus walls.  This injury occurred when going to the bathroom in a dark house walking into the the door.  She is legally blind.  A CT of the chest was positive for small burden PE.  Lower extremity venous Dopplers were positive for DVT involving the right calf, although  she never had pain there.  She was started on enoxaparin and transitioned to Eliquis 10 mg twice daily for 1 week, followed by 5 mg twice daily thereafter.  An echocardiogram was performed that revealed no evidence for right heart strain.  ENT was consulted with respect to the facial fractures, and no surgical intervention was indicated.      Subsequent hospitalizations followed this, including in August 2020 stay secondary to a C3 fracture.     MOST RECENT HOSPITALIZATION    She presented once again to the ED on 10/23/2020 with dizziness and a fall.  She stated she was not intoxicated at the time of the most recent fall and has been sober for 9 weeks prior to admission.  Differential diagnoses included TIA/CVA, intoxication/withdrawal, UTI, seizures/postictal, arrhythmia, vertigo, electrolyte abnormality, mood medications, thyroid abnormalities.  Neurology was consulted.  She does have a history of hypothyroidism, but her TSH was normal.  Electrolytes were also normal.  B12 was elevated.  Carbamazepine levels were unremarkable.  Some of her sedating medications were held with no difference in symptoms.  Echo showed EF of 55 to 60%.  MRI was negative for CVA, and EEG negative for stroke.  She was seen by PT/OT who recommended TCU upon discharge.  Also started on meclizine and vestibular therapy.  Recommendation for outpatient follow-up with Dr. Vazquez (neurology) if gait continues to be unstable.    A right sided orbital blowout fracture was noted.  Patient to follow-up with OMFS.    For her alcoholism, she is not on naltrexone but does receive acamprosate, multivitamin/folate/thiamine.    Diabetes mellitus type 2: She is a diabetic with a recent A1c of 7.6%.  She stated that she has been taking insulin; however, her regimen was unclear.  Lantus dose was listed as 38 units but not listed upon recent hospital discharge summary.  She does take sliding scale NovoLog 3 times per day.  Blood glucose levels ranged in the  "100s to 200s during hospitalization with sliding scale correction.  Her Lantus was held with the need for outpatient follow-up.  She continues to receive sliding scale insulin, Invokana, metformin, and glimepiride.    Normocytic anemia: Hemoglobin 10 on admission.  Receiving multivitamin and iron.    Other chronic issues:   Mood disorder: PTA amitriptyline, buspirone, carbamazepine, mirtazapine, and trazodone.  Hyperlipidemia: PTA statin.  COPD: PTA albuterol and Symbicort inhalers.  Hypothyroidism: PTA levothyroxine.  Recent DVT/PE: PTA Eliquis.  Hypertension: PTA metoprolol, lisinopril.      TCU ISSUES    Disposition: She is still looking for assisted living.  While at home, she has been looking for housekeeping.  Her son, Moses, is assisting her.  As noted in the HPI, she appears to be a hoarder.  She does feel that her acamprosate (for EtOH dependence maintenance) has been effective.    Gait instability/frequent falls: She has described feeling \"real jerky.\"  During 1 fall, she stated that her body simply \"started to go backward.\"  Observed with physical therapy, she is shaky getting up but ambulating rapidly with a good gait.  She has to sit a while before standing.  She has described her falling as being \"out of control.\"  She does have tremors in her hands and positional dizziness, especially when turning a corner.    Depression and anxiety:  Major depressive disorder (with anxiety) has been ongoing for some time.  She most likely was self-medicating with alcohol.  She has described her depression as \"really bad right now.\"  As far as I can tell, medications have not been changed since her last stay.  She will be seen by psych, something she is requesting.    Weight loss: She is down 13.4 pounds since her last stay.    Cognition:  During an earlier stay, there was some concern of cognitive impairment/encephalopathy.  During her stay here in September 2019, she did have some word finding difficulty but " "otherwise scored well in cognitive testing.  When last here, according to Occupational Therapy, she scored only 16/30 on the SLUMS, indicating moderate cognitive deficits.  SLP did work with her as well on cognition and word finding difficulty during that stay.  Currently, Occupational Therapy reports a SLUMS score of 15/30, indicating cognitive impairment.  She has been seen by SLP in the past for word finding difficulty, and we will utilize again for cognitive assessments.    Alcoholism: Patient tells me that she had been sober for 10 years before resuming.    Diabetes mellitus type 2: Insufficient data yet for managing her diabetes.  Recent blood glucose levels range between 88 and 262.  Most are <200.    Right shoulder pain: She did have a fractured right proximal humerus in March 2020.  Pain is chronic.  Today, she describes the pain as \"probably an 8.\"  She tells me she needs right shoulder surgery to replace hardware.    Hypertension: Wide fluctuation in blood pressures.    COPD: Has inhalers.  Endorses dyspnea on exertion.    Diabetic polyneuropathy: She tells me she cannot feel the bottom of her feet.  There is tingling in the hands and feet, though worse in the feet.    Legal blindness: She has mentioned \"bleeding near the optic nerve.\"    Trigeminal neuralgia: Taking carbamazepine for this, and it seems to be under control.  She has described the discomfort as \"phantom earaches.\"     Seborrheic dermatitis: We did order hydrocortisone cream.      ROS: As noted, she endorses chronic dizziness.  She also mentions tremors/shake.  Differential diagnosis for this is too extensive and is most likely multifactorial.  No  chest pains, coughing or congestion, dysuria, integumentary issues.  No bowel issues.  Sleep has always been a problem.  Often, she cannot sleep because of right shoulder pain.    Past Medical History:   Diagnosis Date     Acute encephalopathy 09/23/2017     Acute hypokalemia 09/23/2017     Acute " "hyponatremia 09/23/2017     Acute pulmonary embolism (H)      Acute pyelonephritis 09/23/2017     SOPHIA (acute kidney injury) (H)      Anxiety      C3 cervical fracture (H)      CAD (coronary artery disease)      Closed fracture of facial bone (H)      Closed fracture of right humerus      COPD (chronic obstructive pulmonary disease) (H)      Depression      Diabetes mellitus (H) 09/23/2017     Diabetic peripheral neuropathy (H) 9/4/2019     History of cervical cancer      HLD (hyperlipidemia)      Hypertension      Hypothyroidism      Insomnia      Legal blindness 5/28/2014    Diabetic retinopathy, macular edema and degeneration. S/p eye injection. Not surgical candidate d/t proximity to optic nerve.     Normocytic anemia      Peripheral neuropathy      Seborrheic dermatitis 11/4/2020     Sensorineural hearing loss (SNHL) of right ear with tinnitus 9/4/2019     Sepsis (H) 09/23/2017     TIA (transient ischemic attack)      Trigeminal neuralgia               Family History   Problem Relation Age of Onset     COPD Mother      COPD Father      Lung cancer Father      Social History     Socioeconomic History     Marital status: Single     Spouse name: Not on file     Number of children: Not on file     Years of education: Not on file     Highest education level: Not on file   Occupational History     Not on file   Social Needs     Financial resource strain: Not on file     Food insecurity     Worry: Not on file     Inability: Not on file     Transportation needs     Medical: Not on file     Non-medical: Not on file   Tobacco Use     Smoking status: Former Smoker     Packs/day: 2.00     Years: 30.00     Pack years: 60.00     Types: Cigarettes     Smokeless tobacco: Never Used     Tobacco comment: quit at age 42   Substance and Sexual Activity     Alcohol use: Not Currently     Comment: Reports is an \"alcoholic\"      Drug use: No     Sexual activity: Not Currently     Partners: Male   Lifestyle     Physical activity     " Days per week: Not on file     Minutes per session: Not on file     Stress: Not on file   Relationships     Social connections     Talks on phone: Not on file     Gets together: Not on file     Attends Hinduism service: Not on file     Active member of club or organization: Not on file     Attends meetings of clubs or organizations: Not on file     Relationship status: Not on file     Intimate partner violence     Fear of current or ex partner: Not on file     Emotionally abused: Not on file     Physically abused: Not on file     Forced sexual activity: Not on file   Other Topics Concern     Not on file   Social History Narrative    Lives at home alone with her catKailyn       MEDICATIONS: Reviewed from the MAR, physician orders, and/or earlier progress notes.    Post Discharge Medication Reconciliation Status: medication reconciliation previously completed during another office visit.      Current Outpatient Medications   Medication Sig     acamprosate (CAMPRAL) 333 mg tablet Take 2 tablets (666 mg total) by mouth 3 (three) times a day.     acetaminophen (TYLENOL) 500 MG tablet Take 1,000 mg by mouth 3 (three) times a day.      albuterol (PROAIR HFA;PROVENTIL HFA;VENTOLIN HFA) 90 mcg/actuation inhaler Inhale 2 puffs 4 (four) times a day as needed for wheezing.      amitriptyline (ELAVIL) 150 MG tablet Take 150 mg by mouth at bedtime.     apixaban ANTICOAGULANT (ELIQUIS) 2.5 mg Tab tablet Take 2.5 mg by mouth 2 (two) times a day.      aspirin 81 mg chewable tablet Chew 81 mg daily.      budesonide-formoteroL (SYMBICORT) 160-4.5 mcg/actuation inhaler Inhale 2 puffs 2 (two) times a day.     busPIRone (BUSPAR) 10 MG tablet Take 20 mg by mouth at bedtime.     canagliflozin (INVOKANA) 100 mg Tab Take 100 mg by mouth daily before breakfast.     carBAMazepine (TEGRETOL) 200 mg tablet Take 400 mg by mouth 2 (two) times a day.     cholecalciferol, vitamin D3, 1,000 unit (25 mcg) tablet Take 1,000 Units by mouth daily.      ferrous sulfate 65 mg elemental iron Take 1 tablet by mouth daily with breakfast.      FLUoxetine (PROZAC) 20 MG capsule Take 20 mg by mouth daily.     glimepiride (AMARYL) 1 MG tablet Take 1 mg by mouth daily before breakfast.     hydrocortisone 1 % cream Apply topically 2 (two) times a day. Keep away from eyes.     insulin aspart U-100 (NOVOLOG) 100 unit/mL (3 mL) injection pen Inject 1-10 Units under the skin see administration instructions. Inject 1-10 units subcutaneously three times daily with meals per blood glucose. Don't give corrective dose for as needed, post-prandial, or nocturnal glucose checks unless ordered. BG <70 mg/dL see hypoglycemia protocol. Do not inject for a BG of  mg/dL unless ordered. Starting at blood glucose of 150 mg/dL inject 1-5 units for every increase of 50 mg/dL. Inject 6 units for BG > 400 mg/dL and call MD.     levothyroxine (SYNTHROID, LEVOTHROID) 50 MCG tablet Take 50 mcg by mouth daily.     lisinopril (PRINIVIL,ZESTRIL) 10 MG tablet Take 10 mg by mouth daily.     melatonin 10 mg Tab Take 10 mg by mouth at bedtime.     metFORMIN (GLUCOPHAGE-XR) 750 MG 24 hr tablet Take 750 mg by mouth 2 (two) times a day with meals.      metoprolol succinate (TOPROL-XL) 25 MG Take 25 mg by mouth daily.      mirtazapine (REMERON) 15 MG tablet Take 15 mg by mouth daily.     multivit with min-folic acid 0.4 mg Tab Take 1 tablet by mouth daily.     nitroglycerin (NITROSTAT) 0.4 MG SL tablet Place 0.4 mg under the tongue every 5 (five) minutes as needed for chest pain.     polyethylene glycol (MIRALAX) 17 gram packet Take 17 g by mouth daily as needed.      rosuvastatin (CRESTOR) 20 MG tablet Take 20 mg by mouth at bedtime.     traZODone (DESYREL) 150 MG tablet Take 150 mg by mouth at bedtime.      ALLERGIES:   Allergies   Allergen Reactions     Propranolol Anaphylaxis     Slowed HR     Atorvastatin      Hand neuropathy     Prochlorperazine Edisylate Rash     nightmares     DIET: 2 g sodium  "restriction, consistent carbohydrates, regular texture, thin liquids.    Vitals:    10/30/20 1833   BP: 153/82   Pulse: 77   Resp: 20   Temp: 97.5  F (36.4  C)   SpO2: 97%   Weight: 161 lb 3.2 oz (73.1 kg)   Height: 5' 3\" (1.6 m)   She is down 11.6 pounds since her last stay.  Body mass index is 28.56 kg/m .    EXAMINATION:   General: Fairly pleasant middle-aged female, sitting comfortably, in no apparent distress.  She was also observed ambulating with physical therapy using a rolling walker.  Head: Normocephalic and atraumatic.  External visual signs of her facial fractures are not in evidence.  Eyes: PERRLA, sclerae clear.   ENT: Moist oral mucosa.  She is edentulous with full upper and lower dentures.  No rhinorrhea or nasal discharge.  She has complete hearing loss in the right ear and chronic tinnitus there.  Cardiovascular: Regular rate and rhythm with a short 2/6 systolic ejection murmur at the left sternal border.   Respiratory: Lung sounds are coarse in the bases, otherwise clear.  Abdomen: Soft and nontender.   Musculoskeletal/Extremities: Trace swelling in the ankles.  Integument: Seborrheic dermatitis of the forehead.  We ordered hydrocortisone cream.  Cognitive/Psychiatric: Appearing alert and oriented x3, although she scored 16/30 on the SLUMS during an earlier stay and 15/30 more recently.  Affect is rather flat, and she does endorse depression.    DIAGNOSTICS:   Results for orders placed or performed during the hospital encounter of 10/23/20   Basic Metabolic Panel   Result Value Ref Range    Sodium 136 136 - 145 mmol/L    Potassium 4.7 3.5 - 5.0 mmol/L    Chloride 100 98 - 107 mmol/L    CO2 28 22 - 31 mmol/L    Anion Gap, Calculation 8 5 - 18 mmol/L    Glucose 196 (H) 70 - 125 mg/dL    Calcium 8.9 8.5 - 10.5 mg/dL    BUN 18 8 - 22 mg/dL    Creatinine 1.13 (H) 0.60 - 1.10 mg/dL    GFR MDRD Af Amer 58 (L) >60 mL/min/1.73m2    GFR MDRD Non Af Amer 48 (L) >60 mL/min/1.73m2     Lab Results   Component " Value Date    WBC 6.8 10/23/2020    HGB 10.0 (L) 10/23/2020    HCT 31.2 (L) 10/23/2020    MCV 88 10/23/2020     10/23/2020     Estimated Creatinine Clearance: 46.9 mL/min (A) (by C-G formula based on SCr of 1.13 mg/dL (H)).  Lab Results   Component Value Date    HGBA1C 7.6 (H) 08/21/2020       ASSESSMENT/Plan:      ICD-10-CM    1. Frequent falls  R29.6    2. Alcohol use disorder, severe, in early remission (H)  F10.21    3. Diabetic peripheral neuropathy (H)  E11.42    4. Diabetic nephropathy associated with type 2 diabetes mellitus (H)  E11.21    5. Closed blow-out fracture of right orbit, sequela (H)  S02.31XS    6. Episode of recurrent major depressive disorder, unspecified depression episode severity (H)  F33.9    7. Pulmonary emphysema, unspecified emphysema type (H)  J43.9    8. Sensorineural hearing loss (SNHL) of right ear, unspecified hearing status on contralateral side  H90.5    9. Gait disturbance  R26.9    10. Chronic right shoulder pain  M25.511     G89.29    11. Stage 3a chronic kidney disease  N18.31    12. Compression fracture of C3 vertebra, sequela  S12.290S    13. Hypertension due to endocrine disorder  I15.2    14. Legal blindness  H54.8    15. Coronary artery disease : previous stents without angina pectoris  I25.10    16. Anemia of chronic disease  D63.8    17. Trigeminal neuralgia  G50.0    18. Seborrheic dermatitis  L21.9      CHANGES:  None.    CARE PLAN:  The care plan has been reviewed and all orders signed.  Changes to care plan, if any, as noted.  Otherwise, continue current plan of care.      The above has been created using voice recognition software. Please be aware that this may unintentionally  produce inaccuracies and/or nonsensical sentences.      Electronically signed by: Suraj Robledo CNP

## 2021-06-12 NOTE — PROGRESS NOTES
[facility]    Name:   Ruddy Mendoza  : 1955  Facility:   [facility] [[id number]]   Room:   Code Status: DNI and POLST AVAILABLE - Limited treatment (CPR and meds okay)  Fac type:   SNF (Skilled Nursing Facility, TCU) -     PCP:  Ron Mandujano    CHIEF COMPLAINT / REASON FOR VISIT:  Chief Complaint   Patient presents with     H & P     Multiple falls with head injuries.      [facility] from 2019 until 2019 (right facial numbness, severe dizziness, right upper extremity ataxia, swallowing and word finding difficulties)  [facility] from 2019 until 2019  [facility] from 2020 until 2020 (right proximal humerus fracture and hyperglycemia with SOPHIA)   [facility] from 2020 until 2020 (facial fractures and pulmonary embolism)  [facility] from 2020 until 2020 (expected discharge date)  [facility] from 2020 until 2020 (C3 vertebral fracture)      HPI: Ruddy is a 65 y.o. female with an extensive history of problems and hospitalizations related to alcohol use disorder and diabetes mellitus.     RECENT HISTORY    She has a past medical history of recurrent UTIs and pyelonephritis, diabetes mellitus type 2 (poorly controlled - A1c 16.4 in 10/2017 and 9.4 in 2020 - with multiple related complications, including peripheral neuropathy, nephropathy, and retinopathy), endocrine induced hypertension, coronary artery disease (status post stenting x2), COPD (former smoker), and alcoholism who has been here multiple times in the past, involving stroke-like symptoms and right proximal humerus fracture along with hyperglycemia and SOPHIA.    Based on EMS reports prior to an earlier hospitalization, she appeared to live in a hoarder home.  She was found on the the floor of her bedroom with a hole in the wall, and the patient later reported old broken shelving  falling, although in the ED she could not remember what occurred.  She did note drinking multiple drinks of straight vodka that night, often doing this intermittently, and did not want her children to know.  There has been concern for alcoholism and possibly Warnicke's encephalopathy.     She was admitted on 07/2428 after presenting to the ED for evaluation of recurrent falls (x4).  She had been seen at [facility] ED on 06/27/2024 fall and was discharged home with nitrofurantoin for urinary tract infection, along with home health care.  There was no loss of consciousness, dizziness, vision changes, or room spinning sensation reported.  She expressed uncertainty as to why she kept falling, but she did report difficulty ambulating at home and getting up after her fall.  Her first fall happened while getting off the toilet, the second in the shower where she hit her head, the third while getting out of her chair, and the last while walking home from the store.  Paramedics were called to her house multiple times.      In the ED, she was tachycardic with a pulse of 103.  Labs were notable for a hemoglobin of 10.3, hematocrit 31.9, and calcium 8.2.  CT of the head showed partial visualized air-fluid level in the left maxillary sinus with hyperdense material new from 06/08/2020 (possibly representing layering hemorrhage from an occult facial fracture).  A CT of the chest showed acute PE in the left lower lobe and mild emphysema.  ECG showed NSR.  She received IVF and admitted for further management.     A maxillofacial CT confirmed mildly displaced fractures of the left inferior orbital rim and anterior and posterior lateral left maxillary sinus walls.  This injury occurred when going to the bathroom in a dark house walking into the the door.  She is legally blind.  A CT of the chest was positive for small burden PE.  Lower extremity venous Dopplers were positive for DVT involving the right calf, although she never  had pain there.  She was started on enoxaparin and transitioned to Eliquis 10 mg twice daily for 1 week, followed by 5 mg twice daily thereafter.  An echocardiogram was performed that revealed no evidence for right heart strain.  ENT was consulted with respect to the facial fractures, and no surgical intervention was indicated.      Subsequent hospitalizations followed this, including in August 2020 stay secondary to a C3 fracture.     MOST RECENT HOSPITALIZATION    She presented once again to the ED on 10/23/2020 with dizziness and a fall.  She stated she was not intoxicated at the time of the most recent fall and has been sober for 9 weeks prior to admission.  Differential diagnoses included TIA/CVA, intoxication/withdrawal, UTI, seizures/postictal, arrhythmia, vertigo, electrolyte abnormality, mood medications, thyroid abnormalities.  Neurology was consulted.  She does have a history of hypothyroidism, but her TSH was normal.  Electrolytes were also normal.  B12 was elevated.  Carbamazepine levels were unremarkable.  Some of her sedating medications were held with no difference in symptoms.  Echo showed EF of 55 to 60%.  MRI was negative for CVA, and EEG negative for stroke.  She was seen by PT/OT who recommended TCU upon discharge.  Also started on meclizine and vestibular therapy.  Recommendation for outpatient follow-up with Dr. Vazquez (neurology) if gait continues to be unstable.    A right sided orbital blowout fracture was noted.  Patient to follow-up with OMFS.    For her alcoholism, she is not on naltrexone but does receive acamprosate, multivitamin/folate/thiamine.    Diabetes mellitus type 2: She is a diabetic with a recent A1c of 7.6%.  She stated that she has been taking insulin; however, her regimen was unclear.  Lantus dose was listed as 38 units but not listed upon recent hospital discharge summary.  She does take sliding scale NovoLog 3 times per day.  Blood glucose levels ranged in the 100s to 200s  "during hospitalization with sliding scale correction.  Her Lantus was held with the need for outpatient follow-up.  She continues to receive sliding scale insulin, Invokana, metformin, and glimepiride.    Normocytic anemia: Hemoglobin 10 on admission.  Receiving multivitamin and iron.    Other chronic issues:   Mood disorder: PTA amitriptyline, buspirone, carbamazepine, mirtazapine, and trazodone.  Hyperlipidemia: PTA statin.  COPD: PTA albuterol and Symbicort inhalers.  Hypothyroidism: PTA levothyroxine.  Recent DVT/PE: PTA Eliquis.  Hypertension: PTA metoprolol, lisinopril.      TCU ISSUES    Disposition: She is still looking for assisted living.  While at home, she tells me she is looking for housekeeping.  As noted in the HPI, she appears to be a hoarder.  She does feel that her acamprosate (for EtOH dependence maintenance) has been effective.    Fall: She says she is \"real jerky,\" and she did fall again last night here, stating that her body \"started to go backward.\"    Depression and anxiety:  Major depressive disorder (with anxiety) has been ongoing for some time.  She most likely was self-medicating with alcohol.  She describes her depression as \"really bad right now.\"  As far as I can tell, medications have not been changed since her last stay.    Weight loss: She is down 13.4 pounds since her last stay.    Cognition:  During an earlier stay, there was some concern of cognitive impairment/encephalopathy.  During her stay here in September 2019, she did have some word finding difficulty but otherwise scored well in cognitive testing.  When last here, according to Occupational Therapy, she scored only 16/30 on the SLUMS, indicating moderate cognitive deficits.  SLP did work with her as well on cognition and word finding difficulty during that stay.  Currently, Occupational Therapy reports a SLUMS score of 15/30, indicating cognitive impairment.  She has been seen by SLP in the past for word finding " "difficulty, and we will utilize again for cognitive assessments.    Alcoholism: Patient tells me that she had been sober for 10 years before resuming.    Diabetes mellitus type 2: Insufficient data yet for managing her diabetes.  Recent blood glucose levels over the last few days run anywhere between 79 and 450.    Right shoulder pain: She did have a fractured right proximal humerus in March 2020.  Pain is chronic.  Today, she describes the pain as \"probably an 8.\"  She tells me she needs right shoulder surgery to replace hardware.    Hypertension: Currently well controlled.    COPD: Has inhalers.  Endorses dyspnea on exertion.    Diabetic polyneuropathy: Tingling in the hands and feet, though worse in the feet.    Trigeminal neuralgia: Taking carbamazepine for this, and it seems to be under control.  She has described the discomfort as \"phantom earaches.\"       ROS: As noted, she endorses dizziness.  She also mentions tremors/shake.  Differential diagnosis for this is too extensive.  No  chest pains, coughing or congestion, dysuria, integumentary issues.  No bowel issues.  Sleep has always been a problem.  She tells me she is not sleeping because of her shoulder pain.    Past Medical History:   Diagnosis Date     Acute encephalopathy 09/23/2017     Acute hypokalemia 09/23/2017     Acute hyponatremia 09/23/2017     Acute pulmonary embolism (H)      Acute pyelonephritis 09/23/2017     SOPHIA (acute kidney injury) (H)      Anxiety      C3 cervical fracture (H)      CAD (coronary artery disease)      Closed fracture of facial bone (H)      Closed fracture of right humerus      COPD (chronic obstructive pulmonary disease) (H)      Depression      Diabetes mellitus (H) 09/23/2017     Diabetic peripheral neuropathy (H) 9/4/2019     History of cervical cancer      HLD (hyperlipidemia)      Hypertension      Hypothyroidism      Insomnia      Legal blindness 5/28/2014    Diabetic retinopathy, macular edema and degeneration. S/p " "eye injection. Not surgical candidate d/t proximity to optic nerve.     Normocytic anemia      Peripheral neuropathy      Sensorineural hearing loss (SNHL) of right ear with tinnitus 9/4/2019     Sepsis (H) 09/23/2017     TIA (transient ischemic attack)      Trigeminal neuralgia               Family History   Problem Relation Age of Onset     COPD Mother      COPD Father      Lung cancer Father      Social History     Socioeconomic History     Marital status: Single     Spouse name: Not on file     Number of children: Not on file     Years of education: Not on file     Highest education level: Not on file   Occupational History     Not on file   Social Needs     Financial resource strain: Not on file     Food insecurity     Worry: Not on file     Inability: Not on file     Transportation needs     Medical: Not on file     Non-medical: Not on file   Tobacco Use     Smoking status: Former Smoker     Packs/day: 2.00     Years: 30.00     Pack years: 60.00     Types: Cigarettes     Smokeless tobacco: Never Used     Tobacco comment: quit at age 42   Substance and Sexual Activity     Alcohol use: Not Currently     Comment: Reports is an \"alcoholic\"      Drug use: No     Sexual activity: Not Currently     Partners: Male   Lifestyle     Physical activity     Days per week: Not on file     Minutes per session: Not on file     Stress: Not on file   Relationships     Social connections     Talks on phone: Not on file     Gets together: Not on file     Attends Restoration service: Not on file     Active member of club or organization: Not on file     Attends meetings of clubs or organizations: Not on file     Relationship status: Not on file     Intimate partner violence     Fear of current or ex partner: Not on file     Emotionally abused: Not on file     Physically abused: Not on file     Forced sexual activity: Not on file   Other Topics Concern     Not on file   Social History Narrative    Lives at home alone with her cat, " Kailyn       MEDICATIONS: Reviewed from the MAR, physician orders, and/or earlier progress notes.    Post Discharge Medication Reconciliation Status: discharge medications reconciled and changed, per note/orders.    Updated by me today (10/27/2020) with the addition of hydrocortisone 1% cream reflected below.  Current Outpatient Medications   Medication Sig     hydrocortisone 1 % cream Apply topically 2 (two) times a day. Keep away from eyes.     acamprosate (CAMPRAL) 333 mg tablet Take 2 tablets (666 mg total) by mouth 3 (three) times a day.     acetaminophen (TYLENOL) 500 MG tablet Take 1,000 mg by mouth 3 (three) times a day.      albuterol (PROAIR HFA;PROVENTIL HFA;VENTOLIN HFA) 90 mcg/actuation inhaler Inhale 2 puffs 4 (four) times a day as needed for wheezing.      amitriptyline (ELAVIL) 150 MG tablet Take 150 mg by mouth at bedtime.     apixaban ANTICOAGULANT (ELIQUIS) 2.5 mg Tab tablet Take 2.5 mg by mouth 2 (two) times a day.      aspirin 81 mg chewable tablet Chew 81 mg daily.      budesonide-formoteroL (SYMBICORT) 160-4.5 mcg/actuation inhaler Inhale 2 puffs 2 (two) times a day.     busPIRone (BUSPAR) 10 MG tablet Take 20 mg by mouth at bedtime.     canagliflozin (INVOKANA) 100 mg Tab Take 100 mg by mouth daily before breakfast.     carBAMazepine (TEGRETOL) 200 mg tablet Take 400 mg by mouth 2 (two) times a day.     cholecalciferol, vitamin D3, 1,000 unit (25 mcg) tablet Take 1,000 Units by mouth daily.     ferrous sulfate 65 mg elemental iron Take 1 tablet by mouth daily with breakfast.      FLUoxetine (PROZAC) 20 MG capsule Take 20 mg by mouth daily.     glimepiride (AMARYL) 1 MG tablet Take 1 mg by mouth daily before breakfast.     insulin aspart U-100 (NOVOLOG) 100 unit/mL (3 mL) injection pen Inject 1-10 Units under the skin see administration instructions. Inject 1-10 units subcutaneously three times daily with meals per blood glucose. Don't give corrective dose for as needed, post-prandial, or  "nocturnal glucose checks unless ordered. BG <70 mg/dL see hypoglycemia protocol. Do not inject for a BG of  mg/dL unless ordered. Starting at blood glucose of 150 mg/dL inject 1-5 units for every increase of 50 mg/dL. Inject 6 units for BG > 400 mg/dL and call MD.     levothyroxine (SYNTHROID, LEVOTHROID) 50 MCG tablet Take 50 mcg by mouth daily.     lisinopril (PRINIVIL,ZESTRIL) 10 MG tablet Take 10 mg by mouth daily.     melatonin 10 mg Tab Take 10 mg by mouth at bedtime.     metFORMIN (GLUCOPHAGE-XR) 750 MG 24 hr tablet Take 750 mg by mouth 2 (two) times a day with meals.      metoprolol succinate (TOPROL-XL) 25 MG Take 25 mg by mouth daily.      mirtazapine (REMERON) 15 MG tablet Take 15 mg by mouth daily.     multivit with min-folic acid 0.4 mg Tab Take 1 tablet by mouth daily.     nitroglycerin (NITROSTAT) 0.4 MG SL tablet Place 0.4 mg under the tongue every 5 (five) minutes as needed for chest pain.     polyethylene glycol (MIRALAX) 17 gram packet Take 17 g by mouth daily as needed.      rosuvastatin (CRESTOR) 20 MG tablet Take 20 mg by mouth at bedtime.     traZODone (DESYREL) 150 MG tablet Take 150 mg by mouth at bedtime.      ALLERGIES:   Allergies   Allergen Reactions     Propranolol Anaphylaxis     Slowed HR     Atorvastatin      Hand neuropathy     Prochlorperazine Edisylate Rash     nightmares     DIET: 2 g sodium restriction, consistent carbohydrates, regular texture, thin liquids.    Vitals:    10/27/20 0926   BP: 102/70   Pulse: 83   Resp: 18   Temp: 97.2  F (36.2  C)   SpO2: 97%   Weight: 161 lb 3.2 oz (73.1 kg)   Height: 5' 3\" (1.6 m)   She is down 11.6 pounds since her last stay.  Body mass index is 28.56 kg/m .    EXAMINATION:   General: Fairly pleasant middle-aged female, sitting comfortably, in no apparent distress.  Head: Normocephalic and atraumatic.  External visual signs of her facial fractures are not in evidence.  Eyes: PERRLA, sclerae clear.   ENT: Moist oral mucosa.  She is " edentulous with full upper and lower dentures.  No rhinorrhea or nasal discharge.  She has complete hearing loss in the right ear and chronic tinnitus there.  Cardiovascular: Regular rate and rhythm with a short 2/6 systolic ejection murmur at the left sternal border.   Respiratory: Lung sounds are coarse in the bases, otherwise clear.  Abdomen: Soft and nontender.   Musculoskeletal/Extremities: Trace swelling in the ankles.  Integument: Seborrheic dermatitis of the forehead.  We will order hydrocortisone 1% cream to be applied twice daily.  Cognitive/Psychiatric: Appearing alert and oriented x3, although she scored 16/30 on the SLUMS during an earlier stay and 15/30 more recently.  Affect is rather flat, and she does endorse depression.    DIAGNOSTICS:   Results for orders placed or performed during the hospital encounter of 10/23/20   Basic Metabolic Panel   Result Value Ref Range    Sodium 136 136 - 145 mmol/L    Potassium 4.7 3.5 - 5.0 mmol/L    Chloride 100 98 - 107 mmol/L    CO2 28 22 - 31 mmol/L    Anion Gap, Calculation 8 5 - 18 mmol/L    Glucose 196 (H) 70 - 125 mg/dL    Calcium 8.9 8.5 - 10.5 mg/dL    BUN 18 8 - 22 mg/dL    Creatinine 1.13 (H) 0.60 - 1.10 mg/dL    GFR MDRD Af Amer 58 (L) >60 mL/min/1.73m2    GFR MDRD Non Af Amer 48 (L) >60 mL/min/1.73m2     Lab Results   Component Value Date    WBC 6.8 10/23/2020    HGB 10.0 (L) 10/23/2020    HCT 31.2 (L) 10/23/2020    MCV 88 10/23/2020     10/23/2020     Estimated Creatinine Clearance: 46.9 mL/min (A) (by C-G formula based on SCr of 1.13 mg/dL (H)).  Lab Results   Component Value Date    HGBA1C 7.6 (H) 08/21/2020       ASSESSMENT/Plan:      ICD-10-CM    1. Frequent falls  R29.6    2. Alcohol use disorder, severe, in early remission (H)  F10.21    3. Diabetic peripheral neuropathy (H)  E11.42    4. Diabetic nephropathy associated with type 2 diabetes mellitus (H)  E11.21    5. Closed blow-out fracture of right orbit, sequela (H)  S02.31XS    6.  Episode of recurrent major depressive disorder, unspecified depression episode severity (H)  F33.9    7. Pulmonary emphysema, unspecified emphysema type (H)  J43.9    8. Sensorineural hearing loss (SNHL) of right ear, unspecified hearing status on contralateral side  H90.5    9. Gait disturbance  R26.9    10. Chronic right shoulder pain  M25.511     G89.29    11. Stage 3a chronic kidney disease  N18.31    12. Compression fracture of C3 vertebra, sequela  S12.290S    13. Hypertension due to endocrine disorder  I15.2    14. Legal blindness  H54.8    15. Coronary artery disease : previous stents without angina pectoris  I25.10    16. Anemia of chronic disease  D63.8    17. Trigeminal neuralgia  G50.0      CHANGES:  Hydrocortisone 1% cream to the forehead twice daily for seborrheic dermatitis.    CARE PLAN:  The care plan has been reviewed and all orders signed.  Changes to care plan, if any, as noted.  Otherwise, continue current plan of care.  Total time spent with this patient was approximately 40 minutes, with greater than 50% spent in counseling and coordination of care that included a review of multiple medical problems, obtaining additional data from the patient not available in the hospital discharge summary, as well as speaking with therapies regarding evidence of cognitive impairment.      The above has been created using voice recognition software. Please be aware that this may unintentionally  produce inaccuracies and/or nonsensical sentences.      Electronically signed by: Suraj Robledo CNP

## 2021-06-12 NOTE — PROGRESS NOTES
"CHART NOTE     DATE OF SERVICE:  10/15/2020     : 1955   65 y.o.     ASSESSMENT/Plan : No neck pain, no movement on flex/ex  Plan: WEan from collar, no formal follow up, rtc prn    HPI: from hospital consultation: \"Ruddy Mendoza is a 64 year old female fell at home 2020 while intoxicated. Sustained C3 anterior inferior tip fracture. Prevertebral soft tissue swelling. MRI largely motion degraded. Concerns for ALL injury as well as C3-4 interspinous ligament edema. Repeat MRI still motion degraded but ligaments appear intact. Will plan to brace patient with [place] collar at all times for 3 months. Casi to shower. Recently diagnosed with DVT, PE and now on Eliquis. Recent right arm surgery 3/19/2020 right proximal humerous fracture, details unknown but she states she has intermittent arm pain that gets quite severe which was one reason she drank heavily yesterday. She has limited ROM in the right arm, weak hand grasp. No high grade cervical stenosis on MRI. Head CT negative for hemorrhage or SDH. No neck pain on exam at rest. Pain with palpation. Mild arm pain, chronic. No numbness or tingling. No electric shocks with fall. She also does not remember all the details of her fall.\"       PAST MEDICAL HISTORY, SURGICAL HISTORY, REVIEW OF SYMPTOMS, MEDICATIONS AND ALLERGIES:  Past medical history, surgical history, ROS, medications and allergies reviewed with patient and remain unchanged from previous visit.    Past Medical History:   Diagnosis Date     Acute encephalopathy 2017     Acute hypokalemia 2017     Acute hyponatremia 2017     Acute pulmonary embolism (H)      Acute pyelonephritis 2017     SOPHIA (acute kidney injury) (H)      Anxiety      C3 cervical fracture (H)      CAD (coronary artery disease)      Closed fracture of facial bone (H)      Closed fracture of right humerus      COPD (chronic obstructive pulmonary disease) (H)      Depression      Diabetes mellitus (H) 2017 " "    Diabetic peripheral neuropathy (H) 9/4/2019     History of cervical cancer      HLD (hyperlipidemia)      Hypertension      Hypothyroidism      Insomnia      Normocytic anemia      Peripheral neuropathy      Sensorineural hearing loss (SNHL) of right ear with tinnitus 9/4/2019     Sepsis (H) 09/23/2017     TIA (transient ischemic attack)      Trigeminal neuralgia        PHYSICAL EXAM:    BP (!) 180/94 - recheck 140s/80s   Pulse 88   Resp 16   Ht 5' 3\" (1.6 m)   Wt 146 lb (66.2 kg)   SpO2 98%   BMI 25.86 kg/m      Neurological exam reveals:  Respirations easy, non-labored.   Skin: W/D/I. No rashes, lesions or breaks in integrity.   Recent and remote memory intact, fund of knowledge wnl.    Alert and oriented x3, speech fluent and appropriate.   Comprehension intact with 2 step crossover command.   Finger nose finger smooth and accurate  Face symmetric, tongue midline, Uvula midline,  palate rises with phonation   Shoulder shrug equal  Arm strength bilateral grasp, biceps, triceps, and deltoids 5/5, did not test right arm due to pain.    Muscle Bulk and tone wnl.   Gait and station: with a walker.      RADIOGRAPHIC IMAGING: XR Films personally reviewed and interpreted.    EXAM: XR CERVICAL SPINE FLEXION EXTENSION 2 - 3 VWS  LOCATION: [facility]  DATE/TIME: 10/12/2020 10:29 AM     INDICATION: F/U 4 weeks C3 chip fracture. Flex/ex and Lateral, remove collar.  COMPARISON: Plain film of 09/08/2020.     IMPRESSION:   Cervical levels seen to C5 on the lateral views. Levels below this are obscured by overlying shoulder artifact. Straightening of the normal cervical lordosis. Small avulsion fracture off the anterior inferior C3 corner is stable compared to   09/08/2020. Prevertebral soft tissues are normal in thickness. Alignment is normal otherwise and stable on flexion and extension maneuvers.       Mandy Mccarty CNP  [facility]  O: [phone]  P: [phone]    "

## 2021-06-12 NOTE — PATIENT INSTRUCTIONS - HE
WEAN COLLAR:    To wean from your cervical collar, begin by removing the collar for 1-2 hours on the first day.   You may sleep out of the collar.  Increase the time you are not wearing the collar slowly by 1-2 hours per day.  Listen to your body as you wean from the collar.  If you experience increased pain or back/neck spasms, back down on how long you are out of the collar (example: go back to how may hours you were out of the collar yesterday or do not increase your time out of the collar tomorrow).  Resume increasing your time out of the collar when ready.  When you tolerate being out of collar for eight hours, you may discontinue the collar altogether.  Everyone is different, however, you may anticipate weaning from the collar over seven to ten days.      Call with any questions or concerns. No formal follow up needed

## 2021-06-13 NOTE — PROGRESS NOTES
[facility]    Facility:   [facility] [[id number]]   Code Status: FULL CODE      CHIEF COMPLAINT/REASON FOR VISIT:  Chief Complaint   Patient presents with     Follow Up     rehab, pyelneph       HISTORY:      HPI: Ruddy is a 62 y.o. female who I had the pleasure to visit with today secondary to her hospitalization September 23 - September 26, 2017 secondary to sepsis secondary to acute pyelonephritis.  She does have multiple chronic medical conditions including diabetes hypertension CAD status post stents, COPD trigeminal neuralgia.  She has been having some on and off right abdominal flank pain also a history of multiple recent falls glucose was over 600 frequent urinations also confused and disoriented.  Her urine culture grew out E. coli which was pansensitive.    Currently is on the transitional care unit to try to improve her strength balance and conditioning.  She does feel comfortable she does feel like she has made improvement her antibiotics are completed October 10.  Hemoglobin did come back at 11 also did have a BMP see results below.  Blood sugars ranging 108-235 normotensive and afebrile.  Does have a history of chronic knee pain currently left greater than right looking at her it does appear that it is a subpatellar bursitis we talked about that including conservative treatments also we did talk about steroid injections and she had one she thinks in both knees a few years ago which were helpful.    Past Medical History:   Diagnosis Date     Acute encephalopathy 09/23/2017     Acute hypokalemia 09/23/2017     Acute hyponatremia 09/23/2017     Acute pyelonephritis 09/23/2017     SOPHIA (acute kidney injury)      Anxiety      CAD (coronary artery disease)      COPD (chronic obstructive pulmonary disease)      Depression      Diabetes mellitus 09/23/2017     Hypertension      Sepsis 09/23/2017     Trigeminal neuralgia              Family History   Problem Relation Age of  Onset     COPD Mother      Social History     Social History     Marital status: Single     Spouse name: N/A     Number of children: N/A     Years of education: N/A     Social History Main Topics     Smoking status: Former Smoker     Packs/day: 2.00     Types: Cigarettes     Smokeless tobacco: Never Used      Comment: quit at age 42     Alcohol use No     Drug use: No     Sexual activity: Not Currently     Partners: Male     Other Topics Concern     Not on file     Social History Narrative     No narrative on file         Review of Systems  Currently denies chills fever coughing wheezing chest pain dizziness vertigo flank pain flulike symptoms headache stiff neck or swollen glands.  A history of diabetes CAD COPD hypertension and depression    Current Outpatient Prescriptions   Medication Sig     acetaminophen (TYLENOL) 325 MG tablet Take 650 mg by mouth 4 (four) times a day.     albuterol (PROVENTIL HFA) 90 mcg/actuation inhaler Inhale 2 puffs every 6 (six) hours as needed for wheezing.     amitriptyline (ELAVIL) 150 MG tablet Take 150 mg by mouth at bedtime.     aspirin 325 MG tablet Take 325 mg by mouth daily.     B complex-vitamin C-folic acid 0.8 mg Tab Take 0.5 tablets by mouth daily.     beclomethasone (QVAR) 40 mcg/actuation inhaler Take 2 puffs by mouth 2 (two) times a day.     biotin 10,000 mcg TbDL Take 10,000 mcg by mouth 2 (two) times a day.     carBAMazepine, mood stabiliz, 200 mg CM12 Take 20,400 mg by mouth 2 (two) times a day.     cefuroxime (CEFTIN) 500 MG tablet Take 500 mg by mouth 2 (two) times a day. For 14 days     diphenhydrAMINE (UNISOM SLEEPGELS) 50 MG capsule Take 50 mg by mouth at bedtime as needed for itching.     ferrous sulfate 65 mg elemental iron Take 1 tablet by mouth 2 (two) times a day with meals.     insulin aspart (NOVOLOG FLEXPEN) 100 unit/mL injection pen Inject under the skin 3 (three) times a day. 3-7 units with breakfast,12-17 units at supper     insulin glargine (TOUJEO  SOLOSTAR) 300 unit/mL (1.5 mL) injection Inject 45 Units under the skin daily. Before breakfast     levothyroxine (SYNTHROID, LEVOTHROID) 50 MCG tablet Take 50 mcg by mouth daily.     lisinopril (PRINIVIL,ZESTRIL) 10 MG tablet Take 10 mg by mouth daily.     metFORMIN (GLUMETZA) 500 MG (MOD) 24 hr tablet Take 500 mg by mouth 2 (two) times a day with meals.     metoprolol succinate (TOPROL-XL) 50 MG 24 hr tablet Take 25 mg by mouth daily.      nitroglycerin (NITROSTAT) 0.4 MG SL tablet Place 0.4 mg under the tongue every 5 (five) minutes as needed for chest pain.     oxyCODONE (OXY-IR) 5 mg capsule Take 2.5 mg by mouth every 6 (six) hours as needed.     rosuvastatin (CRESTOR) 20 MG tablet Take 20 mg by mouth at bedtime.     senna-docusate (PERICOLACE) 8.6-50 mg tablet Take 2 tablets by mouth 2 (two) times a day as needed.        .There were no vitals filed for this visit.  Blood pressure 108/69 pulse 83 respirations 16 temperature 97.8  Physical Exam   Constitutional: She is oriented to person, place, and time. She appears well-nourished. No distress.   HENT:   Head: Normocephalic.   Eyes: Pupils are equal, round, and reactive to light.   Neck: Neck supple. No thyromegaly present.   Cardiovascular: Normal rate, regular rhythm and normal heart sounds.    History of CAD status post stents   Pulmonary/Chest: Breath sounds normal.   Abdominal: Bowel sounds are normal. There is no tenderness. There is no guarding.   Musculoskeletal:   Denies flank pain.  History of trigeminal neuralgia.  Improving with his strength and balance.  Left knee subpatellar bursitis   Lymphadenopathy:     She has no cervical adenopathy.   Neurological: She is oriented to person, place, and time.   Skin: Skin is warm and dry. No rash noted.         LABS:   Lab Results   Component Value Date    WBC 8.7 10/02/2017    HGB 11.0 (L) 10/02/2017    HCT 35.0 10/02/2017    MCV 93 10/02/2017     (H) 10/02/2017     Results for orders placed or  performed in visit on 10/02/17   Basic Metabolic Panel   Result Value Ref Range    Sodium 133 (L) 136 - 145 mmol/L    Potassium 5.1 (H) 3.5 - 5.0 mmol/L    Chloride 95 (L) 98 - 107 mmol/L    CO2 30 22 - 31 mmol/L    Anion Gap, Calculation 8 5 - 18 mmol/L    Glucose 320 (H) 70 - 125 mg/dL    Calcium 8.3 (L) 8.5 - 10.5 mg/dL    BUN 15 8 - 22 mg/dL    Creatinine 0.96 0.60 - 1.10 mg/dL    GFR MDRD Af Amer >60 >60 mL/min/1.73m2    GFR MDRD Non Af Amer 59 (L) >60 mL/min/1.73m2           ASSESSMENT:      ICD-10-CM    1. Pyelonephritis N12    2. 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 Anemia D64.9    4. Type 2 diabetes mellitus with hyperglycemia, with long-term current use of insulin E11.65     Z79.4        PLAN:    At this time continue to manage and follow.  She believes that she will probably be discharged within a week or so or if certainly by the weekend.  She is feeling much better antibiotics are finished October 10.    .    Electronically signed by: Michael Duane Johnson, CNP

## 2021-06-13 NOTE — PROGRESS NOTES
[facility]    Facility:   [facility] [[id number]]   Code Status: FULL CODE  PCP: No Primary Care Provider   Phone: None   Fax: [phone]      CHIEF COMPLAINT/REASON FOR VISIT:  Chief Complaint   Patient presents with     Discharge Summary       HISTORY COURSE:  Ruddy is a 62 y.o. female who is recent transfer from [facility] on 9/26/17.  She presented to the hospital on 9/23/17 with on and off right abdominal flank pain that was radiating to her back, recent falls, high glucose above 600 with polyuria.  Her family stated that she was quite confused and disorientated as well.  She was subsequently admitted with right-sided pyelonephritis and sepsis.  UAC showed E. coli pansensitive sensitive, she had no fevers but ongoing right flank pain, and blood sugar was out of control.  Apparently she was using 10 units of to USP at home instead of the 45 units prescribed.  She was initially given Rocephin and was discharged on cefuroxime for total 14 days per ID recommendation.  Is also noted that she should have a follow-up CT if her pain is uncontrolled.  She was getting oxycodone for pain control.  She was subsequently discharged to the TCU on 9/26/17 for rehab.  Today I meet a 62-year-old female sitting on her bed with complaints of progressive right sided pain that radiates to her back.  She tells me that she has significant pain last night and now uses scheduled pain medication via oxycodone 5 mg 3 times daily and at at bedtime.  I noticed that she has not received received adequate Tylenol dosing so I will schedule her Tylenol 650 4 times daily.  She also tells me she has had a bowel movement in approximately 7 days.  She was given senna S, MiraLAX, Percocet suppository without production.  I will order fleets enema today and monitor.  I also noticed that her blood sugars were in the 70s last night so I will make sure that she receives an evening snack per her diabetes  control.  She has been to successfully participate with rehabilitation services.  Her blood sugars still remain elevated but do range in the past week anywhere from 1 .  Recently increased the Lantus now at 48 units she also does have sliding scale.  Her A1c on October 2 was 10.2.  We had a chance to discuss the importance of diabetic care and going to diabetic education.  She has been normotensive and afebrile.  She will finish up her Ceftin antibiotic on October 10.  She will be discharging tomorrow to her apartment.  Review of Systems  Currently denies chills fever coughing wheezing chest pain dizziness vertigo flank pain flulike symptoms headache stiff neck or swollen glands.  A history of diabetes CAD COPD hypertension and depression  Vitals:    10/06/17 1443   BP: 115/69   Pulse: 84   Resp: 20   Temp: (!) 96.5  F (35.8  C)       Physical Exam    Constitutional: She is oriented to person, place, and time. She appears well-nourished. No distress.   HENT:   Head: Normocephalic.   Eyes: Pupils are equal, round, and reactive to light.   Neck: Neck supple. No thyromegaly present.   Cardiovascular: Normal rate, regular rhythm and normal heart sounds.    History of CAD status post stents   Pulmonary/Chest: Breath sounds normal.   Abdominal: Bowel sounds are normal. There is no tenderness. There is no guarding.   Musculoskeletal:   Denies flank pain.  History of trigeminal neuralgia.  Improving with his strength and balance.   Lymphadenopathy:     She has no cervical adenopathy.   Neurological: She is oriented to person, place, and time.   Skin: Skin is warm and dry. No rash noted.     Lab Results   Component Value Date    WBC 8.7 10/02/2017    HGB 11.0 (L) 10/02/2017    HCT 35.0 10/02/2017    MCV 93 10/02/2017     (H) 10/02/2017     Results for orders placed or performed in visit on 10/02/17   Basic Metabolic Panel   Result Value Ref Range    Sodium 133 (L) 136 - 145 mmol/L    Potassium 5.1 (H) 3.5 - 5.0  mmol/L    Chloride 95 (L) 98 - 107 mmol/L    CO2 30 22 - 31 mmol/L    Anion Gap, Calculation 8 5 - 18 mmol/L    Glucose 320 (H) 70 - 125 mg/dL    Calcium 8.3 (L) 8.5 - 10.5 mg/dL    BUN 15 8 - 22 mg/dL    Creatinine 0.96 0.60 - 1.10 mg/dL    GFR MDRD Af Amer >60 >60 mL/min/1.73m2    GFR MDRD Non Af Amer 59 (L) >60 mL/min/1.73m2       Lab Results   Component Value Date    HGBA1C 16.4 (H) 10/02/2017       MEDICATION LIST:  Current Outpatient Prescriptions   Medication Sig     acetaminophen (TYLENOL) 325 MG tablet Take 650 mg by mouth 4 (four) times a day.     albuterol (PROVENTIL HFA) 90 mcg/actuation inhaler Inhale 2 puffs every 6 (six) hours as needed for wheezing.     amitriptyline (ELAVIL) 150 MG tablet Take 150 mg by mouth at bedtime.     aspirin 325 MG tablet Take 325 mg by mouth daily.     B complex-vitamin C-folic acid 0.8 mg Tab Take 0.5 tablets by mouth daily.     beclomethasone (QVAR) 40 mcg/actuation inhaler Take 2 puffs by mouth 2 (two) times a day.     biotin 10,000 mcg TbDL Take 10,000 mcg by mouth 2 (two) times a day.     carBAMazepine, mood stabiliz, 200 mg CM12 Take 20,400 mg by mouth 2 (two) times a day.     cefuroxime (CEFTIN) 500 MG tablet Take 500 mg by mouth 2 (two) times a day. For 14 days     diphenhydrAMINE (UNISOM SLEEPGELS) 50 MG capsule Take 50 mg by mouth at bedtime as needed for itching.     ferrous sulfate 65 mg elemental iron Take 1 tablet by mouth 2 (two) times a day with meals.     insulin aspart (NOVOLOG FLEXPEN) 100 unit/mL injection pen Inject under the skin 3 (three) times a day. 3-7 units with breakfast,12-17 units at supper     insulin glargine (TOUJEO SOLOSTAR) 300 unit/mL (1.5 mL) injection Inject 48 Units under the skin daily. Before breakfast      levothyroxine (SYNTHROID, LEVOTHROID) 50 MCG tablet Take 50 mcg by mouth daily.     lisinopril (PRINIVIL,ZESTRIL) 10 MG tablet Take 10 mg by mouth daily.     metFORMIN (GLUMETZA) 500 MG (MOD) 24 hr tablet Take 500 mg by mouth 2  (two) times a day with meals.     metoprolol succinate (TOPROL-XL) 50 MG 24 hr tablet Take 25 mg by mouth daily.      nitroglycerin (NITROSTAT) 0.4 MG SL tablet Place 0.4 mg under the tongue every 5 (five) minutes as needed for chest pain.     oxyCODONE (OXY-IR) 5 mg capsule Take 2.5 mg by mouth every 6 (six) hours as needed.     rosuvastatin (CRESTOR) 20 MG tablet Take 20 mg by mouth at bedtime.     senna-docusate (PERICOLACE) 8.6-50 mg tablet Take 2 tablets by mouth 2 (two) times a day as needed.        DISCHARGE DIAGNOSIS:    ICD-10-CM    1. Pyelonephritis N12    2. Type 2 diabetes mellitus with hyperglycemia, with long-term current use of insulin E11.65     Z79.4    3. Acute pyelonephritis N10    4. Hypothyroidism E03.9        MEDICAL EQUIPMENT NEEDS:  Walker.  Cane.    DISCHARGE PLAN/FACE TO FACE:  I certify that services are/were furnished while this patient was under the care of a physician and that a physician or an allowed non-physician practitioner (NPP), had a face-to-face encounter that meets the physician face-to-face encounter requirements. The encounter was in whole, or in part, related to the primary reason for home health. The patient is confined to his/her home and needs intermittent skilled nursing, physical therapy, speech-language pathology, or the continued need for occupational therapy. A plan of care has been established by a physician and is periodically reviewed by a physician.  Date of Face-to-Face Encounter: October 6, 2017    I certify that, based on my findings, the following services are medically necessary home health services: She will be discharging to home with current medications along with [facility] for physical therapy and nursing care secondary to generalized debility diabetes and medication management    My clinical findings support the need for the above skilled services because: (Please write a brief narrative summary that describes what the RN, PT, SLP, or other  services will be doing in the home. A list of diagnoses in this section does not meet the CMS requirements.)  Secondary to multiple chronic medical conditions including therapy and nursing for medication management and diabetic care    This patient is homebound because: (Please write a brief narrative summary describing the functional limitations as to why this patient is homebound and specifically what makes this patient homebound.)  Secondary to chronic medical conditions.    The patient is, or has been, under my care and I have initiated the establishment of the plan of care. This patient will be followed by a physician who will periodically review the plan of care.  She will follow-up with diabetic education for strict instructions about how to care for her diabetes her blood sugars are diet and she is willing to do that when she does get discharged.  We did not increase the Lantus 48 units.  She otherwise does feel comfortable with the remainder of her medications and will continue with the Ceftin until October 10.  She also follow-up with her primary care doctor regarding medication management.    Discharge coordination of care greater than 30 minutes.    Electronically signed by: Michael Duane Johnson, CNP

## 2021-06-13 NOTE — PROGRESS NOTES
[facility]      Facility:    [facility] [[id number]]  Code Status: FULL CODE      Chief Complaint/Reason for Visit:  Chief Complaint   Patient presents with     Review Of Multiple Medical Conditions       HPI:   Ruddy is a 62 y.o. female who is recent transfer from [facility] on 9/26/17.  She presented to the hospital on 9/23/17 with on and off right abdominal flank pain that was radiating to her back, recent falls, high glucose above 600 with polyuria.  Her family stated that she was quite confused and disorientated as well.  She was subsequently admitted with right-sided pyelonephritis and sepsis.  UAC showed E. coli pansensitive sensitive, she had no fevers but ongoing right flank pain, and blood sugar was out of control.  Apparently she was using 10 units of to group home at home instead of the 45 units prescribed.  She was initially given Rocephin and was discharged on cefuroxime for total 14 days per ID recommendation.  Is also noted that she should have a follow-up CT if her pain is uncontrolled.  She was getting oxycodone for pain control.  She was subsequently discharged to the TCU on 9/26/17 for rehab.  Today I meet a 62-year-old female sitting on her bed with complaints of progressive right sided pain that radiates to her back.  She tells me that she has significant pain last night and now uses scheduled pain medication via oxycodone 5 mg 3 times daily and at at bedtime.  I noticed that she has not received received adequate Tylenol dosing so I will schedule her Tylenol 650 4 times daily.  She also tells me she has had a bowel movement in approximately 7 days.  She was given senna S, MiraLAX, Percocet suppository without production.  I will order fleets enema today and monitor.  I also noticed that her blood sugars were in the 70s last night so I will make sure that she receives an evening snack per her diabetes control.    Patient denies headache, chest pain,  numbness or tingling, shortest of breath, eating or swallowing concerns, nausea or vomiting, diarrhea or bowel abnormalities, or no new integumentary concerns today.  Currently applying new scheduled oxycodone and has ongoing bowel movement concerns.    Past Medical History:  Past Medical History:   Diagnosis Date     Acute encephalopathy 09/23/2017     Acute hypokalemia 09/23/2017     Acute hyponatremia 09/23/2017     Acute pyelonephritis 09/23/2017     SOPHIA (acute kidney injury)      Anxiety      CAD (coronary artery disease)      COPD (chronic obstructive pulmonary disease)      Depression      Diabetes mellitus 09/23/2017     Hypertension      Sepsis 09/23/2017     Trigeminal neuralgia            Surgical History:  Past Surgical History:   Procedure Laterality Date     CORONARY STENT PLACEMENT      TWO       Family History:   Family History   Problem Relation Age of Onset     COPD Mother        Social History:    Social History     Social History     Marital status: N/A     Spouse name: N/A     Number of children: N/A     Years of education: N/A     Social History Main Topics     Smoking status: Former Smoker     Packs/day: 2.00     Types: Cigarettes     Smokeless tobacco: Never Used      Comment: quit at age 42     Alcohol use No     Drug use: No     Sexual activity: Not Currently     Partners: Male     Other Topics Concern     Not on file     Social History Narrative     No narrative on file          Review of Systems   Constitutional: Positive for activity change. Negative for appetite change, diaphoresis, fatigue and fever.        Has right-sided flank pain and constipation   HENT: Negative for congestion, facial swelling and hearing loss.    Eyes: Negative for photophobia and visual disturbance.   Respiratory: Negative for apnea, choking, shortness of breath and wheezing.    Cardiovascular: Negative for chest pain.   Gastrointestinal: Positive for constipation. Negative for abdominal distention, abdominal  pain, blood in stool, diarrhea and nausea.        No bowel movement in 7 days   Endocrine: Positive for polyuria.        Polyuria is less   Genitourinary: Negative for dysuria and frequency.   Musculoskeletal: Positive for back pain.        Right-sided flank pain that radiates to back   Skin:        Intact   Allergic/Immunologic: Negative.    Neurological: Negative for dizziness, tremors, seizures, weakness and headaches.   Hematological: Negative for adenopathy.   Psychiatric/Behavioral: Negative for agitation, confusion and sleep disturbance. The patient is not nervous/anxious.         Back to baseline       Vitals:    09/29/17 0955   BP: 144/82   Pulse: 84   Resp: 18   Temp: 97.2  F (36.2  C)   SpO2: 100%   Weight: 160 lb 6.4 oz (72.8 kg)       Physical Exam   Constitutional: She is oriented to person, place, and time. She appears well-developed and well-nourished. She appears distressed.   Has constipation and right-sided flank pain that radiates to her back   HENT:   Head: Normocephalic and atraumatic.   Mouth/Throat: Oropharynx is clear and moist.   Eyes: Pupils are equal, round, and reactive to light. Right eye exhibits no discharge. Left eye exhibits no discharge. No scleral icterus.   Neck: Normal range of motion. Neck supple. No JVD present.   Intact   Cardiovascular: Normal rate, regular rhythm and normal heart sounds.  Exam reveals no gallop and no friction rub.    No murmur heard.  Pulmonary/Chest: Effort normal and breath sounds normal. No stridor.   , Clear   Abdominal: Soft. Bowel sounds are normal. She exhibits no distension. There is no tenderness.   Ongoing constipation   Genitourinary:   Genitourinary Comments: Deferred   Musculoskeletal: Normal range of motion. She exhibits no edema, tenderness or deformity.   No issues with ambulation   Neurological: She is alert and oriented to person, place, and time.   No focal deficits   Skin: Skin is warm and dry. She is not diaphoretic.   Intact    Psychiatric: She has a normal mood and affect.   Has anxiety no depression   Vitals reviewed.      Medication List:  Current Outpatient Prescriptions   Medication Sig     acetaminophen (TYLENOL) 325 MG tablet Take 650 mg by mouth 4 (four) times a day.     albuterol (PROVENTIL HFA) 90 mcg/actuation inhaler Inhale 2 puffs every 6 (six) hours as needed for wheezing.     amitriptyline (ELAVIL) 150 MG tablet Take 150 mg by mouth at bedtime.     aspirin 325 MG tablet Take 325 mg by mouth daily.     B complex-vitamin C-folic acid 0.8 mg Tab Take 0.5 tablets by mouth daily.     beclomethasone (QVAR) 40 mcg/actuation inhaler Take 2 puffs by mouth 2 (two) times a day.     biotin 10,000 mcg TbDL Take 10,000 mcg by mouth 2 (two) times a day.     carBAMazepine, mood stabiliz, 200 mg CM12 Take 20,400 mg by mouth 2 (two) times a day.     cefuroxime (CEFTIN) 500 MG tablet Take 500 mg by mouth 2 (two) times a day. For 14 days     diphenhydrAMINE (UNISOM SLEEPGELS) 50 MG capsule Take 50 mg by mouth at bedtime as needed for itching.     ferrous sulfate 65 mg elemental iron Take 1 tablet by mouth 2 (two) times a day with meals.     insulin aspart (NOVOLOG FLEXPEN) 100 unit/mL injection pen Inject under the skin 3 (three) times a day. 3-7 units with breakfast,12-17 units at supper     insulin glargine (TOUJEO SOLOSTAR) 300 unit/mL (1.5 mL) injection Inject 45 Units under the skin daily. Before breakfast     levothyroxine (SYNTHROID, LEVOTHROID) 50 MCG tablet Take 50 mcg by mouth daily.     lisinopril (PRINIVIL,ZESTRIL) 10 MG tablet Take 10 mg by mouth daily.     metFORMIN (GLUMETZA) 500 MG (MOD) 24 hr tablet Take 500 mg by mouth 2 (two) times a day with meals.     metoprolol succinate (TOPROL-XL) 50 MG 24 hr tablet Take 25 mg by mouth daily.      nitroglycerin (NITROSTAT) 0.4 MG SL tablet Place 0.4 mg under the tongue every 5 (five) minutes as needed for chest pain.     oxyCODONE (OXY-IR) 5 mg capsule Take 2.5 mg by mouth every 6  (six) hours as needed.     rosuvastatin (CRESTOR) 20 MG tablet Take 20 mg by mouth at bedtime.     senna-docusate (PERICOLACE) 8.6-50 mg tablet Take 2 tablets by mouth 2 (two) times a day as needed.        Labs:  No results found for: HGBA1C      Assessment:    ICD-10-CM    1. Type 2 diabetes mellitus with hyperglycemia, with long-term current use of insulin E11.65     Z79.4    2. Slow transit constipation K59.01    3. Pulmonary emphysema J43.9    4. Acute pyelonephritis N10    5. Hypertension secondary to endocrine disorder with goal blood pressure less than 130/80 I15.2     E34.9    6. SOPHIA (acute kidney injury) N17.9        Plan:  1.  Blood sugars seem to be more controlled, pending hemoglobin Hgb A1c  2.  No bowel movement in 7 days status post MiraLAX, senna, suppository.  Will order fleets enema now  3.  Qvar, controlled  4.  Significant pain, using oxycodone 5 mg 3 times daily and at at bedtime, continue cefuroxime for total of 14 days, may need repeat CT if pain increases  5.  Controlled, monitor  6.  No recent BMP, pending BMP    The care plan has been reviewed and all orders signed. Changes to care plan, if any, as noted. Otherwise, continue care plan of care.  The total time spent with this patient was greater than 40 minutes, with greater than 50% spent in counseling and coordination of care that included multiple issues including chart review, DM management, and pain control issues.        Electronically signed by: Michael Cordon NP

## 2021-06-13 NOTE — PROGRESS NOTES
"[facility]      Facility:    [facility] [[id number]]    Code Status: FULL CODE   [facility] 9/23-9/26/17      Chief Complaint/Reason for Visit:  Chief Complaint   Patient presents with     H & P       HPI:   Ruddy is a 62 y.o. female who lives in a senior apartment building, had had several days of on/off right abdominal pain, flank pain.  Pain would radiate to her back.  Denied fever, chills.  She has a history of UTIs which are usually asymptomatic.  Her sugars have been over 600, with frequent urination, she had had multiple falls recently.  Family thought she was more confused and disoriented.  She was admitted to [facility], urine grew out E. coli.  Ultrasound showed pyelonephritis with possible pyonephrosis. .  She was switched at the discharge to cefuroxime  For pain she was using oxycodone as needed.  Other medical issues include diabetes type 2 on Lantus, coronary artery disease status post stents, COPD, hypertension, depression anxiety, trigeminal neuralgia    Past Medical History:  Past Medical History:   Diagnosis Date     Acute encephalopathy 09/23/2017     Acute hypokalemia 09/23/2017     Acute hyponatremia 09/23/2017     Acute pyelonephritis 09/23/2017     SOPHIA (acute kidney injury)      Anxiety      CAD (coronary artery disease)      COPD (chronic obstructive pulmonary disease)      Depression      Diabetes mellitus 09/23/2017     Hypertension      Sepsis 09/23/2017     Trigeminal neuralgia       ALLERGIES:  Prochlorperazine: Rash, nightmares  Propranolol\"slowed heart rate\"  Atorvastatin hand neuropathy      Surgical History:  Past Surgical History:   Procedure Laterality Date     CORONARY STENT PLACEMENT      TWO       Family History:   Family History   Problem Relation Age of Onset     COPD Mother        Social History:    Social History     Social History     Marital status: Single     Spouse name: N/A     Number of children: 2 sons     Years of education: " N/A     Social History Main Topics     Smoking status: Former Smoker     Packs/day: 2.00     Types: Cigarettes     Smokeless tobacco: Never Used      Comment: quit at age 42     Alcohol use No     Drug use: No     Sexual activity: Not Currently     Partners: Male     Other Topics Concern     Not on file     Social History Narrative     No narrative on file          Review of Systems   Uses a walker at baseline.    She was not using the proper amount of insulin (was scheduled 45 units of  Toujeo, only taking 10 units).  Ruddy said that there was a change in her insurance and they did not supply all her meds.  She went 2 months without test strips and insulin.  Her vision is quite limited, she uses cane outside the building  She has constipation  She notices her memory is decreasing  She says she has poor balance and has a tendency toward falling although it has only been a couple times lately.  She complains of peripheral neuropathy  Her sleep has been fragmented for over half years sleeping a couple hours, up a couple hours.  She has been on Unisom and melatonin for years.  She has COPD, uses a rescue alert inhaler twice a day  She is not enough duration of pain relief  The comprehensive review of systems is otherwise negative    Blood pressure 144/82, pulse 84, temperature 97.2  F (36.2  C), resp. rate 18, SpO2 100 %.        Physical Exam   Constitutional: She is oriented to person, place, and time. She appears well-developed and well-nourished. No distress.   HENT:   Right Ear: External ear normal.   Left Ear: External ear normal.   Mouth/Throat: Oropharynx is clear and moist.   Eyes: Conjunctivae and EOM are normal. No scleral icterus.   Cardiovascular: Regular rhythm and normal heart sounds.    No murmur heard.  Pulmonary/Chest: Breath sounds normal. She has no wheezes. She has no rales.   Extremely tender in the right posterior flank and rib area with very light touch   Abdominal: Bowel sounds are normal. She  exhibits no distension. There is no tenderness.   Musculoskeletal:   2+ ankle and lower leg edema bilaterally  Brace on right knee   Lymphadenopathy:     She has no cervical adenopathy.   Neurological: She is alert and oriented to person, place, and time.   Decreased sensation of the feet   Skin: Skin is warm and dry. No rash noted.   Abrasion on her right brow and the bridge of her nose 2/2 fall   Psychiatric: She has a normal mood and affect. Her behavior is normal. Thought content normal.       Medication List:  Current Outpatient Prescriptions   Medication Sig     acetaminophen (TYLENOL) 325 MG tablet Take 650 mg by mouth 4 (four) times a day.     albuterol (PROVENTIL HFA) 90 mcg/actuation inhaler Inhale 2 puffs every 6 (six) hours as needed for wheezing.     amitriptyline (ELAVIL) 150 MG tablet Take 150 mg by mouth at bedtime.     aspirin 325 MG tablet Take 325 mg by mouth daily.     B complex-vitamin C-folic acid 0.8 mg Tab Take 0.5 tablets by mouth daily.     beclomethasone (QVAR) 40 mcg/actuation inhaler Take 2 puffs by mouth 2 (two) times a day.     biotin 10,000 mcg TbDL Take 10,000 mcg by mouth 2 (two) times a day.     carBAMazepine, mood stabiliz, 200 mg CM12 Take 20,400 mg by mouth 2 (two) times a day.     cefuroxime (CEFTIN) 500 MG tablet Take 500 mg by mouth 2 (two) times a day. For 14 days     diphenhydrAMINE (UNISOM SLEEPGELS) 50 MG capsule Take 50 mg by mouth at bedtime as needed for itching.     ferrous sulfate 65 mg elemental iron Take 1 tablet by mouth 2 (two) times a day with meals.     insulin aspart (NOVOLOG FLEXPEN) 100 unit/mL injection pen Inject under the skin 3 (three) times a day. 3-7 units with breakfast,12-17 units at supper     insulin glargine (TOUJEO SOLOSTAR) 300 unit/mL (1.5 mL) injection Inject 45 Units under the skin daily. Before breakfast     levothyroxine (SYNTHROID, LEVOTHROID) 50 MCG tablet Take 50 mcg by mouth daily.     lisinopril (PRINIVIL,ZESTRIL) 10 MG tablet Take 10  mg by mouth daily.     metFORMIN (GLUMETZA) 500 MG (MOD) 24 hr tablet Take 500 mg by mouth 2 (two) times a day with meals.     metoprolol succinate (TOPROL-XL) 50 MG 24 hr tablet Take 25 mg by mouth daily.      nitroglycerin (NITROSTAT) 0.4 MG SL tablet Place 0.4 mg under the tongue every 5 (five) minutes as needed for chest pain.     oxyCODONE (OXY-IR) 5 mg capsule Take 2.5 mg by mouth every 6 (six) hours as needed.     rosuvastatin (CRESTOR) 20 MG tablet Take 20 mg by mouth at bedtime.     senna-docusate (PERICOLACE) 8.6-50 mg tablet Take 2 tablets by mouth 2 (two) times a day as needed.        Labs:  Upon admission to the hospital:  White count 18.6, hemoglobin 12.5, platelets 221  Sodium 119, potassium 3.1, CO2 21, BUN 28, creatinine 1.30  Glucose 602  Chest x-ray negative  CT of the abdomen: Acute right pyelonephritis, no calculi or hydronephrosis  CT of the head negative for acute changes    Assessment:    ICD-10-CM    1. Acute pyelonephritis N10    2. Acute abdominal pain in right flank R10.9    3. Hyponatremia E87.1    4. Hypokalemia E87.6    5. Type 2 diabetes mellitus with hyperglycemia, with long-term current use of insulin E11.65     Z79.4    6. Coronary artery disease I25.10    7. COPD (chronic obstructive pulmonary disease) J44.9          Plan:  For pain schedule oxycodone 4 times a day still use as needed  Diabetes try to tighten control increase insulins as necessary.  With her limited vision, probably cannot go easily for sliding scale at home  Give extra medications for constipation, follow until resolved      Electronically signed by: Leatha Ordonez MD   No

## 2021-06-16 NOTE — PROGRESS NOTES
Code Status:  FULL CODE  Visit Type: H & P (S/P right reverse TSA, anxiety, constipation, DM2)     Facility:  [facility] [[id number]]      Facility Type: SNF (Skilled Nursing Facility, TCU)    History of Present Illness:   Hospital Admission Date: 3/17/2021 Hospital Discharge Date: 3/19/2021    Ruddy Mendoza is a 65 y.o. female with a past medical history for frequent falls sustaining a right proximal humerus fracture on 3/14/2020 with ORIF and a C3 vertebral fracture on 8/20/2020, alcohol use disorder with 7 months sobriety, type 2 diabetes, HLD, hypertension, DVT diagnosed in October/2020 on Eliquis, hyperparathyroidism, CAD status post stent placement, Sjogren's disease, anemia, anxiety and depression.  She was recently hospitalized for planned right reverse TSA and removal of prior hardware.  She underwent surgery without any complications and is to be nonweightbearing in a sling x6 weeks.    Discharge hemoglobin 7.7    Today, she endorses constipation and states she has not had a bowel movement in over 3 days.  She reports her pain is significant and continues to have a dressing over her right shoulder incision.  She reports icing her shoulder causes more discomfort.  She does have Percocet and Vistaril for pain.      Blood sugars ranging . Most BS 80s.     She reports her mood has been decreased however she works with her PCP and psychiatry with medication management.  She reports that her Mirtazapine was recently increased.     She reports she has been sober from Alcohol for 7 months and 1 week.     Past Medical History:   Diagnosis Date     Acute encephalopathy 09/23/2017     Acute hypokalemia 09/23/2017     Acute hyponatremia 09/23/2017     Acute pulmonary embolism (H)      Acute pyelonephritis 09/23/2017     SOPHIA (acute kidney injury) (H)      Anxiety      C3 cervical fracture (H)      CAD (coronary artery disease)      Closed fracture of facial bone (H)      Closed fracture of right humerus   "    COPD (chronic obstructive pulmonary disease) (H)      Depression      Diabetes mellitus (H) 09/23/2017     Diabetic peripheral neuropathy (H) 9/4/2019     History of cervical cancer      HLD (hyperlipidemia)      Hypertension      Hypothyroidism      Insomnia      Legal blindness 5/28/2014    Diabetic retinopathy, macular edema and degeneration. S/p eye injection. Not surgical candidate d/t proximity to optic nerve.     Normocytic anemia      Peripheral neuropathy      Seborrheic dermatitis 11/4/2020     Sensorineural hearing loss (SNHL) of right ear with tinnitus 9/4/2019     Sepsis (H) 09/23/2017     TIA (transient ischemic attack)      Trigeminal neuralgia      Past Surgical History:   Procedure Laterality Date     CORONARY STENT PLACEMENT      TWO     HERNIA REPAIR       ORIF HUMERUS FRACTURE Right 03/19/2020     TOTAL ABDOMINAL HYSTERECTOMY W/ BILATERAL SALPINGOOPHORECTOMY      Cervical cancer     VASCULAR SURGERY      Left wrist     Family History   Problem Relation Age of Onset     COPD Mother      COPD Father      Lung cancer Father      Social History     Socioeconomic History     Marital status: Single     Spouse name: Not on file     Number of children: Not on file     Years of education: Not on file     Highest education level: Not on file   Occupational History     Not on file   Social Needs     Financial resource strain: Not on file     Food insecurity     Worry: Not on file     Inability: Not on file     Transportation needs     Medical: Not on file     Non-medical: Not on file   Tobacco Use     Smoking status: Former Smoker     Packs/day: 2.00     Years: 30.00     Pack years: 60.00     Types: Cigarettes     Smokeless tobacco: Never Used     Tobacco comment: quit at age 42   Substance and Sexual Activity     Alcohol use: Not Currently     Comment: Reports is an \"alcoholic\"      Drug use: No     Sexual activity: Not Currently     Partners: Male   Lifestyle     Physical activity     Days per week: " Not on file     Minutes per session: Not on file     Stress: Not on file   Relationships     Social connections     Talks on phone: Not on file     Gets together: Not on file     Attends Zoroastrian service: Not on file     Active member of club or organization: Not on file     Attends meetings of clubs or organizations: Not on file     Relationship status: Not on file     Intimate partner violence     Fear of current or ex partner: Not on file     Emotionally abused: Not on file     Physically abused: Not on file     Forced sexual activity: Not on file   Other Topics Concern     Not on file   Social History Narrative    Lives at home alone with her catKailyn       Current Outpatient Medications   Medication Sig Dispense Refill     acamprosate (CAMPRAL) 333 mg tablet Take 2 tablets (666 mg total) by mouth 3 (three) times a day. 90 tablet 0     acetaminophen (TYLENOL) 500 MG tablet Take 1,000 mg by mouth 3 (three) times a day.        albuterol (PROAIR HFA;PROVENTIL HFA;VENTOLIN HFA) 90 mcg/actuation inhaler Inhale 2 puffs 4 (four) times a day as needed for wheezing.        amitriptyline (ELAVIL) 150 MG tablet Take 150 mg by mouth at bedtime.       apixaban ANTICOAGULANT (ELIQUIS) 2.5 mg Tab tablet Take 2.5 mg by mouth 2 (two) times a day.        aspirin 81 mg chewable tablet Chew 81 mg daily.        B complex-vitamin C-folic acid (DIALYVITE 800) 0.8 mg Tab Take 1 tablet by mouth daily.       biotin 1 mg cap Take 1,000 mcg by mouth daily.       budesonide-formoteroL (SYMBICORT) 160-4.5 mcg/actuation inhaler Inhale 2 puffs 2 (two) times a day.       busPIRone (BUSPAR) 10 MG tablet Take 20 mg by mouth at bedtime.       canagliflozin (INVOKANA) 100 mg Tab Take 100 mg by mouth daily before breakfast.       carBAMazepine (TEGRETOL) 200 mg tablet Take 400 mg by mouth 2 (two) times a day.       cholecalciferol, vitamin D3, 1,000 unit (25 mcg) tablet Take 5,000 Units by mouth daily.        ferrous sulfate 65 mg elemental iron  Take 1 tablet by mouth daily with breakfast.        FLUoxetine (PROZAC) 20 MG capsule Take 20 mg by mouth daily.       glimepiride (AMARYL) 1 MG tablet Take 1 mg by mouth daily before breakfast.       hydrOXYzine pamoate (VISTARIL) 25 MG capsule Take 25 mg by mouth every 6 (six) hours as needed.       insulin glargine (LANTUS SOLOSTAR U-100 INSULIN) 100 unit/mL (3 mL) pen Inject 5 Units under the skin 2 (two) times a day.        levothyroxine (SYNTHROID, LEVOTHROID) 50 MCG tablet Take 50 mcg by mouth daily.       lisinopril (PRINIVIL,ZESTRIL) 10 MG tablet Take 20 mg by mouth daily.        magnesium oxide (MAG-OX) 400 mg (241.3 mg magnesium) tablet Take 400 mg by mouth daily.       melatonin 10 mg Tab Take 10 mg by mouth at bedtime.       metFORMIN (GLUCOPHAGE-XR) 750 MG 24 hr tablet Take 750 mg by mouth 2 (two) times a day with meals.        metoprolol succinate (TOPROL-XL) 25 MG Take 25 mg by mouth daily.        mirtazapine (REMERON) 45 MG tablet Take 45 mg by mouth at bedtime.       multivit with min-folic acid 0.4 mg Tab Take 1 tablet by mouth daily.       nitroglycerin (NITROSTAT) 0.4 MG SL tablet Place 0.4 mg under the tongue every 5 (five) minutes as needed for chest pain.       oxyCODONE (ROXICODONE) 5 MG immediate release tablet Take 5-10 mg by mouth every 4 (four) hours as needed for pain. Take 5mg for pain 3-6/10 and 10mg for pain 7-10/10       polyethylene glycol (MIRALAX) 17 gram packet Take 17 g by mouth daily as needed.        rosuvastatin (CRESTOR) 20 MG tablet Take 20 mg by mouth at bedtime.       traZODone (DESYREL) 150 MG tablet Take 150 mg by mouth at bedtime.        No current facility-administered medications for this visit.      Allergies   Allergen Reactions     Propranolol Anaphylaxis     Slowed HR     Atorvastatin      Hand neuropathy     Prochlorperazine Edisylate Rash     nightmares     Immunization History   Administered Date(s) Administered     Influenza, inj, historic,unspecified  10/02/2020       Post Discharge Medication Reconciliation Status: discharge medications reconciled and changed, per note/orders    Review of Systems   Patient denies fever, chills, headache, lightheadedness, dizziness, rhinorrhea, cough, congestion, shortness of breath, chest pain, palpitations, abdominal pain, n/v, diarrhea, change in appetite, dysuria, frequency, burning or pain with urination.  Other than stated in HPI all other review of systems is negative.         Physical Exam   Vitals:    03/22/21 1117   BP: 151/77   Pulse: 78   Resp: 18   Temp: 97.8  F (36.6  C)   SpO2: 92%       GENERAL APPEARANCE: Well developed, well nourished, in no acute distress.  HEENT: normocephalic, atraumatic  PERRL, sclerae anicteric, conjunctivae clear and moist, EOM intact  LUNGS: Lung sounds CTA, no adventitious sounds, respiratory effort normal.  CARD: RRR, S1, S2, without murmurs, gallops, rubs,  ABD: Soft and nontender with normal bowel sounds.   MSK: mobility limited RUE, all other extremity strength equal.   EXTREMITIES: No cyanosis, clubbing or edema. Right arm with good radial pulse and cms.  Localized edema to right shoulder.   NEURO: Alert and oriented x 3.  Face is symmetric.  SKIN: incision covered.   PSYCH: euthymic      Labs:  No results found for this or any previous visit (from the past 240 hour(s)).      Assessment:  1. Status post reverse total replacement of right shoulder     2. Type 2 diabetes mellitus with hyperglycemia, with long-term current use of insulin (H)     3. Pulmonary emphysema, unspecified emphysema type (H)     4. Alcohol use disorder, severe, in early remission (H)     5. Episode of recurrent major depressive disorder, unspecified depression episode severity (H)     6. Diabetic peripheral neuropathy (H)     7. Hyperparathyroidism due to vitamin D deficiency (H)     8. Morbid obesity (H)     9. Sjogren's syndrome, with unspecified organ involvement (H)     10. Stage 3a chronic kidney disease      11. Hypertension due to endocrine disorder     12. Coronary artery disease : previous stents without angina pectoris     13. Depression, unspecified depression type     14. Anemia, unspecified type     15. Drug-induced constipation     16. History of DVT (deep vein thrombosis)         Plan:   PSA: Nonweightbearing in a sling x6 weeks.  We will have [facility] call orthopedics office as patient tells me she already has a follow-up appointment set up.  Nursing can remove the dressing and assess the incision site.  Will change pain management to APAP 1000 mg 3 times daily and as needed oxycodone.  Also has as needed Vistaril.  Counseled patient on the use of these medications and to attempt to try icing the shoulder again.    DM: BS low likely due to now back to normal oral intake.  dicontinue sliding scale insulin and decrease Lantus to 5u BiD. cotninue with amaryl, canagliflozin, and metformin.  Will increase BS checks to four times a day and monitor     Emphysema: no exacerbation, symbicort for controller and albuterol rescue inhaler.  Will watch for symptoms.  Not smoking at this time.     Alcohol disorder: in remission, continue with acomprosate    Depression: scoring low high on PHQ-9 however patient does have psychiatric providers.  Discussed with patient and she will let me know if mood is decreasing.  Continue on prozac, tegretol, buspar, mirtazapine and trazodone for sleep    Hyperparathyroidism due to vit D def: on cholecalciferol and levothyroxine    Morbid obesity: Does increase her morbidity and decreases her mobility.    Sjogrens: controlled, no medications    CKD: check a BMP, avoid nephrotoxin meds    HTN: BP today slightly elevated but likely due to pain, all other BPs are acceptable, continue on lisinopril and metoprolol    CAD: s/p stent placement, prn Nitrostat, statin and ASA    Anemia: blood loss on top of iron def, check CBC and continue with ferrous sulfate    Constipation: scheduled Senna-S two  times a day and miralax prn    History of DVT: it looks like she had a provoked DVT in Oct 2020 and was started on Eliquis.  Question if she will need to continue on the eliquis following her post operative course.  Will defer this decision to her PCP unless she has bleeding issues.       Electronically signed by: Vale Alexander, CNP

## 2021-06-19 NOTE — LETTER
Letter by Suraj Robledo CNP at      Author: Suraj Robledo CNP Service: -- Author Type: --    Filed:  Encounter Date: 2019 Status: (Other)         Patient: Ruddy Mendoza   MR Number: [id number]   YOB: 1955   Date of Visit: 2019     [facility]    Name:   Ruddy Mendoza  : 1955  Facility:   [facility] [[id number]]   Room:   Code Status: FULL CODE -   Fac type:   SNF (Skilled Nursing Facility, TCU) -     PCP:  Provider, No Primary Care    CHIEF COMPLAINT / REASON FOR VISIT:  Chief Complaint   Patient presents with   ? Follow-up     TCU follow-up after hospitalization for a variety of symptoms of unknown etiology, including right facial numbness, severe dizziness, right upper extremity ataxia, and swallowing difficulties.      [facility] from 2019 until 2019 (right facial numbness, severe dizziness, right upper extremity ataxia, swallowing and word finding difficulties)      HPI: Ruddy is a 64 y.o. female with a past medical history of recurrent UTIs and pyelonephritis, diabetes mellitus type 2 (uncontrolled), endocrine induced hypertension, coronary artery disease (status post stenting), and COPD (former smoker) who presented with complaints of severe dizziness, new right lower facial numbness, and ataxia of the right upper extremity for the previous 4 to 5 days prior to admission, with concern for possible stroke, though imaging was negative.  She was also found to be hyperglycemic to the 500s, suffering SOHPIA, and a urinalysis was concerning for UTI on admission.    Etiology of her symptoms was unclear.  Given her history of nausea and vomiting, falls, and unstable gait, the main concern was for a cerebellar stroke; however, an MRI was without any findings of acute stroke, although it did show some chronic infarcts of the cerebellum.  Consider with her hyperglycemia or if hypotensive, a watershed type  "phenomenon.  Hypoglycemia may have been the primary cause of this, although it sounded as though it was secondary to her ability to take medications due to her vertigo.  Her report of worsening dizziness with change in position could support orthostatic hypotension, although this was later also deemed to be negative.  She does have diabetic peripheral neuropathy, but one would not expect this to cause a sudden vertiginous syndrome.  Unlikely BPPV, given no symptoms with head movements.  She denied any recent substance alcohol use, though consideration was given to Warnicke's type encephalopathy with her need for a wide gait.    Neurology consult: Neurology was consulted and followed her while inpatient, recommending starting thiamine 100 mg daily and considering a Tegretol level outpatient if vertigo was not resolving or worsening.    Urinary tract infection: As noted, she has history of multiple UTIs and pyelonephritis.  She had back pain and stated it was consistent with her history of UTIs.  However, she denies any dysuria, hematuria, or suprapubic pain, but did state that she did not usually have these when she had a UTI.  She did report CVA tenderness on admission.  The UA did show some nitrites, bacteria, white cells, and she received ceftriaxone in the ER.  UC was positive for pansensitive E. coli, and she was treated for 5 days with nitrofurantoin.    Diabetes mellitus type 2/hyperglycemia: She had significant hyperglycemia on admission into the 500s.  However, she stated she was unable to take her usual home medications due to her vertigo/dizziness.  At the time, her last known A1c was 16.4 in October 2017.  In the hospital, it was 9.4.  She is on 35 units of Lantus insulin every morning and also receives metformin and NovoLog per sliding scale.      TCU ISSUES    Primary diagnosis: Asked why she is here, she stated, \"I need help with my balance, and I need help to learn how to use a walker, and any help " with memory.  I fell 6 times in 4 days and ended up in the hospital.  I was super, super dizzy and had no balance.  She is now also claiming to have word finding difficulty and difficulty with some swallowing certain meats.  They apparently did a bedside swallow study in the hospital and found nothing wrong.  Nonetheless, we are giving an okay for speech therapy.    Diabetes mellitus type 2: Currently, there only 3 blood glucose levels to go on.  Supper yesterday was 150, at bedtime 321, fasting this morning 89.  She tells me that at home she will check her blood glucose levels in the morning, and if it was good, she may only check it once more during the day.    Diabetic polyneuropathy: She has tingling in the hands and feet.    ROS: She tells me she is a little constipated.  No complaints of headaches or chest pains, coughing or congestion, nausea or vomiting, dyspnea, dysuria.  She did not sleep well the first night.    Past Medical History:   Diagnosis Date   ? Acute encephalopathy 09/23/2017   ? Acute hypokalemia 09/23/2017   ? Acute hyponatremia 09/23/2017   ? Acute pyelonephritis 09/23/2017   ? SOPHIA (acute kidney injury) (H)    ? Anxiety    ? CAD (coronary artery disease)    ? COPD (chronic obstructive pulmonary disease) (H)    ? Depression    ? Diabetes mellitus (H) 09/23/2017   ? Diabetic peripheral neuropathy (H) 9/4/2019   ? History of cervical cancer    ? Hypertension    ? Sepsis (H) 09/23/2017   ? Trigeminal neuralgia               Family History   Problem Relation Age of Onset   ? COPD Mother    ? COPD Father    ? Lung cancer Father      Social History     Socioeconomic History   ? Marital status: Single     Spouse name: Not on file   ? Number of children: Not on file   ? Years of education: Not on file   ? Highest education level: Not on file   Occupational History   ? Not on file   Social Needs   ? Financial resource strain: Not on file   ? Food insecurity:     Worry: Not on file     Inability: Not on  file   ? Transportation needs:     Medical: Not on file     Non-medical: Not on file   Tobacco Use   ? Smoking status: Former Smoker     Packs/day: 2.00     Years: 30.00     Pack years: 60.00     Types: Cigarettes   ? Smokeless tobacco: Never Used   ? Tobacco comment: quit at age 42   Substance and Sexual Activity   ? Alcohol use: Yes     Comment: Rare   ? Drug use: No   ? Sexual activity: Not Currently     Partners: Male   Lifestyle   ? Physical activity:     Days per week: Not on file     Minutes per session: Not on file   ? Stress: Not on file   Relationships   ? Social connections:     Talks on phone: Not on file     Gets together: Not on file     Attends [employer] service: Not on file     Active member of club or organization: Not on file     Attends meetings of clubs or organizations: Not on file     Relationship status: Not on file   ? Intimate partner violence:     Fear of current or ex partner: Not on file     Emotionally abused: Not on file     Physically abused: Not on file     Forced sexual activity: Not on file   Other Topics Concern   ? Not on file   Social History Narrative    Lives at home alone with her catKailyn       MEDICATIONS: Reviewed from the MAR, physician orders, and/or earlier progress notes.  Post Discharge Medication Reconciliation Status: discharge medications reconciled, continue medications without change.  Current Outpatient Medications   Medication Sig   ? acetaminophen (TYLENOL) 325 MG tablet Take 650 mg by mouth every 6 (six) hours as needed for pain or fever.          ? amitriptyline (ELAVIL) 150 MG tablet Take 150 mg by mouth at bedtime.   ? aspirin 325 MG tablet Take 325 mg by mouth daily.   ? B complex-vitamin C-folic acid 0.8 mg Tab Take 1 tablet by mouth daily.          ? biotin 10,000 mcg TbDL Take 10,000 mcg by mouth 2 (two) times a day.   ? busPIRone (BUSPAR) 10 MG tablet Take 20 mg by mouth daily.   ? carBAMazepine (TEGRETOL) 200 mg tablet Take 400 mg by mouth 2 (two)  times a day.   ? diphenhydrAMINE (UNISOM SLEEPGELS) 50 MG capsule Take 50 mg by mouth at bedtime as needed for sleep.          ? docusate sodium (COLACE) 100 MG capsule Take 100 mg by mouth daily.   ? ferrous sulfate 65 mg elemental iron Take 1 tablet by mouth 2 (two) times a day with meals.   ? insulin aspart (NOVOLOG FLEXPEN) 100 unit/mL injection pen Inject under the skin 3 (three) times a day before meals. Sliding scale based on blood sugars.         ? insulin glargine (LANTUS) 100 unit/mL vial Inject 35 Units under the skin every morning.   ? lisinopril (PRINIVIL,ZESTRIL) 10 MG tablet Take 10 mg by mouth daily.   ? metFORMIN (GLUCOPHAGE) 500 MG tablet Take 500 mg by mouth 2 (two) times a day with meals.          ? metoprolol succinate (TOPROL-XL) 25 MG Take 25 mg by mouth daily.          ? nitrofurantoin, macrocrystal-monohydrate, (MACROBID) 100 MG capsule Take 1 capsule (100 mg total) by mouth 2 (two) times a day for 5 days.   ? nitroglycerin (NITROSTAT) 0.4 MG SL tablet Place 0.4 mg under the tongue every 5 (five) minutes as needed for chest pain.   ? NOVOLOG FLEXPEN U-100 INSULIN 100 unit/mL (3 mL) injection pen Check blood sugar four (4) times daily.  11.65 Type 2 with hyperglycemia  BD Ultra-fine Noris Pen Needles - NDC [address]-49 - dispense 1 case, refill PRN for 1 year  Accu-chek Guide blood glucose meter - dispense 1  Accu-chek Guide test strips (50 ct. boxes) - dispense 1, refill PRN for 1 year  Accu-chek FastClix lancets (box of 102 ct.) - dispense 1, refill PRN for 1 year   ? rosuvastatin (CRESTOR) 20 MG tablet Take 20 mg by mouth at bedtime.   ? thiamine 100 MG tablet Take 1 tablet (100 mg total) by mouth daily.   ? traZODone (DESYREL) 50 MG tablet Take 150 mg by mouth at bedtime.     ALLERGIES:   Allergies   Allergen Reactions   ? Propranolol Anaphylaxis     Slowed HR   ? Atorvastatin      Hand neuropathy   ? Prochlorperazine Edisylate Rash     nightmares     DIET: Cardiac/diabetic, regular  "texture, thin liquids.    Vitals:    09/04/19 1503   BP: 139/83   Pulse: 79   Resp: 20   Temp: 97  F (36.1  C)   SpO2: 98%   Weight: 163 lb 9.6 oz (74.2 kg)   Height: 5' 3\" (1.6 m)     Body mass index is 28.98 kg/m .    EXAMINATION:   General: Fairly pleasant middle-aged female, sitting at the breakfast table, in no apparent distress.  Head: Normocephalic and atraumatic.   Eyes: PERRLA, sclerae clear.   ENT: Moist oral mucosa.  She is edentulous with full upper and lower dentures.  No rhinorrhea or nasal discharge.  She has complete hearing loss in the right ear and chronic tinnitus.  Cardiovascular: Regular rate and rhythm.   Respiratory: Lungs clear to auscultation bilaterally.   Abdomen: Soft and nontender.   Musculoskeletal/Extremities: Age-related degenerative joint disease.   Integument: No rashes, clinically significant lesions, or skin breakdown.   Cognitive/Psychiatric: Alert and oriented x3.  Affect is euthymic.    DIAGNOSTICS:   Results for orders placed or performed during the hospital encounter of 08/30/19   Basic Metabolic Panel   Result Value Ref Range    Sodium 136 136 - 145 mmol/L    Potassium 4.1 3.5 - 5.0 mmol/L    Chloride 104 98 - 107 mmol/L    CO2 26 22 - 31 mmol/L    Anion Gap, Calculation 6 5 - 18 mmol/L    Glucose 103 70 - 125 mg/dL    Calcium 8.7 8.5 - 10.5 mg/dL    BUN 13 8 - 22 mg/dL    Creatinine 0.76 0.60 - 1.10 mg/dL    GFR MDRD Af Amer >60 >60 mL/min/1.73m2    GFR MDRD Non Af Amer >60 >60 mL/min/1.73m2     Lab Results   Component Value Date    WBC 5.5 08/31/2019    HGB 9.5 (L) 08/31/2019    HCT 28.6 (L) 08/31/2019    MCV 95 08/31/2019     09/03/2019     Estimated Creatinine Clearance: 67.6 mL/min (by C-G formula based on SCr of 0.76 mg/dL).  Lab Results   Component Value Date    HGBA1C 9.4 (H) 08/31/2019       ASSESSMENT/Plan:      ICD-10-CM    1. Dizziness R42    2. Right facial numbness R20.0    3. Dysphagia, unspecified type R13.10    4. Word finding difficulty R47.89    5. " Gait disturbance R26.9    6. Pulmonary emphysema, unspecified emphysema type (H) J43.9    7. Hearing loss of right ear, unspecified hearing loss type H91.91    8. Hypertension due to endocrine disorder I15.2    9. Tinnitus of right ear H93.11    10. Diabetic peripheral neuropathy (H) E11.42      CHANGES:    Okay for speech therapy to evaluate and treat for complaints of swallowing difficulty and word finding difficulty.    CARE PLAN:    The care plan has been reviewed and all orders signed. Changes to care plan, if any, as noted. Otherwise, continue current plan of care.  Total time spent with this patient was approximately 40 minutes, with greater than 50% spent in counseling and coordination of care that included a discussion with speech therapy regarding patient complaints/issues and talking about what might be causing the sense of what we might be able to do for the patient.  Time was spent speaking with physical therapy regarding her dizziness/vertigo.    The above has been created using voice recognition software. Please be aware that this may unintentionally  produce inaccuracies and/or nonsensical sentences.      Electronically signed by: Suraj Robledo CNP

## 2021-06-19 NOTE — LETTER
Letter by Leatha Ordonez MD at      Author: Leatha Ordonez MD Service: -- Author Type: --    Filed:  Encounter Date: 9/13/2019 Status: Signed         Patient: Ruddy Mendoza   MR Number: [id number]   YOB: 1955   Date of Visit: 9/13/2019     [facility]      Facility:    [facility] [[id number]]    Code Status: DNR   [facility]     8/30 to 9/3/19    Chief Complaint/Reason for Visit:  Chief Complaint   Patient presents with   ? Review Of Multiple Medical Conditions       HPI:   Ruddy is a 64 y.o. female with a history of diabetes type 2 peripheral neuropathy and retinopathy, emphysema,  coronary artery disease ( stents in 2004, 2015), history of both cervical and ovarian cancer, hypothyroidism (currently not on replacement therapy) depression /anxiety , frequent UTIs, trigeminal neuralgia for which she is on carbamazepine    She had been having 4-5 days of  severe dizziness, causing nausea, vomiting, unstable gait. She had fallen 6 times trying to get up. She couldn't get to the kitchen to eat, didn't take her medications.  She also had right  lower facial numbness, and ataxia of her right arm. Denied any alcohol intake during these events.    She was brought into the hospital: Her sugars were in the 500 range.  Her white count was normal, lactate = 2.2, creatinine increased equal 1.28 (baseline 0.81).        She had not been taking her insulin because of her severe vertigo, she could not get up to get it.  She has not had a recent A1c, last check = 16.4 in October 2017.  Prior to getting sick, she was taking 40 units of Lantus, sliding scale, and metformin.  A1c  in the hospital =9.4%.. She was aggressively hydrated with normal saline, was covered with a lower dose of Lantus and sliding scale insulin.  Metformin was held due to her renal issues.  Her sodium was low but it was felt to be secondary to her elevated sugars.    Her acute kidney injury  was due to significant dehydration, it improved back to normal range with fluids.    CT of the head was negative for acute changes.  MRI was done out of concern for cerebellar stroke with her falling and unstable gait.  MRI negative for acute stroke, positive for chronic infarcts of the cerebellum.  MRI of the cervical spine: Negative for cord issues high sugars were thought to be possible.  Neurology was consulted, recommended thiamine 100 mg daily.  There was no clear etiology found for her neurologic symptoms.  TSH and free T4 were checked were normal, vitamin B12 was normal, orthostatic pressures were negative.  Vestibular PT was recommended.  Should dizziness continue, it was suggested a Tegretol level be checked.    She did have back pain, which is similar to her onset of symptoms with UTIs.  She usually does not have dysuria, suprapubic pain.  Urinalysis and urine culture were positive, E. coli pansensitive.  He had been on ceftriaxone in the hospital, was discharged on nitrofurantoin for 5 days after discharge.    UPDATE:  She has had some problems with low glucose in the morning  Her dizziness has decreased but still present when she turns her body or her head.  Speech-language pathology is working with her for cognition      Past Medical History:  Past Medical History:   Diagnosis Date   ? Acute encephalopathy 09/23/2017   ? Acute hypokalemia 09/23/2017   ? Acute hyponatremia 09/23/2017   ? Acute pyelonephritis 09/23/2017   ? SOPHIA (acute kidney injury) (H)    ? Anxiety    ? CAD (coronary artery disease)    ? COPD (chronic obstructive pulmonary disease) (H)    ? Depression    ? Diabetes mellitus (H) 09/23/2017   ? Diabetic peripheral neuropathy (H) 9/4/2019   ? History of cervical cancer    ? Hypertension    ? Sepsis (H) 09/23/2017   ? Trigeminal neuralgia            Surgical History:  Past Surgical History:   Procedure Laterality Date   ? CORONARY STENT PLACEMENT      TWO   ? HERNIA REPAIR     ? TOTAL  ABDOMINAL HYSTERECTOMY W/ BILATERAL SALPINGOOPHORECTOMY      Cervical cancer   ? VASCULAR SURGERY      Left wrist         Review of Systems   She likes the memory exercises given her by speech-language pathology, has occasional low morning glucoses at home  She has  been having peanut butter cracker at bedtime.  Definitely feels symptoms when her sugars are much below 100, probably is eating less here than at home  Sleeping pill is helpful  Headache still present at times  She is sad that her some won't let her babysit her grand daughter anymore  She states therapy is planning to discharge a week from today      Blood pressure 121/71, pulse 80, temperature 97.2  F (36.2  C), resp. rate 16, SpO2 96 %.        Physical Exam     PHQ9 = 18     Constitutional: Middle-aged  female , pleasant, talkative  Cardiovascular: Regular rhythm ,normal heart sounds,no murmur   Pulmonary/Chest: Breath sounds clear   Abdominal: Bowel sounds are normal, non tender   Musculoskeletal: Normal range of motion. She exhibits no edema or tenderness.   Neurological: She is alert and oriented to person, place, and time. Is somewhat unstable with standing   Skin: Skin is warm and dry. No rash noted.    Psychiatric: She has a normal mood. Thoughts normal.     Glucose review: many of her glucoses are quite low, especially in the AM, and symptomatic.   One glucose 235    Allergies   Allergen Reactions   ? Propranolol Anaphylaxis     Slowed HR   ? Atorvastatin      Hand neuropathy   ? Prochlorperazine Edisylate Rash     nightmares         Medication List:  Current Outpatient Medications   Medication Sig   ? acetaminophen (TYLENOL) 325 MG tablet Take 650 mg by mouth every 6 (six) hours as needed for pain or fever.          ? albuterol (PROAIR HFA;PROVENTIL HFA;VENTOLIN HFA) 90 mcg/actuation inhaler Inhale 2 puffs 3 (three) times a day as needed for wheezing.   ? amitriptyline (ELAVIL) 150 MG tablet Take 150 mg by mouth at bedtime.   ? aspirin  325 MG tablet Take 325 mg by mouth daily.   ? B complex-vitamin C-folic acid 0.8 mg Tab Take 1 tablet by mouth daily.          ? biotin 10,000 mcg TbDL Take 10,000 mcg by mouth 2 (two) times a day.   ? carBAMazepine (TEGRETOL) 200 mg tablet Take 400 mg by mouth 2 (two) times a day.   ? docusate sodium (COLACE) 100 MG capsule Take 100 mg by mouth daily.   ? ferrous sulfate 65 mg elemental iron Take 1 tablet by mouth 2 (two) times a day with meals.   ? FLUoxetine (PROZAC) 20 MG capsule Take 20 mg by mouth daily.   ? insulin aspart (NOVOLOG FLEXPEN) 100 unit/mL injection pen Inject under the skin 3 (three) times a day before meals. 150-200 = 2 units  201-250= 4units  251-300= 6 units  301-350= 8 units  >350 = 10 units         ? insulin glargine (LANTUS) 100 unit/mL vial Inject 35 Units under the skin every morning. (Patient taking differently: Inject 42 Units under the skin every morning.       )   ? lisinopril (PRINIVIL,ZESTRIL) 10 MG tablet Take 10 mg by mouth daily.   ? metFORMIN (GLUCOPHAGE) 500 MG tablet Take 500 mg by mouth 2 (two) times a day with meals.          ? metoprolol succinate (TOPROL-XL) 25 MG Take 25 mg by mouth daily.          ? nitroglycerin (NITROSTAT) 0.4 MG SL tablet Place 0.4 mg under the tongue every 5 (five) minutes as needed for chest pain.   ? NOVOLOG FLEXPEN U-100 INSULIN 100 unit/mL (3 mL) injection pen Check blood sugar four (4) times daily.  11.65 Type 2 with hyperglycemia  BD Ultra-fine Noris Pen Needles - NDC [address]-93 - dispense 1 case, refill PRN for 1 year  Accu-chek Guide blood glucose meter - dispense 1  Accu-chek Guide test strips (50 ct. boxes) - dispense 1, refill PRN for 1 year  Accu-chek FastClix lancets (box of 102 ct.) - dispense 1, refill PRN for 1 year   ? rosuvastatin (CRESTOR) 20 MG tablet Take 20 mg by mouth at bedtime.   ? thiamine 100 MG tablet Take 1 tablet (100 mg total) by mouth daily.   ? traZODone (DESYREL) 50 MG tablet Take 200 mg by mouth at bedtime.               Labs:    Ref Range & Units 9/1/19 0546    Sodium 136 - 145 mmol/L 136     Potassium 3.5 - 5.0 mmol/L 4.1     Chloride 98 - 107 mmol/L 104     CO2 22 - 31 mmol/L 26     Anion Gap, Calculation 5 - 18 mmol/L 6     Glucose 70 - 125 mg/dL 103     Calcium 8.5 - 10.5 mg/dL 8.7     BUN 8 - 22 mg/dL 13     Creatinine 0.60 - 1.10 mg/dL 0.76     GFR MDRD Non Af Amer >60 mL/min/1.73m2 >60        Ref Range & Units 8/30/19 1822    Bilirubin, Total 0.0 - 1.0 mg/dL 0.4     Bilirubin, Direct <=0.5 mg/dL 0.1     Protein, Total 6.0 - 8.0 g/dL 6.6     Albumin 3.5 - 5.0 g/dL 3.2Low      Alkaline Phosphatase 45 - 120 U/L 161High      AST 0 - 40 U/L 17     ALT 0 - 45 U/L 14          Ref Range & Units 8/31/19 1247    GGT (Gamma GT) 0 - 50 U/L 216High     Vitamin E    = normal    8/31/19 Ref Range & Units    Copper, Serum/Plasma 80.0 - 155.0 ug/dL 75.8Low         Ref Range & Units 8/31/19 8/30/19     WBC 4.0 - 11.0 thou/uL 5.5  8.8     RBC 3.80 - 5.40 mill/uL 3.02Low   3.28Low      Hemoglobin 12.0 - 16.0 g/dL 9.5Low   10.4Low      Hematocrit 35.0 - 47.0 % 28.6Low   30.8Low      MCV 80 - 100 fL 95  94     MCH 27.0 - 34.0 pg 31.5  31.7     MCHC 32.0 - 36.0 g/dL 33.2  33.8     RDW 11.0 - 14.5 % 14.4  14.4     Platelets 140 - 440 thou/uL 184           Assessment / Plan:      ICD-10-CM    1. Dizziness R42 Still present, has features of BPV   2. Type 2 diabetes mellitus with hyperglycemia, with long-term current use of insulin (H) E11.65 Now some low glucoses. Stop sliding scale. Trial increased Metformin to see if more even range glucoses    Z79.4    3. Diabetic peripheral neuropathy (H) E11.42 unsteady   4. Diabetic retinopathy of both eyes with macular edema associated with type 2 diabetes mellitus, unspecified retinopathy severity (H) E11.311 Impaired vision   5. Depression, unspecified depression type F32.9 Started on Prozac during TCU for self professed depression and increased PHQ9 score   6. Hypertension, unspecified type I10  Excellent readings   7. Insomnia, unspecified type G47.00 Increased Trazodone. Benadryl stopped b/c of her neuropathy and it is on the BEERS list to avoid         ICD-10-CM    2. Type 2 diabetes mellitus with hyperglycemia, with long-term current use of insulin (H) E11.65     Z79.4    4. Diabetic peripheral neuropathy (H) E11.42 Chronic gait issues; also discovered to have chronic cerebellar infarcts. PT/OT   5. Diabetic retinopathy of both eyes associated with type 2 diabetes mellitus, macular edema presence unspecified, unspecified retinopathy severity (H) E11.319 Is quite visually impaired.   7. Depression, unspecified depression type F32.9 Will start Prozac 20 mg daily with patient input and consent.   8. Insomnia, unspecified type G47.00  DC Benadryl, increase trazodone up to 200 mg   9. Essential hypertension I10 In great range at present. Monitor   10. Coronary artery disease : previous stents without angina pectoris I25.10 Continue medications             Electronically signed by: Leatha Ordonez MD

## 2021-06-19 NOTE — LETTER
Letter by Suraj Robledo CNP at      Author: Suraj Robledo CNP Service: -- Author Type: --    Filed:  Encounter Date: 2019 Status: (Other)         Patient: Ruddy Mendoza   MR Number: [id number]   YOB: 1955   Date of Visit: 2019     [facility]    Name:   Ruddy Mendoza  : 1955  Facility:   [facility] [[id number]]   Room:   Code Status: DNR -   Fac type:   SNF (Skilled Nursing Facility, TCU) -     PCP:  Provider, No Primary Care    CHIEF COMPLAINT / REASON FOR VISIT:  Chief Complaint   Patient presents with   ? Follow-up     TCU follow-up after hospitalization for a variety of symptoms of unknown etiology, including right facial numbness, severe dizziness, right upper extremity ataxia, and swallowing difficulties.      [facility] from 2019 until 2019 (right facial numbness, severe dizziness, right upper extremity ataxia, swallowing and word finding difficulties)  [facility] from 2019 until 2019 (expected discharge date)    Patient was last seen by me on 2019 and subsequently seen by Dr. Ordonez on 2019.      HPI: Ruddy is a 64 y.o. female with a past medical history of recurrent UTIs and pyelonephritis, diabetes mellitus type 2 (uncontrolled), endocrine induced hypertension, coronary artery disease (status post stenting), and COPD (former smoker) who presented with complaints of severe dizziness, new right lower facial numbness, and ataxia of the right upper extremity for the previous 4 to 5 days prior to admission, with concern for possible stroke, though imaging was negative.  She was also found to be hyperglycemic to the 500s, suffering SOPHIA, and a urinalysis was concerning for UTI on admission.    Etiology of her symptoms was unclear.  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  Consider with her hyperglycemia or if hypotensive, a watershed type phenomenon.  Hypoglycemia may have been the primary cause of this, although it sounded as though it was secondary to her ability to take medications due to her vertigo.  Her report of worsening dizziness with change in position could support orthostatic hypotension, although this was later also deemed to be negative.  She does have diabetic peripheral neuropathy, but one would not expect this to cause a sudden vertiginous syndrome.  Unlikely BPPV, given no symptoms with head movements.  She denied any recent substance alcohol use, though consideration was given to Warnicke's type encephalopathy with her need for a wide gait.    Neurology consult: Neurology was consulted and followed her while inpatient, recommending starting thiamine 100 mg daily and considering a Tegretol level outpatient if vertigo was not resolving or worsening.    Urinary tract infection: As noted, she has history of multiple UTIs and pyelonephritis.  She had back pain and stated it was consistent with her history of UTIs.  However, she denies any dysuria, hematuria, or suprapubic pain, but did state that she did not usually have these when she had a UTI.  She did report CVA tenderness on admission.  The UA did show some nitrites, bacteria, white cells, and she received ceftriaxone in the ER.  UC was positive for pansensitive E. coli, and she was treated for 5 days with nitrofurantoin.    Diabetes mellitus type 2/hyperglycemia: She had significant hyperglycemia on admission into the 500s.  However, she stated she was unable to take her usual home medications due to her vertigo/dizziness.  At the time, her last known A1c was 16.4 in October 2017.  In the hospital, it was 9.4.  She is on Lantus insulin every morning and also receives metformin and NovoLog per sliding  "scale.      TCU ISSUES    Today/depression: She tells me she feels cold symptoms coming on.  She also asks about an antidepressant.  She tells me she felt like crying all weekend.  I explained that she was started on fluoxetine on 09/06/2019, and it does take a good month or so for it to show benefit. We did spend some time talking about her depression, and we also reviewed her recent blood glucose levels.  With a recent change in metformin dosing from Dr. Ordonez (see below), we have opted not to make any changes at this time.    Primary diagnosis: Asked why she is here, she stated, \"I need help with my balance, and I need help to learn how to use a walker, and any help with memory.  I fell 6 times in 4 days and ended up in the hospital.  I was super, super dizzy and had no balance.\"  She does feel that her dizziness is improving, although she still has trouble with corners.  She recently got a walker.  Today, she is blaming, for dizziness on a developing head cold.  Note that her facial numbness has resolved.    She tells me that in therapy the plan is to check her balance without holding onto the walker, and she is somewhat worried about that.    COPD: She was on a Ventolin HFA inhaler at home, taking it 2 or 3 times per day, and we added that.    Dysphagia/word finding difficulty: She was also claiming to have word finding difficulty and difficulty with some swallowing certain meats.  They apparently did a bedside swallow study in the hospital and found nothing wrong.  Nonetheless, we did authorize speech therapy.  There was not felt to be any need to work with her.  Nonetheless, she plans on having a speech therapy -- due to word finding difficulty -- upon discharge.  Yesterday, she did not feel this way but has on other days.    Diabetes mellitus type 2: Blood glucose levels have been elevated (particularly at night), although when I last visited with her, there was insufficient data.  She did, as noted above, " have some quite high A1c's.  She told me that at home she would check her blood glucose levels in the morning, and if it was good, she may only check it once more during the day.  In fact, she had a fasting blood glucose level of 53 on 09/10/2019.      Dr. Ordonez discontinued carb count-based NovoLog, increased her glargine from 35 units to 45 units every morning on 09/06/2019, and set parameters for sliding scale coverage.  We cut that back to 42 units.  On 09/13/2019, Dr. Ordonez increased her metformin from 500 mg twice daily to 750 mg twice daily and also discontinued sliding scale coverage.    Currently, fasting blood glucose levels over the last week (since the reading of 53) range between 71 and 103.  Lunch levels are all between 101 and 168 except for an outlier of 78.  At supper, the range is between 110 and 217 (with a 294 outlier).  Bedtime blood glucose levels tend to be high, ranging between 163 and 258 (with a 320 outlier).  Considering her fasting blood glucose levels, this is not really much for concern.    Diabetic polyneuropathy: She has tingling in the hands and feet.    Medication changes: Dr. Ordonez also discontinued her buspirone and Unisom and increased her trazodone to 200 mg nightly, also adding fluoxetine 20 mg nightly for depression.  She still tells me she wakes up a lot during the night.    Code status: In the hospital, she was full code.  A signed a POLST here indicates that she is now DNR.     Discharge planning: She is expected to discharge next Friday, 09/20/2019.  She will have home services with [facility], including speech as well as other services.  She is somewhat nervous about discharge, and we spent some time talking about this as well.      ROS: Last seen, she was complaining of a terrible headache (and looked ill).  Any headaches this bad are rare.  She did not feel it was a migraine, because when she does have one, she experiences photophobia.  She has been taking Excedrin  Migraine (APAP-ASA-caffeine), but she does not find it very beneficial.  No complaints ofchest pains, coughing or congestion, nausea or vomiting, dyspnea, dysuria.  She did not sleep well the first night and is still waking up a lot.  Fortunately, her headache is gone.    Past Medical History:   Diagnosis Date   ? Acute encephalopathy 09/23/2017   ? Acute hypokalemia 09/23/2017   ? Acute hyponatremia 09/23/2017   ? Acute pyelonephritis 09/23/2017   ? SOPHIA (acute kidney injury) (H)    ? Anxiety    ? CAD (coronary artery disease)    ? COPD (chronic obstructive pulmonary disease) (H)    ? Depression    ? Diabetes mellitus (H) 09/23/2017   ? Diabetic peripheral neuropathy (H) 9/4/2019   ? History of cervical cancer    ? Hypertension    ? Sepsis (H) 09/23/2017   ? Trigeminal neuralgia               Family History   Problem Relation Age of Onset   ? COPD Mother    ? COPD Father    ? Lung cancer Father      Social History     Socioeconomic History   ? Marital status: Single     Spouse name: Not on file   ? Number of children: Not on file   ? Years of education: Not on file   ? Highest education level: Not on file   Occupational History   ? Not on file   Social Needs   ? Financial resource strain: Not on file   ? Food insecurity:     Worry: Not on file     Inability: Not on file   ? Transportation needs:     Medical: Not on file     Non-medical: Not on file   Tobacco Use   ? Smoking status: Former Smoker     Packs/day: 2.00     Years: 30.00     Pack years: 60.00     Types: Cigarettes   ? Smokeless tobacco: Never Used   ? Tobacco comment: quit at age 42   Substance and Sexual Activity   ? Alcohol use: Yes     Comment: Rare   ? Drug use: No   ? Sexual activity: Not Currently     Partners: Male   Lifestyle   ? Physical activity:     Days per week: Not on file     Minutes per session: Not on file   ? Stress: Not on file   Relationships   ? Social connections:     Talks on phone: Not on file     Gets together: Not on file      Attends Taoism service: Not on file     Active member of club or organization: Not on file     Attends meetings of clubs or organizations: Not on file     Relationship status: Not on file   ? Intimate partner violence:     Fear of current or ex partner: Not on file     Emotionally abused: Not on file     Physically abused: Not on file     Forced sexual activity: Not on file   Other Topics Concern   ? Not on file   Social History Narrative    Lives at home alone with her catKailyn       MEDICATIONS: Reviewed from the MAR, physician orders, and/or earlier progress notes.  Updated by me today (09/16/2019) with an increase in Metformin and discontinuation of sliding scale NovoLog reflected below.  Post Discharge Medication Reconciliation Status: medication reconciliation previously completed during another office visit.  Current Outpatient Medications   Medication Sig   ? acetaminophen (TYLENOL) 325 MG tablet Take 650 mg by mouth every 6 (six) hours as needed for pain or fever.          ? albuterol (PROAIR HFA;PROVENTIL HFA;VENTOLIN HFA) 90 mcg/actuation inhaler Inhale 2 puffs 3 (three) times a day as needed for wheezing.   ? amitriptyline (ELAVIL) 150 MG tablet Take 150 mg by mouth at bedtime.   ? aspirin 325 MG tablet Take 325 mg by mouth daily.   ? B complex-vitamin C-folic acid 0.8 mg Tab Take 1 tablet by mouth daily.          ? biotin 10,000 mcg TbDL Take 10,000 mcg by mouth 2 (two) times a day.   ? carBAMazepine (TEGRETOL) 200 mg tablet Take 400 mg by mouth 2 (two) times a day.   ? docusate sodium (COLACE) 100 MG capsule Take 100 mg by mouth daily.   ? ferrous sulfate 65 mg elemental iron Take 1 tablet by mouth 2 (two) times a day with meals.   ? FLUoxetine (PROZAC) 20 MG capsule Take 20 mg by mouth daily.   ? insulin glargine (LANTUS) 100 unit/mL vial Inject 35 Units under the skin every morning. (Patient taking differently: Inject 42 Units under the skin every morning.       )   ? lisinopril  "(PRINIVIL,ZESTRIL) 10 MG tablet Take 10 mg by mouth daily.   ? metFORMIN (GLUCOPHAGE) 500 MG tablet Take 750 mg by mouth 2 (two) times a day with meals.          ? metoprolol succinate (TOPROL-XL) 25 MG Take 25 mg by mouth daily.          ? nitroglycerin (NITROSTAT) 0.4 MG SL tablet Place 0.4 mg under the tongue every 5 (five) minutes as needed for chest pain.   ? NOVOLOG FLEXPEN U-100 INSULIN 100 unit/mL (3 mL) injection pen Check blood sugar four (4) times daily.  11.65 Type 2 with hyperglycemia  BD Ultra-fine Noris Pen Needles - NDC [address]-52 - dispense 1 case, refill PRN for 1 year  Accu-chek Guide blood glucose meter - dispense 1  Accu-chek Guide test strips (50 ct. boxes) - dispense 1, refill PRN for 1 year  Accu-chek FastClix lancets (box of 102 ct.) - dispense 1, refill PRN for 1 year   ? rosuvastatin (CRESTOR) 20 MG tablet Take 20 mg by mouth at bedtime.   ? thiamine 100 MG tablet Take 1 tablet (100 mg total) by mouth daily.   ? traZODone (DESYREL) 50 MG tablet Take 200 mg by mouth at bedtime.            ALLERGIES:   Allergies   Allergen Reactions   ? Propranolol Anaphylaxis     Slowed HR   ? Atorvastatin      Hand neuropathy   ? Prochlorperazine Edisylate Rash     nightmares     DIET: Cardiac/diabetic, regular texture, thin liquids.    Vitals:    09/16/19 1624   BP: 131/79   Pulse: 79   Resp: 18   Temp: 98.4  F (36.9  C)   SpO2: 98%   Weight: 169 lb 3.2 oz (76.7 kg)   Height: 5' 3\" (1.6 m)   Questionable weight gain of 7.4 pounds.  Body mass index is 29.97 kg/m .    EXAMINATION:   General: Fairly pleasant middle-aged female, sitting on her bed, in no apparent distress.  Head: Normocephalic and atraumatic.   Eyes: PERRLA, sclerae clear.   ENT: Moist oral mucosa.  She is edentulous with full upper and lower dentures.  No rhinorrhea or nasal discharge.  She has complete hearing loss in the right ear and chronic tinnitus.  Cardiovascular: Sinus tachycardia with a 2/6 systolic ejection murmur at the left " sternal border.  Respiratory: Lungs clear to auscultation bilaterally.   Abdomen: Soft and nontender.   Musculoskeletal/Extremities: Age-related degenerative joint disease.   Integument: No rashes, clinically significant lesions, or skin breakdown.   Cognitive/Psychiatric: Alert and oriented x3.  Affect is euthymic.    DIAGNOSTICS:   Results for orders placed or performed during the hospital encounter of 08/30/19   Basic Metabolic Panel   Result Value Ref Range    Sodium 136 136 - 145 mmol/L    Potassium 4.1 3.5 - 5.0 mmol/L    Chloride 104 98 - 107 mmol/L    CO2 26 22 - 31 mmol/L    Anion Gap, Calculation 6 5 - 18 mmol/L    Glucose 103 70 - 125 mg/dL    Calcium 8.7 8.5 - 10.5 mg/dL    BUN 13 8 - 22 mg/dL    Creatinine 0.76 0.60 - 1.10 mg/dL    GFR MDRD Af Amer >60 >60 mL/min/1.73m2    GFR MDRD Non Af Amer >60 >60 mL/min/1.73m2     Lab Results   Component Value Date    WBC 5.5 08/31/2019    HGB 9.5 (L) 08/31/2019    HCT 28.6 (L) 08/31/2019    MCV 95 08/31/2019     09/03/2019     CrCl cannot be calculated (Patient's most recent lab result is older than the maximum 5 days allowed.).  Lab Results   Component Value Date    HGBA1C 9.4 (H) 08/31/2019       ASSESSMENT/Plan:      ICD-10-CM    1. Dizziness R42    2. Type 2 diabetes mellitus with hyperglycemia, with long-term current use of insulin (H) E11.65     Z79.4    3. Pulmonary emphysema, unspecified emphysema type (H) J43.9    4. Diabetic peripheral neuropathy (H) E11.42    5. Depression, unspecified depression type F32.9    6. Gait disturbance R26.9    7. Hypertension due to endocrine disorder I15.2    8. Hearing loss of right ear, unspecified hearing loss type H91.91    9. Tinnitus of right ear H93.11      CHANGES:    None.    CARE PLAN:    The care plan has been reviewed and all orders signed. Changes to care plan, if any, as noted. Otherwise, continue current plan of care.  Total time spent today was approximately 35 minutes, with greater than 50% spent in  counseling and coordination of care that included discussing her feelings of depression, nervousness about discharge, and also reviewing her blood glucose levels and diabetic regimen.    The above has been created using voice recognition software. Please be aware that this may unintentionally  produce inaccuracies and/or nonsensical sentences.      Electronically signed by: Suraj Robledo CNP

## 2021-06-19 NOTE — LETTER
Letter by Suraj Robledo CNP at      Author: Suraj Robledo CNP Service: -- Author Type: --    Filed:  Encounter Date: 2019 Status: (Other)         Patient: Ruddy Mendoza   MR Number: [id number]   YOB: 1955   Date of Visit: 2019     [facility]    Name:   Ruddy Mendoza  : 1955  Facility:   [facility] [[id number]]   Room:   Code Status: DNR -   Fac type:   SNF (Skilled Nursing Facility, TCU) -     PCP:  Provider, No Primary Care    CHIEF COMPLAINT / REASON FOR VISIT:  Chief Complaint   Patient presents with   ? Follow-up     TCU follow-up after hospitalization for a variety of symptoms of unknown etiology, including right facial numbness, severe dizziness, right upper extremity ataxia, and swallowing difficulties.      [facility] from 2019 until 2019 (right facial numbness, severe dizziness, right upper extremity ataxia, swallowing and word finding difficulties)  [facility]CU from 2019 until 2019 (expected discharge date)    Patient was last seen by me on 2019.      HPI: Ruddy is a 64 y.o. female with a past medical history of recurrent UTIs and pyelonephritis, diabetes mellitus type 2 (uncontrolled), endocrine induced hypertension, coronary artery disease (status post stenting), and COPD (former smoker) who presented with complaints of severe dizziness, new right lower facial numbness, and ataxia of the right upper extremity for the previous 4 to 5 days prior to admission, with concern for possible stroke, though imaging was negative.  She was also found to be hyperglycemic to the 500s, suffering SOPHIA, and a urinalysis was concerning for UTI on admission.    Etiology of her symptoms was unclear.  Given her history of nausea and vomiting, falls, and unstable gait, the main concern was for a cerebellar stroke; however, an MRI was without any findings of acute stroke,  although it did show some chronic infarcts of the cerebellum.  Consider with her hyperglycemia or if hypotensive, a watershed type phenomenon.  Hypoglycemia may have been the primary cause of this, although it sounded as though it was secondary to her ability to take medications due to her vertigo.  Her report of worsening dizziness with change in position could support orthostatic hypotension, although this was later also deemed to be negative.  She does have diabetic peripheral neuropathy, but one would not expect this to cause a sudden vertiginous syndrome.  Unlikely BPPV, given no symptoms with head movements.  She denied any recent substance alcohol use, though consideration was given to Warnicke's type encephalopathy with her need for a wide gait.    Neurology consult: Neurology was consulted and followed her while inpatient, recommending starting thiamine 100 mg daily and considering a Tegretol level outpatient if vertigo was not resolving or worsening.    Urinary tract infection: As noted, she has history of multiple UTIs and pyelonephritis.  She had back pain and stated it was consistent with her history of UTIs.  However, she denies any dysuria, hematuria, or suprapubic pain, but did state that she did not usually have these when she had a UTI.  She did report CVA tenderness on admission.  The UA did show some nitrites, bacteria, white cells, and she received ceftriaxone in the ER.  UC was positive for pansensitive E. coli, and she was treated for 5 days with nitrofurantoin.    Diabetes mellitus type 2/hyperglycemia: She had significant hyperglycemia on admission into the 500s.  However, she stated she was unable to take her usual home medications due to her vertigo/dizziness.  At the time, her last known A1c was 16.4 in October 2017.  In the hospital, it was 9.4.  She is on Lantus insulin every morning and also receives metformin and NovoLog per sliding scale.      TCU ISSUES    Primary diagnosis: Asked  "why she is here, she stated, \"I need help with my balance, and I need help to learn how to use a walker, and any help with memory.  I fell 6 times in 4 days and ended up in the hospital.  I was super, super dizzy and had no balance.  She does feel that her dizziness is improving, although she still has trouble with corners.  She recently got a walker.    COPD: She tells me she was on a Ventolin HFA inhaler at home, taking it 2 or 3 times per day.  We are adding that.    Dysphagia/word finding difficulty: She was also claiming to have word finding difficulty and difficulty with some swallowing certain meats.  They apparently did a bedside swallow study in the hospital and found nothing wrong.  Nonetheless, we did authorize speech therapy.  There was not felt to be any need to work with her.  Nonetheless, she plans on having a speech therapy -- due to word finding difficulty -- upon discharge.    Diabetes mellitus type 2: Blood glucose levels have been elevated (particularly at night), although when I last visited with her, there was insufficient data.  She did, as noted above, have some quite high A1c's.  She told me that at home she would check her blood glucose levels in the morning, and if it was good, she may only check it once more during the day.  She tells me her fasting blood glucose level yesterday morning was 53 (after being 78 on 09/09/2019).      Dr. Ordonez discontinued carb count-based NovoLog, increased her glargine from 35 units to 45 units every morning on 09/06/2019, and set parameters for sliding scale coverage.  We will cut that back to 42 units.    Diabetic polyneuropathy: She has tingling in the hands and feet.    Medication changes: Dr. Ordonez also discontinued her buspirone and Unisom and increased her trazodone to 200 mg nightly, also adding fluoxetine 20 mg nightly for depression.  She still tells me she wakes up a lot during the night.    Code status: In the hospital, she was full code.  A " signed a POLST here indicates that she is now DNR.     Discharge planning: She is expected to discharge next Friday, 09/20/2019.  She will have home services with [facility], including speech as well as other services.      ROS: Last seen, she was complaining of a terrible headache (and looked ill).  Any headaches this bad are rare.  She did not feel it was a migraine, because when she does have one, she experiences photophobia.  She has been taking Excedrin Migraine (APAP-ASA-caffeine), but she does not find it very beneficial.  No complaints ofchest pains, coughing or congestion, nausea or vomiting, dyspnea, dysuria.  She did not sleep well the first night and is still waking up a lot.  Fortunately, her headache is gone.    Past Medical History:   Diagnosis Date   ? Acute encephalopathy 09/23/2017   ? Acute hypokalemia 09/23/2017   ? Acute hyponatremia 09/23/2017   ? Acute pyelonephritis 09/23/2017   ? SOPHIA (acute kidney injury) (H)    ? Anxiety    ? CAD (coronary artery disease)    ? COPD (chronic obstructive pulmonary disease) (H)    ? Depression    ? Diabetes mellitus (H) 09/23/2017   ? Diabetic peripheral neuropathy (H) 9/4/2019   ? History of cervical cancer    ? Hypertension    ? Sepsis (H) 09/23/2017   ? Trigeminal neuralgia               Family History   Problem Relation Age of Onset   ? COPD Mother    ? COPD Father    ? Lung cancer Father      Social History     Socioeconomic History   ? Marital status: Single     Spouse name: Not on file   ? Number of children: Not on file   ? Years of education: Not on file   ? Highest education level: Not on file   Occupational History   ? Not on file   Social Needs   ? Financial resource strain: Not on file   ? Food insecurity:     Worry: Not on file     Inability: Not on file   ? Transportation needs:     Medical: Not on file     Non-medical: Not on file   Tobacco Use   ? Smoking status: Former Smoker     Packs/day: 2.00     Years: 30.00     Pack years: 60.00     Types:  Cigarettes   ? Smokeless tobacco: Never Used   ? Tobacco comment: quit at age 42   Substance and Sexual Activity   ? Alcohol use: Yes     Comment: Rare   ? Drug use: No   ? Sexual activity: Not Currently     Partners: Male   Lifestyle   ? Physical activity:     Days per week: Not on file     Minutes per session: Not on file   ? Stress: Not on file   Relationships   ? Social connections:     Talks on phone: Not on file     Gets together: Not on file     Attends Judaism service: Not on file     Active member of club or organization: Not on file     Attends meetings of clubs or organizations: Not on file     Relationship status: Not on file   ? Intimate partner violence:     Fear of current or ex partner: Not on file     Emotionally abused: Not on file     Physically abused: Not on file     Forced sexual activity: Not on file   Other Topics Concern   ? Not on file   Social History Narrative    Lives at home alone with her catKailyn       MEDICATIONS: Reviewed from the MAR, physician orders, and/or earlier progress notes.  Updated by me today (09/11/2019) with a decrease in Lantus insulin reflected below.  Post Discharge Medication Reconciliation Status: medication reconciliation previously completed during another office visit.  Current Outpatient Medications   Medication Sig   ? acetaminophen (TYLENOL) 325 MG tablet Take 650 mg by mouth every 6 (six) hours as needed for pain or fever.          ? albuterol (PROAIR HFA;PROVENTIL HFA;VENTOLIN HFA) 90 mcg/actuation inhaler Inhale 2 puffs 3 (three) times a day as needed for wheezing.   ? amitriptyline (ELAVIL) 150 MG tablet Take 150 mg by mouth at bedtime.   ? aspirin 325 MG tablet Take 325 mg by mouth daily.   ? B complex-vitamin C-folic acid 0.8 mg Tab Take 1 tablet by mouth daily.          ? biotin 10,000 mcg TbDL Take 10,000 mcg by mouth 2 (two) times a day.   ? carBAMazepine (TEGRETOL) 200 mg tablet Take 400 mg by mouth 2 (two) times a day.   ? docusate sodium  (COLACE) 100 MG capsule Take 100 mg by mouth daily.   ? ferrous sulfate 65 mg elemental iron Take 1 tablet by mouth 2 (two) times a day with meals.   ? FLUoxetine (PROZAC) 20 MG capsule Take 20 mg by mouth daily.   ? insulin aspart (NOVOLOG FLEXPEN) 100 unit/mL injection pen Inject under the skin 3 (three) times a day before meals. 150-200 = 2 units  201-250= 4units  251-300= 6 units  301-350= 8 units  >350 = 10 units         ? insulin glargine (LANTUS) 100 unit/mL vial Inject 35 Units under the skin every morning. (Patient taking differently: Inject 42 Units under the skin every morning.       )   ? lisinopril (PRINIVIL,ZESTRIL) 10 MG tablet Take 10 mg by mouth daily.   ? metFORMIN (GLUCOPHAGE) 500 MG tablet Take 500 mg by mouth 2 (two) times a day with meals.          ? metoprolol succinate (TOPROL-XL) 25 MG Take 25 mg by mouth daily.          ? nitroglycerin (NITROSTAT) 0.4 MG SL tablet Place 0.4 mg under the tongue every 5 (five) minutes as needed for chest pain.   ? NOVOLOG FLEXPEN U-100 INSULIN 100 unit/mL (3 mL) injection pen Check blood sugar four (4) times daily.  11.65 Type 2 with hyperglycemia  BD Ultra-fine Noris Pen Needles - NDC [address]-19 - dispense 1 case, refill PRN for 1 year  Accu-chek Guide blood glucose meter - dispense 1  Accu-chek Guide test strips (50 ct. boxes) - dispense 1, refill PRN for 1 year  Accu-chek FastClix lancets (box of 102 ct.) - dispense 1, refill PRN for 1 year   ? rosuvastatin (CRESTOR) 20 MG tablet Take 20 mg by mouth at bedtime.   ? thiamine 100 MG tablet Take 1 tablet (100 mg total) by mouth daily.   ? traZODone (DESYREL) 50 MG tablet Take 200 mg by mouth at bedtime.            ALLERGIES:   Allergies   Allergen Reactions   ? Propranolol Anaphylaxis     Slowed HR   ? Atorvastatin      Hand neuropathy   ? Prochlorperazine Edisylate Rash     nightmares     DIET: Cardiac/diabetic, regular texture, thin liquids.    Vitals:    09/11/19 1617   BP: 136/80   Pulse: 86   Resp: 19  "  Temp: (!) 96.2  F (35.7  C)   SpO2: 97%   Weight: 161 lb 12.8 oz (73.4 kg)   Height: 5' 3\" (1.6 m)     Body mass index is 28.66 kg/m .    EXAMINATION:   General: Fairly pleasant middle-aged female, sitting on her bed, in no apparent distress.  Head: Normocephalic and atraumatic.   Eyes: PERRLA, sclerae clear.   ENT: Moist oral mucosa.  She is edentulous with full upper and lower dentures.  No rhinorrhea or nasal discharge.  She has complete hearing loss in the right ear and chronic tinnitus.  Cardiovascular: Regular rate and rhythm.   Respiratory: Lungs clear to auscultation bilaterally.   Abdomen: Soft and nontender.   Musculoskeletal/Extremities: Age-related degenerative joint disease.   Integument: No rashes, clinically significant lesions, or skin breakdown.   Cognitive/Psychiatric: Alert and oriented x3.  Affect is euthymic.    DIAGNOSTICS:   Results for orders placed or performed during the hospital encounter of 08/30/19   Basic Metabolic Panel   Result Value Ref Range    Sodium 136 136 - 145 mmol/L    Potassium 4.1 3.5 - 5.0 mmol/L    Chloride 104 98 - 107 mmol/L    CO2 26 22 - 31 mmol/L    Anion Gap, Calculation 6 5 - 18 mmol/L    Glucose 103 70 - 125 mg/dL    Calcium 8.7 8.5 - 10.5 mg/dL    BUN 13 8 - 22 mg/dL    Creatinine 0.76 0.60 - 1.10 mg/dL    GFR MDRD Af Amer >60 >60 mL/min/1.73m2    GFR MDRD Non Af Amer >60 >60 mL/min/1.73m2     Lab Results   Component Value Date    WBC 5.5 08/31/2019    HGB 9.5 (L) 08/31/2019    HCT 28.6 (L) 08/31/2019    MCV 95 08/31/2019     09/03/2019     CrCl cannot be calculated (Patient's most recent lab result is older than the maximum 5 days allowed.).  Lab Results   Component Value Date    HGBA1C 9.4 (H) 08/31/2019       ASSESSMENT/Plan:      ICD-10-CM    1. Dizziness R42    2. Right facial numbness R20.0    3. Type 2 diabetes mellitus with hyperglycemia, with long-term current use of insulin (H) E11.65     Z79.4    4. Pulmonary emphysema, unspecified emphysema " type (H) J43.9    5. Diabetic peripheral neuropathy (H) E11.42    6. Gait disturbance R26.9    7. Hypertension due to endocrine disorder I15.2    8. Hearing loss of right ear, unspecified hearing loss type H91.91    9. Tinnitus of right ear H93.11      CHANGES:    Decrease Lantus insulin from 45 units to 42 units.    CARE PLAN:    The care plan has been reviewed and all orders signed. Changes to care plan, if any, as noted. Otherwise, continue current plan of care.  Total time spent with this patient was approximately 35 minutes, with greater than 50% spent in counseling and coordination of care that included addressing low blood glucose levels/hypoglycemia as well as discussing what she will need upon discharge (which is approaching).    The above has been created using voice recognition software. Please be aware that this may unintentionally  produce inaccuracies and/or nonsensical sentences.      Electronically signed by: Suraj Robledo CNP

## 2021-06-19 NOTE — LETTER
Letter by Leatha Ordonez MD at      Author: Leatha Ordonez MD Service: -- Author Type: --    Filed:  Encounter Date: 9/6/2019 Status: (Other)         Patient: Ruddy Mendoza   MR Number: [id number]   YOB: 1955   Date of Visit: 9/6/2019     [facility]      Facility:    [facility] [[id number]]    Code Status: DNR   [facility]     8/30 to 9/3/19    Chief Complaint/Reason for Visit:  Chief Complaint   Patient presents with   ? H & P     dizziness / falls       HPI:   Ruddy is a 64 y.o. female with a history of diabetes type 2 peripheral neuropathy and retinopathy, emphysema,  coronary artery disease ( stents in 2004, 2015), history of both cervical and ovarian cancer, hypothyroidism (currently not on replacement therapy) depression /anxiety , frequent UTIs, trigeminal neuralgia for which she is on carbamazepine    She had been having 4-5 days of  severe dizziness, causing nausea, vomiting, unstable gait. She had fallen 6 times trying to get up. She couldn't get to the kitchen to eat, didn't take her medications.  She also had right  lower facial numbness, and ataxia of her right arm. Denied any alcohol intake during these events.    She was brought into the hospital: Her sugars were in the 500 range.  Her white count was normal, lactate = 2.2, creatinine increased equal 1.28 (baseline 0.81).        She had not been taking her insulin because of her severe vertigo, she could not get up to get it.  She has not had a recent A1c, last check = 16.4 in October 2017.  Prior to getting sick, she was taking 40 units of Lantus, sliding scale, and metformin.  A1c  in the hospital =9.4%.. She was aggressively hydrated with normal saline, was covered with a lower dose of Lantus and sliding scale insulin.  Metformin was held due to her renal issues.  Her sodium was low but it was felt to be secondary to her elevated sugars.    Her acute kidney injury was due to  significant dehydration, it improved back to normal range with fluids.    CT of the head was negative for acute changes.  MRI was done out of concern for cerebellar stroke with her falling and unstable gait.  MRI negative for acute stroke, positive for chronic infarcts of the cerebellum.  MRI of the cervical spine: Negative for cord issues high sugars were thought to be possible.  Neurology was consulted, recommended thiamine 100 mg daily.  There was no clear etiology found for her neurologic symptoms.  TSH and free T4 were checked were normal, vitamin B12 was normal, orthostatic pressures were negative.  Vestibular PT was recommended.  Should dizziness continue, it was suggested a Tegretol level be checked.    She did have back pain, which is similar to her onset of symptoms with UTIs.  She usually does not have dysuria, suprapubic pain.  Urinalysis and urine culture were positive, E. coli pansensitive.  He had been on ceftriaxone in the hospital, was discharged on nitrofurantoin for 5 days after discharge.        Past Medical History:  Past Medical History:   Diagnosis Date   ? Acute encephalopathy 09/23/2017   ? Acute hypokalemia 09/23/2017   ? Acute hyponatremia 09/23/2017   ? Acute pyelonephritis 09/23/2017   ? SOPHIA (acute kidney injury) (H)    ? Anxiety    ? CAD (coronary artery disease)    ? COPD (chronic obstructive pulmonary disease) (H)    ? Depression    ? Diabetes mellitus (H) 09/23/2017   ? Diabetic peripheral neuropathy (H) 9/4/2019   ? History of cervical cancer    ? Hypertension    ? Sepsis (H) 09/23/2017   ? Trigeminal neuralgia            Surgical History:  Past Surgical History:   Procedure Laterality Date   ? CORONARY STENT PLACEMENT      TWO   ? HERNIA REPAIR     ? TOTAL ABDOMINAL HYSTERECTOMY W/ BILATERAL SALPINGOOPHORECTOMY      Cervical cancer   ? VASCULAR SURGERY      Left wrist       Family History:   Family History   Problem Relation Age of Onset   ? COPD Mother    ? COPD Father    ? Lung  cancer Father      + Every family member has had hypertension, emphysema type COPD, many with CHF.  Her father had peptic ulcer disease, and a TIA    Denies diabetes in the family    Social History:    Social History     Socioeconomic History   ? Marital status: Single     Spouse name: Not on file   ? Number of children: 4 sons; one  SIDS   ? Years of education: Not on file   ? Highest education level: Not on file   Occupational History   ? Not on file   Social Needs   ? Financial resource strain: Not on file   ? Food insecurity:     Worry: Not on file     Inability: Not on file   ? Transportation needs:     Medical: Not on file     Non-medical: Not on file   Tobacco Use   ? Smoking status: Former Smoker     Packs/day: 2.00     Years: 30.00     Pack years: 60.00     Types: Cigarettes   ? Smokeless tobacco: Never Used   ? Tobacco comment: quit at age 42   Substance and Sexual Activity   ? Alcohol use: Yes     Comment: Rare   ? Drug use: No   ? Sexual activity: Not Currently     Partners: Male   Lifestyle   ? Physical activity:     Days per week: Not on file     Minutes per session: Not on file   ? Stress: Not on file   Relationships   ? Social connections:     Talks on phone: Not on file     Gets together: Not on file     Attends [employer] service: Not on file     Active member of club or organization: Not on file     Attends meetings of clubs or organizations: Not on file     Relationship status: Not on file   ? Intimate partner violence:     Fear of current or ex partner: Not on file     Emotionally abused: Not on file     Physically abused: Not on file     Forced sexual activity: Not on file   Other Topics Concern   ? Not on file   Social History Narrative    Lives at home alone with her cat, Kailyn          Review of Systems   Still a little bit dizzy, especially with standing or turning her body, not turning her neck  She got dizzy riding the stationary bike in physical therapy  She feels quite depressed, is  losing weight, not eating much at home, although her appetite is increased at the [facility].  The years ago she was on an antidepressant she has no idea of the name.  She has chronic insomnia has tried melatonin, she is been on trazodone but it does not seem to be too effective.  She notices more difficulty with her memory and speech, she believes it secondary to bumping her head in the fall  Her knees seem to give out on her at home, she is fallen several times  She was diagnosed with diabetes at age 39: She checks her sugars generally twice a day at home, they can go in the 300 range for most of her checks, will occasionally it has been low at 120, occasionally it has gone up to 400 when she feels facial flushing.  She was discharged with carbohydrate counting, she does not know how to do this.  She has diabetic retinopathy with past bleeds, in addition to macular degeneration.  Does not have peripheral vision, has field cuts.  Requires a large print, cannot really see the TV, listens to it.  She had been babysitting her granddaughter, but now her son is not going to allow it  The remainder of the comprehensive review of systems is negative    Vitals:    09/06/19 1100   BP: 125/70   Pulse: 78   Resp: 16   Temp: 97.5  F (36.4  C)   SpO2: 98%       Physical Exam   Constitutional: She is oriented to person, place, and time. She appears distressed.   Fatigued appearing middle-aged  female   HENT:   Nose: Nose normal.   Mouth/Throat: Oropharynx is clear and moist.   Eyes: Conjunctivae and EOM are normal. No scleral icterus.   Decreased vision per her report   Cardiovascular: Regular rhythm and normal heart sounds.   No murmur heard.  Pulmonary/Chest: Breath sounds normal. She has no wheezes. She has no rales.   Abdominal: Bowel sounds are normal. There is no rebound.   Musculoskeletal: Normal range of motion. She exhibits no edema or tenderness.   Lymphadenopathy:     She has no cervical adenopathy.   Neurological:  She is alert and oriented to person, place, and time.   Right arm appears to be accurate, no ataxia  Bit unstable with standing  No nystagmus with standing   Skin: Skin is warm and dry. No rash noted.   Scabs on both kneecaps right greater than left   Psychiatric: She has a normal mood and affect. Her behavior is normal. Thought content normal.     PHQ9 = 18        The 4 days of glucoses were reviewed from her TCU stay thus far: 7 AM glucoses are great (88-1 10)  Noon: Still in an okay range at 145, 166  Suppertime sugars: 150-215  9 PM sugars: Consistently in the 300 range      Medication List:  Current Outpatient Medications   Medication Sig   ? acetaminophen (TYLENOL) 325 MG tablet Take 650 mg by mouth every 6 (six) hours as needed for pain or fever.          ? amitriptyline (ELAVIL) 150 MG tablet Take 150 mg by mouth at bedtime.   ? aspirin 325 MG tablet Take 325 mg by mouth daily.   ? B complex-vitamin C-folic acid 0.8 mg Tab Take 1 tablet by mouth daily.          ? biotin 10,000 mcg TbDL Take 10,000 mcg by mouth 2 (two) times a day.   ? busPIRone (BUSPAR) 10 MG tablet Take 20 mg by mouth daily.   ? carBAMazepine (TEGRETOL) 200 mg tablet Take 400 mg by mouth 2 (two) times a day.   ? diphenhydrAMINE (UNISOM SLEEPGELS) 50 MG capsule Take 50 mg by mouth at bedtime as needed for sleep.          ? docusate sodium (COLACE) 100 MG capsule Take 100 mg by mouth daily.   ? ferrous sulfate 65 mg elemental iron Take 1 tablet by mouth 2 (two) times a day with meals.   ? insulin aspart (NOVOLOG FLEXPEN) 100 unit/mL injection pen Inject under the skin 3 (three) times a day before meals. Sliding scale based on blood sugars.         ? insulin glargine (LANTUS) 100 unit/mL vial Inject 35 Units under the skin every morning.   ? lisinopril (PRINIVIL,ZESTRIL) 10 MG tablet Take 10 mg by mouth daily.   ? metFORMIN (GLUCOPHAGE) 500 MG tablet Take 500 mg by mouth 2 (two) times a day with meals.          ? metoprolol succinate  (TOPROL-XL) 25 MG Take 25 mg by mouth daily.          ? nitrofurantoin, macrocrystal-monohydrate, (MACROBID) 100 MG capsule Take 1 capsule (100 mg total) by mouth 2 (two) times a day for 5 days.   ? nitroglycerin (NITROSTAT) 0.4 MG SL tablet Place 0.4 mg under the tongue every 5 (five) minutes as needed for chest pain.   ? NOVOLOG FLEXPEN U-100 INSULIN 100 unit/mL (3 mL) injection pen Check blood sugar four (4) times daily.  11.65 Type 2 with hyperglycemia  BD Ultra-fine Noris Pen Needles - NDC [address]-32 - dispense 1 case, refill PRN for 1 year  Accu-chek Guide blood glucose meter - dispense 1  Accu-chek Guide test strips (50 ct. boxes) - dispense 1, refill PRN for 1 year  Accu-chek FastClix lancets (box of 102 ct.) - dispense 1, refill PRN for 1 year   ? rosuvastatin (CRESTOR) 20 MG tablet Take 20 mg by mouth at bedtime.   ? thiamine 100 MG tablet Take 1 tablet (100 mg total) by mouth daily.   ? traZODone (DESYREL) 50 MG tablet Take 150 mg by mouth at bedtime.       Labs:    Ref Range & Units 9/1/19 0546    Sodium 136 - 145 mmol/L 136     Potassium 3.5 - 5.0 mmol/L 4.1     Chloride 98 - 107 mmol/L 104     CO2 22 - 31 mmol/L 26     Anion Gap, Calculation 5 - 18 mmol/L 6     Glucose 70 - 125 mg/dL 103     Calcium 8.5 - 10.5 mg/dL 8.7     BUN 8 - 22 mg/dL 13     Creatinine 0.60 - 1.10 mg/dL 0.76     GFR MDRD Non Af Amer >60 mL/min/1.73m2 >60        Ref Range & Units 8/30/19 1822    Bilirubin, Total 0.0 - 1.0 mg/dL 0.4     Bilirubin, Direct <=0.5 mg/dL 0.1     Protein, Total 6.0 - 8.0 g/dL 6.6     Albumin 3.5 - 5.0 g/dL 3.2Low      Alkaline Phosphatase 45 - 120 U/L 161High      AST 0 - 40 U/L 17     ALT 0 - 45 U/L 14          Ref Range & Units 8/31/19 1247    GGT (Gamma GT) 0 - 50 U/L 216High     Vitamin E    = normal    8/31/19 Ref Range & Units    Copper, Serum/Plasma 80.0 - 155.0 ug/dL 75.8Low         Ref Range & Units 8/31/19 8/30/19     WBC 4.0 - 11.0 thou/uL 5.5  8.8     RBC 3.80 - 5.40 mill/uL 3.02Low    3.28Low      Hemoglobin 12.0 - 16.0 g/dL 9.5Low   10.4Low      Hematocrit 35.0 - 47.0 % 28.6Low   30.8Low      MCV 80 - 100 fL 95  94     MCH 27.0 - 34.0 pg 31.5  31.7     MCHC 32.0 - 36.0 g/dL 33.2  33.8     RDW 11.0 - 14.5 % 14.4  14.4     Platelets 140 - 440 thou/uL 184           Assessment / Plan:    ICD-10-CM    1. Dizziness R42 Mild level soon but still present. ? Check Tegretol   2. Type 2 diabetes mellitus with hyperglycemia, with long-term current use of insulin (H) E11.65 Will increase the Lantus since evening glucoses are consistently high, and add sliding scale Novolog. No carb counts    Z79.4    3. Acute dehydration E86.0 Resolved with IVF, normalization of Cr   4. Diabetic peripheral neuropathy (H) E11.42 Chronic gait issues; also discovered to have chronic cerebellar infarcts. PT/OT   5. Diabetic retinopathy of both eyes associated with type 2 diabetes mellitus, macular edema presence unspecified, unspecified retinopathy severity (H) E11.319 Is quite visually impaired.   6. Acute cystitis without hematuria N30.00 Will complete abx on 9/8   7. Depression, unspecified depression type F32.9 Will start Prozac 20 mg daily with patient input and consent.   8. Insomnia, unspecified type G47.00  DC Benadryl, increase trazodone up to 200 mg   9. Essential hypertension I10 In great range at present. Monitor   10. Coronary artery disease : previous stents without angina pectoris I25.10 Continue medications     She changed her code status = DNR        Electronically signed by: Leatha Ordonez MD

## 2021-06-19 NOTE — LETTER
Letter by Suraj Robledo CNP at      Author: Suraj Robledo CNP Service: -- Author Type: --    Filed:  Encounter Date: 2019 Status: (Other)         Patient: Ruddy Mendoza   MR Number: [id number]   YOB: 1955   Date of Visit: 2019     [facility]    Name:   Ruddy Mendoza  : 1955  Facility:   [facility] [[id number]]   Room:   Code Status: DNR/DNI -   Fac type:   SNF (Skilled Nursing Facility, TCU) -     PCP:  Provider, No Primary Care    CHIEF COMPLAINT / REASON FOR VISIT:  Chief Complaint   Patient presents with   ? Discharge Summary     TCU discharge after hospitalization for a variety of symptoms of unknown etiology, including right facial numbness, severe dizziness, right upper extremity ataxia, and swallowing difficulties.      [facility] from 2019 until 2019 (right facial numbness, severe dizziness, right upper extremity ataxia, swallowing and word finding difficulties)  [facility] from 2019 until 2019 (expected discharge date)    Patient was last seen by me on 2019 and subsequently seen by Dr. Ordonez on 2019.      HPI: Ruddy is a 64 y.o. female with a past medical history of recurrent UTIs and pyelonephritis, diabetes mellitus type 2 (uncontrolled), endocrine induced hypertension, coronary artery disease (status post stenting), and COPD (former smoker) who presented with complaints of severe dizziness, new right lower facial numbness, and ataxia of the right upper extremity for the previous 4 to 5 days prior to admission, with concern for possible stroke, though imaging was negative.  She was also found to be hyperglycemic to the 500s, suffering SOPHIA, and a urinalysis was concerning for UTI on admission.    Etiology of her symptoms was unclear.  Given her history of nausea and vomiting, falls, and unstable gait, the main concern was for a cerebellar stroke;  however, an MRI was without any findings of acute stroke, although it did show some chronic infarcts of the cerebellum.  Consider with her hyperglycemia or if hypotensive, a watershed type phenomenon.  Hypoglycemia may have been the primary cause of this, although it sounded as though it was secondary to her ability to take medications due to her vertigo.  Her report of worsening dizziness with change in position could support orthostatic hypotension, although this was later also deemed to be negative.  She does have diabetic peripheral neuropathy, but one would not expect this to cause a sudden vertiginous syndrome.  Unlikely BPPV, given no symptoms with head movements.  She denied any recent substance alcohol use, though consideration was given to Warnicke's type encephalopathy with her need for a wide gait.    Neurology consult: Neurology was consulted and followed her while inpatient, recommending starting thiamine 100 mg daily and considering a Tegretol level outpatient if vertigo was not resolving or worsening.    Urinary tract infection: As noted, she has history of multiple UTIs and pyelonephritis.  She had back pain and stated it was consistent with her history of UTIs.  However, she denies any dysuria, hematuria, or suprapubic pain, but did state that she did not usually have these when she had a UTI.  She did report CVA tenderness on admission.  The UA did show some nitrites, bacteria, white cells, and she received ceftriaxone in the ER.  UC was positive for pansensitive E. coli, and she was treated for 5 days with nitrofurantoin.    Diabetes mellitus type 2/hyperglycemia: She had significant hyperglycemia on admission into the 500s.  However, she stated she was unable to take her usual home medications due to her vertigo/dizziness.  At the time, her last known A1c was 16.4 in October 2017.  In the hospital, it was 9.4.  She is on Lantus insulin every morning and also receives metformin and NovoLog per  "sliding scale.      TCU ISSUES    Depression: When last seen, she told me she felt like crying all weekend.  I explained that she was started on fluoxetine on 09/06/2019, and it does take a good month or so for it to show benefit. We did spend some time talking about her depression, and we also reviewed her recent blood glucose levels.  With a recent change in metformin dosing from Dr. Ordonez (see below), we have opted not to make any changes at this time.    Primary diagnosis: Asked why she is here, she stated, \"I need help with my balance, and I need help to learn how to use a walker, and any help with memory.  I fell 6 times in 4 days and ended up in the hospital.  I was super, super dizzy and had no balance.\"  She does feel that her dizziness is improving, although she still has trouble with corners.  She recently got a walker.  Today, she is blaming, for dizziness on a developing head cold.  Note that her facial numbness has resolved.    She tells me that in therapy the plan is to check her balance without holding onto the walker, and she is somewhat worried about that.    COPD: She was on a Ventolin HFA inhaler at home, taking it 2 or 3 times per day, and we added that.    Dysphagia/word finding difficulty: She was also claiming to have word finding difficulty and difficulty with some swallowing certain meats.  They apparently did a bedside swallow study in the hospital and found nothing wrong.  Nonetheless, we did authorize speech therapy.  There was not felt to be any need to work with her.  Nonetheless, she plans on having a speech therapy -- due to word finding difficulty -- upon discharge.  Yesterday, she did not feel this way but has on other days.    Diabetes mellitus type 2: Blood glucose levels have been elevated (particularly at night), although when I last visited with her, there was insufficient data.  She did, as noted above, have some quite high A1c's.  She told me that at home she would check " her blood glucose levels in the morning, and if it was good, she may only check it once more during the day.  In fact, she had a fasting blood glucose level of 53 on 09/10/2019.      Dr. Ordonez discontinued carb count-based NovoLog, increased her glargine from 35 units to 45 units every morning on 09/06/2019, and set parameters for sliding scale coverage.  We cut that back to 42 units due to some low blood glucose levels.  On 09/13/2019, Dr. Ordonez increased her metformin from 500 mg twice daily to 750 mg twice daily and also discontinued sliding scale coverage.    With some low blood glucose levels lately (symptomatic at 74 this morning despite having peanut butter and crackers before bed), we are going to decrease her glargine from 42 units to 38 units.    Diabetic polyneuropathy: She has tingling in the hands and feet.    Other issues not addressed but discussed: She has very poor vision (5/200) with macular degeneration, early diabetic retinopathy, and some hemorrhaging.  [facility] [employer] [facility].  She did not think it helped, and it was exceedingly difficult to read [place] given her neuropathy.    Medication changes: Dr. Ordonez also discontinued her buspirone and Unisom and increased her trazodone to 200 mg nightly, also adding fluoxetine 20 mg nightly for depression.  She still tells me she wakes up a lot during the night.    Code status: In the hospital, she was full code.  A signed a POLST here indicates that she is now DNR/DNI (also opposed to intubation, as she watched her mother go through it).     Discharge planning: She is expected to discharge on Friday, 09/20/2019.  She will have home services with [facility], including speech as well as other services.  She is somewhat nervous about discharge, and we spent some time talking about this as well.      ROS: Last seen, she was complaining of a terrible headache (and looked ill).  Any headaches this bad are  rare.  She did not feel it was a migraine, because when she does have one, she experiences photophobia.  She has been taking Excedrin Migraine (APAP-ASA-caffeine), but she does not find it very beneficial.  No complaints ofchest pains, coughing or congestion, nausea or vomiting, dyspnea, dysuria.  She did not sleep well the first night and is still waking up a lot.  Fortunately, her headache is gone.    Past Medical History:   Diagnosis Date   ? Acute encephalopathy 09/23/2017   ? Acute hypokalemia 09/23/2017   ? Acute hyponatremia 09/23/2017   ? Acute pyelonephritis 09/23/2017   ? SOPHIA (acute kidney injury) (H)    ? Anxiety    ? CAD (coronary artery disease)    ? COPD (chronic obstructive pulmonary disease) (H)    ? Depression    ? Diabetes mellitus (H) 09/23/2017   ? Diabetic peripheral neuropathy (H) 9/4/2019   ? History of cervical cancer    ? Hypertension    ? Sepsis (H) 09/23/2017   ? Trigeminal neuralgia               Family History   Problem Relation Age of Onset   ? COPD Mother    ? COPD Father    ? Lung cancer Father      Social History     Socioeconomic History   ? Marital status: Single     Spouse name: Not on file   ? Number of children: Not on file   ? Years of education: Not on file   ? Highest education level: Not on file   Occupational History   ? Not on file   Social Needs   ? Financial resource strain: Not on file   ? Food insecurity:     Worry: Not on file     Inability: Not on file   ? Transportation needs:     Medical: Not on file     Non-medical: Not on file   Tobacco Use   ? Smoking status: Former Smoker     Packs/day: 2.00     Years: 30.00     Pack years: 60.00     Types: Cigarettes   ? Smokeless tobacco: Never Used   ? Tobacco comment: quit at age 42   Substance and Sexual Activity   ? Alcohol use: Yes     Comment: Rare   ? Drug use: No   ? Sexual activity: Not Currently     Partners: Male   Lifestyle   ? Physical activity:     Days per week: Not on file     Minutes per session: Not on file    ? Stress: Not on file   Relationships   ? Social connections:     Talks on phone: Not on file     Gets together: Not on file     Attends Congregational service: Not on file     Active member of club or organization: Not on file     Attends meetings of clubs or organizations: Not on file     Relationship status: Not on file   ? Intimate partner violence:     Fear of current or ex partner: Not on file     Emotionally abused: Not on file     Physically abused: Not on file     Forced sexual activity: Not on file   Other Topics Concern   ? Not on file   Social History Narrative    Lives at home alone with her catKailyn       MEDICATIONS: Reviewed from the MAR, physician orders, and/or earlier progress notes.  Updated by me today (09/18/2019) with a decrease in glargine insulin reflected below.  Post Discharge Medication Reconciliation Status: medication reconciliation previously completed during another office visit.  Current Outpatient Medications   Medication Sig   ? acetaminophen (TYLENOL) 325 MG tablet Take 650 mg by mouth every 6 (six) hours as needed for pain or fever.          ? albuterol (PROAIR HFA;PROVENTIL HFA;VENTOLIN HFA) 90 mcg/actuation inhaler Inhale 2 puffs 3 (three) times a day as needed for wheezing.   ? amitriptyline (ELAVIL) 150 MG tablet Take 150 mg by mouth at bedtime.   ? aspirin 325 MG tablet Take 325 mg by mouth daily.   ? B complex-vitamin C-folic acid 0.8 mg Tab Take 1 tablet by mouth daily.          ? biotin 10,000 mcg TbDL Take 10,000 mcg by mouth 2 (two) times a day.   ? carBAMazepine (TEGRETOL) 200 mg tablet Take 400 mg by mouth 2 (two) times a day.   ? docusate sodium (COLACE) 100 MG capsule Take 100 mg by mouth daily.   ? ferrous sulfate 65 mg elemental iron Take 1 tablet by mouth 2 (two) times a day with meals.   ? FLUoxetine (PROZAC) 20 MG capsule Take 20 mg by mouth daily.   ? insulin glargine (LANTUS) 100 unit/mL vial Inject 35 Units under the skin every morning. (Patient taking  "differently: Inject 38 Units under the skin every morning.       )   ? lisinopril (PRINIVIL,ZESTRIL) 10 MG tablet Take 10 mg by mouth daily.   ? metFORMIN (GLUCOPHAGE) 500 MG tablet Take 750 mg by mouth 2 (two) times a day with meals.          ? metoprolol succinate (TOPROL-XL) 25 MG Take 25 mg by mouth daily.          ? nitroglycerin (NITROSTAT) 0.4 MG SL tablet Place 0.4 mg under the tongue every 5 (five) minutes as needed for chest pain.   ? NOVOLOG FLEXPEN U-100 INSULIN 100 unit/mL (3 mL) injection pen Check blood sugar four (4) times daily.  11.65 Type 2 with hyperglycemia  BD Ultra-fine Noris Pen Needles - NDC [address]-04 - dispense 1 case, refill PRN for 1 year  Accu-chek Guide blood glucose meter - dispense 1  Accu-chek Guide test strips (50 ct. boxes) - dispense 1, refill PRN for 1 year  Accu-chek FastClix lancets (box of 102 ct.) - dispense 1, refill PRN for 1 year   ? rosuvastatin (CRESTOR) 20 MG tablet Take 20 mg by mouth at bedtime.   ? thiamine 100 MG tablet Take 1 tablet (100 mg total) by mouth daily.   ? traZODone (DESYREL) 50 MG tablet Take 200 mg by mouth at bedtime.            ALLERGIES:   Allergies   Allergen Reactions   ? Propranolol Anaphylaxis     Slowed HR   ? Atorvastatin      Hand neuropathy   ? Prochlorperazine Edisylate Rash     nightmares     DIET: Cardiac/diabetic, regular texture, thin liquids.    Vitals:    09/18/19 1525   BP: 160/72   Pulse: 82   Resp: 18   Temp: 98.4  F (36.9  C)   SpO2: 97%   Weight: 169 lb 3.2 oz (76.7 kg)   Height: 5' 3\" (1.6 m)   Questionable weight gain of 7.4 pounds.  Body mass index is 29.97 kg/m .    EXAMINATION:   General: Fairly pleasant middle-aged female, sitting on her bed, in no apparent distress.  Head: Normocephalic and atraumatic.   Eyes: PERRLA, sclerae clear.   ENT: Moist oral mucosa.  She is edentulous with full upper and lower dentures.  No rhinorrhea or nasal discharge.  She has complete hearing loss in the right ear and chronic " tinnitus.  Cardiovascular: Sinus tachycardia with a 2/6 systolic ejection murmur at the left sternal border.  Respiratory: Lungs clear to auscultation bilaterally.   Abdomen: Soft and nontender.   Musculoskeletal/Extremities: Age-related degenerative joint disease.   Integument: No rashes, clinically significant lesions, or skin breakdown.   Cognitive/Psychiatric: Alert and oriented x3.  Affect is euthymic.    DIAGNOSTICS:   Results for orders placed or performed during the hospital encounter of 08/30/19   Basic Metabolic Panel   Result Value Ref Range    Sodium 136 136 - 145 mmol/L    Potassium 4.1 3.5 - 5.0 mmol/L    Chloride 104 98 - 107 mmol/L    CO2 26 22 - 31 mmol/L    Anion Gap, Calculation 6 5 - 18 mmol/L    Glucose 103 70 - 125 mg/dL    Calcium 8.7 8.5 - 10.5 mg/dL    BUN 13 8 - 22 mg/dL    Creatinine 0.76 0.60 - 1.10 mg/dL    GFR MDRD Af Amer >60 >60 mL/min/1.73m2    GFR MDRD Non Af Amer >60 >60 mL/min/1.73m2     Lab Results   Component Value Date    WBC 5.5 08/31/2019    HGB 9.5 (L) 08/31/2019    HCT 28.6 (L) 08/31/2019    MCV 95 08/31/2019     09/03/2019     CrCl cannot be calculated (Patient's most recent lab result is older than the maximum 5 days allowed.).  Lab Results   Component Value Date    HGBA1C 9.4 (H) 08/31/2019       ASSESSMENT/Plan:      ICD-10-CM    1. Dizziness R42    2. Type 2 diabetes mellitus with hyperglycemia, with long-term current use of insulin (H) E11.65     Z79.4    3. Pulmonary emphysema, unspecified emphysema type (H) J43.9    4. Diabetic peripheral neuropathy (H) E11.42    5. Coronary artery disease : previous stents without angina pectoris I25.10    6. Depression, unspecified depression type F32.9    7. Gait disturbance R26.9    8. Hypertension due to endocrine disorder I15.2    9. Hearing loss of right ear, unspecified hearing loss type H91.91    10. Tinnitus of right ear H93.11      DISCHARGE PLAN/FACE TO FACE:  I certify that this patient is under my care and that  I had a face-to-face encounter that meets the physician face-to-face encounter requirements with this patient.     Date of Face-to-Face Encounter: 09/18/2019     I certify that, based on my findings, the following services are medically necessary home health services: Home speech therapy, Home PT, and home OT    My clinical findings support the need for the above skilled services because: (Please write a brief narrative summary that describes what the RN, PT, SLP, or other services will be doing in the home. A list of diagnoses in this section does not meet the CMS requirements): Continuing with these services in the home setting.    This patient is homebound because: (Please write a brief narrative summmary describing the functional limitations as to why this patient is homebound and specifically what makes this patient homebound.):  The above services necessarily need to be performed in the home to be of benefit.    The patient is, or has been, under my care and I have initiated the establishment of the plan of care. This patient will be followed by a physician who will periodically review the plan of care.  Initial follow-up should be within 7-10 days.    Approximate time spent with this patient was greater than 30 minutes with greater than 50% spent in discussions regarding services required upon discharge.    The above has been created using voice recognition software. Please be aware that this may unintentionally  produce inaccuracies and/or nonsensical sentences.      Electronically signed by: Suraj Robledo CNP

## 2021-06-19 NOTE — LETTER
Letter by Suraj Robledo CNP at      Author: Suraj Robledo CNP Service: -- Author Type: --    Filed:  Encounter Date: 2019 Status: (Other)         Patient: Ruddy Mendoza   MR Number: [id number]   YOB: 1955   Date of Visit: 2019     [facility]    Name:   Ruddy Mendoza  : 1955  Facility:   [facility] [[id number]]   Room:   Code Status: DNR -   Fac type:   SNF (Skilled Nursing Facility, TCU) -     PCP:  Provider, No Primary Care    CHIEF COMPLAINT / REASON FOR VISIT:  Chief Complaint   Patient presents with   ? Follow-up     TCU follow-up after hospitalization for a variety of symptoms of unknown etiology, including right facial numbness, severe dizziness, right upper extremity ataxia, and swallowing difficulties.      [facility] from 2019 until 2019 (right facial numbness, severe dizziness, right upper extremity ataxia, swallowing and word finding difficulties)      HPI: Ruddy is a 64 y.o. female with a past medical history of recurrent UTIs and pyelonephritis, diabetes mellitus type 2 (uncontrolled), endocrine induced hypertension, coronary artery disease (status post stenting), and COPD (former smoker) who presented with complaints of severe dizziness, new right lower facial numbness, and ataxia of the right upper extremity for the previous 4 to 5 days prior to admission, with concern for possible stroke, though imaging was negative.  She was also found to be hyperglycemic to the 500s, suffering SOPHIA, and a urinalysis was concerning for UTI on admission.    Etiology of her symptoms was unclear.  Given her history of nausea and vomiting, falls, and unstable gait, the main concern was for a cerebellar stroke; however, an MRI was without any findings of acute stroke, although it did show some chronic infarcts of the cerebellum.  Consider with her hyperglycemia or if hypotensive, a watershed type  "phenomenon.  Hypoglycemia may have been the primary cause of this, although it sounded as though it was secondary to her ability to take medications due to her vertigo.  Her report of worsening dizziness with change in position could support orthostatic hypotension, although this was later also deemed to be negative.  She does have diabetic peripheral neuropathy, but one would not expect this to cause a sudden vertiginous syndrome.  Unlikely BPPV, given no symptoms with head movements.  She denied any recent substance alcohol use, though consideration was given to Warnicke's type encephalopathy with her need for a wide gait.    Neurology consult: Neurology was consulted and followed her while inpatient, recommending starting thiamine 100 mg daily and considering a Tegretol level outpatient if vertigo was not resolving or worsening.    Urinary tract infection: As noted, she has history of multiple UTIs and pyelonephritis.  She had back pain and stated it was consistent with her history of UTIs.  However, she denies any dysuria, hematuria, or suprapubic pain, but did state that she did not usually have these when she had a UTI.  She did report CVA tenderness on admission.  The UA did show some nitrites, bacteria, white cells, and she received ceftriaxone in the ER.  UC was positive for pansensitive E. coli, and she was treated for 5 days with nitrofurantoin.    Diabetes mellitus type 2/hyperglycemia: She had significant hyperglycemia on admission into the 500s.  However, she stated she was unable to take her usual home medications due to her vertigo/dizziness.  At the time, her last known A1c was 16.4 in October 2017.  In the hospital, it was 9.4.  She is on 35 units of Lantus insulin every morning and also receives metformin and NovoLog per sliding scale.      TCU ISSUES    Primary diagnosis: Asked why she is here, she stated, \"I need help with my balance, and I need help to learn how to use a walker, and any help " with memory.  I fell 6 times in 4 days and ended up in the hospital.  I was super, super dizzy and had no balance.  She does feel that her dizziness is improving, although she still has trouble with corners.  She recently got a walker.    COPD: She tells me she was on a Ventolin HFA inhaler at home, taking it 2 or 3 times per day.  We are adding that.    Dysphagia/word finding difficulty: She was also claiming to have word finding difficulty and difficulty with some swallowing certain meats.  They apparently did a bedside swallow study in the hospital and found nothing wrong.  Nonetheless, we did authorize speech therapy.  There was not felt to be any need to work with her.    Diabetes mellitus type 2: Blood glucose levels have been elevated (particularly at night), although when I last visited with her, there was insufficient data.  She did, as noted above, have some quite high A1c's.  She told me that at home she would check her blood glucose levels in the morning, and if it was good, she may only check it once more during the day.  She tells me her fasting blood glucose level this morning was 78.  She ate most of her breakfast, and then began to feel sick.    Dr. Ordonez discontinued carb count-based NovoLog, increased her glargine from 35 units to 45 units every morning, and set parameters for sliding scale coverage.    Diabetic polyneuropathy: She has tingling in the hands and feet.    Medication changes: Dr. Ordonez also discontinued her buspirone and Unisom and increased her trazodone to 200 mg nightly, also adding fluoxetine 20 mg nightly for depression.    Code status: In the hospital, she was full code.  A signed a POLST here indicates that she is now DNR.       ROS: Today, she is complaining of a terrible headache (and looks ill).  Any headaches this bad or rare.  She does not feel it is a migraine, because when she does have one, she experiences photophobia.  She has been taking Excedrin Migraine  (APAP-ASA-caffeine), but she does not find it very beneficial.  No complaints ofchest pains, coughing or congestion, nausea or vomiting, dyspnea, dysuria.  She did not sleep well the first night.    Past Medical History:   Diagnosis Date   ? Acute encephalopathy 09/23/2017   ? Acute hypokalemia 09/23/2017   ? Acute hyponatremia 09/23/2017   ? Acute pyelonephritis 09/23/2017   ? SOPHIA (acute kidney injury) (H)    ? Anxiety    ? CAD (coronary artery disease)    ? COPD (chronic obstructive pulmonary disease) (H)    ? Depression    ? Diabetes mellitus (H) 09/23/2017   ? Diabetic peripheral neuropathy (H) 9/4/2019   ? History of cervical cancer    ? Hypertension    ? Sepsis (H) 09/23/2017   ? Trigeminal neuralgia               Family History   Problem Relation Age of Onset   ? COPD Mother    ? COPD Father    ? Lung cancer Father      Social History     Socioeconomic History   ? Marital status: Single     Spouse name: Not on file   ? Number of children: Not on file   ? Years of education: Not on file   ? Highest education level: Not on file   Occupational History   ? Not on file   Social Needs   ? Financial resource strain: Not on file   ? Food insecurity:     Worry: Not on file     Inability: Not on file   ? Transportation needs:     Medical: Not on file     Non-medical: Not on file   Tobacco Use   ? Smoking status: Former Smoker     Packs/day: 2.00     Years: 30.00     Pack years: 60.00     Types: Cigarettes   ? Smokeless tobacco: Never Used   ? Tobacco comment: quit at age 42   Substance and Sexual Activity   ? Alcohol use: Yes     Comment: Rare   ? Drug use: No   ? Sexual activity: Not Currently     Partners: Male   Lifestyle   ? Physical activity:     Days per week: Not on file     Minutes per session: Not on file   ? Stress: Not on file   Relationships   ? Social connections:     Talks on phone: Not on file     Gets together: Not on file     Attends Jainism service: Not on file     Active member of club or  organization: Not on file     Attends meetings of clubs or organizations: Not on file     Relationship status: Not on file   ? Intimate partner violence:     Fear of current or ex partner: Not on file     Emotionally abused: Not on file     Physically abused: Not on file     Forced sexual activity: Not on file   Other Topics Concern   ? Not on file   Social History Narrative    Lives at home alone with her catKailyn       MEDICATIONS: Reviewed from the MAR, physician orders, and/or earlier progress notes.  Updated by me today (09/09/2019) with the addition of Ventolin HFA inhaler reflected below.  Dr. Ordonez's increase in glargine was also addressed.  Post Discharge Medication Reconciliation Status: medication reconciliation previously completed during another office visit.  Current Outpatient Medications   Medication Sig   ? albuterol (PROAIR HFA;PROVENTIL HFA;VENTOLIN HFA) 90 mcg/actuation inhaler Inhale 2 puffs 3 (three) times a day as needed for wheezing.   ? acetaminophen (TYLENOL) 325 MG tablet Take 650 mg by mouth every 6 (six) hours as needed for pain or fever.          ? amitriptyline (ELAVIL) 150 MG tablet Take 150 mg by mouth at bedtime.   ? aspirin 325 MG tablet Take 325 mg by mouth daily.   ? B complex-vitamin C-folic acid 0.8 mg Tab Take 1 tablet by mouth daily.          ? biotin 10,000 mcg TbDL Take 10,000 mcg by mouth 2 (two) times a day.   ? carBAMazepine (TEGRETOL) 200 mg tablet Take 400 mg by mouth 2 (two) times a day.   ? docusate sodium (COLACE) 100 MG capsule Take 100 mg by mouth daily.   ? ferrous sulfate 65 mg elemental iron Take 1 tablet by mouth 2 (two) times a day with meals.   ? FLUoxetine (PROZAC) 20 MG capsule Take 20 mg by mouth daily.   ? insulin aspart (NOVOLOG FLEXPEN) 100 unit/mL injection pen Inject under the skin 3 (three) times a day before meals. 150-200 = 2 units  201-250= 4units  251-300= 6 units  301-350= 8 units  >350 = 10 units         ? insulin glargine (LANTUS) 100  "unit/mL vial Inject 35 Units under the skin every morning. (Patient taking differently: Inject 45 Units under the skin every morning.       )   ? lisinopril (PRINIVIL,ZESTRIL) 10 MG tablet Take 10 mg by mouth daily.   ? metFORMIN (GLUCOPHAGE) 500 MG tablet Take 500 mg by mouth 2 (two) times a day with meals.          ? metoprolol succinate (TOPROL-XL) 25 MG Take 25 mg by mouth daily.          ? nitroglycerin (NITROSTAT) 0.4 MG SL tablet Place 0.4 mg under the tongue every 5 (five) minutes as needed for chest pain.   ? NOVOLOG FLEXPEN U-100 INSULIN 100 unit/mL (3 mL) injection pen Check blood sugar four (4) times daily.  11.65 Type 2 with hyperglycemia  BD Ultra-fine Noris Pen Needles - NDC [address]-48 - dispense 1 case, refill PRN for 1 year  Accu-chek Guide blood glucose meter - dispense 1  Accu-chek Guide test strips (50 ct. boxes) - dispense 1, refill PRN for 1 year  Accu-chek FastClix lancets (box of 102 ct.) - dispense 1, refill PRN for 1 year   ? rosuvastatin (CRESTOR) 20 MG tablet Take 20 mg by mouth at bedtime.   ? thiamine 100 MG tablet Take 1 tablet (100 mg total) by mouth daily.   ? traZODone (DESYREL) 50 MG tablet Take 200 mg by mouth at bedtime.            ALLERGIES:   Allergies   Allergen Reactions   ? Propranolol Anaphylaxis     Slowed HR   ? Atorvastatin      Hand neuropathy   ? Prochlorperazine Edisylate Rash     nightmares     DIET: Cardiac/diabetic, regular texture, thin liquids.    Vitals:    09/09/19 1737   BP: 132/79   Pulse: 84   Resp: 18   Temp: 97.6  F (36.4  C)   SpO2: 97%   Weight: 161 lb 12.8 oz (73.4 kg)   Height: 5' 3\" (1.6 m)     Body mass index is 28.66 kg/m .    EXAMINATION:   General: Fairly pleasant middle-aged female, sitting at the breakfast table, in no apparent distress.  Head: Normocephalic and atraumatic.   Eyes: PERRLA, sclerae clear.   ENT: Moist oral mucosa.  She is edentulous with full upper and lower dentures.  No rhinorrhea or nasal discharge.  She has complete hearing " loss in the right ear and chronic tinnitus.  Cardiovascular: Regular rate and rhythm.   Respiratory: Lungs clear to auscultation bilaterally.   Abdomen: Soft and nontender.   Musculoskeletal/Extremities: Age-related degenerative joint disease.   Integument: No rashes, clinically significant lesions, or skin breakdown.   Cognitive/Psychiatric: Alert and oriented x3.  Affect is euthymic.    DIAGNOSTICS:   Results for orders placed or performed during the hospital encounter of 08/30/19   Basic Metabolic Panel   Result Value Ref Range    Sodium 136 136 - 145 mmol/L    Potassium 4.1 3.5 - 5.0 mmol/L    Chloride 104 98 - 107 mmol/L    CO2 26 22 - 31 mmol/L    Anion Gap, Calculation 6 5 - 18 mmol/L    Glucose 103 70 - 125 mg/dL    Calcium 8.7 8.5 - 10.5 mg/dL    BUN 13 8 - 22 mg/dL    Creatinine 0.76 0.60 - 1.10 mg/dL    GFR MDRD Af Amer >60 >60 mL/min/1.73m2    GFR MDRD Non Af Amer >60 >60 mL/min/1.73m2     Lab Results   Component Value Date    WBC 5.5 08/31/2019    HGB 9.5 (L) 08/31/2019    HCT 28.6 (L) 08/31/2019    MCV 95 08/31/2019     09/03/2019     CrCl cannot be calculated (Patient's most recent lab result is older than the maximum 5 days allowed.).  Lab Results   Component Value Date    HGBA1C 9.4 (H) 08/31/2019       ASSESSMENT/Plan:      ICD-10-CM    1. Dizziness R42    2. Right facial numbness R20.0    3. Type 2 diabetes mellitus with hyperglycemia, with long-term current use of insulin (H) E11.65     Z79.4    4. Pulmonary emphysema, unspecified emphysema type (H) J43.9    5. Diabetic peripheral neuropathy (H) E11.42    6. Gait disturbance R26.9    7. Hypertension due to endocrine disorder I15.2    8. Hearing loss of right ear, unspecified hearing loss type H91.91    9. Tinnitus of right ear H93.11      CHANGES:    Ventolin HFA inhaler 3 times daily as needed.    CARE PLAN:    The care plan has been reviewed and all orders signed. Changes to care plan, if any, as noted. Otherwise, continue current plan  of care.      The above has been created using voice recognition software. Please be aware that this may unintentionally  produce inaccuracies and/or nonsensical sentences.      Electronically signed by: Suraj Robledo CNP

## 2021-06-20 NOTE — LETTER
Letter by Suraj Robledo CNP at      Author: Suraj Robledo CNP Service: -- Author Type: --    Filed:  Encounter Date: 2020 Status: (Other)         Patient: Ruddy Mendoza   MR Number: [id number]   YOB: 1955   Date of Visit: 2020     [facility]    Name:   Ruddy Mendoza  : 1955  Facility:   [facility] [[id number]]   Room:   Code Status: DNI and POLST AVAILABLE - Limited treatment (CPR and meds okay)  Fac type:   SNF (Skilled Nursing Facility, TCU) -     PCP:  Dawit Aviles MD    CHIEF COMPLAINT / REASON FOR VISIT:  Chief Complaint   Patient presents with   ? Follow-up     TCU follow-up after hospitalization following a fall resulting in facial fractures, as well as discovery of a pulmonary embolism.      [facility] from 2019 until 2019 (right facial numbness, severe dizziness, right upper extremity ataxia, swallowing and word finding difficulties)  [facility] from 2019 until 2019  [facility] from 2020 until 2020 (right proximal humerus fracture and hyperglycemia with SOPHIA)   [facility] from 2020 until 2020 (facial fractures and pulmonary embolism)    Patient was last seen by me on 2020.      HPI: Ruddy is a 64 y.o. female with a past medical history of recurrent UTIs and pyelonephritis, diabetes mellitus type 2 (poorly controlled - A1c 16.4 in 10/2017 and 9.4 in 2020 - with multiple related complications, including peripheral neuropathy, nephropathy, and retinopathy), endocrine induced hypertension, coronary artery disease (status post stenting x2), COPD (former smoker), and alcoholism who has been here multiple times in the past, involving stroke-like symptoms and right proximal humerus fracture along with hyperglycemia and SOPHIA.    Based on EMS reports prior to her last hospitalization, she appeared to live in a hoarder home.  She  was found on the the floor of her bedroom with a hole in the wall, and the patient later reported old broken shelving falling, although in the ED she could not remember what occurred.  She did note drinking multiple drinks of straight vodka that night, often doing this intermittently, and did not want her children to know.  There has been concern for alcoholism and possibly Warnicke's encephalopathy.     She was admitted on 07/2428 after presenting to the ED for evaluation of recurrent falls (x4).  She had been seen at [facility] ED on 06/27/2024 fall and was discharged home with nitrofurantoin for urinary tract infection, along with home health care.  There was no loss of consciousness, dizziness, vision changes, or room spinning sensation reported.  She expressed uncertainty as to why she kept falling, but she did report difficulty ambulating at home and getting up after her fall.  Her first fall happened while getting off the toilet, the second in the shower where she hit her head, the third while getting out of her chair, and the last while walking home from the store.  Paramedics were called to her house multiple times.  The patient denied any pain or headaches and reported feeling fine.    In the ED, she was tachycardic with a pulse of 103.  Labs were notable for a hemoglobin of 10.3, hematocrit 31.9, and calcium 8.2.  CT of the head showed partial visualized air-fluid level in the left maxillary sinus with hyperdense material new from 06/08/2020 (possibly representing layering hemorrhage from an occult facial fracture).  A CT of the chest showed acute PE in the left lower lobe and mild emphysema.  ECG showed NSR.  She received IVF and admitted for further management.     A maxillofacial CT confirmed mildly displaced fractures of the left inferior orbital rim and anterior and posterior lateral left maxillary sinus walls.  This injury occurred when go to the bathroom in a dark house walking into the the  "door.  She is legally blind.  A CT of the chest was positive for small burden PE.  Lower extremity venous Dopplers were positive for DVT involving the right calf, although she never had pain there.  She was started on enoxaparin and transitioned to Eliquis 10 mg twice daily for 1 week, followed by 5 mg twice daily thereafter.  An echocardiogram was performed that revealed no evidence for right heart strain.    ENT was consulted with respect to the facial fractures, and no surgical intervention was indicated.  She was subsequently transferred to [facility] with therapy.       TCU ISSUES    Disposition: She had a care conference today, and assisted living (due to frequent falls) was discussed.  During my visit with her, she was crying.  She stated that all of falls occurred in 1 day and that the doctors blamed blood clots and neuropathy.  She is quite distressed.  She currently lives in a 1 bedroom apartment in a senior citizen disability building.  She tells me the people to go into assisted living \"are in really bad shape.\"  I tried to emphasize that assisted living was pretty much à la carte, and she would have a good deal of independence, only receiving assistance where needed.  She also told me that she does not have money for AL.      Facial fractures: She is experiencing facial pain on the left, especially at the sinuses.  There is also some numbness.  Ibuprofen and acetaminophen were completely ineffective.  Her oxycodone does not entirely remove the pain, but it \"takes the edge off.\"      It hurts a bit more today since she has been crying.    Pulmonary embolism and DVT: On Eliquis.  She still has some pain with deep inspiration, so she tries to breathe shallowly.    Diabetes mellitus type 2: Receiving metformin, glimepiride, Invokana, and sliding scale aspart.  Note that blood glucose levels had been under rather poor control.  Her A1c on 08/31/2019 was 9.4.  Recent blood glucose levels are mostly in the low " "to mid 100s.  All in all, they are only occasionally giving a single unit of insulin with some meals.  We will consider discontinuing sliding scale, especially prior to discharge.    Right shoulder pain: She did have a fractured right proximal humerus in March 2020.  Currently, pain is worse, although there was no radiographic evidence of any reinjury.  Still, she is quite uncomfortable.  Pain seems to be at its worst first thing in the morning and in the middle of the night.  For the facial pain and for this, we ordered oxycodone 5 mg every 6 hours as needed (see Facial fractures above).  She told me that she can, for the first time in a while, put her socks on herself.  She has an appointment with the orthopedic surgeon for her right shoulder on 08/13/2020.      Hypertension: Still mildly elevated (pain related?), she is receiving lisinopril and metoprolol succinate.    Cognition:  During her last stay, there was some concern of cognitive impairment/encephalopathy.  During her stay here in September 2019, she did have some word finding difficulty but otherwise scored well in cognitive testing.  When last here, according to Occupational Therapy, she scored only 16/30 on the SLUMS, indicating moderate cognitive deficits.  SLP did work with her as well on cognition and word finding difficulty during her last stay.    Depression and anxiety: Major depressive disorder (with anxiety) has been ongoing for some time.  She was started on 20 mg of fluoxetine on 09/06/2019, and the dosing has not been changed.  She also receives BuSpar (20 mg daily).  She may also be self-medicating with alcohol.    Insomnia: She tells me she can sleep with \"a lot of medication.\"  She does awaken at 4 AM and stays up after that.    COPD: Has inhalers.    Diabetic polyneuropathy: Tingling in the hands and feet, though worse in the feet.    Trigeminal neuralgia: Taking carbamazepine for this, and it seems to be under control.  She describes " "the pain as \"phantom earaches.\"      ROS: No  chest pains, coughing or congestion, dizziness or dyspnea, dysuria, integumentary issues.  Sleep has always been a problem, but she is doing okay on her current sleep medication regimen.    Past Medical History:   Diagnosis Date   ? Acute encephalopathy 09/23/2017   ? Acute hypokalemia 09/23/2017   ? Acute hyponatremia 09/23/2017   ? Acute pulmonary embolism (H)    ? Acute pyelonephritis 09/23/2017   ? SOPHIA (acute kidney injury) (H)    ? Anxiety    ? CAD (coronary artery disease)    ? Closed fracture of facial bone (H)    ? Closed fracture of right humerus    ? COPD (chronic obstructive pulmonary disease) (H)    ? Depression    ? Diabetes mellitus (H) 09/23/2017   ? Diabetic peripheral neuropathy (H) 9/4/2019   ? History of cervical cancer    ? HLD (hyperlipidemia)    ? Hypertension    ? Hypothyroidism    ? Insomnia    ? Normocytic anemia    ? Peripheral neuropathy    ? Sensorineural hearing loss (SNHL) of right ear with tinnitus 9/4/2019   ? Sepsis (H) 09/23/2017   ? Trigeminal neuralgia               Family History   Problem Relation Age of Onset   ? COPD Mother    ? COPD Father    ? Lung cancer Father      Social History     Socioeconomic History   ? Marital status: Single     Spouse name: Not on file   ? Number of children: Not on file   ? Years of education: Not on file   ? Highest education level: Not on file   Occupational History   ? Not on file   Social Needs   ? Financial resource strain: Not on file   ? Food insecurity     Worry: Not on file     Inability: Not on file   ? Transportation needs     Medical: Not on file     Non-medical: Not on file   Tobacco Use   ? Smoking status: Former Smoker     Packs/day: 2.00     Years: 30.00     Pack years: 60.00     Types: Cigarettes   ? Smokeless tobacco: Never Used   ? Tobacco comment: quit at age 42   Substance and Sexual Activity   ? Alcohol use: Yes     Comment: Rare   ? Drug use: No   ? Sexual activity: Not Currently "     Partners: Male   Lifestyle   ? Physical activity     Days per week: Not on file     Minutes per session: Not on file   ? Stress: Not on file   Relationships   ? Social connections     Talks on phone: Not on file     Gets together: Not on file     Attends Holiness service: Not on file     Active member of club or organization: Not on file     Attends meetings of clubs or organizations: Not on file     Relationship status: Not on file   ? Intimate partner violence     Fear of current or ex partner: Not on file     Emotionally abused: Not on file     Physically abused: Not on file     Forced sexual activity: Not on file   Other Topics Concern   ? Not on file   Social History Narrative    Lives at home alone with her catKailyn       MEDICATIONS: Reviewed from the MAR, physician orders, and/or earlier progress notes.    Post Discharge Medication Reconciliation Status: medication reconciliation previously completed during another office visit.    Current Outpatient Medications   Medication Sig   ? acetaminophen (TYLENOL) 500 MG tablet Take 1,000 mg by mouth 3 (three) times a day.    ? albuterol (PROAIR HFA;PROVENTIL HFA;VENTOLIN HFA) 90 mcg/actuation inhaler Inhale 2 puffs 4 (four) times a day as needed for wheezing.    ? amitriptyline (ELAVIL) 150 MG tablet Take 150 mg by mouth at bedtime.   ? apixaban ANTICOAGULANT (ELIQUIS) 5 mg Tab tablet Take 5 mg by mouth 2 (two) times a day.   ? aspirin 81 mg chewable tablet Chew 81 mg daily.   ? budesonide-formoteroL (SYMBICORT) 160-4.5 mcg/actuation inhaler Inhale 2 puffs 2 (two) times a day.   ? busPIRone (BUSPAR) 10 MG tablet Take 20 mg by mouth at bedtime.   ? canagliflozin (INVOKANA) 100 mg Tab Take 100 mg by mouth daily before breakfast.   ? carBAMazepine (TEGRETOL) 200 mg tablet Take 400 mg by mouth 2 (two) times a day.   ? doxylamine (UNISOM) 25 mg tablet Take 25 mg by mouth at bedtime.   ? ferrous sulfate 65 mg elemental iron Take 1 tablet by mouth daily with  breakfast.    ? FLUoxetine (PROZAC) 20 MG capsule Take 20 mg by mouth daily.   ? glimepiride (AMARYL) 1 MG tablet Take 1 mg by mouth daily before breakfast.   ? ibuprofen (ADVIL,MOTRIN) 200 MG tablet Take 200 mg by mouth every 4 (four) hours as needed for pain or mild pain (1-3).   ? insulin aspart U-100 (NOVOLOG) 100 unit/mL injection Inject under the skin 3 (three) times a day with meals. Per sliding scale: if 0 - 69 = 0 UNITS SEEHYPOGLYCEMIA PROTOCOL; 70 - 149 = 0 UNITSNO INSULIN, GIVE PRANDIAL INSULIN IFORDERED; 150 - 199 = 1 UNIT; 200 - 249 = 2UNITS; 250 - 299 = 3 UNITS; 300 - 349 = 4 UNITS;350 - 399 = 5 UNITS; 400+ = 6 UNITS IF  ORGREATER, GIVE 6 UNITS AND CALL MD/NP   ? levothyroxine (SYNTHROID, LEVOTHROID) 50 MCG tablet Take 50 mcg by mouth daily.   ? lisinopril (PRINIVIL,ZESTRIL) 10 MG tablet Take 10 mg by mouth daily.   ? melatonin 10 mg Tab Take 10 mg by mouth at bedtime.   ? metFORMIN (GLUCOPHAGE-XR) 750 MG 24 hr tablet Take 750 mg by mouth 2 (two) times a day with meals.    ? metoprolol succinate (TOPROL-XL) 25 MG Take 25 mg by mouth daily.    ? mirtazapine (REMERON) 15 MG tablet Take 15 mg by mouth daily.   ? multivit with min-folic acid 0.4 mg Tab Take 1 tablet by mouth daily.   ? naltrexone (DEPADE) 50 mg tablet Take 50 mg by mouth daily.   ? nitroglycerin (NITROSTAT) 0.4 MG SL tablet Place 0.4 mg under the tongue every 5 (five) minutes as needed for chest pain.   ? oxyCODONE (ROXICODONE) 5 MG immediate release tablet TAKE 1 TABLET BY MOUTH EVERY 6 HOURS AS NEEDED FOR FACIAL, RIGHT SHOULDER PAIN   ? polyethylene glycol (MIRALAX) 17 gram packet Take 17 g by mouth daily.   ? rosuvastatin (CRESTOR) 20 MG tablet Take 20 mg by mouth at bedtime.   ? traZODone (DESYREL) 50 MG tablet Take 150 mg by mouth at bedtime.      ALLERGIES:   Allergies   Allergen Reactions   ? Propranolol Anaphylaxis     Slowed HR   ? Atorvastatin      Hand neuropathy   ? Prochlorperazine Edisylate Rash     nightmares     DIET:  "Consistent carbohydrates, regular texture, thin liquids.    Vitals:    08/05/20 1606   BP: 132/90   Pulse: 86   Resp: 18   Temp: 98.2  F (36.8  C)   SpO2: 97%   Weight: 173 lb 12.8 oz (78.8 kg)   Height: 5' 3\" (1.6 m)   She is down 11.6 pounds since her last stay.  Body mass index is 30.79 kg/m .    EXAMINATION:   General: Fairly pleasant middle-aged female, sitting comfortably, in no apparent distress.  Head: Normocephalic and atraumatic.  External visual signs of her facial fractures are not in evidence.  Eyes: PERRLA, sclerae clear.   ENT: Moist oral mucosa.  She is edentulous with full upper and lower dentures.  No rhinorrhea or nasal discharge.  She has complete hearing loss in the right ear and chronic tinnitus there.  Cardiovascular: Previously: Regular rate and rhythm with a short 2/6 systolic ejection murmur at the left sternal border.  Chest not auscultated today due to ongoing COVID-19 precautions.  Respiratory: Previously: Lungs clear to auscultation bilaterally.  Chest not auscultated today due to ongoing COVID-19 precautions.  Abdomen: Soft and nontender.   Musculoskeletal/Extremities: Trace swelling in the feet.  Integument:  No rashes, clinically significant lesions, or skin breakdown.   Cognitive/Psychiatric: Alert and oriented x3, although she scored 16/30 on the SLUMS during an earlier stay.  Affect is rather flat.    DIAGNOSTICS:   Results for orders placed or performed during the hospital encounter of 08/30/19   Basic Metabolic Panel   Result Value Ref Range    Sodium 136 136 - 145 mmol/L    Potassium 4.1 3.5 - 5.0 mmol/L    Chloride 104 98 - 107 mmol/L    CO2 26 22 - 31 mmol/L    Anion Gap, Calculation 6 5 - 18 mmol/L    Glucose 103 70 - 125 mg/dL    Calcium 8.7 8.5 - 10.5 mg/dL    BUN 13 8 - 22 mg/dL    Creatinine 0.76 0.60 - 1.10 mg/dL    GFR MDRD Af Amer >60 >60 mL/min/1.73m2    GFR MDRD Non Af Amer >60 >60 mL/min/1.73m2     Lab Results   Component Value Date    WBC 5.5 08/31/2019    HGB 9.5 " (L) 08/31/2019    HCT 28.6 (L) 08/31/2019    MCV 95 08/31/2019     09/03/2019     CrCl cannot be calculated (Patient's most recent lab result is older than the maximum 10 days allowed.).  Lab Results   Component Value Date    HGBA1C 9.4 (H) 08/31/2019       ASSESSMENT/Plan:      ICD-10-CM    1. Closed fracture of left side of maxilla with routine healing, subsequent encounter  S02.40DD    2. Other acute pulmonary embolism without acute cor pulmonale (H)  I26.99    3. Chronic right shoulder pain  M25.511     G89.29    4. 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 Episode of recurrent major depressive disorder, unspecified depression episode severity (H)  F33.9    6. Diabetic nephropathy associated with type 2 diabetes mellitus (H)  E11.21    7. Diabetic peripheral neuropathy (H)  E11.42    8. Pulmonary emphysema, unspecified emphysema type (H)  J43.9    9. Sensorineural hearing loss (SNHL) of right ear, unspecified hearing status on contralateral side  H90.5    10. Hypertension due to endocrine disorder  I15.2    11. Coronary artery disease : previous stents without angina pectoris  I25.10    12. Anemia of chronic disease  D63.8    13. Trigeminal neuralgia  G50.0      CHANGES:  None.    CARE PLAN:  The care plan has been reviewed and all orders signed.  Changes to care plan, if any, as noted.  Otherwise, continue current plan of care.        The above has been created using voice recognition software. Please be aware that this may unintentionally  produce inaccuracies and/or nonsensical sentences.      Electronically signed by: Suraj Robledo, CNP

## 2021-06-20 NOTE — LETTER
Letter by Suraj Robledo CNP at      Author: Suraj Robledo CNP Service: -- Author Type: --    Filed:  Encounter Date: 3/23/2020 Status: (Other)         Patient: Ruddy Mendoza   MR Number: [id number]   YOB: 1955   Date of Visit: 3/23/2020     [facility]    Name:   Ruddy Mendoza  : 1955  Facility:   [facility] [[id number]]   Room:   Code Status: FULL CODE -   Fac type:   SNF (Skilled Nursing Facility, TCU) -     PCP:  Dawit Aviles MD    CHIEF COMPLAINT / REASON FOR VISIT:  Chief Complaint   Patient presents with   ? Follow-up     TCU follow-up after hospitalization for right proximal humerus fracture, s/p ORIF.      [facility] from 2019 until 2019 (right facial numbness, severe dizziness, right upper extremity ataxia, swallowing and word finding difficulties)  [facility]CU from 2019 until 2019  [facility] from 2020 until 2020 (right proximal humerus fracture)        HPI: Ruddy is a 64 y.o. female with a past medical history of recurrent UTIs and pyelonephritis, diabetes mellitus type 2 (uncontrolled), endocrine induced hypertension, coronary artery disease (status post stenting), and COPD (former smoker) who, prior to her last stay in the TCU, had presented with complaints of severe dizziness, new right lower facial numbness, and ataxia of the right upper extremity for the previous 4 to 5 days prior to admission, with concern for possible stroke, though imaging was negative.  She was also found to be hyperglycemic to the 500s, suffering SOPHIA, and a urinalysis was concerning for UTI on admission.    Etiology of her symptoms was unclear.  Given her history of nausea and vomiting, falls, and unstable gait, the main concern was for a cerebellar stroke; however, an MRI was without any findings of acute stroke, although it did show some chronic infarcts of the cerebellum.   "Consider with her hyperglycemia or if hypotensive, a watershed type phenomenon.  Hypoglycemia may have been the primary cause of this, although it sounded as though it was secondary to her ability to take medications due to her vertigo.  Her report of worsening dizziness with change in position could support orthostatic hypotension, although this was later also deemed to be negative.  She does have diabetic peripheral neuropathy, but one would not expect this to cause a sudden vertiginous syndrome.  Unlikely BPPV, given no symptoms with head movements.  She denied any recent substance alcohol use, though consideration was given to Warnicke's type encephalopathy with her need for a wide gait, and neurology did recommend initiating thiamine therapy.  At the time, she was also treated for a urinary tract infection (pansensitive E. Coli).  She also had significant hyperglycemia on admission into the 500s.  However, she stated she was unable to take her usual home medications due to her vertigo/dizziness.  At the time, her last known A1c was 16.4 in October 2017.  In the hospital, it was 9.4.      More recently, she fell at home.  Based on EMS reports, it appeared to be a hoarder home.  She was found on the the floor of her bedroom with a hole in the wall, and the patient later reported old broken shelving falling, although in the ED she could not remember what occurred.  She did note drinking multiple drinks of straight vodka that night, often does this intermittently, and did not want her children to know this.  As noted previously, there has been some concern for alcoholism and Warnicke's encephalopathy.      TCU ISSUES    Humerus fracture: Considerable pain, unrelieved on the current regimen of oxycodone 5 mg every 4 hours as needed.  We are going to try scheduling 10 mg 3 times daily and 5 mg every 4 hours as needed.  She did tell me that hydromorphone and morphine made her feel \"weird.\"  She tells me that her pain " "is worse in the mornings.    Cognition: As noted, there is some concern of cognitive impairment/encephalopathy.  When last here in September 2019, she did have some word finding difficulty then but otherwise scored well in cognitive testing.  According to Occupational Therapy, she scored only 16/30 on the SLUMS, indicating moderate cognitive deficits.    When last here, she stated, \"I need help with my balance, and I need help to learn how to use a walker, and any help with memory.  I fell 6 times in 4 days and ended up in the hospital.  I was super, super dizzy and had no balance.\"      Depression: Major depressive disorder (with anxiety) has been ongoing for some time.  She was started on fluoxetine on 09/06/2019, and the dosing has not been changed.  She may be self-medicating with alcohol, as she did acknowledge having multiple drinks of straight vodka the day of her fall.    From what I have heard, she had been taking care of her grandchildren prior to her last hospitalization, and since then, the children are now going to .  I believe she was told that if she could not take care of herself, she could not take care of the grandchildren.  I believe this has upset her greatly.    She also admits to being depressed that the  in her building committed suicide.  There were no indications that he was depressed, and he left no note.    When last here, Dr. Ordonez discontinued her buspirone, also adding fluoxetine 20 mg nightly for depression.  She seems to be back on the former, continuing the latter.    COPD: She is on Symbicort and Ventolin HFA inhalers at home.    Diabetes mellitus type 2: She is currently on a lower dose of glargine than she had been at home.  Current dosing is 30 units every morning.  At home, she had been on 35 units.  In addition to the glargine, she is getting 5 units of scheduled aspart with each meal and at bedtime.    It is too soon to evaluate current blood glucose " levels, and she has not been here long enough.  They range between 116 and 223.  She has had, as noted above, some quite high A1cs.  When last here, she told me that at home she would check her blood glucose levels in the morning, and if it was good, she may only check it once more during the day.     Diabetic polyneuropathy: She has tingling in the hands and feet.    Trigeminal neuralgia: She is taking carbamazepine for this.  It seems to be under control.    Other issues not addressed but discussed: She has very poor vision (5/200) with macular degeneration, early diabetic retinopathy, and some hemorrhaging.  She did attend [employer] at the [facility] for a while.  She did not think it helped, and it was exceedingly difficult to read Braille, given her neuropathy.    Code status: Prior to her last stay, she was full code in the hospital.  A signed a POLST here indicated that she was now DNR/DNI (also opposed to intubation, as she watched her mother go through it).  Once again, she is full code.    Medication changes: Currently with orders for 1000 mg of acetaminophen 4 times daily, we will need to decrease the dose to 3 times daily.      ROS: Biggest complaint is that of right arm pain.  She claims pain of 9/10.  I doubt that it is that high, although she is certainly uncomfortable.  She may be getting a little constipated, although she does have scheduled MiraLAX ordered.  No headaches or chest pains, coughing or congestion, nausea or vomiting, dyspnea, dysuria, integumentary issues.  Sleep is always been a problem.  She is currently on both melatonin and trazodone.    Past Medical History:   Diagnosis Date   ? Acute encephalopathy 09/23/2017   ? Acute hypokalemia 09/23/2017   ? Acute hyponatremia 09/23/2017   ? Acute pyelonephritis 09/23/2017   ? SOPHIA (acute kidney injury) (H)    ? Anxiety    ? CAD (coronary artery disease)    ? COPD (chronic obstructive pulmonary disease) (H)    ? Depression    ?  Diabetes mellitus (H) 09/23/2017   ? Diabetic peripheral neuropathy (H) 9/4/2019   ? History of cervical cancer    ? Hypertension    ? Sensorineural hearing loss (SNHL) of right ear with tinnitus 9/4/2019   ? Sepsis (H) 09/23/2017   ? Trigeminal neuralgia               Family History   Problem Relation Age of Onset   ? COPD Mother    ? COPD Father    ? Lung cancer Father      Social History     Socioeconomic History   ? Marital status: Single     Spouse name: Not on file   ? Number of children: Not on file   ? Years of education: Not on file   ? Highest education level: Not on file   Occupational History   ? Not on file   Social Needs   ? Financial resource strain: Not on file   ? Food insecurity     Worry: Not on file     Inability: Not on file   ? Transportation needs     Medical: Not on file     Non-medical: Not on file   Tobacco Use   ? Smoking status: Former Smoker     Packs/day: 2.00     Years: 30.00     Pack years: 60.00     Types: Cigarettes   ? Smokeless tobacco: Never Used   ? Tobacco comment: quit at age 42   Substance and Sexual Activity   ? Alcohol use: Yes     Comment: Rare   ? Drug use: No   ? Sexual activity: Not Currently     Partners: Male   Lifestyle   ? Physical activity     Days per week: Not on file     Minutes per session: Not on file   ? Stress: Not on file   Relationships   ? Social connections     Talks on phone: Not on file     Gets together: Not on file     Attends [employer] service: Not on file     Active member of club or organization: Not on file     Attends meetings of clubs or organizations: Not on file     Relationship status: Not on file   ? Intimate partner violence     Fear of current or ex partner: Not on file     Emotionally abused: Not on file     Physically abused: Not on file     Forced sexual activity: Not on file   Other Topics Concern   ? Not on file   Social History Narrative    Lives at home alone with her catKailyn       MEDICATIONS: Reviewed from the MAR, physician  orders, and/or earlier progress notes.    Post Discharge Medication Reconciliation Status: discharge medications reconciled and changed, per note/orders (see AVS).  Updated by me today (03/23/2020) with an increase in oxycodone and decrease in acetaminophen reflected below.  Other medication changes since her last stay here were also updated.  Current Outpatient Medications   Medication Sig   ? acetaminophen (TYLENOL) 500 MG tablet Take 1,000 mg by mouth 3 (three) times a day.   ? budesonide-formoteroL (SYMBICORT) 160-4.5 mcg/actuation inhaler Inhale 2 puffs 2 (two) times a day.   ? busPIRone (BUSPAR) 10 MG tablet Take 20 mg by mouth at bedtime.   ? ibuprofen (ADVIL,MOTRIN) 200 MG tablet Take 200 mg by mouth every 4 (four) hours as needed for pain or mild pain (1-3).   ? melatonin 10 mg Tab Take 10 mg by mouth at bedtime.   ? oxyCODONE (ROXICODONE) 5 MG immediate release tablet Take 5 mg by mouth every 4 (four) hours as needed for pain (Also receiving scheduled dosing.).   ? oxyCODONE (ROXICODONE) 5 MG immediate release tablet Take 10 mg by mouth every 8 (eight) hours. Also has PRN dosing available.   ? polyethylene glycol (MIRALAX) 17 gram packet Take 17 g by mouth daily.   ? senna-docusate (SENNOSIDES-DOCUSATE SODIUM) 8.6-50 mg tablet Take 1 tablet by mouth 2 (two) times a day as needed for constipation.   ? albuterol (PROAIR HFA;PROVENTIL HFA;VENTOLIN HFA) 90 mcg/actuation inhaler Inhale 2 puffs 3 (three) times a day as needed for wheezing.   ? amitriptyline (ELAVIL) 150 MG tablet Take 150 mg by mouth at bedtime.   ? aspirin 325 MG tablet Take 325 mg by mouth daily.   ? B complex-vitamin C-folic acid 0.8 mg Tab Take 1 tablet by mouth daily.          ? biotin 10,000 mcg TbDL Take 10,000 mcg by mouth 2 (two) times a day.   ? carBAMazepine (TEGRETOL) 200 mg tablet Take 400 mg by mouth 2 (two) times a day.   ? ferrous sulfate 65 mg elemental iron Take 1 tablet by mouth daily with breakfast.    ? FLUoxetine (PROZAC)  20 MG capsule Take 20 mg by mouth daily.   ? insulin glargine (LANTUS) 100 unit/mL vial Inject 35 Units under the skin every morning. (Patient taking differently: Inject 30 Units under the skin every morning. )   ? lisinopril (PRINIVIL,ZESTRIL) 10 MG tablet Take 10 mg by mouth daily.   ? metFORMIN (GLUCOPHAGE) 500 MG tablet Take 750 mg by mouth 2 (two) times a day with meals.          ? metoprolol succinate (TOPROL-XL) 25 MG Take 25 mg by mouth daily.          ? nitroglycerin (NITROSTAT) 0.4 MG SL tablet Place 0.4 mg under the tongue every 5 (five) minutes as needed for chest pain.   ? NOVOLOG FLEXPEN U-100 INSULIN 100 unit/mL (3 mL) injection pen Check blood sugar four (4) times daily.  11.65 Type 2 with hyperglycemia  BD Ultra-fine Noris Pen Needles - NDC [address]-51 - dispense 1 case, refill PRN for 1 year  Accu-chek Guide blood glucose meter - dispense 1  Accu-chek Guide test strips (50 ct. boxes) - dispense 1, refill PRN for 1 year  Accu-chek FastClix lancets (box of 102 ct.) - dispense 1, refill PRN for 1 year (Patient taking differently: Inject 5 Units under the skin. 5 units 4 times daily (AC + bedtime)  11.65 Type 2 with hyperglycemia  BD Ultra-fine Noris Pen Needles - NDC [address]-17 - dispense 1 case, refill PRN for 1 year  Accu-chek Guide blood glucose meter - dispense 1  Accu-chek Guide test strips (50 ct. boxes) - dispense 1, refill PRN for 1 year  Accu-chek FastClix lancets (box of 102 ct.) - dispense 1, refill PRN for 1 year)   ? rosuvastatin (CRESTOR) 20 MG tablet Take 20 mg by mouth at bedtime.   ? thiamine 100 MG tablet Take 1 tablet (100 mg total) by mouth daily.   ? traZODone (DESYREL) 50 MG tablet Take 150 mg by mouth at bedtime.      ALLERGIES:   Allergies   Allergen Reactions   ? Propranolol Anaphylaxis     Slowed HR   ? Atorvastatin      Hand neuropathy   ? Prochlorperazine Edisylate Rash     nightmares     DIET: Diabetic, regular texture, thin liquids.    Vitals:    03/23/20 1342   BP:  "140/74   Pulse: 87   Resp: 18   Temp: (!) 96.2  F (35.7  C)   SpO2: 97%   Weight: 191 lb 12.8 oz (87 kg)   Height: 5' 3\" (1.6 m)   Previous weight when last here was 169.2 pounds.  This is a 22.6 pound weight gain.  Body mass index is 33.98 kg/m .    EXAMINATION:   General: Fairly pleasant middle-aged female, sitting on her bed, clearly in some discomfort.  Head: Normocephalic and atraumatic.   Eyes: PERRLA, sclerae clear.   ENT: Moist oral mucosa.  She is edentulous with full upper and lower dentures.  No rhinorrhea or nasal discharge.  She has complete hearing loss in the right ear and chronic tinnitus.  Cardiovascular: Regular rate and rhythm with a short 2/6 systolic ejection murmur at the left sternal border.  Respiratory: Lungs clear to auscultation bilaterally.   Abdomen: Soft and nontender.   Musculoskeletal/Extremities: Right shoulder is swollen down to the right hand with 2-3+ pitting edema.  Integument: No rashes, clinically significant lesions, or skin breakdown.   Cognitive/Psychiatric: Alert and oriented x3, although she scored 16/30 on the SLUMS.  Affect is euthymic.    DIAGNOSTICS:   Results for orders placed or performed during the hospital encounter of 08/30/19   Basic Metabolic Panel   Result Value Ref Range    Sodium 136 136 - 145 mmol/L    Potassium 4.1 3.5 - 5.0 mmol/L    Chloride 104 98 - 107 mmol/L    CO2 26 22 - 31 mmol/L    Anion Gap, Calculation 6 5 - 18 mmol/L    Glucose 103 70 - 125 mg/dL    Calcium 8.7 8.5 - 10.5 mg/dL    BUN 13 8 - 22 mg/dL    Creatinine 0.76 0.60 - 1.10 mg/dL    GFR MDRD Af Amer >60 >60 mL/min/1.73m2    GFR MDRD Non Af Amer >60 >60 mL/min/1.73m2     Lab Results   Component Value Date    WBC 5.5 08/31/2019    HGB 9.5 (L) 08/31/2019    HCT 28.6 (L) 08/31/2019    MCV 95 08/31/2019     09/03/2019     CrCl cannot be calculated (Patient's most recent lab result is older than the maximum 10 days allowed.).  Lab Results   Component Value Date    HGBA1C 9.4 (H) " 08/31/2019       ASSESSMENT/Plan:      ICD-10-CM    1. Closed displaced comminuted fracture of shaft of right humerus with routine healing  S42.351D    2. Type 2 diabetes mellitus with hyperglycemia, with long-term current use of insulin (H)  E11.65     Z79.4    3. Word finding difficulty  R47.89    4. Episode of recurrent major depressive disorder, unspecified depression episode severity (H)  F33.9    5. Diabetic peripheral neuropathy (H)  E11.42    6. Acquired hypothyroidism  E03.9    7. Sensorineural hearing loss (SNHL) of right ear, unspecified hearing status on contralateral side  H90.5    8. Hyperlipidemia, unspecified hyperlipidemia type  E78.5      CHANGES:  1.  Decrease acetaminophen from 1000 mg 4 times daily to 1000 mg 3 times daily.  2.  Discontinue current oxycodone orders.  New orders: Oxycodone 10 mg scheduled 3 times daily +5 mg every 4 hours as needed.  3.  Okay for SLP to work on cognition and word finding difficulty.    CARE PLAN:  The care plan has been reviewed and all orders signed.  Changes to care plan, if any, as noted.  Otherwise, continue current plan of care.  Total time spent with this patient was approximately 40 minutes, with greater than 50% spent in counseling and coordination of care that included significant information provided by the patient not included in her hospital discharge summary, as well as a conversation with speech therapy (who will be working with her) regarding cognitive and word finding issues.    The above has been created using voice recognition software. Please be aware that this may unintentionally  produce inaccuracies and/or nonsensical sentences.      Electronically signed by: Suraj Robledo CNP

## 2021-06-20 NOTE — LETTER
Letter by Suraj Robledo CNP at      Author: Suraj Robledo CNP Service: -- Author Type: --    Filed:  Encounter Date: 2020 Status: (Other)         Patient: Ruddy Mendoza   MR Number: [id number]   YOB: 1955   Date of Visit: 2020     [facility]    Name:   Ruddy Mendoza  : 1955  Facility:   [facility] [[id number]]   Room:   Code Status: DNI and POLST AVAILABLE - Limited treatment (CPR and meds okay)  Fac type:   SNF (Skilled Nursing Facility, TCU) -     PCP:  Dawit Aviles MD    CHIEF COMPLAINT / REASON FOR VISIT:  Chief Complaint   Patient presents with   ? Follow-up     TCU follow-up after hospitalization following a fall resulting in facial fractures, as well as discovery of a pulmonary embolism.      [facility] from 2019 until 2019 (right facial numbness, severe dizziness, right upper extremity ataxia, swallowing and word finding difficulties)  [facility] from 2019 until 2019  [facility] from 2020 until 2020 (right proximal humerus fracture and hyperglycemia with SOPHIA)   [facility] from 2020 until 2020 (facial fractures and pulmonary embolism)    Patient was last seen by me on 2020.      HPI: Ruddy is a 64 y.o. female with a past medical history of recurrent UTIs and pyelonephritis, diabetes mellitus type 2 (poorly controlled - A1c 16.4 in 10/2017 and 9.4 in 2020 - with multiple related complications, including peripheral neuropathy, nephropathy, and retinopathy), endocrine induced hypertension, coronary artery disease (status post stenting x2), COPD (former smoker), and alcoholism who has been here multiple times in the past, involving stroke-like symptoms and right proximal humerus fracture along with hyperglycemia and SOPHIA.    Based on EMS reports prior to her last hospitalization, she appeared to live in a hoarder home.  She  was found on the the floor of her bedroom with a hole in the wall, and the patient later reported old broken shelving falling, although in the ED she could not remember what occurred.  She did note drinking multiple drinks of straight vodka that night, often doing this intermittently, and did not want her children to know.  There has been concern for alcoholism and possibly Warnicke's encephalopathy.     She was admitted on zero 7/2428 after presenting to the ED for evaluation of recurrent falls (x4).  She had been seen at [facility] ED on 06/27/2024 fall and was discharged home with nitrofurantoin for urinary tract infection, along with home health care.  There was no loss of consciousness, dizziness, vision changes, or room spinning sensation reported.  She expressed uncertainty as to why she kept falling, but she did report difficulty ambulating at home and getting up after her fall.  Her first fall happened while getting off the toilet, the second in the shower where she hit her head, the third while getting out of her chair, and the last while walking home from the store.  Paramedics were called to her house multiple times.  The patient denied any pain or headaches and reported feeling fine.    In the ED, she was tachycardic with a pulse of 103.  Labs were notable for a hemoglobin of 10.3, hematocrit 31.9, and calcium 8.2.  CT of the head showed partial visualized air-fluid level in the left maxillary sinus with hyperdense material new from 06/08/2020 (possibly representing layering hemorrhage from an occult facial fracture).  A CT of the chest showed acute PE in the left lower lobe and mild emphysema.  ECG showed NSR.  She received IVF and admitted for further management.     A maxillofacial CT confirmed mildly displaced fractures of the left inferior orbital rim and anterior and posterior lateral left maxillary sinus walls.  This injury occurred when go to the bathroom in a dark house walking into the  "the door.  She is legally blind.  A CT of the chest was positive for small burden PE.  Lower extremity venous Dopplers were positive for DVT involving the right calf.  She was started on enoxaparin and transitioned to Eliquis 10 mg twice daily for 1 week, followed by 5 mg twice daily thereafter.  An echocardiogram was performed that revealed no evidence for right heart strain.    ENT was consulted with respect to the facial fractures, and no surgical intervention was indicated.  She was subsequently transferred to [facility] with therapy.       TCU ISSUES    Facial fractures: She is experiencing facial pain on the left, especially at the sinuses.  There is also some numbness.  She tells me that \"Tylenol and ibuprofen are just not doing it.\"    Pulmonary embolism and DVT: On Eliquis.  She still has some pain with deep inspiration.    Diabetes mellitus type 2: Receiving metformin, glimepiride, and sliding scale aspart.  Note that blood glucose levels had been under rather poor control.  Her A1c on 08/31/2019 was 9.4.  Currently, fasting blood glucose levels range between 121 and 164.  At lunch, the range is between 132 and 180.  Supper range is between 127 and 169.  The only significantly elevated blood glucose levels are at bedtime, ranging from 172 up to 301.    Right shoulder pain: She did have a fractured right proximal humerus in March 2020.  Currently, pain is worse, although there was no radiographic evidence of any reinjury.  Still, she is quite uncomfortable.  Pain seems to be at its worst first thing in the morning and in the middle of the night.  For the facial pain and for this, we are going to order oxycodone 5 mg every 6 hours as needed.    Hypertension: Still mildly elevated (pain related?), she is receiving lisinopril and metoprolol succinate.    Cognition:  During her last stay, there was some concern of cognitive impairment/encephalopathy.  During her stay here in September 2019, she did have some " "word finding difficulty but otherwise scored well in cognitive testing.  When last here, according to Occupational Therapy, she scored only 16/30 on the SLUMS, indicating moderate cognitive deficits.  SLP did work with her as well on cognition and word finding difficulty during her last stay.    Depression and anxiety: Major depressive disorder (with anxiety) has been ongoing for some time.  She was started on 20 mg of fluoxetine on 09/06/2019, and the dosing has not been changed.  She also receives BuSpar (20 mg daily).  She may also be self-medicating with alcohol.    Insomnia: She tells me she can sleep with \"a lot of medication.\"  She does awaken at 4 AM and stays up after that.    COPD: Has inhalers.    Diabetic polyneuropathy: Tingling in the hands and feet, though worse in the feet.    Trigeminal neuralgia: Taking carbamazepine for this, and it seems to be under control.      ROS: She complains about a poor appetite.  \"Every time I eat, I feel nauseous,\" she noted at my last visit with her.  She denies any vertigo.  No  chest pains, coughing or congestion, dyspnea, dysuria, integumentary issues.  Sleep has always been a problem, but she is doing okay on her current sleep medication regimen.    Past Medical History:   Diagnosis Date   ? Acute encephalopathy 09/23/2017   ? Acute hypokalemia 09/23/2017   ? Acute hyponatremia 09/23/2017   ? Acute pulmonary embolism (H)    ? Acute pyelonephritis 09/23/2017   ? SOPHIA (acute kidney injury) (H)    ? Anxiety    ? CAD (coronary artery disease)    ? Closed fracture of facial bone (H)    ? Closed fracture of right humerus    ? COPD (chronic obstructive pulmonary disease) (H)    ? Depression    ? Diabetes mellitus (H) 09/23/2017   ? Diabetic peripheral neuropathy (H) 9/4/2019   ? History of cervical cancer    ? HLD (hyperlipidemia)    ? Hypertension    ? Hypothyroidism    ? Insomnia    ? Normocytic anemia    ? Peripheral neuropathy    ? Sensorineural hearing loss (SNHL) of " right ear with tinnitus 9/4/2019   ? Sepsis (H) 09/23/2017   ? Trigeminal neuralgia               Family History   Problem Relation Age of Onset   ? COPD Mother    ? COPD Father    ? Lung cancer Father      Social History     Socioeconomic History   ? Marital status: Single     Spouse name: Not on file   ? Number of children: Not on file   ? Years of education: Not on file   ? Highest education level: Not on file   Occupational History   ? Not on file   Social Needs   ? Financial resource strain: Not on file   ? Food insecurity     Worry: Not on file     Inability: Not on file   ? Transportation needs     Medical: Not on file     Non-medical: Not on file   Tobacco Use   ? Smoking status: Former Smoker     Packs/day: 2.00     Years: 30.00     Pack years: 60.00     Types: Cigarettes   ? Smokeless tobacco: Never Used   ? Tobacco comment: quit at age 42   Substance and Sexual Activity   ? Alcohol use: Yes     Comment: Rare   ? Drug use: No   ? Sexual activity: Not Currently     Partners: Male   Lifestyle   ? Physical activity     Days per week: Not on file     Minutes per session: Not on file   ? Stress: Not on file   Relationships   ? Social connections     Talks on phone: Not on file     Gets together: Not on file     Attends [employer] service: Not on file     Active member of club or organization: Not on file     Attends meetings of clubs or organizations: Not on file     Relationship status: Not on file   ? Intimate partner violence     Fear of current or ex partner: Not on file     Emotionally abused: Not on file     Physically abused: Not on file     Forced sexual activity: Not on file   Other Topics Concern   ? Not on file   Social History Narrative    Lives at home alone with her catKailyn       MEDICATIONS: Reviewed from the MAR, physician orders, and/or earlier progress notes.    Post Discharge Medication Reconciliation Status: discharge medications reconciled and changed, per note/orders.  Updated by me  today (07/29/2020) with the addition of oxycodone reflected below.  Current Outpatient Medications   Medication Sig   ? oxyCODONE (ROXICODONE) 5 MG immediate release tablet Take 5 mg by mouth every 6 (six) hours as needed for pain.   ? acetaminophen (TYLENOL) 500 MG tablet Take 1,000 mg by mouth 3 (three) times a day.    ? albuterol (PROAIR HFA;PROVENTIL HFA;VENTOLIN HFA) 90 mcg/actuation inhaler Inhale 2 puffs 4 (four) times a day as needed for wheezing.    ? amitriptyline (ELAVIL) 150 MG tablet Take 150 mg by mouth at bedtime.   ? apixaban ANTICOAGULANT (ELIQUIS) 5 mg Tab tablet Take 5 mg by mouth 2 (two) times a day.   ? aspirin 81 mg chewable tablet Chew 81 mg daily.   ? budesonide-formoteroL (SYMBICORT) 160-4.5 mcg/actuation inhaler Inhale 2 puffs 2 (two) times a day.   ? busPIRone (BUSPAR) 10 MG tablet Take 20 mg by mouth at bedtime.   ? canagliflozin (INVOKANA) 100 mg Tab Take 100 mg by mouth daily before breakfast.   ? carBAMazepine (TEGRETOL) 200 mg tablet Take 400 mg by mouth 2 (two) times a day.   ? doxylamine (UNISOM) 25 mg tablet Take 25 mg by mouth at bedtime.   ? ferrous sulfate 65 mg elemental iron Take 1 tablet by mouth daily with breakfast.    ? FLUoxetine (PROZAC) 20 MG capsule Take 20 mg by mouth daily.   ? glimepiride (AMARYL) 1 MG tablet Take 1 mg by mouth daily before breakfast.   ? ibuprofen (ADVIL,MOTRIN) 200 MG tablet Take 200 mg by mouth every 4 (four) hours as needed for pain or mild pain (1-3).   ? insulin aspart U-100 (NOVOLOG) 100 unit/mL injection Inject under the skin 3 (three) times a day with meals. Per sliding scale: if 0 - 69 = 0 UNITS SEEHYPOGLYCEMIA PROTOCOL; 70 - 149 = 0 UNITSNO INSULIN, GIVE PRANDIAL INSULIN IFORDERED; 150 - 199 = 1 UNIT; 200 - 249 = 2UNITS; 250 - 299 = 3 UNITS; 300 - 349 = 4 UNITS;350 - 399 = 5 UNITS; 400+ = 6 UNITS IF  ORGREATER, GIVE 6 UNITS AND CALL MD/NP   ? levothyroxine (SYNTHROID, LEVOTHROID) 50 MCG tablet Take 50 mcg by mouth daily.   ?  "lisinopril (PRINIVIL,ZESTRIL) 10 MG tablet Take 10 mg by mouth daily.   ? melatonin 10 mg Tab Take 10 mg by mouth at bedtime.   ? metFORMIN (GLUCOPHAGE-XR) 750 MG 24 hr tablet Take 750 mg by mouth 2 (two) times a day with meals.    ? metoprolol succinate (TOPROL-XL) 25 MG Take 25 mg by mouth daily.    ? mirtazapine (REMERON) 15 MG tablet Take 15 mg by mouth daily.   ? multivit with min-folic acid 0.4 mg Tab Take 1 tablet by mouth daily.   ? naltrexone (DEPADE) 50 mg tablet Take 50 mg by mouth daily.   ? nitroglycerin (NITROSTAT) 0.4 MG SL tablet Place 0.4 mg under the tongue every 5 (five) minutes as needed for chest pain.   ? polyethylene glycol (MIRALAX) 17 gram packet Take 17 g by mouth daily.   ? rosuvastatin (CRESTOR) 20 MG tablet Take 20 mg by mouth at bedtime.   ? traZODone (DESYREL) 50 MG tablet Take 150 mg by mouth at bedtime.      ALLERGIES:   Allergies   Allergen Reactions   ? Propranolol Anaphylaxis     Slowed HR   ? Atorvastatin      Hand neuropathy   ? Prochlorperazine Edisylate Rash     nightmares     DIET: Diabetic, regular texture, thin liquids.    Vitals:    07/29/20 1436   BP: 138/83   Pulse: 94   Resp: 18   Temp: 98.3  F (36.8  C)   SpO2: 98%   Weight: 173 lb 12.8 oz (78.8 kg)   Height: 5' 3\" (1.6 m)   She is down 11.6 pounds since her last stay.  Body mass index is 30.79 kg/m .    EXAMINATION:   General: Fairly pleasant middle-aged female, lying in bed, in no apparent distress.  Head: Normocephalic and atraumatic.  Visual signs of her facial fractures are not in evidence.  Eyes: PERRLA, sclerae clear.   ENT: Moist oral mucosa.  She is edentulous with full upper and lower dentures.  No rhinorrhea or nasal discharge.  She has complete hearing loss in the right ear and chronic tinnitus there.  Cardiovascular: Regular rate and rhythm with a short 2/6 systolic ejection murmur at the left sternal border.  Respiratory: Lungs clear to auscultation bilaterally.   Abdomen: Soft and nontender. "   Musculoskeletal/Extremities: Trace swelling in the feet.  Integument:  No rashes, clinically significant lesions, or skin breakdown.   Cognitive/Psychiatric: Alert and oriented x3, although she scored 16/30 on the SLUMS during an earlier stay.  Affect is rather flat.    DIAGNOSTICS:   Results for orders placed or performed during the hospital encounter of 08/30/19   Basic Metabolic Panel   Result Value Ref Range    Sodium 136 136 - 145 mmol/L    Potassium 4.1 3.5 - 5.0 mmol/L    Chloride 104 98 - 107 mmol/L    CO2 26 22 - 31 mmol/L    Anion Gap, Calculation 6 5 - 18 mmol/L    Glucose 103 70 - 125 mg/dL    Calcium 8.7 8.5 - 10.5 mg/dL    BUN 13 8 - 22 mg/dL    Creatinine 0.76 0.60 - 1.10 mg/dL    GFR MDRD Af Amer >60 >60 mL/min/1.73m2    GFR MDRD Non Af Amer >60 >60 mL/min/1.73m2     Lab Results   Component Value Date    WBC 5.5 08/31/2019    HGB 9.5 (L) 08/31/2019    HCT 28.6 (L) 08/31/2019    MCV 95 08/31/2019     09/03/2019     CrCl cannot be calculated (Patient's most recent lab result is older than the maximum 10 days allowed.).  Lab Results   Component Value Date    HGBA1C 9.4 (H) 08/31/2019       ASSESSMENT/Plan:      ICD-10-CM    1. Closed fracture of left side of maxilla with routine healing, subsequent encounter  S02.40DD    2. Other acute pulmonary embolism without acute cor pulmonale (H)  I26.99    3. Chronic right shoulder pain  M25.511     G89.29    4. 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 Episode of recurrent major depressive disorder, unspecified depression episode severity (H)  F33.9    6. Diabetic nephropathy associated with type 2 diabetes mellitus (H)  E11.21    7. Diabetic peripheral neuropathy (H)  E11.42    8. Pulmonary emphysema, unspecified emphysema type (H)  J43.9    9. Sensorineural hearing loss (SNHL) of right ear, unspecified hearing  status on contralateral side  H90.5    10. Hypertension due to endocrine disorder  I15.2    11. Coronary artery disease : previous stents without angina pectoris  I25.10    12. Anemia of chronic disease  D63.8    13. Word finding difficulty  R47.89    14. Trigeminal neuralgia  G50.0      CHANGES:  Oxycodone 5 mg every 6 hours as needed for facial pain and right shoulder pain.    CARE PLAN:  The care plan has been reviewed and all orders signed.  Changes to care plan, if any, as noted.  Otherwise, continue current plan of care.  Total time spent with this patient was approximately 35 minutes, with greater than 50% spent in counseling and coordination of care that included addressing her pain issues and the writing of a prescription for narcotic analgesics.      The above has been created using voice recognition software. Please be aware that this may unintentionally  produce inaccuracies and/or nonsensical sentences.      Electronically signed by: Suraj Robledo CNP

## 2021-06-20 NOTE — LETTER
Letter by Suraj Robledo CNP at      Author: Suraj Robledo CNP Service: -- Author Type: --    Filed:  Encounter Date: 2020 Status: (Other)         Patient: Ruddy Mendoza   MR Number: [id number]   YOB: 1955   Date of Visit: 2020     [facility]    Name:   Ruddy Mendoza  : 1955  Facility:   [facility] [[id number]]   Room:   Code Status: FULL CODE -   Fac type:   SNF (Skilled Nursing Facility, TCU) -     PCP:  Dawit Aviles MD    CHIEF COMPLAINT / REASON FOR VISIT:  Chief Complaint   Patient presents with   ? Follow-up     TCU follow-up after hospitalization for right proximal humerous fracture, s/p ORIF.   ? Problem Visit     Unrelenting pain, elevated blood pressures and low blood glucose levels.      [facility] from 2019 until 2019 (right facial numbness, severe dizziness, right upper extremity ataxia, swallowing and word finding difficulties)  [facility] from 2019 until 2019  [facility] from 2020 until 2020 (right proximal humerus fracture)    Patient was last seen by me on 2020.      HPI: Ruddy is a 64 y.o. female with a past medical history of recurrent UTIs and pyelonephritis, diabetes mellitus type 2 (uncontrolled), endocrine induced hypertension, coronary artery disease (status post stenting), and COPD (former smoker) who, prior to her last stay in the TCU, had presented with complaints of severe dizziness, new right lower facial numbness, and ataxia of the right upper extremity for the previous 4 to 5 days prior to admission, with concern for possible stroke, though imaging was negative.  She was also found to be hyperglycemic to the 500s, suffering SOPHIA, and a urinalysis was concerning for UTI on admission.    Etiology of her symptoms was unclear.  Given her history of nausea and vomiting, falls, and unstable gait, the main concern was for a  cerebellar stroke; however, an MRI was without any findings of acute stroke, although it did show some chronic infarcts of the cerebellum.  Consider with her hyperglycemia or if hypotensive, a watershed type phenomenon.  Hypoglycemia may have been the primary cause of this, although it sounded as though it was secondary to her ability to take medications due to her vertigo.  Her report of worsening dizziness with change in position could support orthostatic hypotension, although this was later also deemed to be negative.  She does have diabetic peripheral neuropathy, but one would not expect this to cause a sudden vertiginous syndrome.  Unlikely BPPV, given no symptoms with head movements.  She denied any recent substance alcohol use, though consideration was given to Warnicke's type encephalopathy with her need for a wide gait, and neurology did recommend initiating thiamine therapy.  At the time, she was also treated for a urinary tract infection (pansensitive E. Coli).  She also had significant hyperglycemia on admission into the 500s.  However, she stated she was unable to take her usual home medications due to her vertigo/dizziness.  At the time, her last known A1c was 16.4 in October 2017.  In the hospital, it was 9.4.      More recently, she fell at home.  Based on EMS reports, it appeared to be a hoarder home.  She was found on the the floor of her bedroom with a hole in the wall, and the patient later reported old broken shelving falling, although in the ED she could not remember what occurred.  She did note drinking multiple drinks of straight vodka that night, often does this intermittently, and did not want her children to know this.  As noted previously, there has been some concern for alcoholism and Warnicke's encephalopathy.      TCU ISSUES    Humerus fracture: During my visit, she has endorsed considerable pain, unrelieved on the current oxycodone regimen.  She was initially receiving 5 mg every 4  "hours as needed.  We tried scheduling 10 mg 3 times daily and 5 mg every 4 hours as needed, and this proved helpful.  She did tell me that hydromorphone and morphine made her feel \"weird.\"  She also stated that her pain is worse in the mornings.  One problem was that she is getting her pain medication late in the morning, and therapy grabs her before it has had a chance to work.  We wrote orders to schedule her first dose rather early (about 6 AM).    She continues to rate her pain \"about a 9,\" and it is interfering with her recovery.  Additionally, it has impacted her appetite, causing weight loss and hypoglycemia.  She even missed therapy one day because of the pain.  She did have her staples removed on 03/30/2020, and the area is now Steri-Stripped.  There is no indication of any infection.  Follow-up x-rays were performed on 03/27/2020, and there were no issues.    Diabetes mellitus type 2/poor appetite/hypoglycemia/weight loss: Currently on a lower dose of glargine than she had been at home, now 30 units every morning.  PTA dose was 35 units.  In addition to the glargine, she is getting 5 units of scheduled aspart with each meal and at bedtime.  She has had hypoglycemic episodes, particularly fasting (64, 77, 63), ostensibly due to poor appetite (down 6 pounds) which may be related to her level of pain.  Low blood glucose levels have been asymptomatic.  At home, she had not been receiving scheduled prandial NovoLog but instead has been managing with a sliding scale.  We talked about eliminating her scheduled dosing but instead decided to make sure that she got a bedtime snack each day.    Her endocrinologist told her about Dexcom G6, where she can monitor her blood glucose levels with her smart phone.  She will be receiving this, as her PCP did take care of prior authorization.    Pain management/hypertension: Blood pressures have been elevated, with systolics in the 150s to 190s, possibly related to her pain " levels.  We are going to increase her metoprolol succinate from 25 mg to 50 mg daily.  We are also going to increase her as needed oxycodone from 5 mg every 4 hours as needed to 10 mg every 4 hours as needed.  We will keep scheduled dosing unchanged.  Note that she has diligently been applying ice.    Cognition: As noted, there has been some concern of cognitive impairment/encephalopathy.  During her stay here in September 2019, she did have some word finding difficulty but otherwise scored well in cognitive testing.  Currently, according to Occupational Therapy, she scored only 16/30 on the SLUMS, indicating moderate cognitive deficits.  SLP is working with her as well on cognition and word finding difficulty.    Depression: Major depressive disorder (with anxiety) has been ongoing for some time.  She was started on fluoxetine on 09/06/2019, and the dosing has not been changed.  She may be self-medicating with alcohol, as she did acknowledge having multiple drinks of straight vodka the day of her fall.    From what I have heard, she had been taking care of her grandchildren prior to her last hospitalization, and since then, the children are now going to .  I believe she was told that if she could not take care of herself, she could not take care of the grandchildren.  I believe this has upset her greatly.    She also admits to being depressed that the  in her building committed suicide.  There were no indications that he was depressed, and he left no note.    When last here, Dr. Ordonez discontinued her buspirone, also adding fluoxetine 20 mg nightly for depression.  She seems to be back on the former, continuing the latter.    COPD: Receiving Symbicort and Ventolin HFA inhalers at home.    Diabetic polyneuropathy: Tingling in the hands and feet.    Trigeminal neuralgia: Taking carbamazepine for this, and it seems to be under control.    Other issues not addressed but discussed: She has very  poor vision (5/200) with macular degeneration, early diabetic retinopathy, and some hemorrhaging.  She did attend [employer] at the [facility] for a while.  She did not think it helped, and it was exceedingly difficult to read Braille, given her neuropathy.    Code status: Prior to her last stay, she was full code in the hospital.  A signed a POLST here indicated that she was now DNR/DNI (also opposed to intubation, as she watched her mother go through it).  Once again, she is full code.    Discharge plan: She lives in a senior apartment.  A care conference is scheduled for 04/03/2020.  The plan also is to send her home on 04/10/2020.      ROS: Biggest complaint continues to be that of the right shoulder pain.  She claims pain of 9/10.  I doubt that it is that high, although she certainly continues to the uncomfortable.  She may be getting a little constipated, although she does have scheduled MiraLAX ordered.  No headaches or chest pains, coughing or congestion, nausea or vomiting, dyspnea, dysuria, integumentary issues.  Sleep is always been a problem.  She is currently on both melatonin and trazodone.    Past Medical History:   Diagnosis Date   ? Acute encephalopathy 09/23/2017   ? Acute hypokalemia 09/23/2017   ? Acute hyponatremia 09/23/2017   ? Acute pyelonephritis 09/23/2017   ? SOPHIA (acute kidney injury) (H)    ? Anxiety    ? CAD (coronary artery disease)    ? Closed fracture of right humerus    ? COPD (chronic obstructive pulmonary disease) (H)    ? Depression    ? Diabetes mellitus (H) 09/23/2017   ? Diabetic peripheral neuropathy (H) 9/4/2019   ? History of cervical cancer    ? HLD (hyperlipidemia)    ? Hypertension    ? Hypothyroidism    ? Insomnia    ? Normocytic anemia    ? Sensorineural hearing loss (SNHL) of right ear with tinnitus 9/4/2019   ? Sepsis (H) 09/23/2017   ? Trigeminal neuralgia               Family History   Problem Relation Age of Onset   ? COPD Mother    ? COPD Father     ? Lung cancer Father      Social History     Socioeconomic History   ? Marital status: Single     Spouse name: Not on file   ? Number of children: Not on file   ? Years of education: Not on file   ? Highest education level: Not on file   Occupational History   ? Not on file   Social Needs   ? Financial resource strain: Not on file   ? Food insecurity     Worry: Not on file     Inability: Not on file   ? Transportation needs     Medical: Not on file     Non-medical: Not on file   Tobacco Use   ? Smoking status: Former Smoker     Packs/day: 2.00     Years: 30.00     Pack years: 60.00     Types: Cigarettes   ? Smokeless tobacco: Never Used   ? Tobacco comment: quit at age 42   Substance and Sexual Activity   ? Alcohol use: Yes     Comment: Rare   ? Drug use: No   ? Sexual activity: Not Currently     Partners: Male   Lifestyle   ? Physical activity     Days per week: Not on file     Minutes per session: Not on file   ? Stress: Not on file   Relationships   ? Social connections     Talks on phone: Not on file     Gets together: Not on file     Attends [employer] service: Not on file     Active member of club or organization: Not on file     Attends meetings of clubs or organizations: Not on file     Relationship status: Not on file   ? Intimate partner violence     Fear of current or ex partner: Not on file     Emotionally abused: Not on file     Physically abused: Not on file     Forced sexual activity: Not on file   Other Topics Concern   ? Not on file   Social History Narrative    Lives at home alone with her catKailyn       MEDICATIONS: Reviewed from the MAR, physician orders, and/or earlier progress notes.    Post Discharge Medication Reconciliation Status: medication reconciliation previously completed during another office visit.  Updated by me today (04/02/2020) with increases in both metoprolol succinate and oxycodone reflected below.  Current Outpatient Medications   Medication Sig   ? acetaminophen (TYLENOL)  500 MG tablet Take 1,000 mg by mouth 4 (four) times a day.    ? albuterol (PROAIR HFA;PROVENTIL HFA;VENTOLIN HFA) 90 mcg/actuation inhaler Inhale 2 puffs 4 (four) times a day as needed for wheezing.    ? amitriptyline (ELAVIL) 150 MG tablet Take 150 mg by mouth at bedtime.   ? aspirin 325 MG tablet Take 325 mg by mouth daily.   ? budesonide-formoteroL (SYMBICORT) 160-4.5 mcg/actuation inhaler Inhale 2 puffs 2 (two) times a day.   ? busPIRone (BUSPAR) 10 MG tablet Take 20 mg by mouth at bedtime.   ? carBAMazepine (TEGRETOL) 200 mg tablet Take 400 mg by mouth 2 (two) times a day.   ? ferrous sulfate 65 mg elemental iron Take 1 tablet by mouth daily with breakfast.    ? FLUoxetine (PROZAC) 20 MG capsule Take 20 mg by mouth daily.   ? ibuprofen (ADVIL,MOTRIN) 200 MG tablet Take 200 mg by mouth every 4 (four) hours as needed for pain or mild pain (1-3).   ? insulin glargine (LANTUS) 100 unit/mL vial Inject 35 Units under the skin every morning. (Patient taking differently: Inject 30 Units under the skin every morning. )   ? levothyroxine (SYNTHROID, LEVOTHROID) 50 MCG tablet Take 50 mcg by mouth daily.   ? lisinopril (PRINIVIL,ZESTRIL) 10 MG tablet Take 10 mg by mouth daily.   ? melatonin 10 mg Tab Take 10 mg by mouth at bedtime.   ? metFORMIN (GLUCOPHAGE-XR) 750 MG 24 hr tablet Take 750 mg by mouth 2 (two) times a day as needed.   ? metoprolol succinate (TOPROL-XL) 25 MG Take 50 mg by mouth daily.    ? multivit with min-folic acid 0.4 mg Tab Take 1 tablet by mouth daily.   ? nitroglycerin (NITROSTAT) 0.4 MG SL tablet Place 0.4 mg under the tongue every 5 (five) minutes as needed for chest pain.   ? NOVOLOG FLEXPEN U-100 INSULIN 100 unit/mL (3 mL) injection pen Check blood sugar four (4) times daily.  11.65 Type 2 with hyperglycemia  BD Ultra-fine Noris Pen Needles - NDC [address]-49 - dispense 1 case, refill PRN for 1 year  Accu-chek Guide blood glucose meter - dispense 1  Accu-chek Guide test strips (50 ct. boxes) -  "dispense 1, refill PRN for 1 year  Accu-chek FastClix lancets (box of 102 ct.) - dispense 1, refill PRN for 1 year (Patient taking differently: Inject 5 Units under the skin. 5 units 4 times daily (AC + bedtime)  11.65 Type 2 with hyperglycemia  BD Ultra-fine Noris Pen Needles - NDC [address]-28 - dispense 1 case, refill PRN for 1 year  Accu-chek Guide blood glucose meter - dispense 1  Accu-chek Guide test strips (50 ct. boxes) - dispense 1, refill PRN for 1 year  Accu-chek FastClix lancets (box of 102 ct.) - dispense 1, refill PRN for 1 year)   ? oxyCODONE (ROXICODONE) 5 MG immediate release tablet Take 10 mg by mouth every 4 (four) hours as needed for pain (Also receiving scheduled dosing.).    ? oxyCODONE (ROXICODONE) 5 MG immediate release tablet Take 10 mg by mouth every 8 (eight) hours. Also has PRN dosing available.   ? polyethylene glycol (MIRALAX) 17 gram packet Take 17 g by mouth daily.   ? rosuvastatin (CRESTOR) 20 MG tablet Take 20 mg by mouth at bedtime.   ? senna-docusate (SENNOSIDES-DOCUSATE SODIUM) 8.6-50 mg tablet Take 1 tablet by mouth 2 (two) times a day as needed for constipation.   ? traZODone (DESYREL) 50 MG tablet Take 150 mg by mouth at bedtime.      ALLERGIES:   Allergies   Allergen Reactions   ? Propranolol Anaphylaxis     Slowed HR   ? Atorvastatin      Hand neuropathy   ? Prochlorperazine Edisylate Rash     nightmares     DIET: Diabetic, regular texture, thin liquids.    Vitals:    04/05/20 1417   BP: 157/88   Pulse: 90   Resp: 18   Temp: 96.9  F (36.1  C)   SpO2: 97%   Weight: 185 lb 12.8 oz (84.3 kg)   Height: 5' 3\" (1.6 m)   Previous weight when last here in the TCU was 169.2 pounds.  This is a 22.6 pound weight gain.  She does endorse weight gain.  Body mass index is 32.91 kg/m .    EXAMINATION:   General: Fairly pleasant middle-aged female, sitting on her bed, in no apparent distress.  Head: Normocephalic and atraumatic.   Eyes: PERRLA, sclerae clear.   ENT: Moist oral mucosa.  She " is edentulous with full upper and lower dentures.  No rhinorrhea or nasal discharge.  She has complete hearing loss in the right ear and chronic tinnitus.  Cardiovascular: Regular rate and rhythm with a short 2/6 systolic ejection murmur at the left sternal border.  Respiratory: Lungs clear to auscultation bilaterally.   Abdomen: Soft and nontender.   Musculoskeletal/Extremities: Most of the right arm swelling has resolved.  Integument: Steri-Stripped right shoulder looks clean without any locations of infection.  No rashes, clinically significant lesions, or skin breakdown.   Cognitive/Psychiatric: Alert and oriented x3, although she scored 16/30 on the SLUMS.  Affect is euthymic.    DIAGNOSTICS:   Results for orders placed or performed during the hospital encounter of 08/30/19   Basic Metabolic Panel   Result Value Ref Range    Sodium 136 136 - 145 mmol/L    Potassium 4.1 3.5 - 5.0 mmol/L    Chloride 104 98 - 107 mmol/L    CO2 26 22 - 31 mmol/L    Anion Gap, Calculation 6 5 - 18 mmol/L    Glucose 103 70 - 125 mg/dL    Calcium 8.7 8.5 - 10.5 mg/dL    BUN 13 8 - 22 mg/dL    Creatinine 0.76 0.60 - 1.10 mg/dL    GFR MDRD Af Amer >60 >60 mL/min/1.73m2    GFR MDRD Non Af Amer >60 >60 mL/min/1.73m2     Lab Results   Component Value Date    WBC 5.5 08/31/2019    HGB 9.5 (L) 08/31/2019    HCT 28.6 (L) 08/31/2019    MCV 95 08/31/2019     09/03/2019     CrCl cannot be calculated (Patient's most recent lab result is older than the maximum 10 days allowed.).  Lab Results   Component Value Date    HGBA1C 9.4 (H) 08/31/2019       ASSESSMENT/Plan:      ICD-10-CM    1. Closed displaced comminuted fracture of shaft of right humerus with routine healing  S42.351D    2. Type 2 diabetes mellitus with hyperglycemia, with long-term current use of insulin (H)  E11.65     Z79.4    3. Hypertension due to endocrine disorder  I15.2    4. Episode of recurrent major depressive disorder, unspecified depression episode severity (H)  F33.9     5. Word finding difficulty  R47.89    6. Diabetic peripheral neuropathy (H)  E11.42    7. Sensorineural hearing loss (SNHL) of right ear, unspecified hearing status on contralateral side  H90.5    8. Acquired hypothyroidism  E03.9    9. Hyperlipidemia, unspecified hyperlipidemia type  E78.5      CHANGES:  1.  Increase as needed oxycodone from 5 mg every 4 hours to 10 mg every 4 hours.  No change to scheduled dosing.  2.  Increase metoprolol succinate from 25 mg to 50 mg daily.    CARE PLAN:  The care plan has been reviewed and all orders signed.  Changes to care plan, if any, as noted.  Otherwise, continue current plan of care.  Total time spent with this patient was approximately 35 minutes, with greater than 50% spent in counseling and coordination of care that included a review of her blood glucose levels (with orders for bedtime snack), assessing her ongoing pain complaints, and also addressing her elevated blood pressures, with orders and scripts being written.    The above has been created using voice recognition software. Please be aware that this may unintentionally  produce inaccuracies and/or nonsensical sentences.      Electronically signed by: Suraj Robledo, CNP

## 2021-06-20 NOTE — LETTER
Letter by Suraj Robledo CNP at      Author: Suraj Robledo CNP Service: -- Author Type: --    Filed:  Encounter Date: 2020 Status: (Other)         Patient: Ruddy Mendoza   MR Number: [id number]   YOB: 1955   Date of Visit: 2020     [facility]    Name:   Ruddy Mendoza  : 1955  Facility:   [facility] [[id number]]   Room:   Code Status: FULL CODE -   Fac type:   SNF (Skilled Nursing Facility, TCU) -     PCP:  Dawit Aviles MD    CHIEF COMPLAINT / REASON FOR VISIT:  Chief Complaint   Patient presents with   ? Discharge Summary     TCU discharge after hospitalization for right proximal humerous fracture, s/p ORIF.      [facility] from 2019 until 2019 (right facial numbness, severe dizziness, right upper extremity ataxia, swallowing and word finding difficulties)  [facility] from 2019 until 2019  [facility] from 2020 until 2020 (right proximal humerus fracture)    Patient was last seen by me on 2020.      HPI: Ruddy is a 64 y.o. female with a past medical history of recurrent UTIs and pyelonephritis, diabetes mellitus type 2 (uncontrolled), endocrine induced hypertension, coronary artery disease (status post stenting), and COPD (former smoker) who, prior to her last stay in the TCU, had presented with complaints of severe dizziness, new right lower facial numbness, and ataxia of the right upper extremity for the previous 4 to 5 days prior to admission, with concern for possible stroke, though imaging was negative.  She was also found to be hyperglycemic to the 500s, suffering SOPHIA, and a urinalysis was concerning for UTI on admission.    Etiology of her symptoms was unclear.  Given her history of nausea and vomiting, falls, and unstable gait, the main concern was for a cerebellar stroke; however, an MRI was without any findings of acute stroke, although it  "did show some chronic infarcts of the cerebellum.  Consider with her hyperglycemia or if hypotensive, a watershed type phenomenon.  Hypoglycemia may have been the primary cause of this, although it sounded as though it was secondary to her ability to take medications due to her vertigo.  Her report of worsening dizziness with change in position could support orthostatic hypotension, although this was later also deemed to be negative.  She does have diabetic peripheral neuropathy, but one would not expect this to cause a sudden vertiginous syndrome.  Unlikely BPPV, given no symptoms with head movements.  She denied any recent substance alcohol use, though consideration was given to Warnicke's type encephalopathy with her need for a wide gait, and neurology did recommend initiating thiamine therapy.  At the time, she was also treated for a urinary tract infection (pansensitive E. Coli).  She also had significant hyperglycemia on admission into the 500s.  However, she stated she was unable to take her usual home medications due to her vertigo/dizziness.  At the time, her last known A1c was 16.4 in October 2017.  In the hospital, it was 9.4.      More recently, she fell at home.  Based on EMS reports, it appeared to be a hoarder home.  She was found on the the floor of her bedroom with a hole in the wall, and the patient later reported old broken shelving falling, although in the ED she could not remember what occurred.  She did note drinking multiple drinks of straight vodka that night, often does this intermittently, and did not want her children to know this.  As noted previously, there has been some concern for alcoholism and Warnicke's encephalopathy.      TCU ISSUES    Humerus fracture/pain management: She describes her pain as \"a little less this morning but feeling a little better.\"  She was initially receiving 5 mg of oxycodone every 4 hours as needed.  We tried scheduling 10 mg 3 times daily and 5 mg every 4 " "hours as needed, and this proved helpful but was still insufficient.  She did tell me that hydromorphone and morphine made her feel \"weird,\" and also stated that her pain is worse in the mornings.  One problem was that she was getting her pain medication late in the morning, and therapy grabbd her before it had a chance to work.  We wrote orders to schedule her first dose rather early (about 6 AM).  Significant pain is still a problem, impacting her appetite, causing weight loss and hypoglycemia.  She has needed this at least a day or 2 of therapy due to pain.  We increased the as needed dosing to 10 mg, and while she is still in pain, it is manageable.    She did have her staples removed on 03/30/2020, and the area is now Steri-Stripped.  There is no indication of any infection.  Follow-up x-rays were performed on 03/27/2020, and there were no issues.    Diabetes mellitus type 2/poor appetite/hypoglycemia/weight loss: Currently on a lower dose of glargine than she had been at home, now 30 units every morning.  PTA dose was 35 units.  In addition to the glargine, she is getting 5 units of scheduled aspart with each meal and at bedtime.  She has had hypoglycemic episodes, particularly fasting, ostensibly due to poor appetite (down 6 pounds), thought related to her level of pain.  She stated that she ate all of her breakfast but only 75% of other meals.  Low blood glucose levels have been asymptomatic.      At home, she had not been receiving scheduled prandial NovoLog but instead has been managing with a sliding scale.  At my last visit, we talked about eliminating her scheduled dosing but instead decided to make sure that she got a bedtime snack each day.  Measures did not improve, and she has had blood glucose levels in the 60s on several occasions, fasting, lunch, and supper time.  There was only one blood glucose level reaching 200, and so we discontinued prandial dosing.  Her lowest blood glucose level since " then has been 75.    Her endocrinologist told her about Dexcom G6, where she can monitor her blood glucose levels with her smart phone.  She will be receiving this, as her PCP did take care of prior authorization.    Note that blood glucose levels have been under rather poor control.  Her A1c on 08/31/2019 was 9.4.    A minor concern she has is about giving herself insulin with her left hand.  She does use an insulin pen.    Hypertension: Blood pressures were elevated, with systolics in the 150s to 190s, possibly related to her pain levels.  We increased metoprolol succinate from 25 mg to 50 mg daily.  We are also increased as needed oxycodone from 5 mg every 4 hours as needed to 10 mg every 4 hours as needed (as noted above).  No change to scheduled dosing.  Blood pressures have shown improvement.    Cognition: As noted, there has been some concern of cognitive impairment/encephalopathy.  During her stay here in September 2019, she did have some word finding difficulty but otherwise scored well in cognitive testing.  Currently, according to Occupational Therapy, she scored only 16/30 on the SLUMS, indicating moderate cognitive deficits.  SLP is working with her as well on cognition and word finding difficulty.    Depression: Major depressive disorder (with anxiety) has been ongoing for some time.  She was started on fluoxetine on 09/06/2019, and the dosing has not been changed.  She may be self-medicating with alcohol, as she did acknowledge having multiple drinks of straight vodka the day of her fall.    From what I have heard, she had been taking care of her grandchildren prior to her last hospitalization, and since then, the children are now going to .  I believe she was told that if she could not take care of herself, she could not take care of the grandchildren.  I believe this has upset her greatly.    She also admits to being depressed that the  in her building committed suicide.  There  were no indications that he was depressed, and he left no note.    When last here, Dr. Ordonez discontinued her buspirone, also adding fluoxetine 20 mg nightly for depression.  She seems to be back on the former, continuing the latter.    COPD: Receiving Symbicort and Ventolin HFA inhalers at home.    Diabetic polyneuropathy: Tingling in the hands and feet.    Trigeminal neuralgia: Taking carbamazepine for this, and it seems to be under control.    Other issues not addressed but discussed: She has very poor vision (5/200) with macular degeneration, early diabetic retinopathy, and some hemorrhaging.  She did attend [employer] at the [facility] for a while.  She did not think it helped, and it was exceedingly difficult to read Braille, given her neuropathy.    Code status: Prior to her last stay, she was full code in the hospital.  A signed a POLST here indicated that she was now DNR/DNI (also opposed to intubation, as she watched her mother go through it).  Once again, she is full code.    Discharge plan: She has been living in an independent living senior apartment.  There was A care conference on 04/03/2020.  The plan is to discharge home on 04/10/2020.      ROS: Biggest complaint has been that of the right shoulder pain. She may be getting a little constipated, although she does have scheduled MiraLAX ordered.  No headaches or chest pains, coughing or congestion, nausea or vomiting, dyspnea, dysuria, integumentary issues.  Sleep is always been a problem.  She is currently on both melatonin and trazodone.    Past Medical History:   Diagnosis Date   ? Acute encephalopathy 09/23/2017   ? Acute hypokalemia 09/23/2017   ? Acute hyponatremia 09/23/2017   ? Acute pyelonephritis 09/23/2017   ? SOPHIA (acute kidney injury) (H)    ? Anxiety    ? CAD (coronary artery disease)    ? Closed fracture of right humerus    ? COPD (chronic obstructive pulmonary disease) (H)    ? Depression    ? Diabetes mellitus (H)  09/23/2017   ? Diabetic peripheral neuropathy (H) 9/4/2019   ? History of cervical cancer    ? HLD (hyperlipidemia)    ? Hypertension    ? Hypothyroidism    ? Insomnia    ? Normocytic anemia    ? Sensorineural hearing loss (SNHL) of right ear with tinnitus 9/4/2019   ? Sepsis (H) 09/23/2017   ? Trigeminal neuralgia               Family History   Problem Relation Age of Onset   ? COPD Mother    ? COPD Father    ? Lung cancer Father      Social History     Socioeconomic History   ? Marital status: Single     Spouse name: Not on file   ? Number of children: Not on file   ? Years of education: Not on file   ? Highest education level: Not on file   Occupational History   ? Not on file   Social Needs   ? Financial resource strain: Not on file   ? Food insecurity     Worry: Not on file     Inability: Not on file   ? Transportation needs     Medical: Not on file     Non-medical: Not on file   Tobacco Use   ? Smoking status: Former Smoker     Packs/day: 2.00     Years: 30.00     Pack years: 60.00     Types: Cigarettes   ? Smokeless tobacco: Never Used   ? Tobacco comment: quit at age 42   Substance and Sexual Activity   ? Alcohol use: Yes     Comment: Rare   ? Drug use: No   ? Sexual activity: Not Currently     Partners: Male   Lifestyle   ? Physical activity     Days per week: Not on file     Minutes per session: Not on file   ? Stress: Not on file   Relationships   ? Social connections     Talks on phone: Not on file     Gets together: Not on file     Attends [employer] service: Not on file     Active member of club or organization: Not on file     Attends meetings of clubs or organizations: Not on file     Relationship status: Not on file   ? Intimate partner violence     Fear of current or ex partner: Not on file     Emotionally abused: Not on file     Physically abused: Not on file     Forced sexual activity: Not on file   Other Topics Concern   ? Not on file   Social History Narrative    Lives at home alone with her  cat, Kailyn       MEDICATIONS: Reviewed from the MAR, physician orders, and/or earlier progress notes.    Post Discharge Medication Reconciliation Status: medication reconciliation previously completed during another office visit.    Current Outpatient Medications   Medication Sig   ? acetaminophen (TYLENOL) 500 MG tablet Take 1,000 mg by mouth 4 (four) times a day.    ? albuterol (PROAIR HFA;PROVENTIL HFA;VENTOLIN HFA) 90 mcg/actuation inhaler Inhale 2 puffs 4 (four) times a day as needed for wheezing.    ? amitriptyline (ELAVIL) 150 MG tablet Take 150 mg by mouth at bedtime.   ? aspirin 325 MG tablet Take 325 mg by mouth daily.   ? budesonide-formoteroL (SYMBICORT) 160-4.5 mcg/actuation inhaler Inhale 2 puffs 2 (two) times a day.   ? busPIRone (BUSPAR) 10 MG tablet Take 20 mg by mouth at bedtime.   ? carBAMazepine (TEGRETOL) 200 mg tablet Take 400 mg by mouth 2 (two) times a day.   ? ferrous sulfate 65 mg elemental iron Take 1 tablet by mouth daily with breakfast.    ? FLUoxetine (PROZAC) 20 MG capsule Take 20 mg by mouth daily.   ? ibuprofen (ADVIL,MOTRIN) 200 MG tablet Take 200 mg by mouth every 4 (four) hours as needed for pain or mild pain (1-3).   ? insulin glargine (LANTUS) 100 unit/mL vial Inject 35 Units under the skin every morning. (Patient taking differently: Inject 30 Units under the skin every morning. )   ? levothyroxine (SYNTHROID, LEVOTHROID) 50 MCG tablet Take 50 mcg by mouth daily.   ? lisinopril (PRINIVIL,ZESTRIL) 10 MG tablet Take 10 mg by mouth daily.   ? melatonin 10 mg Tab Take 10 mg by mouth at bedtime.   ? metFORMIN (GLUCOPHAGE-XR) 750 MG 24 hr tablet Take 750 mg by mouth 2 (two) times a day as needed.   ? metoprolol succinate (TOPROL-XL) 25 MG Take 50 mg by mouth daily.    ? multivit with min-folic acid 0.4 mg Tab Take 1 tablet by mouth daily.   ? nitroglycerin (NITROSTAT) 0.4 MG SL tablet Place 0.4 mg under the tongue every 5 (five) minutes as needed for chest pain.   ? NOVOLOG FLEXPEN  "U-100 INSULIN 100 unit/mL (3 mL) injection pen Check blood sugar four (4) times daily.  11.65 Type 2 with hyperglycemia  BD Ultra-fine Noris Pen Needles - NDC [address]-21 - dispense 1 case, refill PRN for 1 year  Accu-chek Guide blood glucose meter - dispense 1  Accu-chek Guide test strips (50 ct. boxes) - dispense 1, refill PRN for 1 year  Accu-chek FastClix lancets (box of 102 ct.) - dispense 1, refill PRN for 1 year (Patient taking differently: Inject 5 Units under the skin. 5 units 4 times daily (AC + bedtime)  11.65 Type 2 with hyperglycemia  BD Ultra-fine Noris Pen Needles - NDC [address]-20 - dispense 1 case, refill PRN for 1 year  Accu-chek Guide blood glucose meter - dispense 1  Accu-chek Guide test strips (50 ct. boxes) - dispense 1, refill PRN for 1 year  Accu-chek FastClix lancets (box of 102 ct.) - dispense 1, refill PRN for 1 year)   ? oxyCODONE (ROXICODONE) 5 MG immediate release tablet Take 10 mg by mouth every 4 (four) hours as needed for pain (Also receiving scheduled dosing.).    ? oxyCODONE (ROXICODONE) 5 MG immediate release tablet Take 10 mg by mouth every 8 (eight) hours. Also has PRN dosing available.   ? polyethylene glycol (MIRALAX) 17 gram packet Take 17 g by mouth daily.   ? rosuvastatin (CRESTOR) 20 MG tablet Take 20 mg by mouth at bedtime.   ? senna-docusate (SENNOSIDES-DOCUSATE SODIUM) 8.6-50 mg tablet Take 1 tablet by mouth 2 (two) times a day as needed for constipation.   ? traZODone (DESYREL) 50 MG tablet Take 150 mg by mouth at bedtime.      ALLERGIES:   Allergies   Allergen Reactions   ? Propranolol Anaphylaxis     Slowed HR   ? Atorvastatin      Hand neuropathy   ? Prochlorperazine Edisylate Rash     nightmares     DIET: Diabetic, regular texture, thin liquids.    Vitals:    04/08/20 1551   BP: 146/79   Pulse: 80   Resp: 19   Temp: (!) 96  F (35.6  C)   SpO2: 97%   Weight: 185 lb 12.8 oz (84.3 kg)   Height: 5' 3\" (1.6 m)   Previous weight when last here in the TCU was 169.2 " pounds.  This is a 22.6 pound weight gain.  She has endorsed weight gain.  Body mass index is 32.91 kg/m .    EXAMINATION:   General: Fairly pleasant middle-aged female, sitting on her bed, in no apparent distress.  Head: Normocephalic and atraumatic.   Eyes: PERRLA, sclerae clear.   ENT: Moist oral mucosa.  She is edentulous with full upper and lower dentures.  No rhinorrhea or nasal discharge.  She has complete hearing loss in the right ear and chronic tinnitus.  Cardiovascular: Regular rate and rhythm with a short 2/6 systolic ejection murmur at the left sternal border.  Respiratory: Lungs clear to auscultation bilaterally.   Abdomen: Soft and nontender.   Musculoskeletal/Extremities: Most of the right arm swelling has resolved.  She is wearing an edema glove, but it is loose.  Integument: Steri-Stripped right shoulder looks clean without any locations of infection.  No rashes, clinically significant lesions, or skin breakdown.   Cognitive/Psychiatric: Alert and oriented x3, although she scored 16/30 on the SLUMS.  Affect is euthymic.    DIAGNOSTICS:   Results for orders placed or performed during the hospital encounter of 08/30/19   Basic Metabolic Panel   Result Value Ref Range    Sodium 136 136 - 145 mmol/L    Potassium 4.1 3.5 - 5.0 mmol/L    Chloride 104 98 - 107 mmol/L    CO2 26 22 - 31 mmol/L    Anion Gap, Calculation 6 5 - 18 mmol/L    Glucose 103 70 - 125 mg/dL    Calcium 8.7 8.5 - 10.5 mg/dL    BUN 13 8 - 22 mg/dL    Creatinine 0.76 0.60 - 1.10 mg/dL    GFR MDRD Af Amer >60 >60 mL/min/1.73m2    GFR MDRD Non Af Amer >60 >60 mL/min/1.73m2     Lab Results   Component Value Date    WBC 5.5 08/31/2019    HGB 9.5 (L) 08/31/2019    HCT 28.6 (L) 08/31/2019    MCV 95 08/31/2019     09/03/2019     CrCl cannot be calculated (Patient's most recent lab result is older than the maximum 10 days allowed.).  Lab Results   Component Value Date    HGBA1C 9.4 (H) 08/31/2019       ASSESSMENT/Plan:      ICD-10-CM     1. Closed displaced comminuted fracture of shaft of right humerus with routine healing  S42.351D    2. Type 2 diabetes mellitus with hyperglycemia, with long-term current use of insulin (H)  E11.65     Z79.4    3. Hypertension due to endocrine disorder  I15.2    4. Episode of recurrent major depressive disorder, unspecified depression episode severity (H)  F33.9    5. Diabetic peripheral neuropathy (H)  E11.42    6. Word finding difficulty  R47.89    7. Sensorineural hearing loss (SNHL) of right ear, unspecified hearing status on contralateral side  H90.5    8. Acquired hypothyroidism  E03.9    9. Hyperlipidemia, unspecified hyperlipidemia type  E78.5      DISCHARGE PLAN/FACE TO FACE:  I certify that this patient is under my care and that I had a face-to-face encounter that meets the physician face-to-face encounter requirements with this patient.     Date of Face-to-Face Encounter: 04/08/2020     I certify that, based on my findings, the following services are medically necessary home health services: Home health aide, home health nurse, home PT, and home OT    My clinical findings support the need for the above skilled services because: (Please write a brief narrative summary that describes what the RN, PT, SLP, or other services will be doing in the home. A list of diagnoses in this section does not meet the CMS requirements): Home health aide to assist with ADLs such as showering, home health nurse to assist with diabetes and medication management, as well as assessment of recent right shoulder surgery, and home PT and OT to continue therapy in the home setting.    This patient is homebound because: (Please write a brief narrative summmary describing the functional limitations as to why this patient is homebound and specifically what makes this patient homebound.):  The above services necessarily need to be performed in the home to be of benefit.  Also, her injury has made it difficult to commute, and stay at  home orders are current in the [facility].    The patient is, or has been, under my care and I have initiated the establishment of the plan of care. This patient will be followed by a physician who will periodically review the plan of care.  Initial follow-up should be within 7-10 days.    Approximate time spent with this patient was greater than 30 minutes with greater than 50% spent in discussions regarding services required upon discharge.      The above has been created using voice recognition software. Please be aware that this may unintentionally  produce inaccuracies and/or nonsensical sentences.      Electronically signed by: Suraj Robledo CNP

## 2021-06-20 NOTE — LETTER
Letter by Suraj Robledo CNP at      Author: Suraj Robledo CNP Service: -- Author Type: --    Filed:  Encounter Date: 2020 Status: (Other)         Patient: Ruddy Mendoza   MR Number: [id number]   YOB: 1955   Date of Visit: 2020     [facility]    Name:   Ruddy Mendoza  : 1955  Facility:   [facility] [[id number]]   Room:   Code Status: FULL CODE -   Fac type:   SNF (Skilled Nursing Facility, TCU) -     PCP:  Dawit Aviles MD    CHIEF COMPLAINT / REASON FOR VISIT:  Chief Complaint   Patient presents with   ? Follow-up     TCU follow-up after hospitalization for right proximal humerous fracture, s/p ORIF.   ? Problem Visit     Unrelenting pain, elevated blood pressures and low blood glucose levels.      [facility] from 2019 until 2019 (right facial numbness, severe dizziness, right upper extremity ataxia, swallowing and word finding difficulties)  [facility] from 2019 until 2019  [facility] from 2020 until 2020 (right proximal humerus fracture)    Patient was last seen by me on 2020.      HPI: Ruddy is a 64 y.o. female with a past medical history of recurrent UTIs and pyelonephritis, diabetes mellitus type 2 (uncontrolled), endocrine induced hypertension, coronary artery disease (status post stenting), and COPD (former smoker) who, prior to her last stay in the TCU, had presented with complaints of severe dizziness, new right lower facial numbness, and ataxia of the right upper extremity for the previous 4 to 5 days prior to admission, with concern for possible stroke, though imaging was negative.  She was also found to be hyperglycemic to the 500s, suffering SOPHIA, and a urinalysis was concerning for UTI on admission.    Etiology of her symptoms was unclear.  Given her history of nausea and vomiting, falls, and unstable gait, the main concern was for a  cerebellar stroke; however, an MRI was without any findings of acute stroke, although it did show some chronic infarcts of the cerebellum.  Consider with her hyperglycemia or if hypotensive, a watershed type phenomenon.  Hypoglycemia may have been the primary cause of this, although it sounded as though it was secondary to her ability to take medications due to her vertigo.  Her report of worsening dizziness with change in position could support orthostatic hypotension, although this was later also deemed to be negative.  She does have diabetic peripheral neuropathy, but one would not expect this to cause a sudden vertiginous syndrome.  Unlikely BPPV, given no symptoms with head movements.  She denied any recent substance alcohol use, though consideration was given to Warnicke's type encephalopathy with her need for a wide gait, and neurology did recommend initiating thiamine therapy.  At the time, she was also treated for a urinary tract infection (pansensitive E. Coli).  She also had significant hyperglycemia on admission into the 500s.  However, she stated she was unable to take her usual home medications due to her vertigo/dizziness.  At the time, her last known A1c was 16.4 in October 2017.  In the hospital, it was 9.4.      More recently, she fell at home.  Based on EMS reports, it appeared to be a hoarder home.  She was found on the the floor of her bedroom with a hole in the wall, and the patient later reported old broken shelving falling, although in the ED she could not remember what occurred.  She did note drinking multiple drinks of straight vodka that night, often does this intermittently, and did not want her children to know this.  As noted previously, there has been some concern for alcoholism and Warnicke's encephalopathy.      TCU ISSUES    Humerus fracture: During my visit, she has endorsed considerable pain, unrelieved on the current oxycodone regimen.  She was initially receiving 5 mg every 4  "hours as needed.  We tried scheduling 10 mg 3 times daily and 5 mg every 4 hours as needed, and this proved helpful.  She did tell me that hydromorphone and morphine made her feel \"weird.\"  She also stated that her pain is worse in the mornings.  One problem was that she is getting her pain medication late in the morning, and therapy grabs her before it has had a chance to work.  We wrote orders to schedule her first dose rather early (about 6 AM).    She now rates her pain slightly less (7 or 8) than she did before (9).  Pain has impacted her appetite, causing weight loss and hypoglycemia.  She even missed therapy one day because of the pain.  Now, she feels the pain meds are working well, although she is sore today because she slept on the affected arm last night.    She did have her staples removed on 03/30/2020, and the area is now Steri-Stripped.  There is no indication of any infection.  Follow-up x-rays were performed on 03/27/2020, and there were no issues.    Diabetes mellitus type 2/poor appetite/hypoglycemia/weight loss: Currently on a lower dose of glargine than she had been at home, now 30 units every morning.  PTA dose was 35 units.  In addition to the glargine, she is getting 5 units of scheduled aspart with each meal and at bedtime.  She has had hypoglycemic episodes, particularly fasting, ostensibly due to poor appetite (down 6 pounds), thought related to her level of pain.  She stated that she ate all of her breakfast but only 75% of other meals.  Low blood glucose levels have been asymptomatic.      At home, she had not been receiving scheduled prandial NovoLog but instead has been managing with a sliding scale.  At my last visit, we talked about eliminating her scheduled dosing but instead decided to make sure that she got a bedtime snack each day.  Measures did not improve, and she has had blood glucose levels in the 60s on several occasions, fasting, lunch, and supper time.  There was only one " blood glucose level reaching 200.  We are going to discontinue her prandial dosing.    Her endocrinologist told her about Dexcom G6, where she can monitor her blood glucose levels with her smart phone.  She will be receiving this, as her PCP did take care of prior authorization.    Note that blood glucose levels have been under rather poor control.  Her A1c on 08/31/2019 was 9.4.    Pain management/hypertension: Blood pressures have been elevated, with systolics in the 150s to 190s, possibly related to her pain levels.  We increased her metoprolol succinate from 25 mg to 50 mg daily.  We are also increased her as needed oxycodone from 5 mg every 4 hours as needed to 10 mg every 4 hours as needed.  No change to scheduled dosing.    Cognition: As noted, there has been some concern of cognitive impairment/encephalopathy.  During her stay here in September 2019, she did have some word finding difficulty but otherwise scored well in cognitive testing.  Currently, according to Occupational Therapy, she scored only 16/30 on the SLUMS, indicating moderate cognitive deficits.  SLP is working with her as well on cognition and word finding difficulty.    Depression: Major depressive disorder (with anxiety) has been ongoing for some time.  She was started on fluoxetine on 09/06/2019, and the dosing has not been changed.  She may be self-medicating with alcohol, as she did acknowledge having multiple drinks of straight vodka the day of her fall.    From what I have heard, she had been taking care of her grandchildren prior to her last hospitalization, and since then, the children are now going to .  I believe she was told that if she could not take care of herself, she could not take care of the grandchildren.  I believe this has upset her greatly.    She also admits to being depressed that the  in her building committed suicide.  There were no indications that he was depressed, and he left no note.    When  last here, Dr. Ordonez discontinued her buspirone, also adding fluoxetine 20 mg nightly for depression.  She seems to be back on the former, continuing the latter.    COPD: Receiving Symbicort and Ventolin HFA inhalers at home.    Diabetic polyneuropathy: Tingling in the hands and feet.    Trigeminal neuralgia: Taking carbamazepine for this, and it seems to be under control.    Other issues not addressed but discussed: She has very poor vision (5/200) with macular degeneration, early diabetic retinopathy, and some hemorrhaging.  She did attend [employer] at the [facility] for a while.  She did not think it helped, and it was exceedingly difficult to read Braille, given her neuropathy.    Code status: Prior to her last stay, she was full code in the hospital.  A signed a POLST here indicated that she was now DNR/DNI (also opposed to intubation, as she watched her mother go through it).  Once again, she is full code.    Discharge plan: She lives in a senior apartment.  A care conference occurred on 04/03/2020.  The plan is to discharge home on 04/10/2020.      ROS: Biggest complaint has been that of the right shoulder pain. She may be getting a little constipated, although she does have scheduled MiraLAX ordered.  No headaches or chest pains, coughing or congestion, nausea or vomiting, dyspnea, dysuria, integumentary issues.  Sleep is always been a problem.  She is currently on both melatonin and trazodone.    Past Medical History:   Diagnosis Date   ? Acute encephalopathy 09/23/2017   ? Acute hypokalemia 09/23/2017   ? Acute hyponatremia 09/23/2017   ? Acute pyelonephritis 09/23/2017   ? SOPHIA (acute kidney injury) (H)    ? Anxiety    ? CAD (coronary artery disease)    ? Closed fracture of right humerus    ? COPD (chronic obstructive pulmonary disease) (H)    ? Depression    ? Diabetes mellitus (H) 09/23/2017   ? Diabetic peripheral neuropathy (H) 9/4/2019   ? History of cervical cancer    ? HLD  (hyperlipidemia)    ? Hypertension    ? Hypothyroidism    ? Insomnia    ? Normocytic anemia    ? Sensorineural hearing loss (SNHL) of right ear with tinnitus 9/4/2019   ? Sepsis (H) 09/23/2017   ? Trigeminal neuralgia               Family History   Problem Relation Age of Onset   ? COPD Mother    ? COPD Father    ? Lung cancer Father      Social History     Socioeconomic History   ? Marital status: Single     Spouse name: Not on file   ? Number of children: Not on file   ? Years of education: Not on file   ? Highest education level: Not on file   Occupational History   ? Not on file   Social Needs   ? Financial resource strain: Not on file   ? Food insecurity     Worry: Not on file     Inability: Not on file   ? Transportation needs     Medical: Not on file     Non-medical: Not on file   Tobacco Use   ? Smoking status: Former Smoker     Packs/day: 2.00     Years: 30.00     Pack years: 60.00     Types: Cigarettes   ? Smokeless tobacco: Never Used   ? Tobacco comment: quit at age 42   Substance and Sexual Activity   ? Alcohol use: Yes     Comment: Rare   ? Drug use: No   ? Sexual activity: Not Currently     Partners: Male   Lifestyle   ? Physical activity     Days per week: Not on file     Minutes per session: Not on file   ? Stress: Not on file   Relationships   ? Social connections     Talks on phone: Not on file     Gets together: Not on file     Attends Zoroastrian service: Not on file     Active member of club or organization: Not on file     Attends meetings of clubs or organizations: Not on file     Relationship status: Not on file   ? Intimate partner violence     Fear of current or ex partner: Not on file     Emotionally abused: Not on file     Physically abused: Not on file     Forced sexual activity: Not on file   Other Topics Concern   ? Not on file   Social History Narrative    Lives at home alone with her catKailyn       MEDICATIONS: Reviewed from the MAR, physician orders, and/or earlier progress notes.     Post Discharge Medication Reconciliation Status: medication reconciliation previously completed during another office visit.  Updated by me today (04/06/2020) with elimination of scheduled prandial NovoLog reflected below.  Sliding scales remained intact.  Current Outpatient Medications   Medication Sig   ? acetaminophen (TYLENOL) 500 MG tablet Take 1,000 mg by mouth 4 (four) times a day.    ? albuterol (PROAIR HFA;PROVENTIL HFA;VENTOLIN HFA) 90 mcg/actuation inhaler Inhale 2 puffs 4 (four) times a day as needed for wheezing.    ? amitriptyline (ELAVIL) 150 MG tablet Take 150 mg by mouth at bedtime.   ? aspirin 325 MG tablet Take 325 mg by mouth daily.   ? budesonide-formoteroL (SYMBICORT) 160-4.5 mcg/actuation inhaler Inhale 2 puffs 2 (two) times a day.   ? busPIRone (BUSPAR) 10 MG tablet Take 20 mg by mouth at bedtime.   ? carBAMazepine (TEGRETOL) 200 mg tablet Take 400 mg by mouth 2 (two) times a day.   ? ferrous sulfate 65 mg elemental iron Take 1 tablet by mouth daily with breakfast.    ? FLUoxetine (PROZAC) 20 MG capsule Take 20 mg by mouth daily.   ? ibuprofen (ADVIL,MOTRIN) 200 MG tablet Take 200 mg by mouth every 4 (four) hours as needed for pain or mild pain (1-3).   ? insulin glargine (LANTUS) 100 unit/mL vial Inject 35 Units under the skin every morning. (Patient taking differently: Inject 30 Units under the skin every morning. )   ? levothyroxine (SYNTHROID, LEVOTHROID) 50 MCG tablet Take 50 mcg by mouth daily.   ? lisinopril (PRINIVIL,ZESTRIL) 10 MG tablet Take 10 mg by mouth daily.   ? melatonin 10 mg Tab Take 10 mg by mouth at bedtime.   ? metFORMIN (GLUCOPHAGE-XR) 750 MG 24 hr tablet Take 750 mg by mouth 2 (two) times a day as needed.   ? metoprolol succinate (TOPROL-XL) 25 MG Take 50 mg by mouth daily.    ? multivit with min-folic acid 0.4 mg Tab Take 1 tablet by mouth daily.   ? nitroglycerin (NITROSTAT) 0.4 MG SL tablet Place 0.4 mg under the tongue every 5 (five) minutes as needed for chest  "pain.   ? NOVOLOG FLEXPEN U-100 INSULIN 100 unit/mL (3 mL) injection pen Check blood sugar four (4) times daily.  11.65 Type 2 with hyperglycemia  BD Ultra-fine Noris Pen Needles - NDC [address]-00 - dispense 1 case, refill PRN for 1 year  Accu-chek Guide blood glucose meter - dispense 1  Accu-chek Guide test strips (50 ct. boxes) - dispense 1, refill PRN for 1 year  Accu-chek FastClix lancets (box of 102 ct.) - dispense 1, refill PRN for 1 year (Patient taking differently: Inject 5 Units under the skin. 5 units 4 times daily (AC + bedtime)  11.65 Type 2 with hyperglycemia  BD Ultra-fine Noris Pen Needles - NDC [address]-96 - dispense 1 case, refill PRN for 1 year  Accu-chek Guide blood glucose meter - dispense 1  Accu-chek Guide test strips (50 ct. boxes) - dispense 1, refill PRN for 1 year  Accu-chek FastClix lancets (box of 102 ct.) - dispense 1, refill PRN for 1 year)   ? oxyCODONE (ROXICODONE) 5 MG immediate release tablet Take 10 mg by mouth every 4 (four) hours as needed for pain (Also receiving scheduled dosing.).    ? oxyCODONE (ROXICODONE) 5 MG immediate release tablet Take 10 mg by mouth every 8 (eight) hours. Also has PRN dosing available.   ? polyethylene glycol (MIRALAX) 17 gram packet Take 17 g by mouth daily.   ? rosuvastatin (CRESTOR) 20 MG tablet Take 20 mg by mouth at bedtime.   ? senna-docusate (SENNOSIDES-DOCUSATE SODIUM) 8.6-50 mg tablet Take 1 tablet by mouth 2 (two) times a day as needed for constipation.   ? traZODone (DESYREL) 50 MG tablet Take 150 mg by mouth at bedtime.      ALLERGIES:   Allergies   Allergen Reactions   ? Propranolol Anaphylaxis     Slowed HR   ? Atorvastatin      Hand neuropathy   ? Prochlorperazine Edisylate Rash     nightmares     DIET: Diabetic, regular texture, thin liquids.    Vitals:    04/07/20 1051   BP: 163/81   Pulse: 81   Resp: 16   Temp: (!) 94.6  F (34.8  C)   SpO2: 98%   Weight: 185 lb 12.8 oz (84.3 kg)   Height: 5' 3\" (1.6 m)   Previous weight when last " here in the TCU was 169.2 pounds.  This is a 22.6 pound weight gain.  She has endorsed weight gain.  Body mass index is 32.91 kg/m .    EXAMINATION:   General: Fairly pleasant middle-aged female, sitting on her bed, in no apparent distress.  Head: Normocephalic and atraumatic.   Eyes: PERRLA, sclerae clear.   ENT: Moist oral mucosa.  She is edentulous with full upper and lower dentures.  No rhinorrhea or nasal discharge.  She has complete hearing loss in the right ear and chronic tinnitus.  Cardiovascular: Regular rate and rhythm with a short 2/6 systolic ejection murmur at the left sternal border.  Respiratory: Lungs clear to auscultation bilaterally.   Abdomen: Soft and nontender.   Musculoskeletal/Extremities: Most of the right arm swelling has resolved.  Integument: Steri-Stripped right shoulder looks clean without any locations of infection.  No rashes, clinically significant lesions, or skin breakdown.   Cognitive/Psychiatric: Alert and oriented x3, although she scored 16/30 on the SLUMS.  Affect is euthymic.    DIAGNOSTICS:   Results for orders placed or performed during the hospital encounter of 08/30/19   Basic Metabolic Panel   Result Value Ref Range    Sodium 136 136 - 145 mmol/L    Potassium 4.1 3.5 - 5.0 mmol/L    Chloride 104 98 - 107 mmol/L    CO2 26 22 - 31 mmol/L    Anion Gap, Calculation 6 5 - 18 mmol/L    Glucose 103 70 - 125 mg/dL    Calcium 8.7 8.5 - 10.5 mg/dL    BUN 13 8 - 22 mg/dL    Creatinine 0.76 0.60 - 1.10 mg/dL    GFR MDRD Af Amer >60 >60 mL/min/1.73m2    GFR MDRD Non Af Amer >60 >60 mL/min/1.73m2     Lab Results   Component Value Date    WBC 5.5 08/31/2019    HGB 9.5 (L) 08/31/2019    HCT 28.6 (L) 08/31/2019    MCV 95 08/31/2019     09/03/2019     CrCl cannot be calculated (Patient's most recent lab result is older than the maximum 10 days allowed.).  Lab Results   Component Value Date    HGBA1C 9.4 (H) 08/31/2019       ASSESSMENT/Plan:      ICD-10-CM    1. Closed displaced  comminuted fracture of shaft of right humerus with routine healing  S42.351D    2. Type 2 diabetes mellitus with hyperglycemia, with long-term current use of insulin (H)  E11.65     Z79.4    3. Hypertension due to endocrine disorder  I15.2    4. Episode of recurrent major depressive disorder, unspecified depression episode severity (H)  F33.9    5. Diabetic peripheral neuropathy (H)  E11.42    6. Word finding difficulty  R47.89    7. Sensorineural hearing loss (SNHL) of right ear, unspecified hearing status on contralateral side  H90.5    8. Acquired hypothyroidism  E03.9    9. Hyperlipidemia, unspecified hyperlipidemia type  E78.5      CHANGES:  Discontinue scheduled prandial NovoLog dosing.  Preserve sliding scales.    CARE PLAN:  The care plan has been reviewed and all orders signed.  Changes to care plan, if any, as noted.  Otherwise, continue current plan of care.  Total time spent with this patient was approximately 35 minutes, with greater than 50% spent in counseling and coordination of care that included a review of her blood glucose levels (with orders written), assessing her ongoing pain complaints (improved), and also reviewing her elevated blood pressures.  With pain relief, these will hopefully come down.  Otherwise, further adjustments may be necessary.    The above has been created using voice recognition software. Please be aware that this may unintentionally  produce inaccuracies and/or nonsensical sentences.      Electronically signed by: Suraj Robledo CNP

## 2021-06-20 NOTE — LETTER
Letter by Suraj Robledo CNP at      Author: Suraj Robledo CNP Service: -- Author Type: --    Filed:  Encounter Date: 8/3/2020 Status: (Other)         Patient: Ruddy Mendoza   MR Number: [id number]   YOB: 1955   Date of Visit: 8/3/2020     [facility]    Name:   Ruddy Mendoza  : 1955  Facility:   [facility] [[id number]]   Room:   Code Status: DNI and POLST AVAILABLE - Limited treatment (CPR and meds okay)  Fac type:   SNF (Skilled Nursing Facility, TCU) -     PCP:  Dawit Aviles MD    CHIEF COMPLAINT / REASON FOR VISIT:  Chief Complaint   Patient presents with   ? Follow-up     TCU follow-up after hospitalization following a fall resulting in facial fractures, as well as discovery of a pulmonary embolism.      [facility] from 2019 until 2019 (right facial numbness, severe dizziness, right upper extremity ataxia, swallowing and word finding difficulties)  [facility]CU from 2019 until 2019  [facility] from 2020 until 2020 (right proximal humerus fracture and hyperglycemia with SOPHIA)   [facility] from 2020 until 2020 (facial fractures and pulmonary embolism)    Patient was last seen by me on 2020.      HPI: Ruddy is a 64 y.o. female with a past medical history of recurrent UTIs and pyelonephritis, diabetes mellitus type 2 (poorly controlled - A1c 16.4 in 10/2017 and 9.4 in 2020 - with multiple related complications, including peripheral neuropathy, nephropathy, and retinopathy), endocrine induced hypertension, coronary artery disease (status post stenting x2), COPD (former smoker), and alcoholism who has been here multiple times in the past, involving stroke-like symptoms and right proximal humerus fracture along with hyperglycemia and SOPHIA.    Based on EMS reports prior to her last hospitalization, she appeared to live in a hoarder home.  She  was found on the the floor of her bedroom with a hole in the wall, and the patient later reported old broken shelving falling, although in the ED she could not remember what occurred.  She did note drinking multiple drinks of straight vodka that night, often doing this intermittently, and did not want her children to know.  There has been concern for alcoholism and possibly Warnicke's encephalopathy.     She was admitted on 07/2428 after presenting to the ED for evaluation of recurrent falls (x4).  She had been seen at [facility] ED on 06/27/2024 fall and was discharged home with nitrofurantoin for urinary tract infection, along with home health care.  There was no loss of consciousness, dizziness, vision changes, or room spinning sensation reported.  She expressed uncertainty as to why she kept falling, but she did report difficulty ambulating at home and getting up after her fall.  Her first fall happened while getting off the toilet, the second in the shower where she hit her head, the third while getting out of her chair, and the last while walking home from the store.  Paramedics were called to her house multiple times.  The patient denied any pain or headaches and reported feeling fine.    In the ED, she was tachycardic with a pulse of 103.  Labs were notable for a hemoglobin of 10.3, hematocrit 31.9, and calcium 8.2.  CT of the head showed partial visualized air-fluid level in the left maxillary sinus with hyperdense material new from 06/08/2020 (possibly representing layering hemorrhage from an occult facial fracture).  A CT of the chest showed acute PE in the left lower lobe and mild emphysema.  ECG showed NSR.  She received IVF and admitted for further management.     A maxillofacial CT confirmed mildly displaced fractures of the left inferior orbital rim and anterior and posterior lateral left maxillary sinus walls.  This injury occurred when go to the bathroom in a dark house walking into the the  "door.  She is legally blind.  A CT of the chest was positive for small burden PE.  Lower extremity venous Dopplers were positive for DVT involving the right calf, although she never had pain there.  She was started on enoxaparin and transitioned to Eliquis 10 mg twice daily for 1 week, followed by 5 mg twice daily thereafter.  An echocardiogram was performed that revealed no evidence for right heart strain.    ENT was consulted with respect to the facial fractures, and no surgical intervention was indicated.  She was subsequently transferred to [facility] with therapy.       TCU ISSUES    Facial fractures: She is experiencing facial pain on the left, especially at the sinuses.  There is also some numbness.  Ibuprofen and acetaminophen were completely ineffective.  Her oxycodone does not remove the pain, but it \"takes the edge off.\"    Pulmonary embolism and DVT: On Eliquis.  She still has some pain with deep inspiration, so she tries to breathe shallowly.    Diabetes mellitus type 2: Receiving metformin, glimepiride, Invokana, and sliding scale aspart.  Note that blood glucose levels had been under rather poor control.  Her A1c on 08/31/2019 was 9.4.  When last seen, fasting blood glucose levels ranged between 121 and 164.  At lunch, the range was between 132 and 180.  Supper range was between 127 and 169.  The only significantly elevated blood glucose levels are at bedtime, ranging from 172 up to 301, but she notes that this is done after supper.  All in all, they are only occasionally giving a single unit of insulin with some meals.  We will consider discontinuing sliding scale.    Right shoulder pain: She did have a fractured right proximal humerus in March 2020.  Currently, pain is worse, although there was no radiographic evidence of any reinjury.  Still, she is quite uncomfortable.  Pain seems to be at its worst first thing in the morning and in the middle of the night.  For the facial pain and for this, we " "ordered oxycodone 5 mg every 6 hours as needed (see Facial fractures above).  She told me that she can, for the first time in a while, put her socks on herself.  She has an appointment with the orthopedic surgeon for her right shoulder on 08/13/2020.      Hypertension: Still mildly elevated (pain related?), she is receiving lisinopril and metoprolol succinate.    Cognition:  During her last stay, there was some concern of cognitive impairment/encephalopathy.  During her stay here in September 2019, she did have some word finding difficulty but otherwise scored well in cognitive testing.  When last here, according to Occupational Therapy, she scored only 16/30 on the SLUMS, indicating moderate cognitive deficits.  SLP did work with her as well on cognition and word finding difficulty during her last stay.    Depression and anxiety: Major depressive disorder (with anxiety) has been ongoing for some time.  She was started on 20 mg of fluoxetine on 09/06/2019, and the dosing has not been changed.  She also receives BuSpar (20 mg daily).  She may also be self-medicating with alcohol.    Insomnia: She tells me she can sleep with \"a lot of medication.\"  She does awaken at 4 AM and stays up after that.    COPD: Has inhalers.    Diabetic polyneuropathy: Tingling in the hands and feet, though worse in the feet.    Trigeminal neuralgia: Taking carbamazepine for this, and it seems to be under control.      ROS: No  chest pains, coughing or congestion, dizziness or dyspnea, dysuria, integumentary issues.  Sleep has always been a problem, but she is doing okay on her current sleep medication regimen.    Past Medical History:   Diagnosis Date   ? Acute encephalopathy 09/23/2017   ? Acute hypokalemia 09/23/2017   ? Acute hyponatremia 09/23/2017   ? Acute pulmonary embolism (H)    ? Acute pyelonephritis 09/23/2017   ? SOPHIA (acute kidney injury) (H)    ? Anxiety    ? CAD (coronary artery disease)    ? Closed fracture of facial bone " (H)    ? Closed fracture of right humerus    ? COPD (chronic obstructive pulmonary disease) (H)    ? Depression    ? Diabetes mellitus (H) 09/23/2017   ? Diabetic peripheral neuropathy (H) 9/4/2019   ? History of cervical cancer    ? HLD (hyperlipidemia)    ? Hypertension    ? Hypothyroidism    ? Insomnia    ? Normocytic anemia    ? Peripheral neuropathy    ? Sensorineural hearing loss (SNHL) of right ear with tinnitus 9/4/2019   ? Sepsis (H) 09/23/2017   ? Trigeminal neuralgia               Family History   Problem Relation Age of Onset   ? COPD Mother    ? COPD Father    ? Lung cancer Father      Social History     Socioeconomic History   ? Marital status: Single     Spouse name: Not on file   ? Number of children: Not on file   ? Years of education: Not on file   ? Highest education level: Not on file   Occupational History   ? Not on file   Social Needs   ? Financial resource strain: Not on file   ? Food insecurity     Worry: Not on file     Inability: Not on file   ? Transportation needs     Medical: Not on file     Non-medical: Not on file   Tobacco Use   ? Smoking status: Former Smoker     Packs/day: 2.00     Years: 30.00     Pack years: 60.00     Types: Cigarettes   ? Smokeless tobacco: Never Used   ? Tobacco comment: quit at age 42   Substance and Sexual Activity   ? Alcohol use: Yes     Comment: Rare   ? Drug use: No   ? Sexual activity: Not Currently     Partners: Male   Lifestyle   ? Physical activity     Days per week: Not on file     Minutes per session: Not on file   ? Stress: Not on file   Relationships   ? Social connections     Talks on phone: Not on file     Gets together: Not on file     Attends [employer] service: Not on file     Active member of club or organization: Not on file     Attends meetings of clubs or organizations: Not on file     Relationship status: Not on file   ? Intimate partner violence     Fear of current or ex partner: Not on file     Emotionally abused: Not on file      Physically abused: Not on file     Forced sexual activity: Not on file   Other Topics Concern   ? Not on file   Social History Narrative    Lives at home alone with her catKailyn       MEDICATIONS: Reviewed from the MAR, physician orders, and/or earlier progress notes.    Post Discharge Medication Reconciliation Status: medication reconciliation previously completed during another office visit.    Current Outpatient Medications   Medication Sig   ? acetaminophen (TYLENOL) 500 MG tablet Take 1,000 mg by mouth 3 (three) times a day.    ? albuterol (PROAIR HFA;PROVENTIL HFA;VENTOLIN HFA) 90 mcg/actuation inhaler Inhale 2 puffs 4 (four) times a day as needed for wheezing.    ? amitriptyline (ELAVIL) 150 MG tablet Take 150 mg by mouth at bedtime.   ? apixaban ANTICOAGULANT (ELIQUIS) 5 mg Tab tablet Take 5 mg by mouth 2 (two) times a day.   ? aspirin 81 mg chewable tablet Chew 81 mg daily.   ? budesonide-formoteroL (SYMBICORT) 160-4.5 mcg/actuation inhaler Inhale 2 puffs 2 (two) times a day.   ? busPIRone (BUSPAR) 10 MG tablet Take 20 mg by mouth at bedtime.   ? canagliflozin (INVOKANA) 100 mg Tab Take 100 mg by mouth daily before breakfast.   ? carBAMazepine (TEGRETOL) 200 mg tablet Take 400 mg by mouth 2 (two) times a day.   ? doxylamine (UNISOM) 25 mg tablet Take 25 mg by mouth at bedtime.   ? ferrous sulfate 65 mg elemental iron Take 1 tablet by mouth daily with breakfast.    ? FLUoxetine (PROZAC) 20 MG capsule Take 20 mg by mouth daily.   ? glimepiride (AMARYL) 1 MG tablet Take 1 mg by mouth daily before breakfast.   ? ibuprofen (ADVIL,MOTRIN) 200 MG tablet Take 200 mg by mouth every 4 (four) hours as needed for pain or mild pain (1-3).   ? insulin aspart U-100 (NOVOLOG) 100 unit/mL injection Inject under the skin 3 (three) times a day with meals. Per sliding scale: if 0 - 69 = 0 UNITS SEEHYPOGLYCEMIA PROTOCOL; 70 - 149 = 0 UNITSNO INSULIN, GIVE PRANDIAL INSULIN IFORDERED; 150 - 199 = 1 UNIT; 200 - 249 = 2UNITS; 250  "- 299 = 3 UNITS; 300 - 349 = 4 UNITS;350 - 399 = 5 UNITS; 400+ = 6 UNITS IF  ORGREATER, GIVE 6 UNITS AND CALL MD/NP   ? levothyroxine (SYNTHROID, LEVOTHROID) 50 MCG tablet Take 50 mcg by mouth daily.   ? lisinopril (PRINIVIL,ZESTRIL) 10 MG tablet Take 10 mg by mouth daily.   ? melatonin 10 mg Tab Take 10 mg by mouth at bedtime.   ? metFORMIN (GLUCOPHAGE-XR) 750 MG 24 hr tablet Take 750 mg by mouth 2 (two) times a day with meals.    ? metoprolol succinate (TOPROL-XL) 25 MG Take 25 mg by mouth daily.    ? mirtazapine (REMERON) 15 MG tablet Take 15 mg by mouth daily.   ? multivit with min-folic acid 0.4 mg Tab Take 1 tablet by mouth daily.   ? naltrexone (DEPADE) 50 mg tablet Take 50 mg by mouth daily.   ? nitroglycerin (NITROSTAT) 0.4 MG SL tablet Place 0.4 mg under the tongue every 5 (five) minutes as needed for chest pain.   ? oxyCODONE (ROXICODONE) 5 MG immediate release tablet Take 5 mg by mouth every 6 (six) hours as needed for pain.   ? polyethylene glycol (MIRALAX) 17 gram packet Take 17 g by mouth daily.   ? rosuvastatin (CRESTOR) 20 MG tablet Take 20 mg by mouth at bedtime.   ? traZODone (DESYREL) 50 MG tablet Take 150 mg by mouth at bedtime.      ALLERGIES:   Allergies   Allergen Reactions   ? Propranolol Anaphylaxis     Slowed HR   ? Atorvastatin      Hand neuropathy   ? Prochlorperazine Edisylate Rash     nightmares     DIET: Consistent carbohydrates, regular texture, thin liquids.    Vitals:    08/03/20 1601   BP: 118/74   Pulse: 76   Resp: 18   Temp: 98  F (36.7  C)   SpO2: 99%   Weight: 173 lb 12.8 oz (78.8 kg)   Height: 5' 3\" (1.6 m)   She is down 11.6 pounds since her last stay.  Body mass index is 30.79 kg/m .    EXAMINATION:   General: Fairly pleasant middle-aged female, sitting comfortably, in no apparent distress.  Head: Normocephalic and atraumatic.  External visual signs of her facial fractures are not in evidence.  Eyes: PERRLA, sclerae clear.   ENT: Moist oral mucosa.  She is edentulous " with full upper and lower dentures.  No rhinorrhea or nasal discharge.  She has complete hearing loss in the right ear and chronic tinnitus there.  Cardiovascular: Previously: Regular rate and rhythm with a short 2/6 systolic ejection murmur at the left sternal border.  Chest not auscultated today due to ongoing COVID-19 precautions.  Respiratory: Previously: Lungs clear to auscultation bilaterally.  Chest not auscultated today due to ongoing COVID-19 precautions.  Abdomen: Soft and nontender.   Musculoskeletal/Extremities: Trace swelling in the feet.  Integument:  No rashes, clinically significant lesions, or skin breakdown.   Cognitive/Psychiatric: Alert and oriented x3, although she scored 16/30 on the SLUMS during an earlier stay.  Affect is rather flat.    DIAGNOSTICS:   Results for orders placed or performed during the hospital encounter of 08/30/19   Basic Metabolic Panel   Result Value Ref Range    Sodium 136 136 - 145 mmol/L    Potassium 4.1 3.5 - 5.0 mmol/L    Chloride 104 98 - 107 mmol/L    CO2 26 22 - 31 mmol/L    Anion Gap, Calculation 6 5 - 18 mmol/L    Glucose 103 70 - 125 mg/dL    Calcium 8.7 8.5 - 10.5 mg/dL    BUN 13 8 - 22 mg/dL    Creatinine 0.76 0.60 - 1.10 mg/dL    GFR MDRD Af Amer >60 >60 mL/min/1.73m2    GFR MDRD Non Af Amer >60 >60 mL/min/1.73m2     Lab Results   Component Value Date    WBC 5.5 08/31/2019    HGB 9.5 (L) 08/31/2019    HCT 28.6 (L) 08/31/2019    MCV 95 08/31/2019     09/03/2019     CrCl cannot be calculated (Patient's most recent lab result is older than the maximum 10 days allowed.).  Lab Results   Component Value Date    HGBA1C 9.4 (H) 08/31/2019       ASSESSMENT/Plan:      ICD-10-CM    1. Closed fracture of left side of maxilla with routine healing, subsequent encounter  S02.40DD    2. Other acute pulmonary embolism without acute cor pulmonale (H)  I26.99    3. Chronic right shoulder pain  M25.511     G89.29    4. Type 2 diabetes mellitus with both eyes affected by  retinopathy and macular edema, with long-term current use of insulin, unspecified retinopathy severity (H)  E11.311     Z79.4    5. Episode of recurrent major depressive disorder, unspecified depression episode severity (H)  F33.9    6. Diabetic nephropathy associated with type 2 diabetes mellitus (H)  E11.21    7. Diabetic peripheral neuropathy (H)  E11.42    8. Pulmonary emphysema, unspecified emphysema type (H)  J43.9    9. Sensorineural hearing loss (SNHL) of right ear, unspecified hearing status on contralateral side  H90.5    10. Hypertension due to endocrine disorder  I15.2    11. Coronary artery disease : previous stents without angina pectoris  I25.10    12. Anemia of chronic disease  D63.8    13. Word finding difficulty  R47.89    14. Trigeminal neuralgia  G50.0      CHANGES:  None.    CARE PLAN:  The care plan has been reviewed and all orders signed.  Changes to care plan, if any, as noted.  Otherwise, continue current plan of care.        The above has been created using voice recognition software. Please be aware that this may unintentionally  produce inaccuracies and/or nonsensical sentences.      Electronically signed by: Suraj Robledo, CNP

## 2021-06-20 NOTE — LETTER
Letter by Suraj Robledo CNP at      Author: Suraj Robledo CNP Service: -- Author Type: --    Filed:  Encounter Date: 3/30/2020 Status: (Other)         Patient: Ruddy Mendoza   MR Number: [id number]   YOB: 1955   Date of Visit: 3/30/2020     [facility]    Name:   Ruddy Mendoza  : 1955  Facility:   [facility] [[id number]]   Room:   Code Status: FULL CODE -   Fac type:   SNF (Skilled Nursing Facility, TCU) -     PCP:  Dawit Aviles MD    CHIEF COMPLAINT / REASON FOR VISIT:  Chief Complaint   Patient presents with   ? Follow-up     TCU follow-up after hospitalization for right proximal humerous fracture, s/p ORIF.      [facility] from 2019 until 2019 (right facial numbness, severe dizziness, right upper extremity ataxia, swallowing and word finding difficulties)  [facility] from 2019 until 2019  [facility] from 2020 until 2020 (right proximal humerus fracture)    Patient was last seen by me on 2020.      HPI: Ruddy is a 64 y.o. female with a past medical history of recurrent UTIs and pyelonephritis, diabetes mellitus type 2 (uncontrolled), endocrine induced hypertension, coronary artery disease (status post stenting), and COPD (former smoker) who, prior to her last stay in the TCU, had presented with complaints of severe dizziness, new right lower facial numbness, and ataxia of the right upper extremity for the previous 4 to 5 days prior to admission, with concern for possible stroke, though imaging was negative.  She was also found to be hyperglycemic to the 500s, suffering SOPHIA, and a urinalysis was concerning for UTI on admission.    Etiology of her symptoms was unclear.  Given her history of nausea and vomiting, falls, and unstable gait, the main concern was for a cerebellar stroke; however, an MRI was without any findings of acute stroke, although it did  show some chronic infarcts of the cerebellum.  Consider with her hyperglycemia or if hypotensive, a watershed type phenomenon.  Hypoglycemia may have been the primary cause of this, although it sounded as though it was secondary to her ability to take medications due to her vertigo.  Her report of worsening dizziness with change in position could support orthostatic hypotension, although this was later also deemed to be negative.  She does have diabetic peripheral neuropathy, but one would not expect this to cause a sudden vertiginous syndrome.  Unlikely BPPV, given no symptoms with head movements.  She denied any recent substance alcohol use, though consideration was given to Warnicke's type encephalopathy with her need for a wide gait, and neurology did recommend initiating thiamine therapy.  At the time, she was also treated for a urinary tract infection (pansensitive E. Coli).  She also had significant hyperglycemia on admission into the 500s.  However, she stated she was unable to take her usual home medications due to her vertigo/dizziness.  At the time, her last known A1c was 16.4 in October 2017.  In the hospital, it was 9.4.      More recently, she fell at home.  Based on EMS reports, it appeared to be a hoarder home.  She was found on the the floor of her bedroom with a hole in the wall, and the patient later reported old broken shelving falling, although in the ED she could not remember what occurred.  She did note drinking multiple drinks of straight vodka that night, often does this intermittently, and did not want her children to know this.  As noted previously, there has been some concern for alcoholism and Warnicke's encephalopathy.      TCU ISSUES    Humerus fracture: When last seen, she endorsed considerable pain, unrelieved on the a regimen of oxycodone 5 mg every 4 hours as needed.  We tried scheduling 10 mg 3 times daily and 5 mg every 4 hours as needed, and this proved helpful.  She did tell me  "that hydromorphone and morphine made her feel \"weird.\"  She also stated that her pain is worse in the mornings.  One problem was that she is getting her pain medication late in the morning, and therapy grabs her before it has had a chance to work.  We wrote orders to schedule her first dose rather early (about 6 AM).    Pain is \"pretty bad at night now,\" she tells me.  She rates it \"about a 9.\"  She even missed therapy 1 day because the pain is so bad.  She is to have the staples removed today.  Follow-up x-rays were performed on 03/27/2020, and there were no issues.    Cognition: As noted, there has been some concern of cognitive impairment/encephalopathy.  During her stay here in September 2019, she did have some word finding difficulty but otherwise scored well in cognitive testing.  Currently, according to Occupational Therapy, she scored only 16/30 on the SLUMS, indicating moderate cognitive deficits.  SLP is working with her as well on cognition and word finding difficulty.    Depression: Major depressive disorder (with anxiety) has been ongoing for some time.  She was started on fluoxetine on 09/06/2019, and the dosing has not been changed.  She may be self-medicating with alcohol, as she did acknowledge having multiple drinks of straight vodka the day of her fall.    From what I have heard, she had been taking care of her grandchildren prior to her last hospitalization, and since then, the children are now going to .  I believe she was told that if she could not take care of herself, she could not take care of the grandchildren.  I believe this has upset her greatly.    She also admits to being depressed that the  in her building committed suicide.  There were no indications that he was depressed, and he left no note.    When last here, Dr. Ordonez discontinued her buspirone, also adding fluoxetine 20 mg nightly for depression.  She seems to be back on the former, continuing the " latter.    COPD: Receiving Symbicort and Ventolin HFA inhalers at home.    Diabetes mellitus type 2: Currently on a lower dose of glargine than she had been at home, now 30 units every morning.  At home, she had been on 35 units.  In addition to the glargine, she is getting 5 units of scheduled aspart with each meal and at bedtime.    Recent blood glucose levels look good and are generally between 100 and 200, although she has had at least 1 fasting asymptomatic hypoglycemic episode.    Diabetic polyneuropathy: Tingling in the hands and feet.    Trigeminal neuralgia: Taking carbamazepine for this, and it seems to be under control.    Other issues not addressed but discussed: She has very poor vision (5/200) with macular degeneration, early diabetic retinopathy, and some hemorrhaging.  She did attend [employer] at the [facility] for a while.  She did not think it helped, and it was exceedingly difficult to read Braille, given her neuropathy.    Code status: Prior to her last stay, she was full code in the hospital.  A signed a POLST here indicated that she was now DNR/DNI (also opposed to intubation, as she watched her mother go through it).  Once again, she is full code.      ROS: Biggest complaint continues to be that of the right shoulder pain.  She claims pain of 9/10.  I doubt that it is that high, although she certainly continues to the uncomfortable.  She may be getting a little constipated, although she does have scheduled MiraLAX ordered.  No headaches or chest pains, coughing or congestion, nausea or vomiting, dyspnea, dysuria, integumentary issues.  Sleep is always been a problem.  She is currently on both melatonin and trazodone.    Past Medical History:   Diagnosis Date   ? Acute encephalopathy 09/23/2017   ? Acute hypokalemia 09/23/2017   ? Acute hyponatremia 09/23/2017   ? Acute pyelonephritis 09/23/2017   ? SOPHIA (acute kidney injury) (H)    ? Anxiety    ? CAD (coronary artery disease)     ? Closed fracture of right humerus    ? COPD (chronic obstructive pulmonary disease) (H)    ? Depression    ? Diabetes mellitus (H) 09/23/2017   ? Diabetic peripheral neuropathy (H) 9/4/2019   ? History of cervical cancer    ? HLD (hyperlipidemia)    ? Hypertension    ? Hypothyroidism    ? Insomnia    ? Normocytic anemia    ? Sensorineural hearing loss (SNHL) of right ear with tinnitus 9/4/2019   ? Sepsis (H) 09/23/2017   ? Trigeminal neuralgia               Family History   Problem Relation Age of Onset   ? COPD Mother    ? COPD Father    ? Lung cancer Father      Social History     Socioeconomic History   ? Marital status: Single     Spouse name: Not on file   ? Number of children: Not on file   ? Years of education: Not on file   ? Highest education level: Not on file   Occupational History   ? Not on file   Social Needs   ? Financial resource strain: Not on file   ? Food insecurity     Worry: Not on file     Inability: Not on file   ? Transportation needs     Medical: Not on file     Non-medical: Not on file   Tobacco Use   ? Smoking status: Former Smoker     Packs/day: 2.00     Years: 30.00     Pack years: 60.00     Types: Cigarettes   ? Smokeless tobacco: Never Used   ? Tobacco comment: quit at age 42   Substance and Sexual Activity   ? Alcohol use: Yes     Comment: Rare   ? Drug use: No   ? Sexual activity: Not Currently     Partners: Male   Lifestyle   ? Physical activity     Days per week: Not on file     Minutes per session: Not on file   ? Stress: Not on file   Relationships   ? Social connections     Talks on phone: Not on file     Gets together: Not on file     Attends [employer] service: Not on file     Active member of club or organization: Not on file     Attends meetings of clubs or organizations: Not on file     Relationship status: Not on file   ? Intimate partner violence     Fear of current or ex partner: Not on file     Emotionally abused: Not on file     Physically abused: Not on file      Forced sexual activity: Not on file   Other Topics Concern   ? Not on file   Social History Narrative    Lives at home alone with her catKailyn       MEDICATIONS: Reviewed from the MAR, physician orders, and/or earlier progress notes.    Post Discharge Medication Reconciliation Status: medication reconciliation previously completed during another office visit.    Current Outpatient Medications   Medication Sig   ? acetaminophen (TYLENOL) 500 MG tablet Take 1,000 mg by mouth 4 (four) times a day.    ? albuterol (PROAIR HFA;PROVENTIL HFA;VENTOLIN HFA) 90 mcg/actuation inhaler Inhale 2 puffs 4 (four) times a day as needed for wheezing.    ? amitriptyline (ELAVIL) 150 MG tablet Take 150 mg by mouth at bedtime.   ? aspirin 325 MG tablet Take 325 mg by mouth daily.   ? budesonide-formoteroL (SYMBICORT) 160-4.5 mcg/actuation inhaler Inhale 2 puffs 2 (two) times a day.   ? busPIRone (BUSPAR) 10 MG tablet Take 20 mg by mouth at bedtime.   ? carBAMazepine (TEGRETOL) 200 mg tablet Take 400 mg by mouth 2 (two) times a day.   ? ferrous sulfate 65 mg elemental iron Take 1 tablet by mouth daily with breakfast.    ? FLUoxetine (PROZAC) 20 MG capsule Take 20 mg by mouth daily.   ? ibuprofen (ADVIL,MOTRIN) 200 MG tablet Take 200 mg by mouth every 4 (four) hours as needed for pain or mild pain (1-3).   ? insulin glargine (LANTUS) 100 unit/mL vial Inject 35 Units under the skin every morning. (Patient taking differently: Inject 30 Units under the skin every morning. )   ? levothyroxine (SYNTHROID, LEVOTHROID) 50 MCG tablet Take 50 mcg by mouth daily.   ? lisinopril (PRINIVIL,ZESTRIL) 10 MG tablet Take 10 mg by mouth daily.   ? melatonin 10 mg Tab Take 10 mg by mouth at bedtime.   ? metFORMIN (GLUCOPHAGE-XR) 750 MG 24 hr tablet Take 750 mg by mouth 2 (two) times a day as needed.   ? metoprolol succinate (TOPROL-XL) 25 MG Take 25 mg by mouth daily.          ? multivit with min-folic acid 0.4 mg Tab Take 1 tablet by mouth daily.   ?  nitroglycerin (NITROSTAT) 0.4 MG SL tablet Place 0.4 mg under the tongue every 5 (five) minutes as needed for chest pain.   ? NOVOLOG FLEXPEN U-100 INSULIN 100 unit/mL (3 mL) injection pen Check blood sugar four (4) times daily.  11.65 Type 2 with hyperglycemia  BD Ultra-fine Noris Pen Needles - NDC [address]-16 - dispense 1 case, refill PRN for 1 year  Accu-chek Guide blood glucose meter - dispense 1  Accu-chek Guide test strips (50 ct. boxes) - dispense 1, refill PRN for 1 year  Accu-chek FastClix lancets (box of 102 ct.) - dispense 1, refill PRN for 1 year (Patient taking differently: Inject 5 Units under the skin. 5 units 4 times daily (AC + bedtime)  11.65 Type 2 with hyperglycemia  BD Ultra-fine Noris Pen Needles - NDC [address]-89 - dispense 1 case, refill PRN for 1 year  Accu-chek Guide blood glucose meter - dispense 1  Accu-chek Guide test strips (50 ct. boxes) - dispense 1, refill PRN for 1 year  Accu-chek FastClix lancets (box of 102 ct.) - dispense 1, refill PRN for 1 year)   ? oxyCODONE (ROXICODONE) 5 MG immediate release tablet Take 5 mg by mouth every 4 (four) hours as needed for pain (Also receiving scheduled dosing.).   ? oxyCODONE (ROXICODONE) 5 MG immediate release tablet Take 10 mg by mouth every 8 (eight) hours. Also has PRN dosing available.   ? polyethylene glycol (MIRALAX) 17 gram packet Take 17 g by mouth daily.   ? rosuvastatin (CRESTOR) 20 MG tablet Take 20 mg by mouth at bedtime.   ? senna-docusate (SENNOSIDES-DOCUSATE SODIUM) 8.6-50 mg tablet Take 1 tablet by mouth 2 (two) times a day as needed for constipation.   ? traZODone (DESYREL) 50 MG tablet Take 150 mg by mouth at bedtime.      ALLERGIES:   Allergies   Allergen Reactions   ? Propranolol Anaphylaxis     Slowed HR   ? Atorvastatin      Hand neuropathy   ? Prochlorperazine Edisylate Rash     nightmares     DIET: Diabetic, regular texture, thin liquids.    Vitals:    03/30/20 1018   BP: 149/70   Pulse: 78   Resp: 16   Temp: 96.8  F  "(36  C)   SpO2: 92%   Weight: 191 lb 12.8 oz (87 kg)   Height: 5' 3\" (1.6 m)   Previous weight when last here in the TCU was 169.2 pounds.  This is a 22.6 pound weight gain.  She does endorse weight gain.  Body mass index is 33.98 kg/m .    EXAMINATION:   General: Fairly pleasant middle-aged female, sitting on her bed, in no apparent distress.  Head: Normocephalic and atraumatic.   Eyes: PERRLA, sclerae clear.   ENT: Moist oral mucosa.  She is edentulous with full upper and lower dentures.  No rhinorrhea or nasal discharge.  She has complete hearing loss in the right ear and chronic tinnitus.  Cardiovascular: Regular rate and rhythm with a short 2/6 systolic ejection murmur at the left sternal border.  Respiratory: Lungs clear to auscultation bilaterally.   Abdomen: Soft and nontender.   Musculoskeletal/Extremities: Right shoulder is swollen down to the right hand with trace pitting edema, improved since my last visit.  Integument: No rashes, clinically significant lesions, or skin breakdown.   Cognitive/Psychiatric: Alert and oriented x3, although she scored 16/30 on the SLUMS.  Affect is euthymic.    DIAGNOSTICS:   Results for orders placed or performed during the hospital encounter of 08/30/19   Basic Metabolic Panel   Result Value Ref Range    Sodium 136 136 - 145 mmol/L    Potassium 4.1 3.5 - 5.0 mmol/L    Chloride 104 98 - 107 mmol/L    CO2 26 22 - 31 mmol/L    Anion Gap, Calculation 6 5 - 18 mmol/L    Glucose 103 70 - 125 mg/dL    Calcium 8.7 8.5 - 10.5 mg/dL    BUN 13 8 - 22 mg/dL    Creatinine 0.76 0.60 - 1.10 mg/dL    GFR MDRD Af Amer >60 >60 mL/min/1.73m2    GFR MDRD Non Af Amer >60 >60 mL/min/1.73m2     Lab Results   Component Value Date    WBC 5.5 08/31/2019    HGB 9.5 (L) 08/31/2019    HCT 28.6 (L) 08/31/2019    MCV 95 08/31/2019     09/03/2019     CrCl cannot be calculated (Patient's most recent lab result is older than the maximum 10 days allowed.).  Lab Results   Component Value Date    " HGBA1C 9.4 (H) 08/31/2019       ASSESSMENT/Plan:      ICD-10-CM    1. Closed displaced comminuted fracture of shaft of right humerus with routine healing  S42.351D    2. Type 2 diabetes mellitus with hyperglycemia, with long-term current use of insulin (H)  E11.65     Z79.4    3. Word finding difficulty  R47.89    4. Episode of recurrent major depressive disorder, unspecified depression episode severity (H)  F33.9    5. Diabetic peripheral neuropathy (H)  E11.42    6. Hypertension due to endocrine disorder  I15.2    7. Sensorineural hearing loss (SNHL) of right ear, unspecified hearing status on contralateral side  H90.5    8. Acquired hypothyroidism  E03.9    9. Hyperlipidemia, unspecified hyperlipidemia type  E78.5      CHANGES:  Now receiving 10 mg of oxycodone 3 times daily (in addition to as needed).  Schedule first dose of oxycodone at 6 AM.    CARE PLAN:  The care plan has been reviewed and all orders signed.  Changes to care plan, if any, as noted.  Otherwise, continue current plan of care.      The above has been created using voice recognition software. Please be aware that this may unintentionally  produce inaccuracies and/or nonsensical sentences.      Electronically signed by: Suraj Robledo CNP

## 2021-06-20 NOTE — LETTER
Letter by Suraj Robledo CNP at      Author: Suraj Robledo CNP Service: -- Author Type: --    Filed:  Encounter Date: 8/10/2020 Status: (Other)         Patient: Ruddy Mendoza   MR Number: [id number]   YOB: 1955   Date of Visit: 8/10/2020     [facility]    Name:   Ruddy Mendoza  : 1955  Facility:   [facility] [[id number]]   Room:   Code Status: DNI and POLST AVAILABLE - Limited treatment (CPR and meds okay)  Fac type:   SNF (Skilled Nursing Facility, TCU) -     PCP:  Dawit Aviles MD    CHIEF COMPLAINT / REASON FOR VISIT:  Chief Complaint   Patient presents with   ? Discharge Summary     TCU discharge after hospitalization following a fall resulting in facial fractures, as well as discovery of a pulmonary embolism.      [facility] from 2019 until 2019 (right facial numbness, severe dizziness, right upper extremity ataxia, swallowing and word finding difficulties)  [facility] from 2019 until 2019   from 2020 until 2020 (right proximal humerus fracture and hyperglycemia with SOPHIA)    from 2020 until 2020 (facial fractures and pulmonary embolism)  [facility] from 2020 until 2020 (expected discharge date)      HPI: Ruddy is a 64 y.o. female with a past medical history of recurrent UTIs and pyelonephritis, diabetes mellitus type 2 (poorly controlled - A1c 16.4 in 10/2017 and 9.4 in 2020 - with multiple related complications, including peripheral neuropathy, nephropathy, and retinopathy), endocrine induced hypertension, coronary artery disease (status post stenting x2), COPD (former smoker), and alcoholism who has been here multiple times in the past, involving stroke-like symptoms and right proximal humerus fracture along with hyperglycemia and SOPHIA.    Based on EMS reports prior to her last  hospitalization, she appeared to live in a hoarder home.  She was found on the the floor of her bedroom with a hole in the wall, and the patient later reported old broken shelving falling, although in the ED she could not remember what occurred.  She did note drinking multiple drinks of straight vodka that night, often doing this intermittently, and did not want her children to know.  There has been concern for alcoholism and possibly Warnicke's encephalopathy.     She was admitted on 07/2428 after presenting to the ED for evaluation of recurrent falls (x4).  She had been seen at [facility] ED on 06/27/2024 fall and was discharged home with nitrofurantoin for urinary tract infection, along with home health care.  There was no loss of consciousness, dizziness, vision changes, or room spinning sensation reported.  She expressed uncertainty as to why she kept falling, but she did report difficulty ambulating at home and getting up after her fall.  Her first fall happened while getting off the toilet, the second in the shower where she hit her head, the third while getting out of her chair, and the last while walking home from the store.  Paramedics were called to her house multiple times.  The patient denied any pain or headaches and reported feeling fine.    In the ED, she was tachycardic with a pulse of 103.  Labs were notable for a hemoglobin of 10.3, hematocrit 31.9, and calcium 8.2.  CT of the head showed partial visualized air-fluid level in the left maxillary sinus with hyperdense material new from 06/08/2020 (possibly representing layering hemorrhage from an occult facial fracture).  A CT of the chest showed acute PE in the left lower lobe and mild emphysema.  ECG showed NSR.  She received IVF and admitted for further management.     A maxillofacial CT confirmed mildly displaced fractures of the left inferior orbital rim and anterior and posterior lateral left maxillary sinus walls.  This injury occurred  "when go to the bathroom in a dark house walking into the the door.  She is legally blind.  A CT of the chest was positive for small burden PE.  Lower extremity venous Dopplers were positive for DVT involving the right calf, although she never had pain there.  She was started on enoxaparin and transitioned to Eliquis 10 mg twice daily for 1 week, followed by 5 mg twice daily thereafter.  An echocardiogram was performed that revealed no evidence for right heart strain.    ENT was consulted with respect to the facial fractures, and no surgical intervention was indicated.  She was subsequently transferred to [facility] with therapy.       TCU ISSUES    Disposition: She had a care conference on 08/05/2020, and assisted living (due to frequent falls) was discussed.  During my visit with her, she was crying.  She stated that all of falls occurred in 1 day and that the doctors blamed blood clots and neuropathy.  She is quite distressed.  She currently lives in a 1 bedroom apartment in a senior citizen disability building.  She told me the people to go into assisted living \"are in really bad shape.\"  I tried to emphasize that assisted living was pretty much à la carte, and she would have a good deal of independence, only receiving assistance where needed.  She also told me that she does not have money for AL.      Facial fractures: She is experiencing facial pain on the left, especially at the sinuses.  There is also some numbness.  Ibuprofen and acetaminophen were completely ineffective.  Her oxycodone does not entirely remove the pain, but it \"takes the edge off.\"  When last seen, it hurt a bit more because she was crying.    Pulmonary embolism and DVT: On Eliquis.  She still has some pain with deep inspiration, so she tries to breathe shallowly.  Chest pains are less than they had been.    Diabetes mellitus type 2: Receiving metformin, glimepiride, Invokana, and sliding scale aspart.  Note that blood glucose levels had " been under rather poor control.  Her A1c on 08/31/2019 was 9.4.  Recent blood glucose levels are mostly in the low to mid 100s, rarely greater than 200 or less than 100.  All in all, they are only occasionally giving a single unit of insulin with some meals.  We will discontinue sliding scale coverage.    Right shoulder pain: She did have a fractured right proximal humerus in March 2020.  Currently, pain is worse, although there was no radiographic evidence of any reinjury.  Still, she is quite uncomfortable.  Pain seems to be at its worst first thing in the morning and in the middle of the night.  For the facial pain and for this, we ordered oxycodone 5 mg every 6 hours as needed (see Facial fractures above).  She told me that she can, for the first time in a while, put her socks on herself.  She has an appointment with the orthopedic surgeon for her right shoulder on 08/13/2020.  The pain is now going down the arm into the wrist, and I told her that this was probably due to overuse/compensation.    Hypertension: Still mildly elevated (pain related?), she is receiving lisinopril and metoprolol succinate.    Cognition:  During her last stay, there was some concern of cognitive impairment/encephalopathy.  During her stay here in September 2019, she did have some word finding difficulty but otherwise scored well in cognitive testing.  When last here, according to Occupational Therapy, she scored only 16/30 on the SLUMS, indicating moderate cognitive deficits.  SLP did work with her as well on cognition and word finding difficulty during her last stay.  No obvious word finding difficulties this stay.    Depression and anxiety: Major depressive disorder (with anxiety) has been ongoing for some time.  She was started on 20 mg of fluoxetine on 09/06/2019, and the dosing has not been changed.  She also receives BuSpar (20 mg daily).  She may also have been self-medicating with alcohol.    Insomnia: She tells me she can  "sleep with \"a lot of medication.\"  She does awaken at 4 AM and stays up after that.    COPD: Has inhalers.    Diabetic polyneuropathy: Tingling in the hands and feet, though worse in the feet.    Trigeminal neuralgia: Taking carbamazepine for this, and it seems to be under control.  She describes the pain as \"phantom earaches.\"     Discharge planning: She will be discharging home tomorrow with [facility], including home health aide, PT and OT, and .  Her family is getting the house ready for her.  She is able to get around with a 4 wheeled walker.  She does have an appointment to see the orthopedic surgeon for her right shoulder later this week.      ROS: No  chest pains, coughing or congestion, dizziness or dyspnea, dysuria, integumentary issues.  Sleep has always been a problem, but she is doing okay on her current sleep medication regimen.    Past Medical History:   Diagnosis Date   ? Acute encephalopathy 09/23/2017   ? Acute hypokalemia 09/23/2017   ? Acute hyponatremia 09/23/2017   ? Acute pulmonary embolism (H)    ? Acute pyelonephritis 09/23/2017   ? SOPHIA (acute kidney injury) (H)    ? Anxiety    ? CAD (coronary artery disease)    ? Closed fracture of facial bone (H)    ? Closed fracture of right humerus    ? COPD (chronic obstructive pulmonary disease) (H)    ? Depression    ? Diabetes mellitus (H) 09/23/2017   ? Diabetic peripheral neuropathy (H) 9/4/2019   ? History of cervical cancer    ? HLD (hyperlipidemia)    ? Hypertension    ? Hypothyroidism    ? Insomnia    ? Normocytic anemia    ? Peripheral neuropathy    ? Sensorineural hearing loss (SNHL) of right ear with tinnitus 9/4/2019   ? Sepsis (H) 09/23/2017   ? Trigeminal neuralgia               Family History   Problem Relation Age of Onset   ? COPD Mother    ? COPD Father    ? Lung cancer Father      Social History     Socioeconomic History   ? Marital status: Single     Spouse name: Not on file   ? Number of children: Not on " file   ? Years of education: Not on file   ? Highest education level: Not on file   Occupational History   ? Not on file   Social Needs   ? Financial resource strain: Not on file   ? Food insecurity     Worry: Not on file     Inability: Not on file   ? Transportation needs     Medical: Not on file     Non-medical: Not on file   Tobacco Use   ? Smoking status: Former Smoker     Packs/day: 2.00     Years: 30.00     Pack years: 60.00     Types: Cigarettes   ? Smokeless tobacco: Never Used   ? Tobacco comment: quit at age 42   Substance and Sexual Activity   ? Alcohol use: Yes     Comment: Rare   ? Drug use: No   ? Sexual activity: Not Currently     Partners: Male   Lifestyle   ? Physical activity     Days per week: Not on file     Minutes per session: Not on file   ? Stress: Not on file   Relationships   ? Social connections     Talks on phone: Not on file     Gets together: Not on file     Attends [employer] service: Not on file     Active member of club or organization: Not on file     Attends meetings of clubs or organizations: Not on file     Relationship status: Not on file   ? Intimate partner violence     Fear of current or ex partner: Not on file     Emotionally abused: Not on file     Physically abused: Not on file     Forced sexual activity: Not on file   Other Topics Concern   ? Not on file   Social History Narrative    Lives at home alone with her catKailyn       MEDICATIONS: Reviewed from the MAR, physician orders, and/or earlier progress notes.    Post Discharge Medication Reconciliation Status: medication reconciliation previously completed during another office visit.    Current Outpatient Medications   Medication Sig   ? acetaminophen (TYLENOL) 500 MG tablet Take 1,000 mg by mouth 3 (three) times a day.    ? albuterol (PROAIR HFA;PROVENTIL HFA;VENTOLIN HFA) 90 mcg/actuation inhaler Inhale 2 puffs 4 (four) times a day as needed for wheezing.    ? amitriptyline (ELAVIL) 150 MG tablet Take 150 mg by mouth  at bedtime.   ? apixaban ANTICOAGULANT (ELIQUIS) 5 mg Tab tablet Take 5 mg by mouth 2 (two) times a day.   ? aspirin 81 mg chewable tablet Chew 81 mg daily.   ? budesonide-formoteroL (SYMBICORT) 160-4.5 mcg/actuation inhaler Inhale 2 puffs 2 (two) times a day.   ? busPIRone (BUSPAR) 10 MG tablet Take 20 mg by mouth at bedtime.   ? canagliflozin (INVOKANA) 100 mg Tab Take 100 mg by mouth daily before breakfast.   ? carBAMazepine (TEGRETOL) 200 mg tablet Take 400 mg by mouth 2 (two) times a day.   ? doxylamine (UNISOM) 25 mg tablet Take 25 mg by mouth at bedtime.   ? ferrous sulfate 65 mg elemental iron Take 1 tablet by mouth daily with breakfast.    ? FLUoxetine (PROZAC) 20 MG capsule Take 20 mg by mouth daily.   ? glimepiride (AMARYL) 1 MG tablet Take 1 mg by mouth daily before breakfast.   ? ibuprofen (ADVIL,MOTRIN) 200 MG tablet Take 200 mg by mouth every 4 (four) hours as needed for pain or mild pain (1-3).   ? insulin aspart U-100 (NOVOLOG) 100 unit/mL injection Inject under the skin 3 (three) times a day with meals. Per sliding scale: if 0 - 69 = 0 UNITS SEEHYPOGLYCEMIA PROTOCOL; 70 - 149 = 0 UNITSNO INSULIN, GIVE PRANDIAL INSULIN IFORDERED; 150 - 199 = 1 UNIT; 200 - 249 = 2UNITS; 250 - 299 = 3 UNITS; 300 - 349 = 4 UNITS;350 - 399 = 5 UNITS; 400+ = 6 UNITS IF  ORGREATER, GIVE 6 UNITS AND CALL MD/NP   ? levothyroxine (SYNTHROID, LEVOTHROID) 50 MCG tablet Take 50 mcg by mouth daily.   ? lisinopril (PRINIVIL,ZESTRIL) 10 MG tablet Take 10 mg by mouth daily.   ? melatonin 10 mg Tab Take 10 mg by mouth at bedtime.   ? metFORMIN (GLUCOPHAGE-XR) 750 MG 24 hr tablet Take 750 mg by mouth 2 (two) times a day with meals.    ? metoprolol succinate (TOPROL-XL) 25 MG Take 25 mg by mouth daily.    ? mirtazapine (REMERON) 15 MG tablet Take 15 mg by mouth daily.   ? multivit with min-folic acid 0.4 mg Tab Take 1 tablet by mouth daily.   ? naltrexone (DEPADE) 50 mg tablet Take 50 mg by mouth daily.   ? nitroglycerin  "(NITROSTAT) 0.4 MG SL tablet Place 0.4 mg under the tongue every 5 (five) minutes as needed for chest pain.   ? oxyCODONE (ROXICODONE) 5 MG immediate release tablet TAKE 1 TABLET BY MOUTH EVERY 6 HOURS AS NEEDED FOR FACIAL, RIGHT SHOULDER PAIN   ? polyethylene glycol (MIRALAX) 17 gram packet Take 17 g by mouth daily.   ? rosuvastatin (CRESTOR) 20 MG tablet Take 20 mg by mouth at bedtime.   ? traZODone (DESYREL) 50 MG tablet Take 150 mg by mouth at bedtime.      ALLERGIES:   Allergies   Allergen Reactions   ? Propranolol Anaphylaxis     Slowed HR   ? Atorvastatin      Hand neuropathy   ? Prochlorperazine Edisylate Rash     nightmares     DIET: Consistent carbohydrates, regular texture, thin liquids.    Vitals:    08/10/20 1517   BP: 147/82   Pulse: 85   Resp: 16   Temp: 98  F (36.7  C)   SpO2: 98%   Weight: 174 lb 6.4 oz (79.1 kg)   Height: 5' 3\" (1.6 m)   She is down 11.6 pounds since her last stay.  Body mass index is 30.89 kg/m .    EXAMINATION:   General: Fairly pleasant middle-aged female, sitting comfortably, in no apparent distress.  Head: Normocephalic and atraumatic.  External visual signs of her facial fractures are not in evidence.  Eyes: PERRLA, sclerae clear.   ENT: Moist oral mucosa.  She is edentulous with full upper and lower dentures.  No rhinorrhea or nasal discharge.  She has complete hearing loss in the right ear and chronic tinnitus there.  Cardiovascular: Previously: Regular rate and rhythm with a short 2/6 systolic ejection murmur at the left sternal border.  Chest not auscultated today due to ongoing COVID-19 precautions.  Respiratory: Previously: Lungs clear to auscultation bilaterally.  Chest not auscultated today due to ongoing COVID-19 precautions.  Abdomen: Soft and nontender.   Musculoskeletal/Extremities: Trace swelling in the feet.  Integument:  No rashes, clinically significant lesions, or skin breakdown.   Cognitive/Psychiatric: Alert and oriented x3, although she scored 16/30 on the " SLUMS during an earlier stay.  Affect is rather flat.    DIAGNOSTICS:   Results for orders placed or performed during the hospital encounter of 08/30/19   Basic Metabolic Panel   Result Value Ref Range    Sodium 136 136 - 145 mmol/L    Potassium 4.1 3.5 - 5.0 mmol/L    Chloride 104 98 - 107 mmol/L    CO2 26 22 - 31 mmol/L    Anion Gap, Calculation 6 5 - 18 mmol/L    Glucose 103 70 - 125 mg/dL    Calcium 8.7 8.5 - 10.5 mg/dL    BUN 13 8 - 22 mg/dL    Creatinine 0.76 0.60 - 1.10 mg/dL    GFR MDRD Af Amer >60 >60 mL/min/1.73m2    GFR MDRD Non Af Amer >60 >60 mL/min/1.73m2     Lab Results   Component Value Date    WBC 5.5 08/31/2019    HGB 9.5 (L) 08/31/2019    HCT 28.6 (L) 08/31/2019    MCV 95 08/31/2019     09/03/2019     CrCl cannot be calculated (Patient's most recent lab result is older than the maximum 10 days allowed.).  Lab Results   Component Value Date    HGBA1C 9.4 (H) 08/31/2019       ASSESSMENT/Plan:      ICD-10-CM    1. Closed fracture of left side of maxilla with routine healing, subsequent encounter  S02.40DD    2. Other acute pulmonary embolism without acute cor pulmonale (H)  I26.99    3. Chronic right shoulder pain  M25.511     G89.29    4. 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 Episode of recurrent major depressive disorder, unspecified depression episode severity (H)  F33.9    6. Diabetic nephropathy associated with type 2 diabetes mellitus (H)  E11.21    7. Diabetic peripheral neuropathy (H)  E11.42    8. Pulmonary emphysema, unspecified emphysema type (H)  J43.9    9. Sensorineural hearing loss (SNHL) of right ear, unspecified hearing status on contralateral side  H90.5    10. Hypertension due to endocrine disorder  I15.2    11. Coronary artery disease : previous stents without angina pectoris  I25.10    12. Anemia of chronic disease  D63.8    13.  Trigeminal neuralgia  G50.0      DISCHARGE PLAN/FACE TO FACE:  I certify that this patient is under my care and that I had a face-to-face encounter that meets the physician face-to-face encounter requirements with this patient.     Date of Face-to-Face Encounter: 08/10/2020     I certify that, based on my findings, the following services are medically necessary home health services: Home health aide, home PT, home OT,     My clinical findings support the need for the above skilled services because: (Please write a brief narrative summary that describes what the RN, PT, SLP, or other services will be doing in the home. A list of diagnoses in this section does not meet the CMS requirements): Home health aide to assist with ADLs such as bathing, home PT and OT to continue therapies in the home setting, and  to assist with living arrangements, insurance issues, and such.    This patient is homebound because: (Please write a brief narrative summmary describing the functional limitations as to why this patient is homebound and specifically what makes this patient homebound.):  Services necessarily need to be performed in the home to be of benefit.    The patient is, or has been, under my care and I have initiated the establishment of the plan of care. This patient will be followed by a physician who will periodically review the plan of care.  Initial follow-up should be within 7-10 days.    Approximate time spent with this patient was greater than 30 minutes with greater than 50% spent in discussions regarding services required upon discharge.        The above has been created using voice recognition software. Please be aware that this may unintentionally  produce inaccuracies and/or nonsensical sentences.      Electronically signed by: Suraj Robledo CNP

## 2021-06-20 NOTE — LETTER
Letter by Suraj Robledo CNP at      Author: Suraj Robledo CNP Service: -- Author Type: --    Filed:  Encounter Date: 3/25/2020 Status: (Other)         Patient: Ruddy Mendoza   MR Number: [id number]   YOB: 1955   Date of Visit: 3/25/2020     [facility]    Name:   Ruddy Mendoza  : 1955  Facility:   [facility] [[id number]]   Room:   Code Status: FULL CODE -   Fac type:   SNF (Skilled Nursing Facility, TCU) -     PCP:  Dawit Aviles MD    CHIEF COMPLAINT / REASON FOR VISIT:  Chief Complaint   Patient presents with   ? Follow-up     TCU follow-up after hospitalization for right proximal humerus fracture, s/p ORIF.      [facility] from 2019 until 2019 (right facial numbness, severe dizziness, right upper extremity ataxia, swallowing and word finding difficulties)  [facility] from 2019 until 2019  [facility] from 2020 until 2020 (right proximal humerus fracture)        HPI: Ruddy is a 64 y.o. female with a past medical history of recurrent UTIs and pyelonephritis, diabetes mellitus type 2 (uncontrolled), endocrine induced hypertension, coronary artery disease (status post stenting), and COPD (former smoker) who, prior to her last stay in the TCU, had presented with complaints of severe dizziness, new right lower facial numbness, and ataxia of the right upper extremity for the previous 4 to 5 days prior to admission, with concern for possible stroke, though imaging was negative.  She was also found to be hyperglycemic to the 500s, suffering SOPHIA, and a urinalysis was concerning for UTI on admission.    Etiology of her symptoms was unclear.  Given her history of nausea and vomiting, falls, and unstable gait, the main concern was for a cerebellar stroke; however, an MRI was without any findings of acute stroke, although it did show some chronic infarcts of the cerebellum.   Consider with her hyperglycemia or if hypotensive, a watershed type phenomenon.  Hypoglycemia may have been the primary cause of this, although it sounded as though it was secondary to her ability to take medications due to her vertigo.  Her report of worsening dizziness with change in position could support orthostatic hypotension, although this was later also deemed to be negative.  She does have diabetic peripheral neuropathy, but one would not expect this to cause a sudden vertiginous syndrome.  Unlikely BPPV, given no symptoms with head movements.  She denied any recent substance alcohol use, though consideration was given to Warnicke's type encephalopathy with her need for a wide gait, and neurology did recommend initiating thiamine therapy.  At the time, she was also treated for a urinary tract infection (pansensitive E. Coli).  She also had significant hyperglycemia on admission into the 500s.  However, she stated she was unable to take her usual home medications due to her vertigo/dizziness.  At the time, her last known A1c was 16.4 in October 2017.  In the hospital, it was 9.4.      More recently, she fell at home.  Based on EMS reports, it appeared to be a hoarder home.  She was found on the the floor of her bedroom with a hole in the wall, and the patient later reported old broken shelving falling, although in the ED she could not remember what occurred.  She did note drinking multiple drinks of straight vodka that night, often does this intermittently, and did not want her children to know this.  As noted previously, there has been some concern for alcoholism and Warnicke's encephalopathy.      TCU ISSUES    Humerus fracture: When last seen, he endorsed considerable pain, unrelieved on the current regimen of oxycodone 5 mg every 4 hours as needed.  We tried scheduling 10 mg 3 times daily and 5 mg every 4 hours as needed, and this has been helpful.  She did tell me that hydromorphone and morphine made  "her feel \"weird.\"  She also stated that her pain is worse in the mornings.      She tells me she is having a \"rough day\" today.  The right shoulder hurts, and it has been keeping her up at night.  One problem is that she is getting her pain medication late in the morning, and therapy grabs her before it has had a chance to work.  We need to schedule her first dose rather early (about 6 AM).    Cognition: As noted, there is some concern of cognitive impairment/encephalopathy.  When last here in September 2019, she did have some word finding difficulty then but otherwise scored well in cognitive testing.  According to Occupational Therapy, she scored only 16/30 on the SLUMS, indicating moderate cognitive deficits.  SLP is working with her as well on cognition and word finding difficulty.    During her last stay in the TCU, she stated, \"I need help with my balance, and I need help to learn how to use a walker, and any help with memory.  I fell 6 times in 4 days and ended up in the hospital.  I was super, super dizzy and had no balance.\"      Depression: Major depressive disorder (with anxiety) has been ongoing for some time.  She was started on fluoxetine on 09/06/2019, and the dosing has not been changed.  She may be self-medicating with alcohol, as she did acknowledge having multiple drinks of straight vodka the day of her fall.    From what I have heard, she had been taking care of her grandchildren prior to her last hospitalization, and since then, the children are now going to .  I believe she was told that if she could not take care of herself, she could not take care of the grandchildren.  I believe this has upset her greatly.    She also admits to being depressed that the  in her building committed suicide.  There were no indications that he was depressed, and he left no note.    When last here, Dr. Ordonez discontinued her buspirone, also adding fluoxetine 20 mg nightly for depression.  " She seems to be back on the former, continuing the latter.    COPD: Receiving Symbicort and Ventolin HFA inhalers at home.    Diabetes mellitus type 2: Currently on a lower dose of glargine than she had been at home, now 30 units every morning.  At home, she had been on 35 units.  In addition to the glargine, she is getting 5 units of scheduled aspart with each meal and at bedtime.    Recent blood glucose levels look good and are generally between 100 and 200.    Diabetic polyneuropathy: Tingling in the hands and feet.    Trigeminal neuralgia: Taking carbamazepine for this, and it seems to be under control.    Other issues not addressed but discussed: She has very poor vision (5/200) with macular degeneration, early diabetic retinopathy, and some hemorrhaging.  She did attend [employer] at the [facility] for a while.  She did not think it helped, and it was exceedingly difficult to read Braille, given her neuropathy.    Code status: Prior to her last stay, she was full code in the hospital.  A signed a POLST here indicated that she was now DNR/DNI (also opposed to intubation, as she watched her mother go through it).  Once again, she is full code.    Medication changes: Currently with orders for 1000 mg of acetaminophen 4 times daily, we will need to decrease the dose to 3 times daily.      ROS: Biggest complaint is that of right arm pain.  She claims pain of 9/10.  I doubt that it is that high, although she is certainly uncomfortable.  She may be getting a little constipated, although she does have scheduled MiraLAX ordered.  No headaches or chest pains, coughing or congestion, nausea or vomiting, dyspnea, dysuria, integumentary issues.  Sleep is always been a problem.  She is currently on both melatonin and trazodone.    Past Medical History:   Diagnosis Date   ? Acute encephalopathy 09/23/2017   ? Acute hypokalemia 09/23/2017   ? Acute hyponatremia 09/23/2017   ? Acute pyelonephritis 09/23/2017   ?  SOPHIA (acute kidney injury) (H)    ? Anxiety    ? CAD (coronary artery disease)    ? Closed fracture of right humerus    ? COPD (chronic obstructive pulmonary disease) (H)    ? Depression    ? Diabetes mellitus (H) 09/23/2017   ? Diabetic peripheral neuropathy (H) 9/4/2019   ? History of cervical cancer    ? HLD (hyperlipidemia)    ? Hypertension    ? Hypothyroidism    ? Insomnia    ? Normocytic anemia    ? Sensorineural hearing loss (SNHL) of right ear with tinnitus 9/4/2019   ? Sepsis (H) 09/23/2017   ? Trigeminal neuralgia               Family History   Problem Relation Age of Onset   ? COPD Mother    ? COPD Father    ? Lung cancer Father      Social History     Socioeconomic History   ? Marital status: Single     Spouse name: Not on file   ? Number of children: Not on file   ? Years of education: Not on file   ? Highest education level: Not on file   Occupational History   ? Not on file   Social Needs   ? Financial resource strain: Not on file   ? Food insecurity     Worry: Not on file     Inability: Not on file   ? Transportation needs     Medical: Not on file     Non-medical: Not on file   Tobacco Use   ? Smoking status: Former Smoker     Packs/day: 2.00     Years: 30.00     Pack years: 60.00     Types: Cigarettes   ? Smokeless tobacco: Never Used   ? Tobacco comment: quit at age 42   Substance and Sexual Activity   ? Alcohol use: Yes     Comment: Rare   ? Drug use: No   ? Sexual activity: Not Currently     Partners: Male   Lifestyle   ? Physical activity     Days per week: Not on file     Minutes per session: Not on file   ? Stress: Not on file   Relationships   ? Social connections     Talks on phone: Not on file     Gets together: Not on file     Attends Mu-ism service: Not on file     Active member of club or organization: Not on file     Attends meetings of clubs or organizations: Not on file     Relationship status: Not on file   ? Intimate partner violence     Fear of current or ex partner: Not on file      Emotionally abused: Not on file     Physically abused: Not on file     Forced sexual activity: Not on file   Other Topics Concern   ? Not on file   Social History Narrative    Lives at home alone with her catKailyn       MEDICATIONS: Reviewed from the MAR, physician orders, and/or earlier progress notes.    Post Discharge Medication Reconciliation Status: medication reconciliation previously completed during another office visit.    Current Outpatient Medications   Medication Sig   ? acetaminophen (TYLENOL) 500 MG tablet Take 1,000 mg by mouth 4 (four) times a day.    ? albuterol (PROAIR HFA;PROVENTIL HFA;VENTOLIN HFA) 90 mcg/actuation inhaler Inhale 2 puffs 4 (four) times a day as needed for wheezing.    ? amitriptyline (ELAVIL) 150 MG tablet Take 150 mg by mouth at bedtime.   ? aspirin 325 MG tablet Take 325 mg by mouth daily.   ? budesonide-formoteroL (SYMBICORT) 160-4.5 mcg/actuation inhaler Inhale 2 puffs 2 (two) times a day.   ? busPIRone (BUSPAR) 10 MG tablet Take 20 mg by mouth at bedtime.   ? carBAMazepine (TEGRETOL) 200 mg tablet Take 400 mg by mouth 2 (two) times a day.   ? ferrous sulfate 65 mg elemental iron Take 1 tablet by mouth daily with breakfast.    ? FLUoxetine (PROZAC) 20 MG capsule Take 20 mg by mouth daily.   ? ibuprofen (ADVIL,MOTRIN) 200 MG tablet Take 200 mg by mouth every 4 (four) hours as needed for pain or mild pain (1-3).   ? insulin glargine (LANTUS) 100 unit/mL vial Inject 35 Units under the skin every morning. (Patient taking differently: Inject 30 Units under the skin every morning. )   ? levothyroxine (SYNTHROID, LEVOTHROID) 50 MCG tablet Take 50 mcg by mouth daily.   ? lisinopril (PRINIVIL,ZESTRIL) 10 MG tablet Take 10 mg by mouth daily.   ? melatonin 10 mg Tab Take 10 mg by mouth at bedtime.   ? metFORMIN (GLUCOPHAGE-XR) 750 MG 24 hr tablet Take 750 mg by mouth 2 (two) times a day as needed.   ? metoprolol succinate (TOPROL-XL) 25 MG Take 25 mg by mouth daily.          ?  multivit with min-folic acid 0.4 mg Tab Take 1 tablet by mouth daily.   ? nitroglycerin (NITROSTAT) 0.4 MG SL tablet Place 0.4 mg under the tongue every 5 (five) minutes as needed for chest pain.   ? NOVOLOG FLEXPEN U-100 INSULIN 100 unit/mL (3 mL) injection pen Check blood sugar four (4) times daily.  11.65 Type 2 with hyperglycemia  BD Ultra-fine Noris Pen Needles - NDC [address]-28 - dispense 1 case, refill PRN for 1 year  Accu-chek Guide blood glucose meter - dispense 1  Accu-chek Guide test strips (50 ct. boxes) - dispense 1, refill PRN for 1 year  Accu-chek FastClix lancets (box of 102 ct.) - dispense 1, refill PRN for 1 year (Patient taking differently: Inject 5 Units under the skin. 5 units 4 times daily (AC + bedtime)  11.65 Type 2 with hyperglycemia  BD Ultra-fine Noris Pen Needles - NDC [address]-47 - dispense 1 case, refill PRN for 1 year  Accu-chek Guide blood glucose meter - dispense 1  Accu-chek Guide test strips (50 ct. boxes) - dispense 1, refill PRN for 1 year  Accu-chek FastClix lancets (box of 102 ct.) - dispense 1, refill PRN for 1 year)   ? oxyCODONE (ROXICODONE) 5 MG immediate release tablet Take 5 mg by mouth every 4 (four) hours as needed for pain (Also receiving scheduled dosing.).   ? oxyCODONE (ROXICODONE) 5 MG immediate release tablet Take 10 mg by mouth every 8 (eight) hours. Also has PRN dosing available.   ? polyethylene glycol (MIRALAX) 17 gram packet Take 17 g by mouth daily.   ? rosuvastatin (CRESTOR) 20 MG tablet Take 20 mg by mouth at bedtime.   ? senna-docusate (SENNOSIDES-DOCUSATE SODIUM) 8.6-50 mg tablet Take 1 tablet by mouth 2 (two) times a day as needed for constipation.   ? traZODone (DESYREL) 50 MG tablet Take 150 mg by mouth at bedtime.      ALLERGIES:   Allergies   Allergen Reactions   ? Propranolol Anaphylaxis     Slowed HR   ? Atorvastatin      Hand neuropathy   ? Prochlorperazine Edisylate Rash     nightmares     DIET: Diabetic, regular texture, thin  "liquids.    Vitals:    03/25/20 1139   BP: 128/82   Pulse: 98   Resp: 19   Temp: (!) 96.4  F (35.8  C)   SpO2: 94%   Weight: 191 lb 12.8 oz (87 kg)   Height: 5' 3\" (1.6 m)   Previous weight when last here in the TCU was 169.2 pounds.  This is a 22.6 pound weight gain.  She does endorse weight gain.  Body mass index is 33.98 kg/m .    EXAMINATION:   General: Fairly pleasant middle-aged female, sitting on her bed, in no apparent distress.  Head: Normocephalic and atraumatic.   Eyes: PERRLA, sclerae clear.   ENT: Moist oral mucosa.  She is edentulous with full upper and lower dentures.  No rhinorrhea or nasal discharge.  She has complete hearing loss in the right ear and chronic tinnitus.  Cardiovascular: Regular rate and rhythm with a short 2/6 systolic ejection murmur at the left sternal border.  Respiratory: Lungs clear to auscultation bilaterally.   Abdomen: Soft and nontender.   Musculoskeletal/Extremities: Right shoulder is swollen down to the right hand with 1-2+ pitting edema, improved since my last visit.  Integument: No rashes, clinically significant lesions, or skin breakdown.   Cognitive/Psychiatric: Alert and oriented x3, although she scored 16/30 on the SLUMS.  Affect is euthymic.    DIAGNOSTICS:   Results for orders placed or performed during the hospital encounter of 08/30/19   Basic Metabolic Panel   Result Value Ref Range    Sodium 136 136 - 145 mmol/L    Potassium 4.1 3.5 - 5.0 mmol/L    Chloride 104 98 - 107 mmol/L    CO2 26 22 - 31 mmol/L    Anion Gap, Calculation 6 5 - 18 mmol/L    Glucose 103 70 - 125 mg/dL    Calcium 8.7 8.5 - 10.5 mg/dL    BUN 13 8 - 22 mg/dL    Creatinine 0.76 0.60 - 1.10 mg/dL    GFR MDRD Af Amer >60 >60 mL/min/1.73m2    GFR MDRD Non Af Amer >60 >60 mL/min/1.73m2     Lab Results   Component Value Date    WBC 5.5 08/31/2019    HGB 9.5 (L) 08/31/2019    HCT 28.6 (L) 08/31/2019    MCV 95 08/31/2019     09/03/2019     CrCl cannot be calculated (Patient's most recent lab " result is older than the maximum 10 days allowed.).  Lab Results   Component Value Date    HGBA1C 9.4 (H) 08/31/2019       ASSESSMENT/Plan:      ICD-10-CM    1. Closed displaced comminuted fracture of shaft of right humerus with routine healing  S42.351D    2. Type 2 diabetes mellitus with hyperglycemia, with long-term current use of insulin (H)  E11.65     Z79.4    3. Word finding difficulty  R47.89    4. Episode of recurrent major depressive disorder, unspecified depression episode severity (H)  F33.9    5. Diabetic peripheral neuropathy (H)  E11.42    6. Hypertension due to endocrine disorder  I15.2    7. Sensorineural hearing loss (SNHL) of right ear, unspecified hearing status on contralateral side  H90.5    8. Acquired hypothyroidism  E03.9    9. Hyperlipidemia, unspecified hyperlipidemia type  E78.5      CHANGES:  Now receiving 10 mg of oxycodone 3 times daily (in addition to as needed).  Schedule first dose of oxycodone at 6 AM.    CARE PLAN:  The care plan has been reviewed and all orders signed.  Changes to care plan, if any, as noted.  Otherwise, continue current plan of care.      The above has been created using voice recognition software. Please be aware that this may unintentionally  produce inaccuracies and/or nonsensical sentences.      Electronically signed by: Suraj Robledo CNP

## 2021-06-20 NOTE — LETTER
Letter by Suraj Robledo CNP at      Author: Suraj Robledo CNP Service: -- Author Type: --    Filed:  Encounter Date: 2020 Status: (Other)         Patient: Ruddy Mendoza   MR Number: [id number]   YOB: 1955   Date of Visit: 2020     [facility]    Name:   Ruddy Mendoza  : 1955  Facility:   [facility] [[id number]]   Room:   Code Status: DNI and POLST AVAILABLE - Limited treatment (CPR and meds okay)  Fac type:   SNF (Skilled Nursing Facility, TCU) -     PCP:  Dawit Aviles MD    CHIEF COMPLAINT / REASON FOR VISIT:  Chief Complaint   Patient presents with   ? Follow-up     ADMISSION following hospitalization following a fall resulting in facial fractures, as well as discovery of a pulmonary embolism.      [facility] from 2019 until 2019 (right facial numbness, severe dizziness, right upper extremity ataxia, swallowing and word finding difficulties)  [facility] from 2019 until 2019  [facility] from 2020 until 2020 (right proximal humerus fracture and hyperglycemia with SOPHIA)   [facility] from 2020 until 2020 (facial fractures and pulmonary embolism)    Patient was last seen by me on 2020.      HPI: Ruddy is a 64 y.o. female with a past medical history of recurrent UTIs and pyelonephritis, diabetes mellitus type 2 (poorly controlled - A1c 16.4 in 10/2017 and 9.4 in 2020 - with multiple related complications, including peripheral neuropathy, nephropathy, and retinopathy), endocrine induced hypertension, coronary artery disease (status post stenting x2), COPD (former smoker), and alcoholism who has been here multiple times in the past, involving stroke-like symptoms and right proximal humerus fracture along with hyperglycemia and SOPHIA.    Based on EMS reports prior to her last hospitalization, she appeared to live in a hoarder home.  She  was found on the the floor of her bedroom with a hole in the wall, and the patient later reported old broken shelving falling, although in the ED she could not remember what occurred.  She did note drinking multiple drinks of straight vodka that night, often doing this intermittently, and did not want her children to know.  There has been concern for alcoholism and possibly Warnicke's encephalopathy.     She was admitted on zero 7/2428 after presenting to the ED for evaluation of recurrent falls (x4).  She had been seen at [facility] ED on 06/27/2024 fall and was discharged home with nitrofurantoin for urinary tract infection, along with home health care.  There was no loss of consciousness, dizziness, vision changes, or room spinning sensation reported.  She expressed uncertainty as to why she kept falling, but she did report difficulty ambulating at home and getting up after her fall.  Her first fall happened while getting off the toilet, the second in the shower where she hit her head, the third while getting out of her chair, and the last while walking home from the store.  Paramedics were called to her house multiple times.  The patient denied any pain or headaches and reported feeling fine.    In the ED, she was tachycardic with a pulse of 103.  Labs were notable for a hemoglobin of 10.3, hematocrit 31.9, and calcium 8.2.  CT of the head showed partial visualized air-fluid level in the left maxillary sinus with hyperdense material new from 06/08/2020 (possibly representing layering hemorrhage from an occult facial fracture).  A CT of the chest showed acute PE in the left lower lobe and mild emphysema.  ECG showed NSR.  She received IVF and admitted for further management.     A maxillofacial CT confirmed mildly displaced fractures of the left inferior orbital rim and anterior and posterior lateral left maxillary sinus walls.  This injury occurred when go to the bathroom in a dark house walking into the  "the door.  She is legally blind.  A CT of the chest was positive for small burden PE.  Lower extremity venous Dopplers were positive for DVT involving the right calf.  She was started on enoxaparin and transitioned to Eliquis 10 mg twice daily for 1 week, followed by 5 mg twice daily thereafter.  An echocardiogram was performed that revealed no evidence for right heart strain.    ENT was consulted with respect to the facial fractures, and no surgical intervention was indicated.  She was subsequently transferred to [facility] with therapy.       TCU ISSUES    Facial fractures: Her feels feels none except for some pain at the sinuses.    Pulmonary embolism and DVT: On Eliquis.  She still has some pain with deep inspiration.    Diabetes mellitus type 2: Receiving metformin, glimepiride, and sliding scale aspart.  Note that blood glucose levels have been under rather poor control.  Her A1c on 08/31/2019 was 9.4.    Hypertension: Still mildly elevated, she is receiving lisinopril and metoprolol succinate.    Cognition:  During her last stay, there was some concern of cognitive impairment/encephalopathy.  During her stay here in September 2019, she did have some word finding difficulty but otherwise scored well in cognitive testing.  When last here, according to Occupational Therapy, she scored only 16/30 on the SLUMS, indicating moderate cognitive deficits.  SLP did work with her as well on cognition and word finding difficulty during her last stay.    Depression and anxiety: Major depressive disorder (with anxiety) has been ongoing for some time.  She was started on 20 mg of fluoxetine on 09/06/2019, and the dosing has not been changed.  She also receives BuSpar (20 mg daily).  She may also be self-medicating with alcohol.    Insomnia: She tells me she can sleep with \"a lot of medication.\"    COPD: Has inhalers.    Diabetic polyneuropathy: Tingling in the hands and feet, though worse in the feet.    Trigeminal neuralgia: " "Taking carbamazepine for this, and it seems to be under control.      ROS: She complains about a poor appetite.  \"Every time I eat, I feel nauseous.\"  She denies any vertigo.  No  chest pains, coughing or congestion, dyspnea, dysuria, integumentary issues.  Sleep has always been a problem, but she is doing okay on her current sleep medication regimen.    Past Medical History:   Diagnosis Date   ? Acute encephalopathy 09/23/2017   ? Acute hypokalemia 09/23/2017   ? Acute hyponatremia 09/23/2017   ? Acute pyelonephritis 09/23/2017   ? SOPHIA (acute kidney injury) (H)    ? Anxiety    ? CAD (coronary artery disease)    ? Closed fracture of right humerus    ? COPD (chronic obstructive pulmonary disease) (H)    ? Depression    ? Diabetes mellitus (H) 09/23/2017   ? Diabetic peripheral neuropathy (H) 9/4/2019   ? History of cervical cancer    ? HLD (hyperlipidemia)    ? Hypertension    ? Hypothyroidism    ? Insomnia    ? Normocytic anemia    ? Sensorineural hearing loss (SNHL) of right ear with tinnitus 9/4/2019   ? Sepsis (H) 09/23/2017   ? Trigeminal neuralgia               Family History   Problem Relation Age of Onset   ? COPD Mother    ? COPD Father    ? Lung cancer Father      Social History     Socioeconomic History   ? Marital status: Single     Spouse name: Not on file   ? Number of children: Not on file   ? Years of education: Not on file   ? Highest education level: Not on file   Occupational History   ? Not on file   Social Needs   ? Financial resource strain: Not on file   ? Food insecurity     Worry: Not on file     Inability: Not on file   ? Transportation needs     Medical: Not on file     Non-medical: Not on file   Tobacco Use   ? Smoking status: Former Smoker     Packs/day: 2.00     Years: 30.00     Pack years: 60.00     Types: Cigarettes   ? Smokeless tobacco: Never Used   ? Tobacco comment: quit at age 42   Substance and Sexual Activity   ? Alcohol use: Yes     Comment: Rare   ? Drug use: No   ? Sexual " "activity: Not Currently     Partners: Male   Lifestyle   ? Physical activity     Days per week: Not on file     Minutes per session: Not on file   ? Stress: Not on file   Relationships   ? Social connections     Talks on phone: Not on file     Gets together: Not on file     Attends Jew service: Not on file     Active member of club or organization: Not on file     Attends meetings of clubs or organizations: Not on file     Relationship status: Not on file   ? Intimate partner violence     Fear of current or ex partner: Not on file     Emotionally abused: Not on file     Physically abused: Not on file     Forced sexual activity: Not on file   Other Topics Concern   ? Not on file   Social History Narrative    Lives at home alone with her catKailyn       MEDICATIONS: Reviewed from the MAR, physician orders, and/or earlier progress notes.    Post Discharge Medication Reconciliation Status: unable to reconcile discharge medications due to not yet backloaded.      ALLERGIES:   Allergies   Allergen Reactions   ? Propranolol Anaphylaxis     Slowed HR   ? Atorvastatin      Hand neuropathy   ? Prochlorperazine Edisylate Rash     nightmares     DIET: Diabetic, regular texture, thin liquids.    Vitals:    07/28/20 0939   BP: (!) 149/96   Pulse: 82   Resp: 18   Temp: 98  F (36.7  C)   SpO2: 98%   Weight: 173 lb 3.2 oz (78.6 kg)   Height: 5' 3\" (1.6 m)   She is down 11.6 pounds since her last stay.  Body mass index is 30.68 kg/m .    EXAMINATION:   General: Fairly pleasant middle-aged female, lying in bed, in no apparent distress.  Head: Normocephalic and atraumatic.  Visual signs of her facial fractures are not in evidence.  Eyes: PERRLA, sclerae clear.   ENT: Moist oral mucosa.  She is edentulous with full upper and lower dentures.  No rhinorrhea or nasal discharge.  She has complete hearing loss in the right ear and chronic tinnitus there.  Cardiovascular: Regular rate and rhythm with a short 2/6 systolic ejection " murmur at the left sternal border.  Respiratory: Lungs clear to auscultation bilaterally.   Abdomen: Soft and nontender.   Musculoskeletal/Extremities: Trace swelling in the feet.  Integument:  No rashes, clinically significant lesions, or skin breakdown.   Cognitive/Psychiatric: Alert and oriented x3, although she scored 16/30 on the SLUMS during an earlier stay.  Affect is rather flat.    DIAGNOSTICS:   Results for orders placed or performed during the hospital encounter of 08/30/19   Basic Metabolic Panel   Result Value Ref Range    Sodium 136 136 - 145 mmol/L    Potassium 4.1 3.5 - 5.0 mmol/L    Chloride 104 98 - 107 mmol/L    CO2 26 22 - 31 mmol/L    Anion Gap, Calculation 6 5 - 18 mmol/L    Glucose 103 70 - 125 mg/dL    Calcium 8.7 8.5 - 10.5 mg/dL    BUN 13 8 - 22 mg/dL    Creatinine 0.76 0.60 - 1.10 mg/dL    GFR MDRD Af Amer >60 >60 mL/min/1.73m2    GFR MDRD Non Af Amer >60 >60 mL/min/1.73m2     Lab Results   Component Value Date    WBC 5.5 08/31/2019    HGB 9.5 (L) 08/31/2019    HCT 28.6 (L) 08/31/2019    MCV 95 08/31/2019     09/03/2019     CrCl cannot be calculated (Patient's most recent lab result is older than the maximum 10 days allowed.).  Lab Results   Component Value Date    HGBA1C 9.4 (H) 08/31/2019       ASSESSMENT/Plan:      ICD-10-CM    1. Closed fracture of left side of maxilla with routine healing, subsequent encounter  S02.40DD    2. Other acute pulmonary embolism without acute cor pulmonale (H)  I26.99    3. 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 Episode of recurrent major depressive disorder, unspecified depression episode severity (H)  F33.9    5. Diabetic nephropathy associated with type 2 diabetes mellitus (H)  E11.21    6. Diabetic peripheral neuropathy (H)  E11.42    7. Pulmonary emphysema, mild (H)  J43.9    8. Sensorineural hearing  loss (SNHL) of right ear, unspecified hearing status on contralateral side  H90.5    9. Hypertension due to endocrine disorder  I15.2    10. Coronary artery disease : previous stents without angina pectoris  I25.10    11. Anemia of chronic disease  D63.8    12. Vitamin D deficiency  E55.9      CHANGES:  None.    CARE PLAN:  The care plan has been reviewed and all orders signed.  Changes to care plan, if any, as noted.  Otherwise, continue current plan of care.  Total time spent with this patient was approximately 35 minutes, with greater than 50% spent in counseling and coordination of care that included a review of her various comorbidities and particulars regarding her recent new diagnoses.      The above has been created using voice recognition software. Please be aware that this may unintentionally  produce inaccuracies and/or nonsensical sentences.      Electronically signed by: Suraj Robledo CNP

## 2021-06-21 NOTE — LETTER
Letter by Suraj Robledo CNP at      Author: Suraj Robledo CNP Service: -- Author Type: --    Filed:  Encounter Date: 2020 Status: (Other)         Patient: Ruddy Mendoza   MR Number: [id number]   YOB: 1955   Date of Visit: 2020     [facility]    Name:   Ruddy Mendoza  : 1955  Facility:   [facility] [[id number]]   Room:   Code Status: DNI and POLST AVAILABLE - Limited treatment (CPR and meds okay)  Fac type:   SNF (Skilled Nursing Facility, TCU) -     PCP:  Ron Mandujano    CHIEF COMPLAINT / REASON FOR VISIT:  Chief Complaint   Patient presents with   ? Discharge Summary     Multiple falls with head injuries.      [facility] from 2019 until 2019 (right facial numbness, severe dizziness, right upper extremity ataxia, swallowing and word finding difficulties)  [facility] from 2019 until 2019  [facility] from 2020 until 2020 (right proximal humerus fracture and hyperglycemia with SOPHIA)   [facility] from 2020 until 2020 (facial fractures and pulmonary embolism)  [facility] from 2020 until 2020 (expected discharge date)  [facility] from 2020 until 2020 (C3 vertebral fracture)      HPI: Ruddy is a 65 y.o. female with an extensive history of problems and hospitalizations related to alcohol use disorder, tobacco use, and diabetes mellitus.     RECENT HISTORY    She has a past medical history of recurrent UTIs and pyelonephritis, diabetes mellitus type 2 (poorly controlled - A1c 16.4 in 10/2017 and 9.4 in 2020 - with multiple related complications, including peripheral neuropathy, nephropathy, and retinopathy), endocrine induced hypertension, coronary artery disease (status post stenting x2), COPD (former smoker), and alcoholism who has been here multiple times in the past, involving stroke-like  symptoms and right proximal humerus fracture along with hyperglycemia and SOPHIA.    Based on EMS reports prior to an earlier hospitalization, she appeared to live in a hoarder home.  She was found on the the floor of her bedroom with a hole in the wall, and the patient later reported old broken shelving falling, although in the ED she could not remember what occurred.  She did note drinking multiple drinks of straight vodka that night, often doing this intermittently, and did not want her children to know.  There has been concern for alcoholism and possibly Warnicke's encephalopathy.     She was admitted on 07/2428 after presenting to the ED for evaluation of recurrent falls (x4).  She had been seen at [facility] ED on 06/27/2024 fall and was discharged home with nitrofurantoin for urinary tract infection, along with home health care.  There was no loss of consciousness, dizziness, vision changes, or room spinning sensation reported.  She expressed uncertainty as to why she kept falling, but she did report difficulty ambulating at home and getting up after her fall.  Her first fall happened while getting off the toilet, the second in the shower where she hit her head, the third while getting out of her chair, and the last while walking home from the store.  Paramedics were called to her house multiple times.      In the ED, she was tachycardic with a pulse of 103.  Labs were notable for a hemoglobin of 10.3, hematocrit 31.9, and calcium 8.2.  CT of the head showed partial visualized air-fluid level in the left maxillary sinus with hyperdense material new from 06/08/2020 (possibly representing layering hemorrhage from an occult facial fracture).  A CT of the chest showed acute PE in the left lower lobe and mild emphysema.  ECG showed NSR.  She received IVF and admitted for further management.     A maxillofacial CT confirmed mildly displaced fractures of the left inferior orbital rim and anterior and posterior  lateral left maxillary sinus walls.  This injury occurred when going to the bathroom in a dark house walking into the the door.  She is legally blind.  A CT of the chest was positive for small burden PE.  Lower extremity venous Dopplers were positive for DVT involving the right calf, although she never had pain there.  She was started on enoxaparin and transitioned to Eliquis 10 mg twice daily for 1 week, followed by 5 mg twice daily thereafter.  An echocardiogram was performed that revealed no evidence for right heart strain.  ENT was consulted with respect to the facial fractures, and no surgical intervention was indicated.      Subsequent hospitalizations followed this, including in August 2020 stay secondary to a C3 fracture.     MOST RECENT HOSPITALIZATION    She presented once again to the ED on 10/23/2020 with dizziness and a fall.  She stated she was not intoxicated at the time of the most recent fall and has been sober for 9 weeks prior to admission.  Differential diagnoses included TIA/CVA, intoxication/withdrawal, UTI, seizures/postictal, arrhythmia, vertigo, electrolyte abnormality, mood medications, thyroid abnormalities.  Neurology was consulted.  She does have a history of hypothyroidism, but her TSH was normal.  Electrolytes were also normal.  B12 was elevated.  Carbamazepine levels were unremarkable.  Some of her sedating medications were held with no difference in symptoms.  Echo showed EF of 55 to 60%.  MRI was negative for CVA, and EEG negative for stroke.  She was seen by PT/OT who recommended TCU upon discharge.  Also started on meclizine and vestibular therapy.  Recommendation for outpatient follow-up with Dr. Vazquez (neurology) if gait continues to be unstable.    A right sided orbital blowout fracture was noted.  Patient to follow-up with OMFS.    For her alcoholism, she is not on naltrexone but does receive acamprosate, multivitamin/folate/thiamine.    Diabetes mellitus type 2: She is a  "diabetic with a recent A1c of 7.6%.  She stated that she has been taking insulin; however, her regimen was unclear.  Lantus dose was listed as 38 units but not listed upon recent hospital discharge summary.  She does take sliding scale NovoLog 3 times per day.  Blood glucose levels ranged in the 100s to 200s during hospitalization with sliding scale correction.  Her Lantus was held with the need for outpatient follow-up. She continues to receive sliding scale insulin, Invokana, metformin, and glimepiride.    Normocytic anemia: Hemoglobin 10 on admission.  Receiving multivitamin and iron.    Other chronic issues:   Mood disorder: PTA amitriptyline, buspirone, carbamazepine, mirtazapine, and trazodone.  Hyperlipidemia: PTA statin.  COPD: PTA albuterol and Symbicort inhalers.  Hypothyroidism: PTA levothyroxine.  Recent DVT/PE: PTA Eliquis.  Hypertension: PTA metoprolol, lisinopril.      TCU ISSUES    Disposition: She may still looking for assisted living.  While at home, she has been looking for housekeeping.  Her son, Moses, is assisting her.  As noted in the HPI, she appears to be a hoarder.  She does feel that her acamprosate (for EtOH dependence maintenance) has been effective.    Today, she tells me she is doing okay.  She will be having right shoulder surgery very soon, as the hardware is failing.  She has a preop scheduled for Monday with a surgery to be performed on 11/18/2020.  After that surgery, she will not be able to use her walker, so she will necessarily require more care at that time.    She knows that she will need to be very careful and avoid falling.    Discharge planning: Patient is discharging today.  At home, she will require physical therapy and a home health aide, both to be provided by [facility].    Gait instability/frequent falls: She has described feeling \"real jerky.\"  During 1 fall, she stated that her body simply \"started to go backward.\"  Observed with physical therapy, she is shaky " "getting up but ambulating rapidly with a good gait.  She has to sit a while before standing.  She has described her falling as being \"out of control.\"  She does have tremors in her hands and positional dizziness, especially when turning a corner.    Depression and anxiety:  Major depressive disorder (with anxiety) has been ongoing for some time.  She most likely was self-medicating with alcohol.  She has described her depression as \"really bad right now.\"  As far as I can tell, medications have not been changed since her last stay.  She will be seen by psych, something she is requesting.    Weight loss: She is down 13.4 pounds since her last stay.  Weight during this stay has been stable.    Cognition:  During an earlier stay, there was some concern of cognitive impairment/encephalopathy.  During her stay here in September 2019, she did have some word finding difficulty but otherwise scored well in cognitive testing.  When last here, according to Occupational Therapy, she scored only 16/30 on the SLUMS, indicating moderate cognitive deficits.  SLP did work with her as well on cognition and word finding difficulty during that stay.  Currently, Occupational Therapy reports a SLUMS score of 15/30, indicating cognitive impairment.  She has been seen by SLP in the past for word finding difficulty, and we will utilize again for cognitive assessments.    Alcoholism: Patient tells me that she had been sober for 10 years before resuming.    Diabetes mellitus type 2: Recent blood glucose levels range between 88 and 262, with most <200.    Right shoulder pain: She did have a fractured right proximal humerus in March 2020.  Pain is chronic.  As noted above, she will be having surgery later this month.  Hypertension: Wide fluctuation in blood pressures.    COPD: Has inhalers.  Endorses dyspnea on exertion.    Diabetic polyneuropathy: She tells me she cannot feel the bottom of her feet.  There is tingling in the hands and feet, " "though worse in the feet.    Legal blindness: She has mentioned \"bleeding near the optic nerve.\"    Trigeminal neuralgia: Taking carbamazepine for this, and it seems to be under control.  She has described the discomfort as \"phantom earaches.\"     Seborrheic dermatitis: We did order hydrocortisone cream.      ROS:  As noted, she endorses chronic dizziness.  She also mentions tremors/shake.  Differential diagnosis for this is too extensive and is most likely multifactorial.  No  chest pains, coughing or congestion, dysuria, integumentary issues.  No bowel issues.  Sleep has always been a problem.  Often, she cannot sleep because of right shoulder pain.    Past Medical History:   Diagnosis Date   ? Acute encephalopathy 09/23/2017   ? Acute hypokalemia 09/23/2017   ? Acute hyponatremia 09/23/2017   ? Acute pulmonary embolism (H)    ? Acute pyelonephritis 09/23/2017   ? SOPHIA (acute kidney injury) (H)    ? Anxiety    ? C3 cervical fracture (H)    ? CAD (coronary artery disease)    ? Closed fracture of facial bone (H)    ? Closed fracture of right humerus    ? COPD (chronic obstructive pulmonary disease) (H)    ? Depression    ? Diabetes mellitus (H) 09/23/2017   ? Diabetic peripheral neuropathy (H) 9/4/2019   ? History of cervical cancer    ? HLD (hyperlipidemia)    ? Hypertension    ? Hypothyroidism    ? Insomnia    ? Legal blindness 5/28/2014    Diabetic retinopathy, macular edema and degeneration. S/p eye injection. Not surgical candidate d/t proximity to optic nerve.   ? Normocytic anemia    ? Peripheral neuropathy    ? Seborrheic dermatitis 11/4/2020   ? Sensorineural hearing loss (SNHL) of right ear with tinnitus 9/4/2019   ? Sepsis (H) 09/23/2017   ? TIA (transient ischemic attack)    ? Trigeminal neuralgia               Family History   Problem Relation Age of Onset   ? COPD Mother    ? COPD Father    ? Lung cancer Father      Social History     Socioeconomic History   ? Marital status: Single     Spouse name: Not " "on file   ? Number of children: Not on file   ? Years of education: Not on file   ? Highest education level: Not on file   Occupational History   ? Not on file   Social Needs   ? Financial resource strain: Not on file   ? Food insecurity     Worry: Not on file     Inability: Not on file   ? Transportation needs     Medical: Not on file     Non-medical: Not on file   Tobacco Use   ? Smoking status: Former Smoker     Packs/day: 2.00     Years: 30.00     Pack years: 60.00     Types: Cigarettes   ? Smokeless tobacco: Never Used   ? Tobacco comment: quit at age 42   Substance and Sexual Activity   ? Alcohol use: Not Currently     Comment: Reports is an \"alcoholic\"    ? Drug use: No   ? Sexual activity: Not Currently     Partners: Male   Lifestyle   ? Physical activity     Days per week: Not on file     Minutes per session: Not on file   ? Stress: Not on file   Relationships   ? Social connections     Talks on phone: Not on file     Gets together: Not on file     Attends [employer] service: Not on file     Active member of club or organization: Not on file     Attends meetings of clubs or organizations: Not on file     Relationship status: Not on file   ? Intimate partner violence     Fear of current or ex partner: Not on file     Emotionally abused: Not on file     Physically abused: Not on file     Forced sexual activity: Not on file   Other Topics Concern   ? Not on file   Social History Narrative    Lives at home alone with her catKailyn       MEDICATIONS: Reviewed from the MAR, physician orders, and/or earlier progress notes.    Post Discharge Medication Reconciliation Status: medication reconciliation previously completed during another office visit.      Current Outpatient Medications   Medication Sig   ? acamprosate (CAMPRAL) 333 mg tablet Take 2 tablets (666 mg total) by mouth 3 (three) times a day.   ? acetaminophen (TYLENOL) 500 MG tablet Take 1,000 mg by mouth 3 (three) times a day.    ? albuterol (PROAIR " HFA;PROVENTIL HFA;VENTOLIN HFA) 90 mcg/actuation inhaler Inhale 2 puffs 4 (four) times a day as needed for wheezing.    ? amitriptyline (ELAVIL) 150 MG tablet Take 150 mg by mouth at bedtime.   ? apixaban ANTICOAGULANT (ELIQUIS) 2.5 mg Tab tablet Take 2.5 mg by mouth 2 (two) times a day.    ? aspirin 81 mg chewable tablet Chew 81 mg daily.    ? budesonide-formoteroL (SYMBICORT) 160-4.5 mcg/actuation inhaler Inhale 2 puffs 2 (two) times a day.   ? busPIRone (BUSPAR) 10 MG tablet Take 20 mg by mouth at bedtime.   ? canagliflozin (INVOKANA) 100 mg Tab Take 100 mg by mouth daily before breakfast.   ? carBAMazepine (TEGRETOL) 200 mg tablet Take 400 mg by mouth 2 (two) times a day.   ? cholecalciferol, vitamin D3, 1,000 unit (25 mcg) tablet Take 1,000 Units by mouth daily.   ? ferrous sulfate 65 mg elemental iron Take 1 tablet by mouth daily with breakfast.    ? FLUoxetine (PROZAC) 20 MG capsule Take 20 mg by mouth daily.   ? glimepiride (AMARYL) 1 MG tablet Take 1 mg by mouth daily before breakfast.   ? hydrocortisone 1 % cream Apply topically 2 (two) times a day. Keep away from eyes.   ? insulin aspart U-100 (NOVOLOG) 100 unit/mL (3 mL) injection pen Inject 1-10 Units under the skin see administration instructions. Inject 1-10 units subcutaneously three times daily with meals per blood glucose. Don't give corrective dose for as needed, post-prandial, or nocturnal glucose checks unless ordered. BG <70 mg/dL see hypoglycemia protocol. Do not inject for a BG of  mg/dL unless ordered. Starting at blood glucose of 150 mg/dL inject 1-5 units for every increase of 50 mg/dL. Inject 6 units for BG > 400 mg/dL and call MD.   ? levothyroxine (SYNTHROID, LEVOTHROID) 50 MCG tablet Take 50 mcg by mouth daily.   ? lisinopril (PRINIVIL,ZESTRIL) 10 MG tablet Take 10 mg by mouth daily.   ? melatonin 10 mg Tab Take 10 mg by mouth at bedtime.   ? metFORMIN (GLUCOPHAGE-XR) 750 MG 24 hr tablet Take 750 mg by mouth 2 (two) times a day  "with meals.    ? metoprolol succinate (TOPROL-XL) 25 MG Take 25 mg by mouth daily.    ? mirtazapine (REMERON) 15 MG tablet Take 15 mg by mouth daily.   ? multivit with min-folic acid 0.4 mg Tab Take 1 tablet by mouth daily.   ? nitroglycerin (NITROSTAT) 0.4 MG SL tablet Place 0.4 mg under the tongue every 5 (five) minutes as needed for chest pain.   ? polyethylene glycol (MIRALAX) 17 gram packet Take 17 g by mouth daily as needed.    ? rosuvastatin (CRESTOR) 20 MG tablet Take 20 mg by mouth at bedtime.   ? traZODone (DESYREL) 150 MG tablet Take 150 mg by mouth at bedtime.      ALLERGIES:   Allergies   Allergen Reactions   ? Propranolol Anaphylaxis     Slowed HR   ? Atorvastatin      Hand neuropathy   ? Prochlorperazine Edisylate Rash     nightmares     DIET: 2 g sodium restriction, consistent carbohydrates, regular texture, thin liquids.    Vitals:    11/06/20 0959   BP: 138/80   Pulse: 81   Resp: 17   Temp: 97.3  F (36.3  C)   SpO2: 98%   Weight: 161 lb 3.2 oz (73.1 kg)   Height: 5' 3\" (1.6 m)   She is down 11.6 pounds since her last stay.  Body mass index is 28.56 kg/m .    EXAMINATION:   General: Fairly pleasant middle-aged female, sitting comfortably, in no apparent distress.   Head: Normocephalic and atraumatic.  External visual signs of her facial fractures are not in evidence.  Eyes: PERRLA, sclerae clear.   ENT: Moist oral mucosa.  She is edentulous with full upper and lower dentures.  No rhinorrhea or nasal discharge.  She has complete hearing loss in the right ear and chronic tinnitus there.  Cardiovascular: Regular rate and rhythm with a short 2/6 systolic ejection murmur at the left sternal border.   Respiratory: Lung sounds are coarse in the bases, otherwise clear.  Abdomen: Soft and nontender.   Musculoskeletal/Extremities: Trace swelling in the ankles.  Integument: Seborrheic dermatitis of the forehead.  We ordered hydrocortisone cream.  Cognitive/Psychiatric: Appearing alert and oriented x3, although " she scored 16/30 on the SLUMS during an earlier stay and 15/30 more recently.  Affect is rather flat, and she does endorse depression.    DIAGNOSTICS:   Results for orders placed or performed during the hospital encounter of 10/23/20   Basic Metabolic Panel   Result Value Ref Range    Sodium 136 136 - 145 mmol/L    Potassium 4.7 3.5 - 5.0 mmol/L    Chloride 100 98 - 107 mmol/L    CO2 28 22 - 31 mmol/L    Anion Gap, Calculation 8 5 - 18 mmol/L    Glucose 196 (H) 70 - 125 mg/dL    Calcium 8.9 8.5 - 10.5 mg/dL    BUN 18 8 - 22 mg/dL    Creatinine 1.13 (H) 0.60 - 1.10 mg/dL    GFR MDRD Af Amer 58 (L) >60 mL/min/1.73m2    GFR MDRD Non Af Amer 48 (L) >60 mL/min/1.73m2     Lab Results   Component Value Date    WBC 6.8 10/23/2020    HGB 10.0 (L) 10/23/2020    HCT 31.2 (L) 10/23/2020    MCV 88 10/23/2020     10/23/2020     CrCl cannot be calculated (Patient's most recent lab result is older than the maximum 10 days allowed.).  Lab Results   Component Value Date    HGBA1C 7.6 (H) 08/21/2020       ASSESSMENT/Plan:      ICD-10-CM    1. Frequent falls  R29.6    2. Alcohol use disorder, severe, in early remission (H)  F10.21    3. Diabetic peripheral neuropathy (H)  E11.42    4. Diabetic nephropathy associated with type 2 diabetes mellitus (H)  E11.21    5. Type 2 diabetes mellitus with hyperglycemia, with long-term current use of insulin (H)  E11.65     Z79.4    6. Closed blow-out fracture of right orbit, sequela (H)  S02.31XS    7. Episode of recurrent major depressive disorder, unspecified depression episode severity (H)  F33.9    8. Pulmonary emphysema, unspecified emphysema type (H)  J43.9    9. Sensorineural hearing loss (SNHL) of right ear, unspecified hearing status on contralateral side  H90.5    10. Gait disturbance  R26.9    11. Chronic right shoulder pain  M25.511     G89.29    12. Stage 3a chronic kidney disease  N18.31    13. Compression fracture of C3 vertebra, sequela  S12.290S    14. Hypertension due to  endocrine disorder  I15.2    15. Legal blindness  H54.8    16. Coronary artery disease : previous stents without angina pectoris  I25.10    17. Anemia of chronic disease  D63.8    18. Trigeminal neuralgia  G50.0    19. Seborrheic dermatitis  L21.9      DISCHARGE PLAN/FACE TO FACE:  I certify that this patient is under my care and that I had a face-to-face encounter that meets the physician face-to-face encounter requirements with this patient.     Date of Face-to-Face Encounter: 11/06/2020     I certify that, based on my findings, the following services are medically necessary home health services: Home health aide and home PT    My clinical findings support the need for the above skilled services because: (Please write a brief narrative summary that describes what the RN, PT, SLP, or other services will be doing in the home. A list of diagnoses in this section does not meet the CMS requirements): Home health aide to assist with ADLs around her house with some light housekeeping that she is unable to perform on her own.  Home PT to continue therapy in the home setting.    This patient is homebound because: (Please write a brief narrative summmary describing the functional limitations as to why this patient is homebound and specifically what makes this patient homebound.):  Above services necessarily need to be performed in the home to be of benefit.    The patient is, or has been, under my care and I have initiated the establishment of the plan of care. This patient will be followed by a physician who will periodically review the plan of care.  Initial follow-up should be within 7-10 days.    Approximate time spent with this patient was greater than 30 minutes with greater than 50% spent in discussions regarding services required upon discharge.      The above has been created using voice recognition software. Please be aware that this may unintentionally  produce inaccuracies and/or nonsensical  sentences.      Electronically signed by: Suraj Robledo, CNP

## 2021-06-21 NOTE — LETTER
Letter by Suraj Robledo CNP at      Author: Suraj Robledo CNP Service: -- Author Type: --    Filed:  Encounter Date: 10/27/2020 Status: (Other)         Patient: Ruddy Mendoza   MR Number: [id number]   YOB: 1955   Date of Visit: 10/27/2020     [facility]    Name:   Ruddy Mendoza  : 1955  Facility:   [facility] [[id number]]   Room:   Code Status: DNI and POLST AVAILABLE - Limited treatment (CPR and meds okay)  Fac type:   SNF (Skilled Nursing Facility, TCU) -     PCP:  Ron Mandujano    CHIEF COMPLAINT / REASON FOR VISIT:  Chief Complaint   Patient presents with   ? H & P     Multiple falls with head injuries.      [facility] from 2019 until 2019 (right facial numbness, severe dizziness, right upper extremity ataxia, swallowing and word finding difficulties)  [facility] from 2019 until 2019  [facility] from 2020 until 2020 (right proximal humerus fracture and hyperglycemia with SOPHIA)   [facility] from 2020 until 2020 (facial fractures and pulmonary embolism)  [facility] TCU from 2020 until 2020 (expected discharge date)  [facility] from 2020 until 2020 (C3 vertebral fracture)      HPI: Ruddy is a 65 y.o. female with an extensive history of problems and hospitalizations related to alcohol use disorder and diabetes mellitus.     RECENT HISTORY    She has a past medical history of recurrent UTIs and pyelonephritis, diabetes mellitus type 2 (poorly controlled - A1c 16.4 in 10/2017 and 9.4 in 2020 - with multiple related complications, including peripheral neuropathy, nephropathy, and retinopathy), endocrine induced hypertension, coronary artery disease (status post stenting x2), COPD (former smoker), and alcoholism who has been here multiple times in the past, involving stroke-like symptoms and right proximal  humerus fracture along with hyperglycemia and SOPHIA.    Based on EMS reports prior to an earlier hospitalization, she appeared to live in a hoarder home.  She was found on the the floor of her bedroom with a hole in the wall, and the patient later reported old broken shelving falling, although in the ED she could not remember what occurred.  She did note drinking multiple drinks of straight vodka that night, often doing this intermittently, and did not want her children to know.  There has been concern for alcoholism and possibly Warnicke's encephalopathy.     She was admitted on 07/2428 after presenting to the ED for evaluation of recurrent falls (x4).  She had been seen at [facility] ED on 06/27/2024 fall and was discharged home with nitrofurantoin for urinary tract infection, along with home health care.  There was no loss of consciousness, dizziness, vision changes, or room spinning sensation reported.  She expressed uncertainty as to why she kept falling, but she did report difficulty ambulating at home and getting up after her fall.  Her first fall happened while getting off the toilet, the second in the shower where she hit her head, the third while getting out of her chair, and the last while walking home from the store.  Paramedics were called to her house multiple times.      In the ED, she was tachycardic with a pulse of 103.  Labs were notable for a hemoglobin of 10.3, hematocrit 31.9, and calcium 8.2.  CT of the head showed partial visualized air-fluid level in the left maxillary sinus with hyperdense material new from 06/08/2020 (possibly representing layering hemorrhage from an occult facial fracture).  A CT of the chest showed acute PE in the left lower lobe and mild emphysema.  ECG showed NSR.  She received IVF and admitted for further management.     A maxillofacial CT confirmed mildly displaced fractures of the left inferior orbital rim and anterior and posterior lateral left maxillary sinus  walls.  This injury occurred when going to the bathroom in a dark house walking into the the door.  She is legally blind.  A CT of the chest was positive for small burden PE.  Lower extremity venous Dopplers were positive for DVT involving the right calf, although she never had pain there.  She was started on enoxaparin and transitioned to Eliquis 10 mg twice daily for 1 week, followed by 5 mg twice daily thereafter.  An echocardiogram was performed that revealed no evidence for right heart strain.  ENT was consulted with respect to the facial fractures, and no surgical intervention was indicated.      Subsequent hospitalizations followed this, including in August 2020 stay secondary to a C3 fracture.     MOST RECENT HOSPITALIZATION    She presented once again to the ED on 10/23/2020 with dizziness and a fall.  She stated she was not intoxicated at the time of the most recent fall and has been sober for 9 weeks prior to admission.  Differential diagnoses included TIA/CVA, intoxication/withdrawal, UTI, seizures/postictal, arrhythmia, vertigo, electrolyte abnormality, mood medications, thyroid abnormalities.  Neurology was consulted.  She does have a history of hypothyroidism, but her TSH was normal.  Electrolytes were also normal.  B12 was elevated.  Carbamazepine levels were unremarkable.  Some of her sedating medications were held with no difference in symptoms.  Echo showed EF of 55 to 60%.  MRI was negative for CVA, and EEG negative for stroke.  She was seen by PT/OT who recommended TCU upon discharge.  Also started on meclizine and vestibular therapy.  Recommendation for outpatient follow-up with Dr. Vazquez (neurology) if gait continues to be unstable.    A right sided orbital blowout fracture was noted.  Patient to follow-up with OMFS.    For her alcoholism, she is not on naltrexone but does receive acamprosate, multivitamin/folate/thiamine.    Diabetes mellitus type 2: She is a diabetic with a recent A1c of 7.6%.  " She stated that she has been taking insulin; however, her regimen was unclear.  Lantus dose was listed as 38 units but not listed upon recent hospital discharge summary.  She does take sliding scale NovoLog 3 times per day.  Blood glucose levels ranged in the 100s to 200s during hospitalization with sliding scale correction.  Her Lantus was held with the need for outpatient follow-up.  She continues to receive sliding scale insulin, Invokana, metformin, and glimepiride.    Normocytic anemia: Hemoglobin 10 on admission.  Receiving multivitamin and iron.    Other chronic issues:   Mood disorder: PTA amitriptyline, buspirone, carbamazepine, mirtazapine, and trazodone.  Hyperlipidemia: PTA statin.  COPD: PTA albuterol and Symbicort inhalers.  Hypothyroidism: PTA levothyroxine.  Recent DVT/PE: PTA Eliquis.  Hypertension: PTA metoprolol, lisinopril.      TCU ISSUES    Disposition: She is still looking for assisted living.  While at home, she tells me she is looking for housekeeping.  As noted in the HPI, she appears to be a hoarder.  She does feel that her acamprosate (for EtOH dependence maintenance) has been effective.    Fall: She says she is \"real jerky,\" and she did fall again last night here, stating that her body \"started to go backward.\"    Depression and anxiety:  Major depressive disorder (with anxiety) has been ongoing for some time.  She most likely was self-medicating with alcohol.  She describes her depression as \"really bad right now.\"  As far as I can tell, medications have not been changed since her last stay.    Weight loss: She is down 13.4 pounds since her last stay.    Cognition:  During an earlier stay, there was some concern of cognitive impairment/encephalopathy.  During her stay here in September 2019, she did have some word finding difficulty but otherwise scored well in cognitive testing.  When last here, according to Occupational Therapy, she scored only 16/30 on the SLUMS, indicating moderate " "cognitive deficits.  SLP did work with her as well on cognition and word finding difficulty during that stay.  Currently, Occupational Therapy reports a SLUMS score of 15/30, indicating cognitive impairment.  She has been seen by SLP in the past for word finding difficulty, and we will utilize again for cognitive assessments.    Alcoholism: Patient tells me that she had been sober for 10 years before resuming.    Diabetes mellitus type 2: Insufficient data yet for managing her diabetes.  Recent blood glucose levels over the last few days run anywhere between 79 and 450.    Right shoulder pain: She did have a fractured right proximal humerus in March 2020.  Pain is chronic.  Today, she describes the pain as \"probably an 8.\"  She tells me she needs right shoulder surgery to replace hardware.    Hypertension: Currently well controlled.    COPD: Has inhalers.  Endorses dyspnea on exertion.    Diabetic polyneuropathy: Tingling in the hands and feet, though worse in the feet.    Trigeminal neuralgia: Taking carbamazepine for this, and it seems to be under control.  She has described the discomfort as \"phantom earaches.\"       ROS: As noted, she endorses dizziness.  She also mentions tremors/shake.  Differential diagnosis for this is too extensive.  No  chest pains, coughing or congestion, dysuria, integumentary issues.  No bowel issues.  Sleep has always been a problem.  She tells me she is not sleeping because of her shoulder pain.    Past Medical History:   Diagnosis Date   ? Acute encephalopathy 09/23/2017   ? Acute hypokalemia 09/23/2017   ? Acute hyponatremia 09/23/2017   ? Acute pulmonary embolism (H)    ? Acute pyelonephritis 09/23/2017   ? SOPHIA (acute kidney injury) (H)    ? Anxiety    ? C3 cervical fracture (H)    ? CAD (coronary artery disease)    ? Closed fracture of facial bone (H)    ? Closed fracture of right humerus    ? COPD (chronic obstructive pulmonary disease) (H)    ? Depression    ? Diabetes mellitus " "(H) 09/23/2017   ? Diabetic peripheral neuropathy (H) 9/4/2019   ? History of cervical cancer    ? HLD (hyperlipidemia)    ? Hypertension    ? Hypothyroidism    ? Insomnia    ? Legal blindness 5/28/2014    Diabetic retinopathy, macular edema and degeneration. S/p eye injection. Not surgical candidate d/t proximity to optic nerve.   ? Normocytic anemia    ? Peripheral neuropathy    ? Sensorineural hearing loss (SNHL) of right ear with tinnitus 9/4/2019   ? Sepsis (H) 09/23/2017   ? TIA (transient ischemic attack)    ? Trigeminal neuralgia               Family History   Problem Relation Age of Onset   ? COPD Mother    ? COPD Father    ? Lung cancer Father      Social History     Socioeconomic History   ? Marital status: Single     Spouse name: Not on file   ? Number of children: Not on file   ? Years of education: Not on file   ? Highest education level: Not on file   Occupational History   ? Not on file   Social Needs   ? Financial resource strain: Not on file   ? Food insecurity     Worry: Not on file     Inability: Not on file   ? Transportation needs     Medical: Not on file     Non-medical: Not on file   Tobacco Use   ? Smoking status: Former Smoker     Packs/day: 2.00     Years: 30.00     Pack years: 60.00     Types: Cigarettes   ? Smokeless tobacco: Never Used   ? Tobacco comment: quit at age 42   Substance and Sexual Activity   ? Alcohol use: Not Currently     Comment: Reports is an \"alcoholic\"    ? Drug use: No   ? Sexual activity: Not Currently     Partners: Male   Lifestyle   ? Physical activity     Days per week: Not on file     Minutes per session: Not on file   ? Stress: Not on file   Relationships   ? Social connections     Talks on phone: Not on file     Gets together: Not on file     Attends [employer] service: Not on file     Active member of club or organization: Not on file     Attends meetings of clubs or organizations: Not on file     Relationship status: Not on file   ? Intimate partner violence "     Fear of current or ex partner: Not on file     Emotionally abused: Not on file     Physically abused: Not on file     Forced sexual activity: Not on file   Other Topics Concern   ? Not on file   Social History Narrative    Lives at home alone with her catKailyn       MEDICATIONS: Reviewed from the MAR, physician orders, and/or earlier progress notes.    Post Discharge Medication Reconciliation Status: discharge medications reconciled and changed, per note/orders.    Updated by me today (10/27/2020) with the addition of hydrocortisone 1% cream reflected below.  Current Outpatient Medications   Medication Sig   ? hydrocortisone 1 % cream Apply topically 2 (two) times a day. Keep away from eyes.   ? acamprosate (CAMPRAL) 333 mg tablet Take 2 tablets (666 mg total) by mouth 3 (three) times a day.   ? acetaminophen (TYLENOL) 500 MG tablet Take 1,000 mg by mouth 3 (three) times a day.    ? albuterol (PROAIR HFA;PROVENTIL HFA;VENTOLIN HFA) 90 mcg/actuation inhaler Inhale 2 puffs 4 (four) times a day as needed for wheezing.    ? amitriptyline (ELAVIL) 150 MG tablet Take 150 mg by mouth at bedtime.   ? apixaban ANTICOAGULANT (ELIQUIS) 2.5 mg Tab tablet Take 2.5 mg by mouth 2 (two) times a day.    ? aspirin 81 mg chewable tablet Chew 81 mg daily.    ? budesonide-formoteroL (SYMBICORT) 160-4.5 mcg/actuation inhaler Inhale 2 puffs 2 (two) times a day.   ? busPIRone (BUSPAR) 10 MG tablet Take 20 mg by mouth at bedtime.   ? canagliflozin (INVOKANA) 100 mg Tab Take 100 mg by mouth daily before breakfast.   ? carBAMazepine (TEGRETOL) 200 mg tablet Take 400 mg by mouth 2 (two) times a day.   ? cholecalciferol, vitamin D3, 1,000 unit (25 mcg) tablet Take 1,000 Units by mouth daily.   ? ferrous sulfate 65 mg elemental iron Take 1 tablet by mouth daily with breakfast.    ? FLUoxetine (PROZAC) 20 MG capsule Take 20 mg by mouth daily.   ? glimepiride (AMARYL) 1 MG tablet Take 1 mg by mouth daily before breakfast.   ? insulin aspart  "U-100 (NOVOLOG) 100 unit/mL (3 mL) injection pen Inject 1-10 Units under the skin see administration instructions. Inject 1-10 units subcutaneously three times daily with meals per blood glucose. Don't give corrective dose for as needed, post-prandial, or nocturnal glucose checks unless ordered. BG <70 mg/dL see hypoglycemia protocol. Do not inject for a BG of  mg/dL unless ordered. Starting at blood glucose of 150 mg/dL inject 1-5 units for every increase of 50 mg/dL. Inject 6 units for BG > 400 mg/dL and call MD.   ? levothyroxine (SYNTHROID, LEVOTHROID) 50 MCG tablet Take 50 mcg by mouth daily.   ? lisinopril (PRINIVIL,ZESTRIL) 10 MG tablet Take 10 mg by mouth daily.   ? melatonin 10 mg Tab Take 10 mg by mouth at bedtime.   ? metFORMIN (GLUCOPHAGE-XR) 750 MG 24 hr tablet Take 750 mg by mouth 2 (two) times a day with meals.    ? metoprolol succinate (TOPROL-XL) 25 MG Take 25 mg by mouth daily.    ? mirtazapine (REMERON) 15 MG tablet Take 15 mg by mouth daily.   ? multivit with min-folic acid 0.4 mg Tab Take 1 tablet by mouth daily.   ? nitroglycerin (NITROSTAT) 0.4 MG SL tablet Place 0.4 mg under the tongue every 5 (five) minutes as needed for chest pain.   ? polyethylene glycol (MIRALAX) 17 gram packet Take 17 g by mouth daily as needed.    ? rosuvastatin (CRESTOR) 20 MG tablet Take 20 mg by mouth at bedtime.   ? traZODone (DESYREL) 150 MG tablet Take 150 mg by mouth at bedtime.      ALLERGIES:   Allergies   Allergen Reactions   ? Propranolol Anaphylaxis     Slowed HR   ? Atorvastatin      Hand neuropathy   ? Prochlorperazine Edisylate Rash     nightmares     DIET: 2 g sodium restriction, consistent carbohydrates, regular texture, thin liquids.    Vitals:    10/27/20 0926   BP: 102/70   Pulse: 83   Resp: 18   Temp: 97.2  F (36.2  C)   SpO2: 97%   Weight: 161 lb 3.2 oz (73.1 kg)   Height: 5' 3\" (1.6 m)   She is down 11.6 pounds since her last stay.  Body mass index is 28.56 kg/m .    EXAMINATION:   General: " Fairly pleasant middle-aged female, sitting comfortably, in no apparent distress.  Head: Normocephalic and atraumatic.  External visual signs of her facial fractures are not in evidence.  Eyes: PERRLA, sclerae clear.   ENT: Moist oral mucosa.  She is edentulous with full upper and lower dentures.  No rhinorrhea or nasal discharge.  She has complete hearing loss in the right ear and chronic tinnitus there.  Cardiovascular: Regular rate and rhythm with a short 2/6 systolic ejection murmur at the left sternal border.   Respiratory: Lung sounds are coarse in the bases, otherwise clear.  Abdomen: Soft and nontender.   Musculoskeletal/Extremities: Trace swelling in the ankles.  Integument: Seborrheic dermatitis of the forehead.  We will order hydrocortisone 1% cream to be applied twice daily.  Cognitive/Psychiatric: Appearing alert and oriented x3, although she scored 16/30 on the SLUMS during an earlier stay and 15/30 more recently.  Affect is rather flat, and she does endorse depression.    DIAGNOSTICS:   Results for orders placed or performed during the hospital encounter of 10/23/20   Basic Metabolic Panel   Result Value Ref Range    Sodium 136 136 - 145 mmol/L    Potassium 4.7 3.5 - 5.0 mmol/L    Chloride 100 98 - 107 mmol/L    CO2 28 22 - 31 mmol/L    Anion Gap, Calculation 8 5 - 18 mmol/L    Glucose 196 (H) 70 - 125 mg/dL    Calcium 8.9 8.5 - 10.5 mg/dL    BUN 18 8 - 22 mg/dL    Creatinine 1.13 (H) 0.60 - 1.10 mg/dL    GFR MDRD Af Amer 58 (L) >60 mL/min/1.73m2    GFR MDRD Non Af Amer 48 (L) >60 mL/min/1.73m2     Lab Results   Component Value Date    WBC 6.8 10/23/2020    HGB 10.0 (L) 10/23/2020    HCT 31.2 (L) 10/23/2020    MCV 88 10/23/2020     10/23/2020     Estimated Creatinine Clearance: 46.9 mL/min (A) (by C-G formula based on SCr of 1.13 mg/dL (H)).  Lab Results   Component Value Date    HGBA1C 7.6 (H) 08/21/2020       ASSESSMENT/Plan:      ICD-10-CM    1. Frequent falls  R29.6    2. Alcohol use  disorder, severe, in early remission (H)  F10.21    3. Diabetic peripheral neuropathy (H)  E11.42    4. Diabetic nephropathy associated with type 2 diabetes mellitus (H)  E11.21    5. Closed blow-out fracture of right orbit, sequela (H)  S02.31XS    6. Episode of recurrent major depressive disorder, unspecified depression episode severity (H)  F33.9    7. Pulmonary emphysema, unspecified emphysema type (H)  J43.9    8. Sensorineural hearing loss (SNHL) of right ear, unspecified hearing status on contralateral side  H90.5    9. Gait disturbance  R26.9    10. Chronic right shoulder pain  M25.511     G89.29    11. Stage 3a chronic kidney disease  N18.31    12. Compression fracture of C3 vertebra, sequela  S12.290S    13. Hypertension due to endocrine disorder  I15.2    14. Legal blindness  H54.8    15. Coronary artery disease : previous stents without angina pectoris  I25.10    16. Anemia of chronic disease  D63.8    17. Trigeminal neuralgia  G50.0      CHANGES:  Hydrocortisone 1% cream to the forehead twice daily for seborrheic dermatitis.    CARE PLAN:  The care plan has been reviewed and all orders signed.  Changes to care plan, if any, as noted.  Otherwise, continue current plan of care.  Total time spent with this patient was approximately 40 minutes, with greater than 50% spent in counseling and coordination of care that included a review of multiple medical problems, obtaining additional data from the patient not available in the hospital discharge summary, as well as speaking with therapies regarding evidence of cognitive impairment.      The above has been created using voice recognition software. Please be aware that this may unintentionally  produce inaccuracies and/or nonsensical sentences.      Electronically signed by: Suraj Robledo, CNP

## 2021-06-21 NOTE — LETTER
Letter by Suraj Robledo CNP at      Author: Suraj Robledo CNP Service: -- Author Type: --    Filed:  Encounter Date: 10/30/2020 Status: (Other)         Patient: Ruddy Mendoza   MR Number: [id number]   YOB: 1955   Date of Visit: 10/30/2020     [facility]    Name:   Ruddy Mendoza  : 1955  Facility:   [facility] [[id number]]   Room:   Code Status: DNI and POLST AVAILABLE - Limited treatment (CPR and meds okay)  Fac type:   SNF (Skilled Nursing Facility, TCU) -     PCP:  Ron Mandujano    CHIEF COMPLAINT / REASON FOR VISIT:  Chief Complaint   Patient presents with   ? Follow-up     Multiple falls with head injuries.      [facility] from 2019 until 2019 (right facial numbness, severe dizziness, right upper extremity ataxia, swallowing and word finding difficulties)  [facility] from 2019 until 2019  [facility] from 2020 until 2020 (right proximal humerus fracture and hyperglycemia with SOPHIA)   [facility] from 2020 until 2020 (facial fractures and pulmonary embolism)  [facility] TCU from 2020 until 2020 (expected discharge date)  [facility] from 2020 until 2020 (C3 vertebral fracture)      HPI: Ruddy is a 65 y.o. female with an extensive history of problems and hospitalizations related to alcohol use disorder and diabetes mellitus.     RECENT HISTORY    She has a past medical history of recurrent UTIs and pyelonephritis, diabetes mellitus type 2 (poorly controlled - A1c 16.4 in 10/2017 and 9.4 in 2020 - with multiple related complications, including peripheral neuropathy, nephropathy, and retinopathy), endocrine induced hypertension, coronary artery disease (status post stenting x2), COPD (former smoker), and alcoholism who has been here multiple times in the past, involving stroke-like symptoms and right  proximal humerus fracture along with hyperglycemia and SOPHIA.    Based on EMS reports prior to an earlier hospitalization, she appeared to live in a hoarder home.  She was found on the the floor of her bedroom with a hole in the wall, and the patient later reported old broken shelving falling, although in the ED she could not remember what occurred.  She did note drinking multiple drinks of straight vodka that night, often doing this intermittently, and did not want her children to know.  There has been concern for alcoholism and possibly Warnicke's encephalopathy.     She was admitted on 07/2428 after presenting to the ED for evaluation of recurrent falls (x4).  She had been seen at [facility] ED on 06/27/2024 fall and was discharged home with nitrofurantoin for urinary tract infection, along with home health care.  There was no loss of consciousness, dizziness, vision changes, or room spinning sensation reported.  She expressed uncertainty as to why she kept falling, but she did report difficulty ambulating at home and getting up after her fall.  Her first fall happened while getting off the toilet, the second in the shower where she hit her head, the third while getting out of her chair, and the last while walking home from the store.  Paramedics were called to her house multiple times.      In the ED, she was tachycardic with a pulse of 103.  Labs were notable for a hemoglobin of 10.3, hematocrit 31.9, and calcium 8.2.  CT of the head showed partial visualized air-fluid level in the left maxillary sinus with hyperdense material new from 06/08/2020 (possibly representing layering hemorrhage from an occult facial fracture).  A CT of the chest showed acute PE in the left lower lobe and mild emphysema.  ECG showed NSR.  She received IVF and admitted for further management.     A maxillofacial CT confirmed mildly displaced fractures of the left inferior orbital rim and anterior and posterior lateral left  maxillary sinus walls.  This injury occurred when going to the bathroom in a dark house walking into the the door.  She is legally blind.  A CT of the chest was positive for small burden PE.  Lower extremity venous Dopplers were positive for DVT involving the right calf, although she never had pain there.  She was started on enoxaparin and transitioned to Eliquis 10 mg twice daily for 1 week, followed by 5 mg twice daily thereafter.  An echocardiogram was performed that revealed no evidence for right heart strain.  ENT was consulted with respect to the facial fractures, and no surgical intervention was indicated.      Subsequent hospitalizations followed this, including in August 2020 stay secondary to a C3 fracture.     MOST RECENT HOSPITALIZATION    She presented once again to the ED on 10/23/2020 with dizziness and a fall.  She stated she was not intoxicated at the time of the most recent fall and has been sober for 9 weeks prior to admission.  Differential diagnoses included TIA/CVA, intoxication/withdrawal, UTI, seizures/postictal, arrhythmia, vertigo, electrolyte abnormality, mood medications, thyroid abnormalities.  Neurology was consulted.  She does have a history of hypothyroidism, but her TSH was normal.  Electrolytes were also normal.  B12 was elevated.  Carbamazepine levels were unremarkable.  Some of her sedating medications were held with no difference in symptoms.  Echo showed EF of 55 to 60%.  MRI was negative for CVA, and EEG negative for stroke.  She was seen by PT/OT who recommended TCU upon discharge.  Also started on meclizine and vestibular therapy.  Recommendation for outpatient follow-up with Dr. Vazquez (neurology) if gait continues to be unstable.    A right sided orbital blowout fracture was noted.  Patient to follow-up with OMFS.    For her alcoholism, she is not on naltrexone but does receive acamprosate, multivitamin/folate/thiamine.    Diabetes mellitus type 2: She is a diabetic with a  "recent A1c of 7.6%.  She stated that she has been taking insulin; however, her regimen was unclear.  Lantus dose was listed as 38 units but not listed upon recent hospital discharge summary.  She does take sliding scale NovoLog 3 times per day.  Blood glucose levels ranged in the 100s to 200s during hospitalization with sliding scale correction.  Her Lantus was held with the need for outpatient follow-up.  She continues to receive sliding scale insulin, Invokana, metformin, and glimepiride.    Normocytic anemia: Hemoglobin 10 on admission.  Receiving multivitamin and iron.    Other chronic issues:   Mood disorder: PTA amitriptyline, buspirone, carbamazepine, mirtazapine, and trazodone.  Hyperlipidemia: PTA statin.  COPD: PTA albuterol and Symbicort inhalers.  Hypothyroidism: PTA levothyroxine.  Recent DVT/PE: PTA Eliquis.  Hypertension: PTA metoprolol, lisinopril.      TCU ISSUES    Disposition: She is still looking for assisted living.  While at home, she has been looking for housekeeping.  Her son, Moses, is assisting her.  As noted in the HPI, she appears to be a hoarder.  She does feel that her acamprosate (for EtOH dependence maintenance) has been effective.    Gait instability/frequent falls: She has described feeling \"real jerky.\"  During 1 fall, she stated that her body simply \"started to go backward.\"  Observed with physical therapy, she is shaky getting up but ambulating rapidly with a good gait.  She has to sit a while before standing.  She has described her falling as being \"out of control.\"  She does have tremors in her hands and positional dizziness, especially when turning a corner.    Depression and anxiety:  Major depressive disorder (with anxiety) has been ongoing for some time.  She most likely was self-medicating with alcohol.  She has described her depression as \"really bad right now.\"  As far as I can tell, medications have not been changed since her last stay.  She will be seen by psych, " "something she is requesting.    Weight loss: She is down 13.4 pounds since her last stay.    Cognition:  During an earlier stay, there was some concern of cognitive impairment/encephalopathy.  During her stay here in September 2019, she did have some word finding difficulty but otherwise scored well in cognitive testing.  When last here, according to Occupational Therapy, she scored only 16/30 on the SLUMS, indicating moderate cognitive deficits.  SLP did work with her as well on cognition and word finding difficulty during that stay.  Currently, Occupational Therapy reports a SLUMS score of 15/30, indicating cognitive impairment.  She has been seen by SLP in the past for word finding difficulty, and we will utilize again for cognitive assessments.    Alcoholism: Patient tells me that she had been sober for 10 years before resuming.    Diabetes mellitus type 2: Insufficient data yet for managing her diabetes.  Recent blood glucose levels range between 88 and 262.  Most are <200.    Right shoulder pain: She did have a fractured right proximal humerus in March 2020.  Pain is chronic.  Today, she describes the pain as \"probably an 8.\"  She tells me she needs right shoulder surgery to replace hardware.    Hypertension: Wide fluctuation in blood pressures.    COPD: Has inhalers.  Endorses dyspnea on exertion.    Diabetic polyneuropathy: She tells me she cannot feel the bottom of her feet.  There is tingling in the hands and feet, though worse in the feet.    Legal blindness: She has mentioned \"bleeding near the optic nerve.\"    Trigeminal neuralgia: Taking carbamazepine for this, and it seems to be under control.  She has described the discomfort as \"phantom earaches.\"     Seborrheic dermatitis: We did order hydrocortisone cream.      ROS: As noted, she endorses chronic dizziness.  She also mentions tremors/shake.  Differential diagnosis for this is too extensive and is most likely multifactorial.  No  chest pains, " coughing or congestion, dysuria, integumentary issues.  No bowel issues.  Sleep has always been a problem.  Often, she cannot sleep because of right shoulder pain.    Past Medical History:   Diagnosis Date   ? Acute encephalopathy 09/23/2017   ? Acute hypokalemia 09/23/2017   ? Acute hyponatremia 09/23/2017   ? Acute pulmonary embolism (H)    ? Acute pyelonephritis 09/23/2017   ? SOPHIA (acute kidney injury) (H)    ? Anxiety    ? C3 cervical fracture (H)    ? CAD (coronary artery disease)    ? Closed fracture of facial bone (H)    ? Closed fracture of right humerus    ? COPD (chronic obstructive pulmonary disease) (H)    ? Depression    ? Diabetes mellitus (H) 09/23/2017   ? Diabetic peripheral neuropathy (H) 9/4/2019   ? History of cervical cancer    ? HLD (hyperlipidemia)    ? Hypertension    ? Hypothyroidism    ? Insomnia    ? Legal blindness 5/28/2014    Diabetic retinopathy, macular edema and degeneration. S/p eye injection. Not surgical candidate d/t proximity to optic nerve.   ? Normocytic anemia    ? Peripheral neuropathy    ? Seborrheic dermatitis 11/4/2020   ? Sensorineural hearing loss (SNHL) of right ear with tinnitus 9/4/2019   ? Sepsis (H) 09/23/2017   ? TIA (transient ischemic attack)    ? Trigeminal neuralgia               Family History   Problem Relation Age of Onset   ? COPD Mother    ? COPD Father    ? Lung cancer Father      Social History     Socioeconomic History   ? Marital status: Single     Spouse name: Not on file   ? Number of children: Not on file   ? Years of education: Not on file   ? Highest education level: Not on file   Occupational History   ? Not on file   Social Needs   ? Financial resource strain: Not on file   ? Food insecurity     Worry: Not on file     Inability: Not on file   ? Transportation needs     Medical: Not on file     Non-medical: Not on file   Tobacco Use   ? Smoking status: Former Smoker     Packs/day: 2.00     Years: 30.00     Pack years: 60.00     Types: Cigarettes  "  ? Smokeless tobacco: Never Used   ? Tobacco comment: quit at age 42   Substance and Sexual Activity   ? Alcohol use: Not Currently     Comment: Reports is an \"alcoholic\"    ? Drug use: No   ? Sexual activity: Not Currently     Partners: Male   Lifestyle   ? Physical activity     Days per week: Not on file     Minutes per session: Not on file   ? Stress: Not on file   Relationships   ? Social connections     Talks on phone: Not on file     Gets together: Not on file     Attends Oriental orthodox service: Not on file     Active member of club or organization: Not on file     Attends meetings of clubs or organizations: Not on file     Relationship status: Not on file   ? Intimate partner violence     Fear of current or ex partner: Not on file     Emotionally abused: Not on file     Physically abused: Not on file     Forced sexual activity: Not on file   Other Topics Concern   ? Not on file   Social History Narrative    Lives at home alone with her catKailyn       MEDICATIONS: Reviewed from the MAR, physician orders, and/or earlier progress notes.    Post Discharge Medication Reconciliation Status: medication reconciliation previously completed during another office visit.      Current Outpatient Medications   Medication Sig   ? acamprosate (CAMPRAL) 333 mg tablet Take 2 tablets (666 mg total) by mouth 3 (three) times a day.   ? acetaminophen (TYLENOL) 500 MG tablet Take 1,000 mg by mouth 3 (three) times a day.    ? albuterol (PROAIR HFA;PROVENTIL HFA;VENTOLIN HFA) 90 mcg/actuation inhaler Inhale 2 puffs 4 (four) times a day as needed for wheezing.    ? amitriptyline (ELAVIL) 150 MG tablet Take 150 mg by mouth at bedtime.   ? apixaban ANTICOAGULANT (ELIQUIS) 2.5 mg Tab tablet Take 2.5 mg by mouth 2 (two) times a day.    ? aspirin 81 mg chewable tablet Chew 81 mg daily.    ? budesonide-formoteroL (SYMBICORT) 160-4.5 mcg/actuation inhaler Inhale 2 puffs 2 (two) times a day.   ? busPIRone (BUSPAR) 10 MG tablet Take 20 mg by " mouth at bedtime.   ? canagliflozin (INVOKANA) 100 mg Tab Take 100 mg by mouth daily before breakfast.   ? carBAMazepine (TEGRETOL) 200 mg tablet Take 400 mg by mouth 2 (two) times a day.   ? cholecalciferol, vitamin D3, 1,000 unit (25 mcg) tablet Take 1,000 Units by mouth daily.   ? ferrous sulfate 65 mg elemental iron Take 1 tablet by mouth daily with breakfast.    ? FLUoxetine (PROZAC) 20 MG capsule Take 20 mg by mouth daily.   ? glimepiride (AMARYL) 1 MG tablet Take 1 mg by mouth daily before breakfast.   ? hydrocortisone 1 % cream Apply topically 2 (two) times a day. Keep away from eyes.   ? insulin aspart U-100 (NOVOLOG) 100 unit/mL (3 mL) injection pen Inject 1-10 Units under the skin see administration instructions. Inject 1-10 units subcutaneously three times daily with meals per blood glucose. Don't give corrective dose for as needed, post-prandial, or nocturnal glucose checks unless ordered. BG <70 mg/dL see hypoglycemia protocol. Do not inject for a BG of  mg/dL unless ordered. Starting at blood glucose of 150 mg/dL inject 1-5 units for every increase of 50 mg/dL. Inject 6 units for BG > 400 mg/dL and call MD.   ? levothyroxine (SYNTHROID, LEVOTHROID) 50 MCG tablet Take 50 mcg by mouth daily.   ? lisinopril (PRINIVIL,ZESTRIL) 10 MG tablet Take 10 mg by mouth daily.   ? melatonin 10 mg Tab Take 10 mg by mouth at bedtime.   ? metFORMIN (GLUCOPHAGE-XR) 750 MG 24 hr tablet Take 750 mg by mouth 2 (two) times a day with meals.    ? metoprolol succinate (TOPROL-XL) 25 MG Take 25 mg by mouth daily.    ? mirtazapine (REMERON) 15 MG tablet Take 15 mg by mouth daily.   ? multivit with min-folic acid 0.4 mg Tab Take 1 tablet by mouth daily.   ? nitroglycerin (NITROSTAT) 0.4 MG SL tablet Place 0.4 mg under the tongue every 5 (five) minutes as needed for chest pain.   ? polyethylene glycol (MIRALAX) 17 gram packet Take 17 g by mouth daily as needed.    ? rosuvastatin (CRESTOR) 20 MG tablet Take 20 mg by mouth at  "bedtime.   ? traZODone (DESYREL) 150 MG tablet Take 150 mg by mouth at bedtime.      ALLERGIES:   Allergies   Allergen Reactions   ? Propranolol Anaphylaxis     Slowed HR   ? Atorvastatin      Hand neuropathy   ? Prochlorperazine Edisylate Rash     nightmares     DIET: 2 g sodium restriction, consistent carbohydrates, regular texture, thin liquids.    Vitals:    10/30/20 1833   BP: 153/82   Pulse: 77   Resp: 20   Temp: 97.5  F (36.4  C)   SpO2: 97%   Weight: 161 lb 3.2 oz (73.1 kg)   Height: 5' 3\" (1.6 m)   She is down 11.6 pounds since her last stay.  Body mass index is 28.56 kg/m .    EXAMINATION:   General: Fairly pleasant middle-aged female, sitting comfortably, in no apparent distress.  She was also observed ambulating with physical therapy using a rolling walker.  Head: Normocephalic and atraumatic.  External visual signs of her facial fractures are not in evidence.  Eyes: PERRLA, sclerae clear.   ENT: Moist oral mucosa.  She is edentulous with full upper and lower dentures.  No rhinorrhea or nasal discharge.  She has complete hearing loss in the right ear and chronic tinnitus there.  Cardiovascular: Regular rate and rhythm with a short 2/6 systolic ejection murmur at the left sternal border.   Respiratory: Lung sounds are coarse in the bases, otherwise clear.  Abdomen: Soft and nontender.   Musculoskeletal/Extremities: Trace swelling in the ankles.  Integument: Seborrheic dermatitis of the forehead.  We ordered hydrocortisone cream.  Cognitive/Psychiatric: Appearing alert and oriented x3, although she scored 16/30 on the SLUMS during an earlier stay and 15/30 more recently.  Affect is rather flat, and she does endorse depression.    DIAGNOSTICS:   Results for orders placed or performed during the hospital encounter of 10/23/20   Basic Metabolic Panel   Result Value Ref Range    Sodium 136 136 - 145 mmol/L    Potassium 4.7 3.5 - 5.0 mmol/L    Chloride 100 98 - 107 mmol/L    CO2 28 22 - 31 mmol/L    Anion Gap, " Calculation 8 5 - 18 mmol/L    Glucose 196 (H) 70 - 125 mg/dL    Calcium 8.9 8.5 - 10.5 mg/dL    BUN 18 8 - 22 mg/dL    Creatinine 1.13 (H) 0.60 - 1.10 mg/dL    GFR MDRD Af Amer 58 (L) >60 mL/min/1.73m2    GFR MDRD Non Af Amer 48 (L) >60 mL/min/1.73m2     Lab Results   Component Value Date    WBC 6.8 10/23/2020    HGB 10.0 (L) 10/23/2020    HCT 31.2 (L) 10/23/2020    MCV 88 10/23/2020     10/23/2020     Estimated Creatinine Clearance: 46.9 mL/min (A) (by C-G formula based on SCr of 1.13 mg/dL (H)).  Lab Results   Component Value Date    HGBA1C 7.6 (H) 08/21/2020       ASSESSMENT/Plan:      ICD-10-CM    1. Frequent falls  R29.6    2. Alcohol use disorder, severe, in early remission (H)  F10.21    3. Diabetic peripheral neuropathy (H)  E11.42    4. Diabetic nephropathy associated with type 2 diabetes mellitus (H)  E11.21    5. Closed blow-out fracture of right orbit, sequela (H)  S02.31XS    6. Episode of recurrent major depressive disorder, unspecified depression episode severity (H)  F33.9    7. Pulmonary emphysema, unspecified emphysema type (H)  J43.9    8. Sensorineural hearing loss (SNHL) of right ear, unspecified hearing status on contralateral side  H90.5    9. Gait disturbance  R26.9    10. Chronic right shoulder pain  M25.511     G89.29    11. Stage 3a chronic kidney disease  N18.31    12. Compression fracture of C3 vertebra, sequela  S12.290S    13. Hypertension due to endocrine disorder  I15.2    14. Legal blindness  H54.8    15. Coronary artery disease : previous stents without angina pectoris  I25.10    16. Anemia of chronic disease  D63.8    17. Trigeminal neuralgia  G50.0    18. Seborrheic dermatitis  L21.9      CHANGES:  None.    CARE PLAN:  The care plan has been reviewed and all orders signed.  Changes to care plan, if any, as noted.  Otherwise, continue current plan of care.      The above has been created using voice recognition software. Please be aware that this may unintentionally   produce inaccuracies and/or nonsensical sentences.      Electronically signed by: Suraj Robledo, CNP

## 2021-08-08 ENCOUNTER — HEALTH MAINTENANCE LETTER (OUTPATIENT)
Age: 66
End: 2021-08-08

## 2021-10-04 ENCOUNTER — HEALTH MAINTENANCE LETTER (OUTPATIENT)
Age: 66
End: 2021-10-04

## 2022-03-20 ENCOUNTER — HEALTH MAINTENANCE LETTER (OUTPATIENT)
Age: 67
End: 2022-03-20

## 2022-04-25 ENCOUNTER — HOSPITAL ENCOUNTER (EMERGENCY)
Facility: HOSPITAL | Age: 67
Discharge: HOME | End: 2022-04-25
Attending: EMERGENCY MEDICINE | Admitting: EMERGENCY MEDICINE
Payer: COMMERCIAL

## 2022-04-25 ENCOUNTER — APPOINTMENT (OUTPATIENT)
Dept: CT IMAGING | Facility: HOSPITAL | Age: 67
End: 2022-04-25
Attending: EMERGENCY MEDICINE
Payer: COMMERCIAL

## 2022-04-25 VITALS
DIASTOLIC BLOOD PRESSURE: 99 MMHG | WEIGHT: 172 LBS | RESPIRATION RATE: 18 BRPM | SYSTOLIC BLOOD PRESSURE: 198 MMHG | BODY MASS INDEX: 30.47 KG/M2 | OXYGEN SATURATION: 94 % | HEART RATE: 68 BPM | TEMPERATURE: 98.5 F

## 2022-04-25 DIAGNOSIS — E08.65 DIABETES MELLITUS DUE TO UNDERLYING CONDITION WITH HYPERGLYCEMIA, WITH LONG-TERM CURRENT USE OF INSULIN (H): ICD-10-CM

## 2022-04-25 DIAGNOSIS — Z91.148 NONCOMPLIANCE WITH MEDICATION REGIMEN: ICD-10-CM

## 2022-04-25 DIAGNOSIS — Z79.4 DIABETES MELLITUS DUE TO UNDERLYING CONDITION WITH HYPERGLYCEMIA, WITH LONG-TERM CURRENT USE OF INSULIN (H): ICD-10-CM

## 2022-04-25 LAB
ALBUMIN SERPL-MCNC: 3.8 G/DL (ref 3.5–5)
ALBUMIN UR-MCNC: NEGATIVE MG/DL
ALP SERPL-CCNC: 165 U/L (ref 45–120)
ALT SERPL W P-5'-P-CCNC: 29 U/L (ref 0–45)
ANION GAP SERPL CALCULATED.3IONS-SCNC: 9 MMOL/L (ref 5–18)
APPEARANCE UR: CLEAR
AST SERPL W P-5'-P-CCNC: 18 U/L (ref 0–40)
BASE EXCESS BLDV CALC-SCNC: 5 MMOL/L
BASOPHILS # BLD AUTO: 0.1 10E3/UL (ref 0–0.2)
BASOPHILS NFR BLD AUTO: 1 %
BILIRUB DIRECT SERPL-MCNC: 0.1 MG/DL
BILIRUB SERPL-MCNC: 0.2 MG/DL (ref 0–1)
BILIRUB UR QL STRIP: NEGATIVE
BUN SERPL-MCNC: 11 MG/DL (ref 8–22)
CALCIUM SERPL-MCNC: 8.9 MG/DL (ref 8.5–10.5)
CARBAMAZEPINE SERPL-MCNC: 9.4 UG/ML
CHLORIDE BLD-SCNC: 99 MMOL/L (ref 98–107)
CO2 SERPL-SCNC: 29 MMOL/L (ref 22–31)
COLOR UR AUTO: ABNORMAL
CREAT SERPL-MCNC: 1.37 MG/DL (ref 0.6–1.1)
EOSINOPHIL # BLD AUTO: 0.2 10E3/UL (ref 0–0.7)
EOSINOPHIL NFR BLD AUTO: 3 %
ERYTHROCYTE [DISTWIDTH] IN BLOOD BY AUTOMATED COUNT: 11.9 % (ref 10–15)
ERYTHROCYTE [DISTWIDTH] IN BLOOD BY AUTOMATED COUNT: 12.1 % (ref 10–15)
GFR SERPL CREATININE-BSD FRML MDRD: 42 ML/MIN/1.73M2
GLUCOSE BLD-MCNC: 396 MG/DL (ref 70–125)
GLUCOSE BLDC GLUCOMTR-MCNC: 475 MG/DL (ref 70–99)
GLUCOSE UR STRIP-MCNC: >1000 MG/DL
HCO3 BLDV-SCNC: 27 MMOL/L (ref 24–30)
HCT VFR BLD AUTO: 33.7 % (ref 35–47)
HCT VFR BLD AUTO: 36.1 % (ref 35–47)
HGB BLD-MCNC: 11.1 G/DL (ref 11.7–15.7)
HGB BLD-MCNC: 11.9 G/DL (ref 11.7–15.7)
HGB UR QL STRIP: NEGATIVE
HOLD SPECIMEN: NORMAL
IMM GRANULOCYTES # BLD: 0 10E3/UL
IMM GRANULOCYTES NFR BLD: 0 %
KETONES BLD-SCNC: 0.47 MMOL/L
KETONES BLD-SCNC: 0.48 MMOL/L
KETONES UR STRIP-MCNC: NEGATIVE MG/DL
LEUKOCYTE ESTERASE UR QL STRIP: NEGATIVE
LYMPHOCYTES # BLD AUTO: 2.4 10E3/UL (ref 0.8–5.3)
LYMPHOCYTES NFR BLD AUTO: 38 %
MAGNESIUM SERPL-MCNC: 2.3 MG/DL (ref 1.8–2.6)
MCH RBC QN AUTO: 28.5 PG (ref 26.5–33)
MCH RBC QN AUTO: 28.7 PG (ref 26.5–33)
MCHC RBC AUTO-ENTMCNC: 32.9 G/DL (ref 31.5–36.5)
MCHC RBC AUTO-ENTMCNC: 33 G/DL (ref 31.5–36.5)
MCV RBC AUTO: 87 FL (ref 78–100)
MCV RBC AUTO: 87 FL (ref 78–100)
MONOCYTES # BLD AUTO: 0.4 10E3/UL (ref 0–1.3)
MONOCYTES NFR BLD AUTO: 6 %
NEUTROPHILS # BLD AUTO: 3.3 10E3/UL (ref 1.6–8.3)
NEUTROPHILS NFR BLD AUTO: 52 %
NITRATE UR QL: NEGATIVE
NRBC # BLD AUTO: 0 10E3/UL
NRBC BLD AUTO-RTO: 0 /100
OXYHGB MFR BLDV: 52.5 % (ref 70–75)
PCO2 BLDV: 60 MM HG (ref 35–50)
PH BLDV: 7.32 [PH] (ref 7.35–7.45)
PH UR STRIP: 6 [PH] (ref 5–7)
PLATELET # BLD AUTO: 203 10E3/UL (ref 150–450)
PLATELET # BLD AUTO: 214 10E3/UL (ref 150–450)
PO2 BLDV: 30 MM HG (ref 25–47)
POTASSIUM BLD-SCNC: 4.3 MMOL/L (ref 3.5–5)
PROT SERPL-MCNC: 6.7 G/DL (ref 6–8)
RBC # BLD AUTO: 3.89 10E6/UL (ref 3.8–5.2)
RBC # BLD AUTO: 4.14 10E6/UL (ref 3.8–5.2)
RBC URINE: 2 /HPF
SAO2 % BLDV: 53.5 % (ref 70–75)
SARS-COV-2 RNA RESP QL NAA+PROBE: NEGATIVE
SODIUM SERPL-SCNC: 137 MMOL/L (ref 136–145)
SP GR UR STRIP: 1.02 (ref 1–1.03)
SQUAMOUS EPITHELIAL: 1 /HPF
TROPONIN I SERPL-MCNC: <0.01 NG/ML (ref 0–0.29)
UROBILINOGEN UR STRIP-MCNC: <2 MG/DL
WBC # BLD AUTO: 5.3 10E3/UL (ref 4–11)
WBC # BLD AUTO: 6.3 10E3/UL (ref 4–11)
WBC URINE: <1 /HPF
YEAST #/AREA URNS HPF: ABNORMAL /HPF

## 2022-04-25 PROCEDURE — 85025 COMPLETE CBC W/AUTO DIFF WBC: CPT | Performed by: EMERGENCY MEDICINE

## 2022-04-25 PROCEDURE — C9803 HOPD COVID-19 SPEC COLLECT: HCPCS

## 2022-04-25 PROCEDURE — 82248 BILIRUBIN DIRECT: CPT | Performed by: EMERGENCY MEDICINE

## 2022-04-25 PROCEDURE — 82010 KETONE BODYS QUAN: CPT | Performed by: EMERGENCY MEDICINE

## 2022-04-25 PROCEDURE — 36415 COLL VENOUS BLD VENIPUNCTURE: CPT | Performed by: EMERGENCY MEDICINE

## 2022-04-25 PROCEDURE — 87635 SARS-COV-2 COVID-19 AMP PRB: CPT | Performed by: EMERGENCY MEDICINE

## 2022-04-25 PROCEDURE — 99285 EMERGENCY DEPT VISIT HI MDM: CPT | Mod: 25

## 2022-04-25 PROCEDURE — 81003 URINALYSIS AUTO W/O SCOPE: CPT | Performed by: EMERGENCY MEDICINE

## 2022-04-25 PROCEDURE — 82310 ASSAY OF CALCIUM: CPT | Performed by: EMERGENCY MEDICINE

## 2022-04-25 PROCEDURE — 70450 CT HEAD/BRAIN W/O DYE: CPT

## 2022-04-25 PROCEDURE — 83735 ASSAY OF MAGNESIUM: CPT | Performed by: EMERGENCY MEDICINE

## 2022-04-25 PROCEDURE — 85027 COMPLETE CBC AUTOMATED: CPT | Mod: XU | Performed by: EMERGENCY MEDICINE

## 2022-04-25 PROCEDURE — 96361 HYDRATE IV INFUSION ADD-ON: CPT

## 2022-04-25 PROCEDURE — 93005 ELECTROCARDIOGRAM TRACING: CPT | Performed by: EMERGENCY MEDICINE

## 2022-04-25 PROCEDURE — 84484 ASSAY OF TROPONIN QUANT: CPT | Performed by: EMERGENCY MEDICINE

## 2022-04-25 PROCEDURE — 258N000003 HC RX IP 258 OP 636: Performed by: EMERGENCY MEDICINE

## 2022-04-25 PROCEDURE — 80156 ASSAY CARBAMAZEPINE TOTAL: CPT | Performed by: EMERGENCY MEDICINE

## 2022-04-25 PROCEDURE — 96375 TX/PRO/DX INJ NEW DRUG ADDON: CPT

## 2022-04-25 PROCEDURE — 93010 ELECTROCARDIOGRAM REPORT: CPT | Performed by: INTERNAL MEDICINE

## 2022-04-25 PROCEDURE — 250N000011 HC RX IP 250 OP 636: Performed by: EMERGENCY MEDICINE

## 2022-04-25 PROCEDURE — 96374 THER/PROPH/DIAG INJ IV PUSH: CPT

## 2022-04-25 PROCEDURE — 250N000013 HC RX MED GY IP 250 OP 250 PS 637: Performed by: EMERGENCY MEDICINE

## 2022-04-25 PROCEDURE — 82805 BLOOD GASES W/O2 SATURATION: CPT | Performed by: EMERGENCY MEDICINE

## 2022-04-25 RX ORDER — METOCLOPRAMIDE HYDROCHLORIDE 5 MG/ML
5 INJECTION INTRAMUSCULAR; INTRAVENOUS ONCE
Status: COMPLETED | OUTPATIENT
Start: 2022-04-25 | End: 2022-04-25

## 2022-04-25 RX ORDER — FLUCONAZOLE 150 MG/1
150 TABLET ORAL ONCE
Status: COMPLETED | OUTPATIENT
Start: 2022-04-25 | End: 2022-04-25

## 2022-04-25 RX ORDER — ONDANSETRON 2 MG/ML
4 INJECTION INTRAMUSCULAR; INTRAVENOUS ONCE
Status: COMPLETED | OUTPATIENT
Start: 2022-04-25 | End: 2022-04-25

## 2022-04-25 RX ORDER — ACETAMINOPHEN 325 MG/1
975 TABLET ORAL ONCE
Status: DISCONTINUED | OUTPATIENT
Start: 2022-04-25 | End: 2022-04-25

## 2022-04-25 RX ADMIN — ONDANSETRON 4 MG: 2 INJECTION INTRAMUSCULAR; INTRAVENOUS at 14:16

## 2022-04-25 RX ADMIN — SODIUM CHLORIDE 1000 ML: 9 INJECTION, SOLUTION INTRAVENOUS at 14:15

## 2022-04-25 RX ADMIN — SODIUM CHLORIDE, POTASSIUM CHLORIDE, SODIUM LACTATE AND CALCIUM CHLORIDE 1000 ML: 600; 310; 30; 20 INJECTION, SOLUTION INTRAVENOUS at 16:27

## 2022-04-25 RX ADMIN — FLUCONAZOLE 150 MG: 150 TABLET ORAL at 19:23

## 2022-04-25 RX ADMIN — METOCLOPRAMIDE HYDROCHLORIDE 5 MG: 5 INJECTION INTRAMUSCULAR; INTRAVENOUS at 14:56

## 2022-04-25 ASSESSMENT — ENCOUNTER SYMPTOMS
ABDOMINAL PAIN: 0
FREQUENCY: 1
DIZZINESS: 1
NAUSEA: 1
SPEECH DIFFICULTY: 1
HEMATURIA: 0
FEVER: 0
VOMITING: 0
HEADACHES: 1
CONFUSION: 0
JOINT SWELLING: 0
DYSURIA: 0
CHILLS: 0
SORE THROAT: 0
SHORTNESS OF BREATH: 0
DIARRHEA: 0

## 2022-04-25 NOTE — ED PROVIDER NOTES
"  Emergency Department Encounter     Evaluation Date & Time:   4/25/2022  1:26 PM    CHIEF COMPLAINT:  Blood Sugar Problem and Nausea      Triage Note:Patient arrives via EMS, due to nausea and high blood sugar 592. Patient didn't want to take insulin due to weight gain so they stopped taking insulin about 2 wks ago. Stopped taking blood sugars a month ago. Pt feeling nauseated for a couple of days now. Sinus Rhythm. High /117.Ambulatory. Alert and oriented x 4.             ED COURSE & MEDICAL DECISION MAKING:     ED Course as of 04/25/22 1640   Mon Apr 25, 2022   1340 Initial encounter and examination of the patient.   1558 RN called phlebotomy to have them come back down and redraw.  Pt does have IV, but unable to get blood.  She received IVF.  HR improved to 60s, normotensive.     1558 Pt signed out to Dr. Doardo pending labs, disposition.       Pt presenting with symptoms related to stopping her insulin for DM 2 weeks ago.  Pt states she stopped due to not wanting to gain further weight as she has a history of remote gastric bypass.  Pt reports she's still taking her metformin and felt normal until yesterday when she developed some nausea, fatigue, decreased appetite and feeling \"dizzy\".  Pt also with increased b/l UE tremors.  Neuro intact overall intact, but reported dizziness and new movement/tremors of UE.  Will get labs, hydrate with IVF, CT head given some new neuro complaints.     At the conclusion of the encounter I discussed the results of all the tests and the disposition. The questions were answered. The patient or family acknowledged understanding and was agreeable with the care plan.    PPE: N95 and gloves worn  MEDICATIONS GIVEN IN THE EMERGENCY DEPARTMENT:  Medications   0.9% sodium chloride BOLUS (0 mLs Intravenous Stopped 4/25/22 1522)   ondansetron (ZOFRAN) injection 4 mg (4 mg Intravenous Given 4/25/22 1416)   metoclopramide (REGLAN) injection 5 mg (5 mg Intravenous Given 4/25/22 1456) " "  lactated ringers BOLUS 1,000 mL (1,000 mLs Intravenous New Bag 4/25/22 3088)       NEW PRESCRIPTIONS STARTED AT TODAY'S ED VISIT:  New Prescriptions    No medications on file       HPI   HPI     Ruddy Mendoza is a 66 year old female with a pertinent history of SOPHIA, diabetes mellitus type 2, hypertension, pulmonary emphysema, DVT, hyperkalemia, hypoglycemia, gastric bypass, right coronary artery stent placement, sepsis, UTI, word finding difficulty, CKD stage 3, bipolar disorder, alcohol abuse, and encephalopathy who presents to this ED via private car for evaluation of blood sugar problem, nausea.    The patient presents today (4/25) with her high blood sugar. She states that she did not notice the change, and that she has stopped checking it, and has stopped taking her insulin. The patient states that she stopped taking her insulin because she wants to loose weight. Recently, she had a gastric bypass, and \"does not want to go back up there.\" She reports feeling fine until yesterday, until she endorsed room spinning dizziness and nausea. She has been urinating and drinking more frequently. She denies new or changed medications. In addition, the patient reports new shaking, intermittent, but growing worse the past 2 days. Currently, she also reports a \"bad headache.\" The patient states that sometimes, she does not talk right, that her words do not always come out as they should. The patient denies chest pain, abdominal pain, shortness of breath, fever, or any other complaints at this time.    REVIEW OF SYSTEMS:  Review of Systems   Constitutional: Negative for chills and fever.        Positive for shaking   HENT: Negative for sore throat.    Eyes: Negative for visual disturbance.   Respiratory: Negative for shortness of breath.    Cardiovascular: Negative for chest pain.   Gastrointestinal: Positive for nausea. Negative for abdominal pain, diarrhea and vomiting.   Endocrine: Negative for polyuria.   Genitourinary: " Positive for frequency. Negative for dysuria and hematuria.        - urinary changes     Musculoskeletal: Negative for joint swelling.   Skin: Negative for rash.   Neurological: Positive for dizziness, speech difficulty and headaches.   Psychiatric/Behavioral: Negative for confusion.   All other systems reviewed and are negative.        Medical History     Past Medical History:   Diagnosis Date     Acute encephalopathy 09/23/2017     Acute hypokalemia 09/23/2017     Acute hyponatremia 09/23/2017     Acute pulmonary embolism (H)      Acute pyelonephritis 09/23/2017     SOPHIA (acute kidney injury) (H)      Anxiety      C3 cervical fracture (H)      CAD (coronary artery disease)      Closed fracture of facial bone (H)      Closed fracture of right humerus      COPD (chronic obstructive pulmonary disease) (H)      Depression      Diabetes mellitus (H) 09/23/2017     Diabetic peripheral neuropathy (H) 9/4/2019     History of cervical cancer      HLD (hyperlipidemia)      Hypertension      Hypothyroidism      Insomnia      Legal blindness 5/28/2014     Normocytic anemia      Peripheral neuropathy      Seborrheic dermatitis 11/4/2020     Sensorineural hearing loss (SNHL) of right ear 9/4/2019     Sepsis (H) 09/23/2017     TIA (transient ischemic attack)      Trigeminal neuralgia        Past Surgical History:   Procedure Laterality Date     CORONARY STENT PLACEMENT      TWO     HERNIA REPAIR       HYSTERECTOMY TOTAL ABDOMINAL, BILATERAL SALPINGO-OOPHORECTOMY, COMBINED      Cervical cancer     OPEN REDUCTION INTERNAL FIXATION HUMERAL SHAFT Right 03/19/2020     VASCULAR SURGERY      Left wrist       Family History   Problem Relation Age of Onset     Chronic Obstructive Pulmonary Disease Mother      Chronic Obstructive Pulmonary Disease Father      Lung Cancer Father        Social History     Tobacco Use     Smoking status: Former Smoker     Packs/day: 2.00     Years: 30.00     Pack years: 60.00     Types: Cigarettes      "Smokeless tobacco: Never Used     Tobacco comment: quit at age 42   Substance Use Topics     Alcohol use: Not Currently     Comment: Alcoholic Drinks/day: Reports is an \"alcoholic\"      Drug use: No       acamprosate (CAMPRAL) 333 mg tablet  acetaminophen (TYLENOL) 500 MG tablet  albuterol (PROAIR HFA;PROVENTIL HFA;VENTOLIN HFA) 90 mcg/actuation inhaler  amitriptyline (ELAVIL) 150 MG tablet  apixaban ANTICOAGULANT (ELIQUIS) 2.5 mg Tab tablet  aspirin 81 mg chewable tablet  B complex-vitamin C-folic acid (DIALYVITE 800) 0.8 mg Tab  biotin 1 mg cap  budesonide-formoteroL (SYMBICORT) 160-4.5 mcg/actuation inhaler  busPIRone (BUSPAR) 10 MG tablet  canagliflozin (INVOKANA) 100 mg Tab  carBAMazepine (TEGRETOL) 200 mg tablet  cholecalciferol, vitamin D3, 1,000 unit (25 mcg) tablet  ferrous sulfate 65 mg elemental iron  FLUoxetine (PROZAC) 20 MG capsule  glimepiride (AMARYL) 1 MG tablet  hydrOXYzine pamoate (VISTARIL) 25 MG capsule  insulin glargine (LANTUS SOLOSTAR U-100 INSULIN) 100 unit/mL (3 mL) pen  levothyroxine (SYNTHROID, LEVOTHROID) 50 MCG tablet  lisinopril (PRINIVIL,ZESTRIL) 10 MG tablet  magnesium oxide (MAG-OX) 400 mg (241.3 mg magnesium) tablet  melatonin 10 mg Tab  metFORMIN (GLUCOPHAGE-XR) 750 MG 24 hr tablet  metoprolol succinate (TOPROL-XL) 25 MG  mirtazapine (REMERON) 45 MG tablet  multivit with min-folic acid 0.4 mg Tab  nitroglycerin (NITROSTAT) 0.4 MG SL tablet  oxyCODONE (ROXICODONE) 5 MG immediate release tablet  polyethylene glycol (MIRALAX) 17 gram packet  rosuvastatin (CRESTOR) 20 MG tablet  senna-docusate (SENNOSIDES-DOCUSATE SODIUM) 8.6-50 mg tablet  traZODone (DESYREL) 150 MG tablet        Physical Exam     Vitals:  BP (!) 188/88   Pulse 67   Temp 98.5  F (36.9  C) (Oral)   Resp 18   Wt 78 kg (172 lb)   SpO2 96%   BMI 30.47 kg/m      PHYSICAL EXAM:   Physical Exam  Vitals and nursing note reviewed.   Constitutional:       General: She is not in acute distress.     Appearance: Normal " appearance.   HENT:      Head: Normocephalic and atraumatic.      Nose: Nose normal.      Mouth/Throat:      Mouth: Mucous membranes are moist.   Eyes:      Pupils: Pupils are equal, round, and reactive to light.   Cardiovascular:      Rate and Rhythm: Normal rate and regular rhythm.      Pulses: Normal pulses.           Radial pulses are 2+ on the right side and 2+ on the left side.        Dorsalis pedis pulses are 2+ on the right side and 2+ on the left side.   Pulmonary:      Effort: Pulmonary effort is normal. No respiratory distress.      Breath sounds: Normal breath sounds.   Abdominal:      Palpations: Abdomen is soft.      Tenderness: There is no abdominal tenderness.   Musculoskeletal:      Cervical back: Full passive range of motion without pain and neck supple.      Comments: No calf tenderness or swelling b/l   Skin:     General: Skin is warm.      Findings: No rash.   Neurological:      General: No focal deficit present.      Mental Status: She is alert. Mental status is at baseline.      GCS: GCS eye subscore is 4. GCS verbal subscore is 5. GCS motor subscore is 6.      Comments: Fluent speech, no facial asymmetry, no drift, equal strength b/l UE/LE, finger to nose intact b/l, no acute lateralizing deficits  Mild, fine tremors b/l UE   Psychiatric:         Mood and Affect: Mood normal.         Behavior: Behavior normal.         Results     LAB:  All pertinent labs reviewed and interpreted  Labs Ordered and Resulted from Time of ED Arrival to Time of ED Departure   GLUCOSE BY METER - Abnormal       Result Value    GLUCOSE BY METER POCT 475 (*)    CBC WITH PLATELETS - Abnormal    WBC Count 5.3      RBC Count 3.89      Hemoglobin 11.1 (*)     Hematocrit 33.7 (*)     MCV 87      MCH 28.5      MCHC 32.9      RDW 12.1      Platelet Count 203     KETONE BETA-HYDROXYBUTYRATE QUANTITATIVE, RAPID - Abnormal    Ketone (Beta-Hydroxybutyrate) Quantitative 0.47 (*)    BLOOD GAS VENOUS - Abnormal    pH Venous 7.32  (*)     pCO2 Venous 60 (*)     pO2 Venous 30      Bicarbonate Venous 27      Base Excess/Deficit (+/-) 5.0      Oxyhemoglobin Venous 52.5 (*)     O2 Sat, Venous 53.5 (*)    BASIC METABOLIC PANEL   MAGNESIUM   TROPONIN I   ROUTINE UA WITH MICROSCOPIC REFLEX TO CULTURE   HEPATIC FUNCTION PANEL   BLOOD GAS VENOUS   KETONE BETA-HYDROXYBUTYRATE QUANTITATIVE, RAPID   CARBAMAZEPINE TOTAL   CBC WITH PLATELETS AND DIFFERENTIAL   COVID-19 VIRUS (CORONAVIRUS) BY PCR       RADIOLOGY:  Head CT w/o contrast    (Results Pending)                ECG:  NSR, rate 68, normal intervals, no acute ischemia      I have independently reviewed and interpreted the EKG(s) documented above     PROCEDURES:  Procedures:  none      FINAL IMPRESSION:    ICD-10-CM    1. Diabetes mellitus due to underlying condition with hyperglycemia, with long-term current use of insulin (H)  E08.65     Z79.4    2. Noncompliance with medication regimen  Z91.14        0 minutes of critical care time        Jeff Bee DO  Emergency Medicine  [facility] EMERGENCY DEPARTMENT  4/25/2022  1:40 PM          Jeff Bee MD  04/25/22 1640

## 2022-04-25 NOTE — DISCHARGE INSTRUCTIONS
Your blood sugar was elevated.  Recommend drinking at least 3 L of fluid a day and taking your insulin as prescribed.  This is contributing to your dizziness.  The head CT was normal.    Yeast was found in your urinalysis.  You were given Diflucan to treat any possible infection

## 2022-04-25 NOTE — ED TRIAGE NOTES
Patient arrives via EMS, due to nausea and high blood sugar 592. Patient didn't want to take insulin due to weight gain so they stopped taking insulin about 2 wks ago. Stopped taking blood sugars a month ago. Pt feeling nauseated for a couple of days now. Sinus Rhythm. High /117.Ambulatory. Alert and oriented x 4.

## 2022-04-25 NOTE — ED NOTES
Bed: JNEDP-03  Expected date: 4/25/22  Expected time: 1:14 PM  Means of arrival:   Comments:  Vomiting bs 592

## 2022-04-25 NOTE — ED NOTES
"Was reported that pt ambulated without assistance.  Pt did have to turn around due to \"head spinning\".  MD updated.  "

## 2022-04-25 NOTE — ED PROVIDER NOTES
ED progress note    Patient brought into the emergency department by ambulance for elevated blood sugar.  She reports some dizziness.  She has been noncompliant with her insulin.    Blood sugar found to be elevated.  She was given IV fluid.  Blood work does not show evidence of DKA.    CT of the head was normal.    Results were discussed with the patient.  She is not in DKA.  She still has some persistent dizziness however she was able to dismount the bed and is close enough to her baseline that she is requesting discharge home.    Laboratory called for reported Candida found in the urine specimen.  Patient was given a dose of fluconazole     Marshall Dorado MD  04/25/22 5810       Marshall Dorado MD  04/25/22 5540

## 2022-04-26 LAB
ATRIAL RATE - MUSE: 67 BPM
DIASTOLIC BLOOD PRESSURE - MUSE: 65 MMHG
INTERPRETATION ECG - MUSE: NORMAL
P AXIS - MUSE: 93 DEGREES
PR INTERVAL - MUSE: 170 MS
QRS DURATION - MUSE: 88 MS
QT - MUSE: 412 MS
QTC - MUSE: 435 MS
R AXIS - MUSE: 81 DEGREES
SYSTOLIC BLOOD PRESSURE - MUSE: 140 MMHG
T AXIS - MUSE: 78 DEGREES
VENTRICULAR RATE- MUSE: 67 BPM

## 2022-07-27 DIAGNOSIS — Z79.899 HIGH RISK MEDICATION USE: Primary | ICD-10-CM

## 2022-09-11 ENCOUNTER — HEALTH MAINTENANCE LETTER (OUTPATIENT)
Age: 67
End: 2022-09-11

## 2023-01-22 ENCOUNTER — HEALTH MAINTENANCE LETTER (OUTPATIENT)
Age: 68
End: 2023-01-22

## 2023-04-27 ENCOUNTER — LAB REQUISITION (OUTPATIENT)
Dept: LAB | Facility: CLINIC | Age: 68
End: 2023-04-27
Payer: COMMERCIAL

## 2023-04-27 DIAGNOSIS — B58.9 TOXOPLASMOSIS, UNSPECIFIED: ICD-10-CM

## 2023-04-27 DIAGNOSIS — E11.69 TYPE 2 DIABETES MELLITUS WITH OTHER SPECIFIED COMPLICATION (H): ICD-10-CM

## 2023-04-27 DIAGNOSIS — D64.9 ANEMIA, UNSPECIFIED: ICD-10-CM

## 2023-05-01 LAB
BASOPHILS # BLD AUTO: 0.1 10E3/UL (ref 0–0.2)
BASOPHILS NFR BLD AUTO: 1 %
CRP SERPL-MCNC: 5.55 MG/L
EOSINOPHIL # BLD AUTO: 0.4 10E3/UL (ref 0–0.7)
EOSINOPHIL NFR BLD AUTO: 4 %
ERYTHROCYTE [DISTWIDTH] IN BLOOD BY AUTOMATED COUNT: 13.3 % (ref 10–15)
HCT VFR BLD AUTO: 36.7 % (ref 35–47)
HGB BLD-MCNC: 10.5 G/DL (ref 11.7–15.7)
IMM GRANULOCYTES # BLD: 0 10E3/UL
IMM GRANULOCYTES NFR BLD: 0 %
LYMPHOCYTES # BLD AUTO: 2.7 10E3/UL (ref 0.8–5.3)
LYMPHOCYTES NFR BLD AUTO: 29 %
MCH RBC QN AUTO: 28.4 PG (ref 26.5–33)
MCHC RBC AUTO-ENTMCNC: 28.6 G/DL (ref 31.5–36.5)
MCV RBC AUTO: 99 FL (ref 78–100)
MONOCYTES # BLD AUTO: 0.6 10E3/UL (ref 0–1.3)
MONOCYTES NFR BLD AUTO: 6 %
NEUTROPHILS # BLD AUTO: 5.5 10E3/UL (ref 1.6–8.3)
NEUTROPHILS NFR BLD AUTO: 60 %
NRBC # BLD AUTO: 0 10E3/UL
NRBC BLD AUTO-RTO: 0 /100
PLATELET # BLD AUTO: 424 10E3/UL (ref 150–450)
RBC # BLD AUTO: 3.7 10E6/UL (ref 3.8–5.2)
WBC # BLD AUTO: 9.3 10E3/UL (ref 4–11)

## 2023-05-01 PROCEDURE — 85004 AUTOMATED DIFF WBC COUNT: CPT | Performed by: INTERNAL MEDICINE

## 2023-05-01 PROCEDURE — P9604 ONE-WAY ALLOW PRORATED TRIP: HCPCS | Performed by: INTERNAL MEDICINE

## 2023-05-01 PROCEDURE — 36415 COLL VENOUS BLD VENIPUNCTURE: CPT | Performed by: INTERNAL MEDICINE

## 2023-05-01 PROCEDURE — 86140 C-REACTIVE PROTEIN: CPT | Performed by: INTERNAL MEDICINE

## 2023-05-05 ENCOUNTER — LAB REQUISITION (OUTPATIENT)
Dept: LAB | Facility: CLINIC | Age: 68
End: 2023-05-05
Payer: COMMERCIAL

## 2023-05-05 ENCOUNTER — LAB REQUISITION (OUTPATIENT)
Dept: LAB | Facility: CLINIC | Age: 68
End: 2023-05-05

## 2023-05-05 DIAGNOSIS — E78.5 HYPERLIPIDEMIA, UNSPECIFIED: ICD-10-CM

## 2023-05-05 DIAGNOSIS — D64.9 ANEMIA, UNSPECIFIED: ICD-10-CM

## 2023-05-05 DIAGNOSIS — E11.65 TYPE 2 DIABETES MELLITUS WITH HYPERGLYCEMIA (H): ICD-10-CM

## 2023-05-05 DIAGNOSIS — B58.9 TOXOPLASMOSIS, UNSPECIFIED: ICD-10-CM

## 2023-05-05 LAB — C DIFF TOX B STL QL: NEGATIVE

## 2023-05-05 PROCEDURE — 87493 C DIFF AMPLIFIED PROBE: CPT | Performed by: INTERNAL MEDICINE

## 2023-05-08 LAB
BASOPHILS # BLD AUTO: 0 10E3/UL (ref 0–0.2)
BASOPHILS NFR BLD AUTO: 0 %
CRP SERPL-MCNC: 5.86 MG/L
EOSINOPHIL # BLD AUTO: 0.2 10E3/UL (ref 0–0.7)
EOSINOPHIL NFR BLD AUTO: 2 %
ERYTHROCYTE [DISTWIDTH] IN BLOOD BY AUTOMATED COUNT: 13 % (ref 10–15)
HCT VFR BLD AUTO: 30 % (ref 35–47)
HGB BLD-MCNC: 9.1 G/DL (ref 11.7–15.7)
IMM GRANULOCYTES # BLD: 0 10E3/UL
IMM GRANULOCYTES NFR BLD: 0 %
LYMPHOCYTES # BLD AUTO: 1.9 10E3/UL (ref 0.8–5.3)
LYMPHOCYTES NFR BLD AUTO: 24 %
MCH RBC QN AUTO: 28.3 PG (ref 26.5–33)
MCHC RBC AUTO-ENTMCNC: 30.3 G/DL (ref 31.5–36.5)
MCV RBC AUTO: 94 FL (ref 78–100)
MONOCYTES # BLD AUTO: 0.7 10E3/UL (ref 0–1.3)
MONOCYTES NFR BLD AUTO: 9 %
NEUTROPHILS # BLD AUTO: 5 10E3/UL (ref 1.6–8.3)
NEUTROPHILS NFR BLD AUTO: 65 %
NRBC # BLD AUTO: 0 10E3/UL
NRBC BLD AUTO-RTO: 0 /100
PLATELET # BLD AUTO: 298 10E3/UL (ref 150–450)
RBC # BLD AUTO: 3.21 10E6/UL (ref 3.8–5.2)
WBC # BLD AUTO: 7.8 10E3/UL (ref 4–11)

## 2023-05-08 PROCEDURE — 86140 C-REACTIVE PROTEIN: CPT | Performed by: INTERNAL MEDICINE

## 2023-05-08 PROCEDURE — 85025 COMPLETE CBC W/AUTO DIFF WBC: CPT | Performed by: INTERNAL MEDICINE

## 2023-05-08 PROCEDURE — P9604 ONE-WAY ALLOW PRORATED TRIP: HCPCS | Performed by: INTERNAL MEDICINE

## 2023-05-08 PROCEDURE — 36415 COLL VENOUS BLD VENIPUNCTURE: CPT | Performed by: INTERNAL MEDICINE

## 2023-05-09 ENCOUNTER — LAB REQUISITION (OUTPATIENT)
Dept: LAB | Facility: CLINIC | Age: 68
End: 2023-05-09
Payer: COMMERCIAL

## 2023-05-09 DIAGNOSIS — I10 ESSENTIAL (PRIMARY) HYPERTENSION: ICD-10-CM

## 2023-05-10 ENCOUNTER — LAB REQUISITION (OUTPATIENT)
Dept: LAB | Facility: CLINIC | Age: 68
End: 2023-05-10
Payer: COMMERCIAL

## 2023-05-10 DIAGNOSIS — I10 ESSENTIAL (PRIMARY) HYPERTENSION: ICD-10-CM

## 2023-05-10 LAB
ANION GAP SERPL CALCULATED.3IONS-SCNC: 19 MMOL/L (ref 7–15)
BUN SERPL-MCNC: 13.8 MG/DL (ref 8–23)
CALCIUM SERPL-MCNC: 8.8 MG/DL (ref 8.8–10.2)
CHLORIDE SERPL-SCNC: 93 MMOL/L (ref 98–107)
CREAT SERPL-MCNC: 0.83 MG/DL (ref 0.51–0.95)
DEPRECATED HCO3 PLAS-SCNC: 14 MMOL/L (ref 22–29)
GFR SERPL CREATININE-BSD FRML MDRD: 76 ML/MIN/1.73M2
GLUCOSE SERPL-MCNC: 172 MG/DL (ref 70–99)
POTASSIUM SERPL-SCNC: 6.2 MMOL/L (ref 3.4–5.3)
SODIUM SERPL-SCNC: 126 MMOL/L (ref 136–145)

## 2023-05-10 PROCEDURE — 80048 BASIC METABOLIC PNL TOTAL CA: CPT | Performed by: INTERNAL MEDICINE

## 2023-05-10 PROCEDURE — 36415 COLL VENOUS BLD VENIPUNCTURE: CPT | Performed by: INTERNAL MEDICINE

## 2023-05-10 PROCEDURE — P9604 ONE-WAY ALLOW PRORATED TRIP: HCPCS | Performed by: INTERNAL MEDICINE

## 2023-05-11 LAB
HOLD SPECIMEN: NORMAL
POTASSIUM SERPL-SCNC: 4.3 MMOL/L (ref 3.4–5.3)

## 2023-05-11 PROCEDURE — 84295 ASSAY OF SERUM SODIUM: CPT | Performed by: INTERNAL MEDICINE

## 2023-05-11 PROCEDURE — 84132 ASSAY OF SERUM POTASSIUM: CPT | Performed by: INTERNAL MEDICINE

## 2023-05-11 PROCEDURE — 36415 COLL VENOUS BLD VENIPUNCTURE: CPT | Performed by: INTERNAL MEDICINE

## 2023-05-11 PROCEDURE — P9604 ONE-WAY ALLOW PRORATED TRIP: HCPCS | Performed by: INTERNAL MEDICINE

## 2023-05-12 ENCOUNTER — LAB REQUISITION (OUTPATIENT)
Dept: LAB | Facility: CLINIC | Age: 68
End: 2023-05-12
Payer: COMMERCIAL

## 2023-05-12 DIAGNOSIS — D64.9 ANEMIA, UNSPECIFIED: ICD-10-CM

## 2023-05-12 DIAGNOSIS — E11.65 TYPE 2 DIABETES MELLITUS WITH HYPERGLYCEMIA (H): ICD-10-CM

## 2023-05-12 LAB — SODIUM SERPL-SCNC: 140 MMOL/L (ref 136–145)

## 2023-05-13 ENCOUNTER — LAB REQUISITION (OUTPATIENT)
Dept: LAB | Facility: CLINIC | Age: 68
End: 2023-05-13
Payer: COMMERCIAL

## 2023-05-13 DIAGNOSIS — D64.9 ANEMIA, UNSPECIFIED: ICD-10-CM

## 2023-05-13 DIAGNOSIS — E87.1 HYPO-OSMOLALITY AND HYPONATREMIA: ICD-10-CM

## 2023-05-15 LAB
ANION GAP SERPL CALCULATED.3IONS-SCNC: 14 MMOL/L (ref 7–15)
BUN SERPL-MCNC: 11.7 MG/DL (ref 8–23)
CALCIUM SERPL-MCNC: 9.1 MG/DL (ref 8.8–10.2)
CHLORIDE SERPL-SCNC: 94 MMOL/L (ref 98–107)
CREAT SERPL-MCNC: 0.85 MG/DL (ref 0.51–0.95)
CRP SERPL-MCNC: <3 MG/L
DEPRECATED HCO3 PLAS-SCNC: 24 MMOL/L (ref 22–29)
ERYTHROCYTE [DISTWIDTH] IN BLOOD BY AUTOMATED COUNT: 13 % (ref 10–15)
GFR SERPL CREATININE-BSD FRML MDRD: 74 ML/MIN/1.73M2
GLUCOSE SERPL-MCNC: 84 MG/DL (ref 70–99)
HCT VFR BLD AUTO: 31.5 % (ref 35–47)
HGB BLD-MCNC: 9.9 G/DL (ref 11.7–15.7)
MCH RBC QN AUTO: 28.4 PG (ref 26.5–33)
MCHC RBC AUTO-ENTMCNC: 31.4 G/DL (ref 31.5–36.5)
MCV RBC AUTO: 90 FL (ref 78–100)
PLATELET # BLD AUTO: 335 10E3/UL (ref 150–450)
POTASSIUM SERPL-SCNC: 5.1 MMOL/L (ref 3.4–5.3)
RBC # BLD AUTO: 3.49 10E6/UL (ref 3.8–5.2)
SODIUM SERPL-SCNC: 132 MMOL/L (ref 136–145)
WBC # BLD AUTO: 5.3 10E3/UL (ref 4–11)

## 2023-05-15 PROCEDURE — 86140 C-REACTIVE PROTEIN: CPT | Performed by: NURSE PRACTITIONER

## 2023-05-15 PROCEDURE — P9604 ONE-WAY ALLOW PRORATED TRIP: HCPCS | Performed by: NURSE PRACTITIONER

## 2023-05-15 PROCEDURE — 85027 COMPLETE CBC AUTOMATED: CPT | Performed by: NURSE PRACTITIONER

## 2023-05-15 PROCEDURE — 36415 COLL VENOUS BLD VENIPUNCTURE: CPT | Performed by: NURSE PRACTITIONER

## 2023-05-15 PROCEDURE — 82310 ASSAY OF CALCIUM: CPT | Performed by: NURSE PRACTITIONER

## 2023-05-19 ENCOUNTER — LAB REQUISITION (OUTPATIENT)
Dept: LAB | Facility: CLINIC | Age: 68
End: 2023-05-19
Payer: COMMERCIAL

## 2023-05-19 DIAGNOSIS — E11.65 TYPE 2 DIABETES MELLITUS WITH HYPERGLYCEMIA (H): ICD-10-CM

## 2023-05-19 DIAGNOSIS — E87.1 HYPO-OSMOLALITY AND HYPONATREMIA: ICD-10-CM

## 2023-05-19 DIAGNOSIS — E78.5 HYPERLIPIDEMIA, UNSPECIFIED: ICD-10-CM

## 2023-05-19 DIAGNOSIS — D64.9 ANEMIA, UNSPECIFIED: ICD-10-CM

## 2023-05-19 DIAGNOSIS — B58.9 TOXOPLASMOSIS, UNSPECIFIED: ICD-10-CM

## 2023-05-22 LAB
ANION GAP SERPL CALCULATED.3IONS-SCNC: 12 MMOL/L (ref 7–15)
BUN SERPL-MCNC: 19.7 MG/DL (ref 8–23)
CALCIUM SERPL-MCNC: 9.2 MG/DL (ref 8.8–10.2)
CHLORIDE SERPL-SCNC: 96 MMOL/L (ref 98–107)
CREAT SERPL-MCNC: 0.9 MG/DL (ref 0.51–0.95)
DEPRECATED HCO3 PLAS-SCNC: 24 MMOL/L (ref 22–29)
ERYTHROCYTE [DISTWIDTH] IN BLOOD BY AUTOMATED COUNT: 12.8 % (ref 10–15)
GFR SERPL CREATININE-BSD FRML MDRD: 69 ML/MIN/1.73M2
GLUCOSE SERPL-MCNC: 133 MG/DL (ref 70–99)
HCT VFR BLD AUTO: 33.4 % (ref 35–47)
HGB BLD-MCNC: 10.3 G/DL (ref 11.7–15.7)
MCH RBC QN AUTO: 28 PG (ref 26.5–33)
MCHC RBC AUTO-ENTMCNC: 30.8 G/DL (ref 31.5–36.5)
MCV RBC AUTO: 91 FL (ref 78–100)
PLATELET # BLD AUTO: 325 10E3/UL (ref 150–450)
POTASSIUM SERPL-SCNC: 5.3 MMOL/L (ref 3.4–5.3)
RBC # BLD AUTO: 3.68 10E6/UL (ref 3.8–5.2)
SODIUM SERPL-SCNC: 132 MMOL/L (ref 136–145)
WBC # BLD AUTO: 7 10E3/UL (ref 4–11)

## 2023-05-22 PROCEDURE — 80048 BASIC METABOLIC PNL TOTAL CA: CPT | Performed by: INTERNAL MEDICINE

## 2023-05-22 PROCEDURE — 85027 COMPLETE CBC AUTOMATED: CPT | Performed by: INTERNAL MEDICINE

## 2023-05-22 PROCEDURE — 36415 COLL VENOUS BLD VENIPUNCTURE: CPT | Performed by: INTERNAL MEDICINE

## 2023-05-22 PROCEDURE — P9604 ONE-WAY ALLOW PRORATED TRIP: HCPCS | Performed by: INTERNAL MEDICINE

## 2023-07-29 ENCOUNTER — HEALTH MAINTENANCE LETTER (OUTPATIENT)
Age: 68
End: 2023-07-29

## 2023-10-07 ENCOUNTER — HEALTH MAINTENANCE LETTER (OUTPATIENT)
Age: 68
End: 2023-10-07

## 2024-02-24 ENCOUNTER — HEALTH MAINTENANCE LETTER (OUTPATIENT)
Age: 69
End: 2024-02-24

## 2024-09-21 ENCOUNTER — HEALTH MAINTENANCE LETTER (OUTPATIENT)
Age: 69
End: 2024-09-21

## 2024-10-09 ENCOUNTER — LAB REQUISITION (OUTPATIENT)
Dept: LAB | Facility: CLINIC | Age: 69
End: 2024-10-09
Payer: COMMERCIAL

## 2024-10-09 DIAGNOSIS — E11.9 TYPE 2 DIABETES MELLITUS WITHOUT COMPLICATIONS (H): ICD-10-CM

## 2024-10-09 DIAGNOSIS — I25.10 ATHEROSCLEROTIC HEART DISEASE OF NATIVE CORONARY ARTERY WITHOUT ANGINA PECTORIS: ICD-10-CM

## 2024-10-09 DIAGNOSIS — S06.5X0D TRAUMATIC SUBDURAL HEMORRHAGE WITHOUT LOSS OF CONSCIOUSNESS, SUBSEQUENT ENCOUNTER: ICD-10-CM

## 2024-10-09 DIAGNOSIS — I26.09 OTHER PULMONARY EMBOLISM WITH ACUTE COR PULMONALE (H): ICD-10-CM

## 2024-10-11 LAB
ANION GAP SERPL CALCULATED.3IONS-SCNC: 14 MMOL/L (ref 7–15)
BUN SERPL-MCNC: 19.5 MG/DL (ref 8–23)
CALCIUM SERPL-MCNC: 8.3 MG/DL (ref 8.8–10.4)
CHLORIDE SERPL-SCNC: 98 MMOL/L (ref 98–107)
CREAT SERPL-MCNC: 1.13 MG/DL (ref 0.51–0.95)
EGFRCR SERPLBLD CKD-EPI 2021: 52 ML/MIN/1.73M2
GLUCOSE SERPL-MCNC: 119 MG/DL (ref 70–99)
HCO3 SERPL-SCNC: 20 MMOL/L (ref 22–29)
POTASSIUM SERPL-SCNC: 4.3 MMOL/L (ref 3.4–5.3)
SODIUM SERPL-SCNC: 132 MMOL/L (ref 135–145)

## 2024-10-11 PROCEDURE — 36415 COLL VENOUS BLD VENIPUNCTURE: CPT | Mod: ORL | Performed by: INTERNAL MEDICINE

## 2024-10-11 PROCEDURE — 80048 BASIC METABOLIC PNL TOTAL CA: CPT | Mod: ORL | Performed by: INTERNAL MEDICINE

## 2024-10-11 PROCEDURE — P9604 ONE-WAY ALLOW PRORATED TRIP: HCPCS | Mod: ORL | Performed by: INTERNAL MEDICINE

## 2024-10-14 ENCOUNTER — LAB REQUISITION (OUTPATIENT)
Dept: LAB | Facility: CLINIC | Age: 69
End: 2024-10-14
Payer: COMMERCIAL

## 2024-10-14 DIAGNOSIS — E11.9 TYPE 2 DIABETES MELLITUS WITHOUT COMPLICATIONS (H): ICD-10-CM

## 2024-10-14 DIAGNOSIS — I26.09 OTHER PULMONARY EMBOLISM WITH ACUTE COR PULMONALE (H): ICD-10-CM

## 2024-10-14 DIAGNOSIS — S06.5X0D TRAUMATIC SUBDURAL HEMORRHAGE WITHOUT LOSS OF CONSCIOUSNESS, SUBSEQUENT ENCOUNTER: ICD-10-CM

## 2024-10-14 DIAGNOSIS — I25.10 ATHEROSCLEROTIC HEART DISEASE OF NATIVE CORONARY ARTERY WITHOUT ANGINA PECTORIS: ICD-10-CM

## 2024-10-14 DIAGNOSIS — E03.9 HYPOTHYROIDISM, UNSPECIFIED: ICD-10-CM

## 2024-10-15 LAB
ALBUMIN SERPL BCG-MCNC: 3.2 G/DL (ref 3.5–5.2)
ALP SERPL-CCNC: 105 U/L (ref 40–150)
ALT SERPL W P-5'-P-CCNC: 14 U/L (ref 0–50)
ANION GAP SERPL CALCULATED.3IONS-SCNC: 10 MMOL/L (ref 7–15)
AST SERPL W P-5'-P-CCNC: 17 U/L (ref 0–45)
BILIRUB DIRECT SERPL-MCNC: <0.2 MG/DL (ref 0–0.3)
BILIRUB SERPL-MCNC: <0.2 MG/DL
BUN SERPL-MCNC: 14.2 MG/DL (ref 8–23)
CALCIUM SERPL-MCNC: 9 MG/DL (ref 8.8–10.4)
CHLORIDE SERPL-SCNC: 104 MMOL/L (ref 98–107)
CREAT SERPL-MCNC: 0.84 MG/DL (ref 0.51–0.95)
EGFRCR SERPLBLD CKD-EPI 2021: 74 ML/MIN/1.73M2
ERYTHROCYTE [DISTWIDTH] IN BLOOD BY AUTOMATED COUNT: 12.3 % (ref 10–15)
GLUCOSE SERPL-MCNC: 118 MG/DL (ref 70–99)
HCO3 SERPL-SCNC: 23 MMOL/L (ref 22–29)
HCT VFR BLD AUTO: 30.1 % (ref 35–47)
HGB BLD-MCNC: 9.6 G/DL (ref 11.7–15.7)
MCH RBC QN AUTO: 29.5 PG (ref 26.5–33)
MCHC RBC AUTO-ENTMCNC: 31.9 G/DL (ref 31.5–36.5)
MCV RBC AUTO: 93 FL (ref 78–100)
PLATELET # BLD AUTO: 534 10E3/UL (ref 150–450)
POTASSIUM SERPL-SCNC: 4.4 MMOL/L (ref 3.4–5.3)
PROT SERPL-MCNC: 5.9 G/DL (ref 6.4–8.3)
RBC # BLD AUTO: 3.25 10E6/UL (ref 3.8–5.2)
SODIUM SERPL-SCNC: 137 MMOL/L (ref 135–145)
TSH SERPL DL<=0.005 MIU/L-ACNC: 2.54 UIU/ML (ref 0.3–4.2)
WBC # BLD AUTO: 10 10E3/UL (ref 4–11)

## 2024-10-15 PROCEDURE — 85027 COMPLETE CBC AUTOMATED: CPT | Mod: ORL | Performed by: INTERNAL MEDICINE

## 2024-10-15 PROCEDURE — 36415 COLL VENOUS BLD VENIPUNCTURE: CPT | Mod: ORL | Performed by: INTERNAL MEDICINE

## 2024-10-15 PROCEDURE — 80053 COMPREHEN METABOLIC PANEL: CPT | Mod: ORL | Performed by: INTERNAL MEDICINE

## 2024-10-15 PROCEDURE — 84443 ASSAY THYROID STIM HORMONE: CPT | Mod: ORL | Performed by: INTERNAL MEDICINE

## 2024-10-15 PROCEDURE — 82248 BILIRUBIN DIRECT: CPT | Mod: ORL | Performed by: INTERNAL MEDICINE

## 2024-10-15 PROCEDURE — P9604 ONE-WAY ALLOW PRORATED TRIP: HCPCS | Mod: ORL | Performed by: INTERNAL MEDICINE

## 2024-10-16 ENCOUNTER — LAB REQUISITION (OUTPATIENT)
Dept: LAB | Facility: CLINIC | Age: 69
End: 2024-10-16
Payer: COMMERCIAL

## 2024-10-16 DIAGNOSIS — R30.9 PAINFUL MICTURITION, UNSPECIFIED: ICD-10-CM

## 2024-10-16 LAB
ALBUMIN UR-MCNC: 50 MG/DL
APPEARANCE UR: ABNORMAL
BILIRUB UR QL STRIP: NEGATIVE
COLOR UR AUTO: YELLOW
GLUCOSE UR STRIP-MCNC: NEGATIVE MG/DL
HGB UR QL STRIP: ABNORMAL
HYALINE CASTS: 4 /LPF
KETONES UR STRIP-MCNC: NEGATIVE MG/DL
LEUKOCYTE ESTERASE UR QL STRIP: ABNORMAL
MUCOUS THREADS #/AREA URNS LPF: PRESENT /LPF
NITRATE UR QL: NEGATIVE
PH UR STRIP: 5.5 [PH] (ref 5–7)
RBC URINE: 14 /HPF
SP GR UR STRIP: 1.02 (ref 1–1.03)
SQUAMOUS EPITHELIAL: 2 /HPF
TSH SERPL DL<=0.005 MIU/L-ACNC: 2.47 UIU/ML (ref 0.3–4.2)
UROBILINOGEN UR STRIP-MCNC: NORMAL MG/DL
WBC CLUMPS #/AREA URNS HPF: PRESENT /HPF
WBC URINE: >182 /HPF

## 2024-10-16 PROCEDURE — 81001 URINALYSIS AUTO W/SCOPE: CPT | Mod: ORL | Performed by: INTERNAL MEDICINE

## 2024-10-16 PROCEDURE — 87186 SC STD MICRODIL/AGAR DIL: CPT | Mod: ORL | Performed by: INTERNAL MEDICINE

## 2024-10-16 PROCEDURE — 84443 ASSAY THYROID STIM HORMONE: CPT | Mod: ORL | Performed by: NURSE PRACTITIONER

## 2024-10-16 PROCEDURE — P9604 ONE-WAY ALLOW PRORATED TRIP: HCPCS | Mod: ORL | Performed by: NURSE PRACTITIONER

## 2024-10-16 PROCEDURE — 36415 COLL VENOUS BLD VENIPUNCTURE: CPT | Mod: ORL | Performed by: NURSE PRACTITIONER

## 2024-10-18 LAB
BACTERIA UR CULT: ABNORMAL
BACTERIA UR CULT: ABNORMAL

## 2024-11-05 ENCOUNTER — LAB REQUISITION (OUTPATIENT)
Dept: LAB | Facility: CLINIC | Age: 69
End: 2024-11-05
Payer: COMMERCIAL

## 2024-11-05 DIAGNOSIS — N30.90 CYSTITIS, UNSPECIFIED WITHOUT HEMATURIA: ICD-10-CM

## 2024-11-08 LAB
ANION GAP SERPL CALCULATED.3IONS-SCNC: 11 MMOL/L (ref 7–15)
BUN SERPL-MCNC: 33.8 MG/DL (ref 8–23)
CALCIUM SERPL-MCNC: 8.5 MG/DL (ref 8.8–10.4)
CHLORIDE SERPL-SCNC: 98 MMOL/L (ref 98–107)
CREAT SERPL-MCNC: 2.6 MG/DL (ref 0.51–0.95)
EGFRCR SERPLBLD CKD-EPI 2021: 19 ML/MIN/1.73M2
GLUCOSE SERPL-MCNC: 126 MG/DL (ref 70–99)
HCO3 SERPL-SCNC: 20 MMOL/L (ref 22–29)
POTASSIUM SERPL-SCNC: 4.6 MMOL/L (ref 3.4–5.3)
SODIUM SERPL-SCNC: 129 MMOL/L (ref 135–145)

## 2024-11-08 PROCEDURE — 80048 BASIC METABOLIC PNL TOTAL CA: CPT | Mod: ORL | Performed by: INTERNAL MEDICINE

## 2024-11-08 PROCEDURE — 36415 COLL VENOUS BLD VENIPUNCTURE: CPT | Mod: ORL | Performed by: INTERNAL MEDICINE

## 2024-11-08 PROCEDURE — P9604 ONE-WAY ALLOW PRORATED TRIP: HCPCS | Mod: ORL | Performed by: INTERNAL MEDICINE

## 2024-11-11 ENCOUNTER — LAB REQUISITION (OUTPATIENT)
Dept: LAB | Facility: CLINIC | Age: 69
End: 2024-11-11
Payer: COMMERCIAL

## 2024-11-11 DIAGNOSIS — E83.42 HYPOMAGNESEMIA: ICD-10-CM

## 2024-11-11 DIAGNOSIS — E87.1 HYPO-OSMOLALITY AND HYPONATREMIA: ICD-10-CM

## 2024-11-11 DIAGNOSIS — E88.09 OTHER DISORDERS OF PLASMA-PROTEIN METABOLISM, NOT ELSEWHERE CLASSIFIED: ICD-10-CM

## 2024-11-11 DIAGNOSIS — D64.9 ANEMIA, UNSPECIFIED: ICD-10-CM

## 2024-11-20 ENCOUNTER — LAB REQUISITION (OUTPATIENT)
Dept: LAB | Facility: CLINIC | Age: 69
End: 2024-11-20
Payer: COMMERCIAL

## 2024-11-20 DIAGNOSIS — R62.7 ADULT FAILURE TO THRIVE: ICD-10-CM

## 2024-11-21 ENCOUNTER — LAB REQUISITION (OUTPATIENT)
Dept: LAB | Facility: CLINIC | Age: 69
End: 2024-11-21
Payer: COMMERCIAL

## 2024-11-21 DIAGNOSIS — I10 ESSENTIAL (PRIMARY) HYPERTENSION: ICD-10-CM

## 2024-11-21 LAB
ANION GAP SERPL CALCULATED.3IONS-SCNC: 13 MMOL/L (ref 7–15)
BUN SERPL-MCNC: 36.9 MG/DL (ref 8–23)
CALCIUM SERPL-MCNC: 8.3 MG/DL (ref 8.8–10.4)
CHLORIDE SERPL-SCNC: 102 MMOL/L (ref 98–107)
CREAT SERPL-MCNC: 2.75 MG/DL (ref 0.51–0.95)
EGFRCR SERPLBLD CKD-EPI 2021: 18 ML/MIN/1.73M2
ERYTHROCYTE [DISTWIDTH] IN BLOOD BY AUTOMATED COUNT: 15.2 % (ref 10–15)
GLUCOSE SERPL-MCNC: 160 MG/DL (ref 70–99)
HCO3 SERPL-SCNC: 15 MMOL/L (ref 22–29)
HCT VFR BLD AUTO: 20.6 % (ref 35–47)
HGB BLD-MCNC: 6.6 G/DL (ref 11.7–15.7)
MCH RBC QN AUTO: 29.1 PG (ref 26.5–33)
MCHC RBC AUTO-ENTMCNC: 32 G/DL (ref 31.5–36.5)
MCV RBC AUTO: 91 FL (ref 78–100)
PLATELET # BLD AUTO: 501 10E3/UL (ref 150–450)
POTASSIUM SERPL-SCNC: 4.2 MMOL/L (ref 3.4–5.3)
RBC # BLD AUTO: 2.27 10E6/UL (ref 3.8–5.2)
SODIUM SERPL-SCNC: 130 MMOL/L (ref 135–145)
VIT D+METAB SERPL-MCNC: 15 NG/ML (ref 20–50)
WBC # BLD AUTO: 7.9 10E3/UL (ref 4–11)

## 2024-11-21 PROCEDURE — 85048 AUTOMATED LEUKOCYTE COUNT: CPT | Performed by: FAMILY MEDICINE

## 2024-11-21 PROCEDURE — 82306 VITAMIN D 25 HYDROXY: CPT | Performed by: FAMILY MEDICINE

## 2024-11-21 PROCEDURE — 82310 ASSAY OF CALCIUM: CPT | Performed by: FAMILY MEDICINE

## 2024-11-21 PROCEDURE — 80048 BASIC METABOLIC PNL TOTAL CA: CPT | Performed by: FAMILY MEDICINE

## 2024-11-21 PROCEDURE — 85027 COMPLETE CBC AUTOMATED: CPT | Performed by: FAMILY MEDICINE

## 2024-11-21 PROCEDURE — P9604 ONE-WAY ALLOW PRORATED TRIP: HCPCS | Performed by: FAMILY MEDICINE

## 2024-11-21 PROCEDURE — 36415 COLL VENOUS BLD VENIPUNCTURE: CPT | Performed by: FAMILY MEDICINE

## 2024-11-22 ENCOUNTER — LAB REQUISITION (OUTPATIENT)
Dept: LAB | Facility: CLINIC | Age: 69
End: 2024-11-22
Payer: COMMERCIAL

## 2024-11-22 DIAGNOSIS — D64.9 ANEMIA, UNSPECIFIED: ICD-10-CM

## 2024-11-24 ENCOUNTER — HEALTH MAINTENANCE LETTER (OUTPATIENT)
Age: 69
End: 2024-11-24

## 2024-11-25 LAB
ANION GAP SERPL CALCULATED.3IONS-SCNC: 13 MMOL/L (ref 7–15)
BUN SERPL-MCNC: 37.1 MG/DL (ref 8–23)
CALCIUM SERPL-MCNC: 8.6 MG/DL (ref 8.8–10.4)
CHLORIDE SERPL-SCNC: 105 MMOL/L (ref 98–107)
CREAT SERPL-MCNC: 2.41 MG/DL (ref 0.51–0.95)
EGFRCR SERPLBLD CKD-EPI 2021: 21 ML/MIN/1.73M2
GLUCOSE SERPL-MCNC: 127 MG/DL (ref 70–99)
HCO3 SERPL-SCNC: 15 MMOL/L (ref 22–29)
HGB BLD-MCNC: 8.7 G/DL (ref 11.7–15.7)
POTASSIUM SERPL-SCNC: 4.4 MMOL/L (ref 3.4–5.3)
SODIUM SERPL-SCNC: 133 MMOL/L (ref 135–145)

## 2024-11-25 PROCEDURE — 36415 COLL VENOUS BLD VENIPUNCTURE: CPT | Performed by: NURSE PRACTITIONER

## 2024-11-25 PROCEDURE — 85018 HEMOGLOBIN: CPT | Performed by: NURSE PRACTITIONER

## 2024-11-25 PROCEDURE — P9604 ONE-WAY ALLOW PRORATED TRIP: HCPCS | Performed by: NURSE PRACTITIONER

## 2024-11-25 PROCEDURE — 80048 BASIC METABOLIC PNL TOTAL CA: CPT | Performed by: NURSE PRACTITIONER

## 2024-11-26 LAB
ANION GAP SERPL CALCULATED.3IONS-SCNC: 11 MMOL/L (ref 7–15)
BUN SERPL-MCNC: 37.7 MG/DL (ref 8–23)
CALCIUM SERPL-MCNC: 8.6 MG/DL (ref 8.8–10.4)
CHLORIDE SERPL-SCNC: 104 MMOL/L (ref 98–107)
CREAT SERPL-MCNC: 2.48 MG/DL (ref 0.51–0.95)
EGFRCR SERPLBLD CKD-EPI 2021: 20 ML/MIN/1.73M2
GLUCOSE SERPL-MCNC: 110 MG/DL (ref 70–99)
HCO3 SERPL-SCNC: 16 MMOL/L (ref 22–29)
POTASSIUM SERPL-SCNC: 4.6 MMOL/L (ref 3.4–5.3)
SODIUM SERPL-SCNC: 131 MMOL/L (ref 135–145)

## 2024-11-26 PROCEDURE — P9604 ONE-WAY ALLOW PRORATED TRIP: HCPCS | Performed by: FAMILY MEDICINE

## 2024-11-26 PROCEDURE — 36415 COLL VENOUS BLD VENIPUNCTURE: CPT | Performed by: FAMILY MEDICINE

## 2024-11-26 PROCEDURE — 80048 BASIC METABOLIC PNL TOTAL CA: CPT | Performed by: FAMILY MEDICINE

## 2024-11-27 ENCOUNTER — LAB REQUISITION (OUTPATIENT)
Dept: LAB | Facility: CLINIC | Age: 69
End: 2024-11-27

## 2024-11-27 ENCOUNTER — LAB REQUISITION (OUTPATIENT)
Dept: LAB | Facility: CLINIC | Age: 69
End: 2024-11-27
Payer: COMMERCIAL

## 2024-11-27 DIAGNOSIS — R32 UNSPECIFIED URINARY INCONTINENCE: ICD-10-CM

## 2024-11-27 DIAGNOSIS — D64.9 ANEMIA, UNSPECIFIED: ICD-10-CM

## 2024-11-27 LAB
ALBUMIN UR-MCNC: NEGATIVE MG/DL
APPEARANCE UR: ABNORMAL
BILIRUB UR QL STRIP: NEGATIVE
COLOR UR AUTO: ABNORMAL
GLUCOSE UR STRIP-MCNC: NEGATIVE MG/DL
HGB UR QL STRIP: NEGATIVE
HYALINE CASTS: 2 /LPF
KETONES UR STRIP-MCNC: NEGATIVE MG/DL
LEUKOCYTE ESTERASE UR QL STRIP: ABNORMAL
MUCOUS THREADS #/AREA URNS LPF: PRESENT /LPF
NITRATE UR QL: NEGATIVE
PH UR STRIP: 5.5 [PH] (ref 5–7)
RBC URINE: 1 /HPF
SP GR UR STRIP: 1.01 (ref 1–1.03)
SQUAMOUS EPITHELIAL: 3 /HPF
UROBILINOGEN UR STRIP-MCNC: NORMAL MG/DL
WBC CLUMPS #/AREA URNS HPF: PRESENT /HPF
WBC URINE: 71 /HPF

## 2024-11-27 PROCEDURE — 81001 URINALYSIS AUTO W/SCOPE: CPT | Performed by: FAMILY MEDICINE

## 2024-11-27 PROCEDURE — 81015 MICROSCOPIC EXAM OF URINE: CPT | Performed by: FAMILY MEDICINE

## 2024-11-28 LAB — BACTERIA UR CULT: NORMAL

## 2024-11-29 ENCOUNTER — LAB REQUISITION (OUTPATIENT)
Dept: LAB | Facility: CLINIC | Age: 69
End: 2024-11-29
Payer: COMMERCIAL

## 2024-11-29 DIAGNOSIS — I10 ESSENTIAL (PRIMARY) HYPERTENSION: ICD-10-CM

## 2024-11-29 DIAGNOSIS — E11.9 TYPE 2 DIABETES MELLITUS WITHOUT COMPLICATIONS (H): ICD-10-CM

## 2024-12-01 LAB
BACTERIA UR CULT: ABNORMAL
BACTERIA UR CULT: ABNORMAL

## 2024-12-02 LAB
ERYTHROCYTE [DISTWIDTH] IN BLOOD BY AUTOMATED COUNT: 14.6 % (ref 10–15)
HCT VFR BLD AUTO: 26 % (ref 35–47)
HGB BLD-MCNC: 8.1 G/DL (ref 11.7–15.7)
MCH RBC QN AUTO: 28.9 PG (ref 26.5–33)
MCHC RBC AUTO-ENTMCNC: 31.2 G/DL (ref 31.5–36.5)
MCV RBC AUTO: 93 FL (ref 78–100)
PLATELET # BLD AUTO: 462 10E3/UL (ref 150–450)
RBC # BLD AUTO: 2.8 10E6/UL (ref 3.8–5.2)
WBC # BLD AUTO: 11.9 10E3/UL (ref 4–11)

## 2024-12-02 PROCEDURE — P9604 ONE-WAY ALLOW PRORATED TRIP: HCPCS | Performed by: FAMILY MEDICINE

## 2024-12-02 PROCEDURE — 85018 HEMOGLOBIN: CPT | Performed by: FAMILY MEDICINE

## 2024-12-02 PROCEDURE — 36415 COLL VENOUS BLD VENIPUNCTURE: CPT | Performed by: FAMILY MEDICINE

## 2024-12-02 PROCEDURE — 85041 AUTOMATED RBC COUNT: CPT | Performed by: FAMILY MEDICINE

## 2024-12-03 LAB
ANION GAP SERPL CALCULATED.3IONS-SCNC: 11 MMOL/L (ref 7–15)
BUN SERPL-MCNC: 43.7 MG/DL (ref 8–23)
CALCIUM SERPL-MCNC: 8.4 MG/DL (ref 8.8–10.4)
CHLORIDE SERPL-SCNC: 104 MMOL/L (ref 98–107)
CREAT SERPL-MCNC: 2.62 MG/DL (ref 0.51–0.95)
EGFRCR SERPLBLD CKD-EPI 2021: 19 ML/MIN/1.73M2
GLUCOSE SERPL-MCNC: 119 MG/DL (ref 70–99)
HCO3 SERPL-SCNC: 16 MMOL/L (ref 22–29)
POTASSIUM SERPL-SCNC: 4.5 MMOL/L (ref 3.4–5.3)
SODIUM SERPL-SCNC: 131 MMOL/L (ref 135–145)

## 2024-12-03 PROCEDURE — 80048 BASIC METABOLIC PNL TOTAL CA: CPT | Performed by: FAMILY MEDICINE

## 2024-12-03 PROCEDURE — P9604 ONE-WAY ALLOW PRORATED TRIP: HCPCS | Performed by: FAMILY MEDICINE

## 2024-12-03 PROCEDURE — 36415 COLL VENOUS BLD VENIPUNCTURE: CPT | Performed by: FAMILY MEDICINE

## 2024-12-03 PROCEDURE — 82565 ASSAY OF CREATININE: CPT | Performed by: FAMILY MEDICINE

## 2024-12-06 ENCOUNTER — LAB REQUISITION (OUTPATIENT)
Dept: LAB | Facility: CLINIC | Age: 69
End: 2024-12-06
Payer: COMMERCIAL

## 2024-12-06 DIAGNOSIS — D64.9 ANEMIA, UNSPECIFIED: ICD-10-CM

## 2024-12-06 DIAGNOSIS — N18.9 CHRONIC KIDNEY DISEASE, UNSPECIFIED: ICD-10-CM

## 2024-12-09 ENCOUNTER — LAB REQUISITION (OUTPATIENT)
Dept: LAB | Facility: CLINIC | Age: 69
End: 2024-12-09
Payer: COMMERCIAL

## 2024-12-09 DIAGNOSIS — D64.9 ANEMIA, UNSPECIFIED: ICD-10-CM

## 2024-12-09 LAB
ANION GAP SERPL CALCULATED.3IONS-SCNC: 12 MMOL/L (ref 7–15)
BUN SERPL-MCNC: 34.1 MG/DL (ref 8–23)
CALCIUM SERPL-MCNC: 8.5 MG/DL (ref 8.8–10.4)
CHLORIDE SERPL-SCNC: 103 MMOL/L (ref 98–107)
CREAT SERPL-MCNC: 2.51 MG/DL (ref 0.51–0.95)
EGFRCR SERPLBLD CKD-EPI 2021: 20 ML/MIN/1.73M2
GLUCOSE SERPL-MCNC: 151 MG/DL (ref 70–99)
HCO3 SERPL-SCNC: 17 MMOL/L (ref 22–29)
HGB BLD-MCNC: 7.3 G/DL (ref 11.7–15.7)
POTASSIUM SERPL-SCNC: 4.4 MMOL/L (ref 3.4–5.3)
SODIUM SERPL-SCNC: 132 MMOL/L (ref 135–145)

## 2024-12-09 PROCEDURE — P9604 ONE-WAY ALLOW PRORATED TRIP: HCPCS | Performed by: NURSE PRACTITIONER

## 2024-12-09 PROCEDURE — 82374 ASSAY BLOOD CARBON DIOXIDE: CPT | Performed by: NURSE PRACTITIONER

## 2024-12-09 PROCEDURE — 82310 ASSAY OF CALCIUM: CPT | Performed by: NURSE PRACTITIONER

## 2024-12-09 PROCEDURE — 36415 COLL VENOUS BLD VENIPUNCTURE: CPT | Performed by: NURSE PRACTITIONER

## 2024-12-09 PROCEDURE — 80048 BASIC METABOLIC PNL TOTAL CA: CPT | Performed by: NURSE PRACTITIONER

## 2024-12-09 PROCEDURE — 85018 HEMOGLOBIN: CPT | Performed by: NURSE PRACTITIONER

## 2024-12-10 LAB
ERYTHROCYTE [DISTWIDTH] IN BLOOD BY AUTOMATED COUNT: 14.6 % (ref 10–15)
HCT VFR BLD AUTO: 22.6 % (ref 35–47)
HGB BLD-MCNC: 7.3 G/DL (ref 11.7–15.7)
MCH RBC QN AUTO: 29.4 PG (ref 26.5–33)
MCHC RBC AUTO-ENTMCNC: 32.3 G/DL (ref 31.5–36.5)
MCV RBC AUTO: 91 FL (ref 78–100)
PLATELET # BLD AUTO: 412 10E3/UL (ref 150–450)
RBC # BLD AUTO: 2.48 10E6/UL (ref 3.8–5.2)
WBC # BLD AUTO: 9.8 10E3/UL (ref 4–11)

## 2024-12-10 PROCEDURE — 85049 AUTOMATED PLATELET COUNT: CPT | Performed by: FAMILY MEDICINE

## 2024-12-10 PROCEDURE — 85027 COMPLETE CBC AUTOMATED: CPT | Performed by: FAMILY MEDICINE

## 2024-12-10 PROCEDURE — 36415 COLL VENOUS BLD VENIPUNCTURE: CPT | Performed by: FAMILY MEDICINE

## 2024-12-10 PROCEDURE — P9604 ONE-WAY ALLOW PRORATED TRIP: HCPCS | Performed by: FAMILY MEDICINE

## 2024-12-10 PROCEDURE — 85018 HEMOGLOBIN: CPT | Performed by: FAMILY MEDICINE

## 2024-12-11 ENCOUNTER — LAB REQUISITION (OUTPATIENT)
Dept: LAB | Facility: CLINIC | Age: 69
End: 2024-12-11
Payer: COMMERCIAL

## 2024-12-11 DIAGNOSIS — D64.9 ANEMIA, UNSPECIFIED: ICD-10-CM

## 2024-12-11 DIAGNOSIS — N17.9 ACUTE KIDNEY FAILURE, UNSPECIFIED (H): ICD-10-CM

## 2024-12-12 LAB
ANION GAP SERPL CALCULATED.3IONS-SCNC: 13 MMOL/L (ref 7–15)
BUN SERPL-MCNC: 31.8 MG/DL (ref 8–23)
CALCIUM SERPL-MCNC: 8.5 MG/DL (ref 8.8–10.4)
CHLORIDE SERPL-SCNC: 100 MMOL/L (ref 98–107)
CREAT SERPL-MCNC: 2.8 MG/DL (ref 0.51–0.95)
EGFRCR SERPLBLD CKD-EPI 2021: 18 ML/MIN/1.73M2
GLUCOSE SERPL-MCNC: 134 MG/DL (ref 70–99)
HCO3 SERPL-SCNC: 16 MMOL/L (ref 22–29)
HGB BLD-MCNC: 7.8 G/DL (ref 11.7–15.7)
POTASSIUM SERPL-SCNC: 4.7 MMOL/L (ref 3.4–5.3)
SODIUM SERPL-SCNC: 129 MMOL/L (ref 135–145)

## 2024-12-12 PROCEDURE — 85018 HEMOGLOBIN: CPT | Performed by: FAMILY MEDICINE

## 2024-12-12 PROCEDURE — P9604 ONE-WAY ALLOW PRORATED TRIP: HCPCS | Performed by: FAMILY MEDICINE

## 2024-12-12 PROCEDURE — 80048 BASIC METABOLIC PNL TOTAL CA: CPT | Performed by: FAMILY MEDICINE

## 2024-12-12 PROCEDURE — 82310 ASSAY OF CALCIUM: CPT | Performed by: FAMILY MEDICINE

## 2024-12-12 PROCEDURE — 82374 ASSAY BLOOD CARBON DIOXIDE: CPT | Performed by: FAMILY MEDICINE

## 2024-12-12 PROCEDURE — 36415 COLL VENOUS BLD VENIPUNCTURE: CPT | Performed by: FAMILY MEDICINE

## 2024-12-13 ENCOUNTER — LAB REQUISITION (OUTPATIENT)
Dept: LAB | Facility: CLINIC | Age: 69
End: 2024-12-13
Payer: COMMERCIAL

## 2024-12-13 DIAGNOSIS — N17.9 ACUTE KIDNEY FAILURE, UNSPECIFIED (H): ICD-10-CM

## 2024-12-13 DIAGNOSIS — D64.9 ANEMIA, UNSPECIFIED: ICD-10-CM

## 2024-12-13 DIAGNOSIS — E87.1 HYPO-OSMOLALITY AND HYPONATREMIA: ICD-10-CM

## 2024-12-16 LAB
ANION GAP SERPL CALCULATED.3IONS-SCNC: 12 MMOL/L (ref 7–15)
BUN SERPL-MCNC: 35.2 MG/DL (ref 8–23)
CALCIUM SERPL-MCNC: 8.5 MG/DL (ref 8.8–10.4)
CHLORIDE SERPL-SCNC: 102 MMOL/L (ref 98–107)
CREAT SERPL-MCNC: 3.22 MG/DL (ref 0.51–0.95)
EGFRCR SERPLBLD CKD-EPI 2021: 15 ML/MIN/1.73M2
ERYTHROCYTE [DISTWIDTH] IN BLOOD BY AUTOMATED COUNT: 14.4 % (ref 10–15)
GLUCOSE SERPL-MCNC: 130 MG/DL (ref 70–99)
HCO3 SERPL-SCNC: 17 MMOL/L (ref 22–29)
HCT VFR BLD AUTO: 24.4 % (ref 35–47)
HGB BLD-MCNC: 8 G/DL (ref 11.7–15.7)
MCH RBC QN AUTO: 29.6 PG (ref 26.5–33)
MCHC RBC AUTO-ENTMCNC: 32.8 G/DL (ref 31.5–36.5)
MCV RBC AUTO: 90 FL (ref 78–100)
PLATELET # BLD AUTO: 527 10E3/UL (ref 150–450)
POTASSIUM SERPL-SCNC: 4.3 MMOL/L (ref 3.4–5.3)
RBC # BLD AUTO: 2.7 10E6/UL (ref 3.8–5.2)
SODIUM SERPL-SCNC: 131 MMOL/L (ref 135–145)
WBC # BLD AUTO: 10.1 10E3/UL (ref 4–11)

## 2024-12-16 PROCEDURE — 82310 ASSAY OF CALCIUM: CPT | Performed by: FAMILY MEDICINE

## 2024-12-16 PROCEDURE — 36415 COLL VENOUS BLD VENIPUNCTURE: CPT | Performed by: FAMILY MEDICINE

## 2024-12-16 PROCEDURE — P9604 ONE-WAY ALLOW PRORATED TRIP: HCPCS | Performed by: FAMILY MEDICINE

## 2024-12-16 PROCEDURE — 80048 BASIC METABOLIC PNL TOTAL CA: CPT | Performed by: FAMILY MEDICINE

## 2024-12-16 PROCEDURE — 85014 HEMATOCRIT: CPT | Performed by: FAMILY MEDICINE

## 2024-12-17 ENCOUNTER — LAB REQUISITION (OUTPATIENT)
Dept: LAB | Facility: CLINIC | Age: 69
End: 2024-12-17
Payer: COMMERCIAL

## 2024-12-17 DIAGNOSIS — N17.9 ACUTE KIDNEY FAILURE, UNSPECIFIED (H): ICD-10-CM

## 2024-12-20 ENCOUNTER — LAB REQUISITION (OUTPATIENT)
Dept: LAB | Facility: CLINIC | Age: 69
End: 2024-12-20
Payer: COMMERCIAL

## 2024-12-20 DIAGNOSIS — N17.9 ACUTE KIDNEY FAILURE, UNSPECIFIED (H): ICD-10-CM

## 2024-12-27 ENCOUNTER — LAB REQUISITION (OUTPATIENT)
Dept: LAB | Facility: CLINIC | Age: 69
End: 2024-12-27
Payer: COMMERCIAL

## 2024-12-27 DIAGNOSIS — M17.9 OSTEOARTHRITIS OF KNEE, UNSPECIFIED: ICD-10-CM

## 2025-01-07 ENCOUNTER — LAB REQUISITION (OUTPATIENT)
Dept: LAB | Facility: CLINIC | Age: 70
End: 2025-01-07
Payer: COMMERCIAL

## 2025-01-07 DIAGNOSIS — N17.9 ACUTE KIDNEY FAILURE, UNSPECIFIED: ICD-10-CM

## 2025-01-09 ENCOUNTER — LAB REQUISITION (OUTPATIENT)
Dept: LAB | Facility: CLINIC | Age: 70
End: 2025-01-09
Payer: COMMERCIAL

## 2025-01-09 DIAGNOSIS — N18.4 CHRONIC KIDNEY DISEASE, STAGE 4 (SEVERE) (H): ICD-10-CM

## 2025-01-13 ENCOUNTER — LAB REQUISITION (OUTPATIENT)
Dept: LAB | Facility: CLINIC | Age: 70
End: 2025-01-13
Payer: COMMERCIAL

## 2025-01-13 ENCOUNTER — LAB REQUISITION (OUTPATIENT)
Dept: LAB | Facility: CLINIC | Age: 70
End: 2025-01-13

## 2025-01-13 DIAGNOSIS — D64.9 ANEMIA, UNSPECIFIED: ICD-10-CM

## 2025-01-13 DIAGNOSIS — N18.9 CHRONIC KIDNEY DISEASE, UNSPECIFIED: ICD-10-CM

## 2025-01-13 LAB
ALBUMIN UR-MCNC: 50 MG/DL
APPEARANCE UR: ABNORMAL
BILIRUB UR QL STRIP: NEGATIVE
COLOR UR AUTO: ABNORMAL
GLUCOSE UR STRIP-MCNC: 30 MG/DL
HGB UR QL STRIP: NEGATIVE
KETONES UR STRIP-MCNC: NEGATIVE MG/DL
LEUKOCYTE ESTERASE UR QL STRIP: ABNORMAL
NITRATE UR QL: NEGATIVE
PH UR STRIP: 7 [PH] (ref 5–7)
RBC URINE: 3 /HPF
SP GR UR STRIP: 1.01 (ref 1–1.03)
SQUAMOUS EPITHELIAL: 1 /HPF
UROBILINOGEN UR STRIP-MCNC: NORMAL MG/DL
WBC CLUMPS #/AREA URNS HPF: PRESENT /HPF
WBC URINE: >182 /HPF

## 2025-01-13 PROCEDURE — 81001 URINALYSIS AUTO W/SCOPE: CPT | Performed by: NURSE PRACTITIONER

## 2025-01-14 LAB
ANION GAP SERPL CALCULATED.3IONS-SCNC: 12 MMOL/L (ref 7–15)
BUN SERPL-MCNC: 33.6 MG/DL (ref 8–23)
CALCIUM SERPL-MCNC: 8.9 MG/DL (ref 8.8–10.4)
CHLORIDE SERPL-SCNC: 105 MMOL/L (ref 98–107)
CREAT SERPL-MCNC: 3.55 MG/DL (ref 0.51–0.95)
EGFRCR SERPLBLD CKD-EPI 2021: 13 ML/MIN/1.73M2
GLUCOSE SERPL-MCNC: 97 MG/DL (ref 70–99)
HCO3 SERPL-SCNC: 22 MMOL/L (ref 22–29)
HGB BLD-MCNC: 8.5 G/DL (ref 11.7–15.7)
POTASSIUM SERPL-SCNC: 3.8 MMOL/L (ref 3.4–5.3)
SODIUM SERPL-SCNC: 139 MMOL/L (ref 135–145)

## 2025-01-14 PROCEDURE — 36415 COLL VENOUS BLD VENIPUNCTURE: CPT | Performed by: FAMILY MEDICINE

## 2025-01-14 PROCEDURE — 82565 ASSAY OF CREATININE: CPT | Performed by: FAMILY MEDICINE

## 2025-01-14 PROCEDURE — 85018 HEMOGLOBIN: CPT | Performed by: FAMILY MEDICINE

## 2025-01-14 PROCEDURE — P9604 ONE-WAY ALLOW PRORATED TRIP: HCPCS | Performed by: FAMILY MEDICINE

## 2025-01-14 PROCEDURE — 80048 BASIC METABOLIC PNL TOTAL CA: CPT | Performed by: FAMILY MEDICINE

## 2025-01-15 ENCOUNTER — LAB REQUISITION (OUTPATIENT)
Dept: LAB | Facility: CLINIC | Age: 70
End: 2025-01-15
Payer: COMMERCIAL

## 2025-01-15 DIAGNOSIS — N17.9 ACUTE KIDNEY FAILURE, UNSPECIFIED: ICD-10-CM

## 2025-01-16 LAB
ANION GAP SERPL CALCULATED.3IONS-SCNC: 13 MMOL/L (ref 7–15)
BUN SERPL-MCNC: 25.8 MG/DL (ref 8–23)
CALCIUM SERPL-MCNC: 9.5 MG/DL (ref 8.8–10.4)
CHLORIDE SERPL-SCNC: 100 MMOL/L (ref 98–107)
CREAT SERPL-MCNC: 3.14 MG/DL (ref 0.51–0.95)
EGFRCR SERPLBLD CKD-EPI 2021: 15 ML/MIN/1.73M2
GLUCOSE SERPL-MCNC: 142 MG/DL (ref 70–99)
HCO3 SERPL-SCNC: 23 MMOL/L (ref 22–29)
POTASSIUM SERPL-SCNC: 3.9 MMOL/L (ref 3.4–5.3)
SODIUM SERPL-SCNC: 136 MMOL/L (ref 135–145)

## 2025-01-16 PROCEDURE — 36415 COLL VENOUS BLD VENIPUNCTURE: CPT | Performed by: FAMILY MEDICINE

## 2025-01-16 PROCEDURE — 80048 BASIC METABOLIC PNL TOTAL CA: CPT | Performed by: FAMILY MEDICINE

## 2025-01-16 PROCEDURE — P9604 ONE-WAY ALLOW PRORATED TRIP: HCPCS | Performed by: FAMILY MEDICINE

## 2025-01-17 ENCOUNTER — LAB REQUISITION (OUTPATIENT)
Dept: LAB | Facility: CLINIC | Age: 70
End: 2025-01-17
Payer: COMMERCIAL

## 2025-01-17 DIAGNOSIS — N18.9 CHRONIC KIDNEY DISEASE, UNSPECIFIED: ICD-10-CM

## 2025-01-17 DIAGNOSIS — D64.9 ANEMIA, UNSPECIFIED: ICD-10-CM

## 2025-01-20 ENCOUNTER — LAB REQUISITION (OUTPATIENT)
Dept: LAB | Facility: CLINIC | Age: 70
End: 2025-01-20
Payer: COMMERCIAL

## 2025-01-20 DIAGNOSIS — N17.9 ACUTE KIDNEY FAILURE, UNSPECIFIED: ICD-10-CM

## 2025-01-20 DIAGNOSIS — E87.6 HYPOKALEMIA: ICD-10-CM

## 2025-01-20 LAB
ANION GAP SERPL CALCULATED.3IONS-SCNC: 12 MMOL/L (ref 7–15)
BUN SERPL-MCNC: 24.2 MG/DL (ref 8–23)
CALCIUM SERPL-MCNC: 8.7 MG/DL (ref 8.8–10.4)
CHLORIDE SERPL-SCNC: 98 MMOL/L (ref 98–107)
CREAT SERPL-MCNC: 3.08 MG/DL (ref 0.51–0.95)
EGFRCR SERPLBLD CKD-EPI 2021: 16 ML/MIN/1.73M2
GLUCOSE SERPL-MCNC: 117 MG/DL (ref 70–99)
HCO3 SERPL-SCNC: 25 MMOL/L (ref 22–29)
HGB BLD-MCNC: 10.4 G/DL (ref 11.7–15.7)
POTASSIUM SERPL-SCNC: 3.3 MMOL/L (ref 3.4–5.3)
SODIUM SERPL-SCNC: 135 MMOL/L (ref 135–145)

## 2025-01-20 PROCEDURE — 36415 COLL VENOUS BLD VENIPUNCTURE: CPT | Performed by: FAMILY MEDICINE

## 2025-01-20 PROCEDURE — P9604 ONE-WAY ALLOW PRORATED TRIP: HCPCS | Performed by: FAMILY MEDICINE

## 2025-01-20 PROCEDURE — 80048 BASIC METABOLIC PNL TOTAL CA: CPT | Performed by: FAMILY MEDICINE

## 2025-01-20 PROCEDURE — 85018 HEMOGLOBIN: CPT | Performed by: FAMILY MEDICINE

## 2025-01-20 PROCEDURE — 82374 ASSAY BLOOD CARBON DIOXIDE: CPT | Performed by: FAMILY MEDICINE

## 2025-01-20 PROCEDURE — 80051 ELECTROLYTE PANEL: CPT | Performed by: FAMILY MEDICINE

## 2025-01-23 LAB
ANION GAP SERPL CALCULATED.3IONS-SCNC: 11 MMOL/L (ref 7–15)
BUN SERPL-MCNC: 27.7 MG/DL (ref 8–23)
CALCIUM SERPL-MCNC: 8.8 MG/DL (ref 8.8–10.4)
CHLORIDE SERPL-SCNC: 101 MMOL/L (ref 98–107)
CREAT SERPL-MCNC: 2.97 MG/DL (ref 0.51–0.95)
EGFRCR SERPLBLD CKD-EPI 2021: 16 ML/MIN/1.73M2
GLUCOSE SERPL-MCNC: 99 MG/DL (ref 70–99)
HCO3 SERPL-SCNC: 23 MMOL/L (ref 22–29)
POTASSIUM SERPL-SCNC: 3.8 MMOL/L (ref 3.4–5.3)
POTASSIUM SERPL-SCNC: 3.9 MMOL/L (ref 3.4–5.3)
SODIUM SERPL-SCNC: 135 MMOL/L (ref 135–145)

## 2025-01-23 PROCEDURE — 80048 BASIC METABOLIC PNL TOTAL CA: CPT | Performed by: NURSE PRACTITIONER

## 2025-01-23 PROCEDURE — 82435 ASSAY OF BLOOD CHLORIDE: CPT | Performed by: FAMILY MEDICINE

## 2025-01-23 PROCEDURE — P9604 ONE-WAY ALLOW PRORATED TRIP: HCPCS | Performed by: FAMILY MEDICINE

## 2025-01-23 PROCEDURE — P9604 ONE-WAY ALLOW PRORATED TRIP: HCPCS | Performed by: NURSE PRACTITIONER

## 2025-01-23 PROCEDURE — 84520 ASSAY OF UREA NITROGEN: CPT | Performed by: FAMILY MEDICINE

## 2025-01-23 PROCEDURE — 36415 COLL VENOUS BLD VENIPUNCTURE: CPT | Performed by: NURSE PRACTITIONER

## 2025-01-23 PROCEDURE — 82565 ASSAY OF CREATININE: CPT | Performed by: FAMILY MEDICINE

## 2025-01-23 PROCEDURE — 84132 ASSAY OF SERUM POTASSIUM: CPT | Performed by: NURSE PRACTITIONER

## 2025-01-27 ENCOUNTER — LAB REQUISITION (OUTPATIENT)
Dept: LAB | Facility: CLINIC | Age: 70
End: 2025-01-27
Payer: COMMERCIAL

## 2025-01-27 DIAGNOSIS — N17.9 ACUTE KIDNEY FAILURE, UNSPECIFIED: ICD-10-CM

## 2025-04-05 ENCOUNTER — HEALTH MAINTENANCE LETTER (OUTPATIENT)
Age: 70
End: 2025-04-05

## 2025-05-20 ENCOUNTER — LAB REQUISITION (OUTPATIENT)
Dept: LAB | Facility: CLINIC | Age: 70
End: 2025-05-20
Payer: COMMERCIAL

## 2025-05-20 DIAGNOSIS — N17.9 ACUTE KIDNEY FAILURE, UNSPECIFIED: ICD-10-CM

## 2025-06-02 LAB
ANION GAP SERPL CALCULATED.3IONS-SCNC: 15 MMOL/L (ref 7–15)
BUN SERPL-MCNC: 43.1 MG/DL (ref 8–23)
CALCIUM SERPL-MCNC: 8.6 MG/DL (ref 8.8–10.4)
CHLORIDE SERPL-SCNC: 103 MMOL/L (ref 98–107)
CREAT SERPL-MCNC: 3.78 MG/DL (ref 0.51–0.95)
EGFRCR SERPLBLD CKD-EPI 2021: 12 ML/MIN/1.73M2
GLUCOSE SERPL-MCNC: 310 MG/DL (ref 70–99)
HCO3 SERPL-SCNC: 17 MMOL/L (ref 22–29)
POTASSIUM SERPL-SCNC: 3.8 MMOL/L (ref 3.4–5.3)
SODIUM SERPL-SCNC: 135 MMOL/L (ref 135–145)

## 2025-06-02 PROCEDURE — P9604 ONE-WAY ALLOW PRORATED TRIP: HCPCS | Mod: ORL | Performed by: INTERNAL MEDICINE

## 2025-06-02 PROCEDURE — 80048 BASIC METABOLIC PNL TOTAL CA: CPT | Mod: ORL | Performed by: INTERNAL MEDICINE

## 2025-06-02 PROCEDURE — 36415 COLL VENOUS BLD VENIPUNCTURE: CPT | Mod: ORL | Performed by: INTERNAL MEDICINE

## 2025-06-10 ENCOUNTER — LAB REQUISITION (OUTPATIENT)
Dept: LAB | Facility: CLINIC | Age: 70
End: 2025-06-10
Payer: COMMERCIAL

## 2025-06-10 DIAGNOSIS — N18.4 CHRONIC KIDNEY DISEASE, STAGE 4 (SEVERE) (H): ICD-10-CM

## 2025-06-16 LAB
ANION GAP SERPL CALCULATED.3IONS-SCNC: 15 MMOL/L (ref 7–15)
BUN SERPL-MCNC: 54.6 MG/DL (ref 8–23)
CALCIUM SERPL-MCNC: 9 MG/DL (ref 8.8–10.4)
CHLORIDE SERPL-SCNC: 105 MMOL/L (ref 98–107)
CREAT SERPL-MCNC: 4.27 MG/DL (ref 0.51–0.95)
EGFRCR SERPLBLD CKD-EPI 2021: 11 ML/MIN/1.73M2
ERYTHROCYTE [DISTWIDTH] IN BLOOD BY AUTOMATED COUNT: 14.2 % (ref 10–15)
GLUCOSE SERPL-MCNC: 115 MG/DL (ref 70–99)
HCO3 SERPL-SCNC: 18 MMOL/L (ref 22–29)
HCT VFR BLD AUTO: 24.5 % (ref 35–47)
HGB BLD-MCNC: 7.7 G/DL (ref 11.7–15.7)
MCH RBC QN AUTO: 28.9 PG (ref 26.5–33)
MCHC RBC AUTO-ENTMCNC: 31.4 G/DL (ref 31.5–36.5)
MCV RBC AUTO: 92 FL (ref 78–100)
PLATELET # BLD AUTO: 268 10E3/UL (ref 150–450)
POTASSIUM SERPL-SCNC: 4.6 MMOL/L (ref 3.4–5.3)
RBC # BLD AUTO: 2.66 10E6/UL (ref 3.8–5.2)
SODIUM SERPL-SCNC: 138 MMOL/L (ref 135–145)
WBC # BLD AUTO: 9.9 10E3/UL (ref 4–11)

## 2025-06-16 PROCEDURE — 85027 COMPLETE CBC AUTOMATED: CPT | Mod: ORL | Performed by: STUDENT IN AN ORGANIZED HEALTH CARE EDUCATION/TRAINING PROGRAM

## 2025-06-16 PROCEDURE — 80048 BASIC METABOLIC PNL TOTAL CA: CPT | Mod: ORL | Performed by: STUDENT IN AN ORGANIZED HEALTH CARE EDUCATION/TRAINING PROGRAM

## 2025-06-16 PROCEDURE — 36415 COLL VENOUS BLD VENIPUNCTURE: CPT | Mod: ORL | Performed by: STUDENT IN AN ORGANIZED HEALTH CARE EDUCATION/TRAINING PROGRAM

## 2025-06-16 PROCEDURE — P9603 ONE-WAY ALLOW PRORATED MILES: HCPCS | Mod: ORL | Performed by: STUDENT IN AN ORGANIZED HEALTH CARE EDUCATION/TRAINING PROGRAM
